# Patient Record
Sex: FEMALE | Race: WHITE
[De-identification: names, ages, dates, MRNs, and addresses within clinical notes are randomized per-mention and may not be internally consistent; named-entity substitution may affect disease eponyms.]

---

## 2017-02-28 ENCOUNTER — HOSPITAL ENCOUNTER (INPATIENT)
Dept: HOSPITAL 74 - JER | Age: 77
LOS: 7 days | Discharge: HOME | DRG: 811 | End: 2017-03-07
Attending: INTERNAL MEDICINE | Admitting: INTERNAL MEDICINE
Payer: COMMERCIAL

## 2017-02-28 VITALS — BODY MASS INDEX: 21.9 KG/M2

## 2017-02-28 DIAGNOSIS — K92.2: ICD-10-CM

## 2017-02-28 DIAGNOSIS — J96.21: ICD-10-CM

## 2017-02-28 DIAGNOSIS — B48.8: ICD-10-CM

## 2017-02-28 DIAGNOSIS — J44.1: ICD-10-CM

## 2017-02-28 DIAGNOSIS — K57.90: ICD-10-CM

## 2017-02-28 DIAGNOSIS — E87.6: ICD-10-CM

## 2017-02-28 DIAGNOSIS — D50.0: Primary | ICD-10-CM

## 2017-02-28 DIAGNOSIS — N17.9: ICD-10-CM

## 2017-02-28 DIAGNOSIS — E78.5: ICD-10-CM

## 2017-02-28 DIAGNOSIS — Z86.718: ICD-10-CM

## 2017-02-28 DIAGNOSIS — Z99.81: ICD-10-CM

## 2017-02-28 DIAGNOSIS — Z87.891: ICD-10-CM

## 2017-02-28 DIAGNOSIS — I50.31: ICD-10-CM

## 2017-02-28 DIAGNOSIS — I11.0: ICD-10-CM

## 2017-02-28 LAB
ALBUMIN SERPL-MCNC: 3.7 G/DL (ref 3.4–5)
ALP SERPL-CCNC: 89 U/L (ref 45–117)
ALT SERPL-CCNC: 27 U/L (ref 12–78)
ANION GAP SERPL CALC-SCNC: 10 MMOL/L (ref 8–16)
ANISOCYTOSIS BLD QL SMEAR: (no result)
APTT BLD: 28.5 SECONDS (ref 26.9–34.4)
AST SERPL-CCNC: 25 U/L (ref 15–37)
BASOPHILS # BLD: 0.7 % (ref 0–2)
BILIRUB SERPL-MCNC: 0.3 MG/DL (ref 0.2–1)
CALCIUM SERPL-MCNC: 9.4 MG/DL (ref 8.5–10.1)
CO2 SERPL-SCNC: 38 MMOL/L (ref 21–32)
CREAT SERPL-MCNC: 0.8 MG/DL (ref 0.55–1.02)
DEPRECATED RDW RBC AUTO: 21.8 % (ref 11.6–15.6)
EOSINOPHIL # BLD: 0.2 % (ref 0–4.5)
GLUCOSE SERPL-MCNC: 86 MG/DL (ref 74–106)
HYPOCHROMIA BLD QL SMEAR: (no result)
INR BLD: 0.96 (ref 0.82–1.09)
MCH RBC QN AUTO: 16.9 PG (ref 25.7–33.7)
MCHC RBC AUTO-ENTMCNC: 28.4 G/DL (ref 32–36)
MCV RBC: 59.7 FL (ref 80–96)
MICROCYTES BLD QL SMEAR: (no result)
NEUTROPHILS # BLD: 85 % (ref 42.8–82.8)
PLATELET # BLD AUTO: 120 K/MM3 (ref 134–434)
PMV BLD: 8.5 FL (ref 7.5–11.1)
PROT SERPL-MCNC: 6.9 G/DL (ref 6.4–8.2)
PT PNL PPP: 10.6 SEC (ref 9.98–11.88)
TARGETS BLD QL SMEAR: (no result)
TROPONIN I SERPL-MCNC: < 0.02 NG/ML (ref 0–0.05)
VIT B12 SERPL-MCNC: 1642 PG/ML (ref 180–914)
WBC # BLD AUTO: 9.4 K/MM3 (ref 4–10)

## 2017-02-28 PROCEDURE — P9038 RBC IRRADIATED: HCPCS

## 2017-02-28 PROCEDURE — 30233N1 TRANSFUSION OF NONAUTOLOGOUS RED BLOOD CELLS INTO PERIPHERAL VEIN, PERCUTANEOUS APPROACH: ICD-10-PCS

## 2017-02-28 PROCEDURE — P9058 RBC, L/R, CMV-NEG, IRRAD: HCPCS

## 2017-02-28 RX ADMIN — SALINE NASAL SPRAY SCH SPRAY: 1.5 SOLUTION NASAL at 22:23

## 2017-02-28 RX ADMIN — ACLIDINIUM BROMIDE SCH PUFF: 400 POWDER, METERED RESPIRATORY (INHALATION) at 22:22

## 2017-02-28 RX ADMIN — ATORVASTATIN CALCIUM SCH MG: 10 TABLET, FILM COATED ORAL at 22:22

## 2017-02-28 RX ADMIN — FUROSEMIDE SCH MG: 10 INJECTION, SOLUTION INTRAVENOUS at 18:55

## 2017-02-28 RX ADMIN — LATANOPROST SCH DROP: 50 SOLUTION OPHTHALMIC at 22:23

## 2017-02-28 RX ADMIN — ALBUTEROL SULFATE PRN AMP: 2.5 SOLUTION RESPIRATORY (INHALATION) at 22:47

## 2017-02-28 RX ADMIN — BUDESONIDE AND FORMOTEROL FUMARATE DIHYDRATE SCH INH: 80; 4.5 AEROSOL RESPIRATORY (INHALATION) at 22:22

## 2017-02-28 RX ADMIN — MONTELUKAST SODIUM SCH MG: 10 TABLET, COATED ORAL at 22:22

## 2017-02-28 NOTE — HP
Admitting History and Physical





- Primary Care Physician


PCP: Alonso Gonzalez





- Admission


Chief Complaint: I couldn't breathe


History of Present Illness: 


Ms Peter is a very pleasant 76 year old female who comes in with difficulty 

breathing. She says it began about a week ago. At first it was minimal and 

noticed only on significant exertion, however it progressed to the point where 

she was feeling it constantly and at rest. She wears chronic oxygen and notes 

it was not helping the shortness of breath. She says she is unable to lie flat 

all the time and needs two pillows, this did not change. She noticed worsening 

swelling in her legs but that was minimal as well. She says that she was trying 

to treat this at home but the shortness of breath became so severe she needed 

to come in for further evaluation. She denies lightheadedness, dizziness, 

passing out, chest pain, coughing, nausea, vomiting, diarrhea, constipation, 

dark black stool, difficulty or pain on urination, change in color of her urine

, or redness in her legs. She is still feeling short of breath now.


History Source: Patient


Limitations to Obtaining History: No Limitations





- Past Medical History


Cardiovascular: Yes: HTN


Pulmonary: Yes: COPD


Heme/Onc: Yes: Anemia


Musculoskeletal: Yes: Osteoarthritis





- Past Surgical History


Past Surgical History: Yes: None





- Smoking History


Smoking history: Former smoker


Have you smoked in the past 12 months: No


Aproximately how many cigarettes per day: 0


If you are a former smoker, when did you quit?: Over 15 years ago





- Alcohol/Substance Use


Hx Alcohol Use: No


History of Substance Use: reports: None





- Social History


Usual Living Arrangement: Yes: Alone


ADL: Independent


Occupation: Retired dental , , 2 children


History of Recent Travel: No





Home Medications





- Allergies


Allergies/Adverse Reactions: 


 Allergies











Allergy/AdvReac Type Severity Reaction Status Date / Time


 


No Known Allergies Allergy   Verified 02/28/17 13:14














- Home Medications


Home Medications: 


Ambulatory Orders





Albuterol Sulfate [Proair Hfa -] 1 - 2 inh PO PRN PRN 06/14/12 


Fluticasone/Salmeterol [Advair 250-50 Diskus] 1 each IH BID 06/14/12 


Tiotropium Bromide [Spiriva] 18 mcg IH DAILY 06/14/12 


Montelukast Na [Singulair -] 10 mg PO HS #0 tablet 06/19/12 


Multivitamins [Multivit ([facility] Formul[facility])] 1 udtab PO DAILY #0 tab 06/19/12 


Sodium Chloride Nasal Spray [Ocean Spray Nasal Spray -] 2 spray NS TID #0 

spraybtl 06/19/12 


Ferrous Sulfate [Feosol] 325 mg PO DAILY 09/17/12 


Calcium Carbonate/Vitamin D3 [Calcium 600-Vit D3 200 Tablet] 1 each PO DAILY 07/ 06/13 


Docosahexanoic Acid/Epa [Fish Oil Softgel] 1 each PO DAILY 07/06/13 


Potassium Chloride [K-Dur] 20 meq PO DAILY #0 tab.er.prt 07/06/13 


Alendronate Sodium [Fosamax] 35 mg PO COSTA 05/01/16 


Atorvastatin Ca [Lipitor] 10 mg PO HS 05/01/16 


Latanoprost 0.005% Eye Drops [Xalatan 0.005% Eye Drops -] 1 drop OU HS 05/01/16 


Torsemide 40 mg PO BID #120 tablet 05/02/16 


Diltiazem Cd [Cardizem Cd -] 180 mg PO BID 02/28/17 


Diphenhydramine HCl [Benadryl -] 25 mg PO Q6H PRN 02/28/17 


Pantoprazole Sodium 40 mg PO DAILY 02/28/17 











Family Disease History





- Family Disease History


Family Disease History: Heart Disease: Father, Other: Mother (Alzheimers 

Dementia)





Review of Systems


Findings/Remarks: 


Full review of systems obtained, as per HPI and otherwise negative





Physical Examination


Vital Signs: 


 Vital Signs











Temperature  99.1 F   02/28/17 13:15


 


Pulse Rate  101 H  02/28/17 13:26


 


Respiratory Rate  20   02/28/17 13:15


 


Blood Pressure  123/78   02/28/17 13:15


 


O2 Sat by Pulse Oximetry (%)  94 L  02/28/17 13:26











Constitutional: Yes: Well Nourished, No Distress, Calm, Pallor


Eyes: Yes: Conjunctiva Clear, EOM Intact, PERRL


HENT: Yes: Atraumatic, Normocephalic


Cardiovascular: Yes: Regular Rate and Rhythm.  No: Gallop, Murmur, Rub


Respiratory: Yes: Regular, CTA Bilaterally, On Nasal O2.  No: Rales, Rhonchi, 

Wheezes


Gastrointestinal: Yes: Normal Bowel Sounds, Soft.  No: Distention, Tenderness


Extremities: Yes: WNL


Edema: Yes


Edema: LLE: 1+, RLE: 1+


Labs: 


 CBC, BMP





 02/28/17 13:45 





 02/28/17 13:45 











Imaging





- Results


Chest X-ray: Report Reviewed, Image Reviewed


EKG: Image Reviewed





Problem List





- Problems


(1) Anemia


Assessment/Plan: 


-symptomatic anemia with shortness of breath


-has history of anemia, on iron 


-will check anemia labs


-stool for occult blood


-hematology consult


-transfusion ordered by ER physician





Code(s): D64.9 - ANEMIA, UNSPECIFIED   





(2) SOB (shortness of breath)


Assessment/Plan: 


-most likely secondary to anemia


-however will admit to telemetry and rule out for ACS


-cardiac enzymes x3, first set negative


Code(s): R06.02 - SHORTNESS OF BREATH





(3) CHF (congestive heart failure)


Assessment/Plan: 


-patient with slightly increased swelling in her lower extremities per history


-chest x-ray clear, SOB not from CHF exacerbation


-continue torsemide


-cardiology consult


Code(s): I50.9 - HEART FAILURE, UNSPECIFIED   





(4) Hypertension


Assessment/Plan: 


-continue diltiazem and torsemide


-monitor


Code(s): I10 - ESSENTIAL (PRIMARY) HYPERTENSION   





(5) COPD (chronic obstructive pulmonary disease)


Assessment/Plan: 


-baseline


-continue home regimen


-consult Dr Soto, outpatient pulmonologist


Code(s): J44.9 - CHRONIC OBSTRUCTIVE PULMONARY DISEASE, UNSPECIFIED

## 2017-02-28 NOTE — CON.CARD
Consult


Consult Specialty:: cardio


Referred by:: janki


Reason for Consultation:: sob





- History of Present Illness


Chief Complaint: sob


History of Present Illness: 


77 yo female here with progressive sob.





sx's have worsened from mostly on exertion to now also at rest.


her usual home O2 is not helping relieve her sx's


started noticing the sob about 1 week ago but signif worse the past few days 

and today was much worse.


wheezing the past couple of days as well.


legs much more swollen than usual (baseline is feet only).


says her usual wt runs 120-122 lbs, hasn't checked recently.


thinks abdomen is slightly swollen/distended as well.





scattered mild cough, dry.


sore throat x 3d;


no fever/chills;


felt very tired today;


nausea/dry heaves yest





no cp/pressure/heaviness at all





pt here 5/16 with sob.


treated for both a.e. copd and acute diast chf at that time.


lasix 80 iv bid given (had been on lasix 40 po bid as outpt)--labs were stable 

with this tx for 2-3d, then d/c'd after brief stay (? sent out on torsemide 40 

bid per d/c summary).





PMH:


COPD


HFpEF


HTN


anemia





- Past Medical History


Cardio/Vascular: Yes: HTN


Pulmonary: Yes: COPD


Musculoskeletal: Yes: Osteoarthritis





- Past Surgical History


Past Surgical History: Yes: None





- Alcohol/Substance Use


Hx Alcohol Use: No


History of Substance Use: reports: None





- Smoking History


Smoking history: Former smoker


Have you smoked in the past 12 months: No


Aproximately how many cigarettes per day: 0


If you are a former smoker, when did you quit?: Over 15 years ago





- Social History


ADL: Independent


Occupation: Retired dental , , 2 children


History of Recent Travel: No





Home Medications





- Allergies


Allergies/Adverse Reactions: 


 Allergies











Allergy/AdvReac Type Severity Reaction Status Date / Time


 


No Known Allergies Allergy   Verified 02/28/17 13:14














- Home Medications


Home Medications: 


Ambulatory Orders





Albuterol Sulfate [Proair Hfa -] 1 - 2 inh PO PRN PRN 06/14/12 


Fluticasone/Salmeterol [Advair 250-50 Diskus] 1 each IH BID 06/14/12 


Tiotropium Bromide [Spiriva] 18 mcg IH DAILY 06/14/12 


Montelukast Na [Singulair -] 10 mg PO HS #0 tablet 06/19/12 


Multivitamins [Multivit ([facility] Formul[facility])] 1 udtab PO DAILY #0 tab 06/19/12 


Sodium Chloride Nasal Spray [Ocean Spray Nasal Spray -] 2 spray NS TID #0 

spraybtl 06/19/12 


Ferrous Sulfate [Feosol] 325 mg PO DAILY 09/17/12 


Calcium Carbonate/Vitamin D3 [Calcium 600-Vit D3 200 Tablet] 1 each PO DAILY 07/ 06/13 


Docosahexanoic Acid/Epa [Fish Oil Softgel] 1 each PO DAILY 07/06/13 


Potassium Chloride [K-Dur] 20 meq PO DAILY #0 tab.er.prt 07/06/13 


Alendronate Sodium [Fosamax] 35 mg PO COSTA 05/01/16 


Atorvastatin Ca [Lipitor] 10 mg PO HS 05/01/16 


Latanoprost 0.005% Eye Drops [Xalatan 0.005% Eye Drops -] 1 drop OU HS 05/01/16 


Torsemide 40 mg PO BID #120 tablet 05/02/16 


Diltiazem Cd [Cardizem Cd -] 180 mg PO BID 02/28/17 


Diphenhydramine HCl [Benadryl -] 25 mg PO Q6H PRN 02/28/17 


Pantoprazole Sodium 40 mg PO DAILY 02/28/17 











Family Disease History





- Family Disease History


Family Disease History: Heart Disease: Father, Other: Mother (Alzheimers 

Dementia)





Review of Systems





- Review of Systems


Constitutional: denies: Chills, Fever


Eyes: denies: Eye Pain


HENT: denies: Nasal Congestion


Neck: denies: Stiffness


Cardiovascular: denies: Palpitations


Respiratory: denies: Orthopnea, PND


Gastrointestinal: denies: Diarrhea, Rectal Bleeding


Genitourinary: denies: Burning, Hematuria


Musculoskeletal: denies: Muscle Pain


Integumentary: denies: Rash


Neurological: denies: Numbness, Seizure, Syncope


Endocrine: denies: Excessive Sweating


Hematology/Lymphatic: denies: Excessive Bleeding


Vital Signs: 


 Vital Signs











Temperature  99.1 F   02/28/17 13:15


 


Pulse Rate  94 H  02/28/17 16:35


 


Respiratory Rate  17   02/28/17 16:35


 


Blood Pressure  119/63   02/28/17 16:35


 


O2 Sat by Pulse Oximetry (%)  100   02/28/17 16:35











Constitutional: Yes: Well Nourished, No Distress


Eyes: No: Sclera Icterus


HENT: No: Nasal Congestion


Neck: No: Decreased ROM


Respiratory: Yes: CTA Bilaterally.  No: Accessory Muscle Use, Rales (-), Wheezes


Gastrointestinal: Yes: Normal Bowel Sounds.  No: Distention, Hepatomegaly, 

Palpable Mass, Tenderness


Cardiovascular: Yes: Regular Rate and Rhythm


JVD: No


Carotid Bruit: No


PMI: Non-Displaced


Heart Sounds: Yes: S1, S2.  No: Gallop


Murmur: No: Systolic Murmur, Diastolic Murmur


Musculoskeletal: Yes: Other (No kyphosis)


Extremities: No: Cold, Cyanosis


Edema: Yes (2+ pretibs bilat)


Peripheral Pulses: 2+ Left Carotid, 2+ Right Carotid, 2+ Left Doralis Pedis, 2+ 

Right Dorsalis Pedis


Integumentary: No: Jaundice


Neurological: Yes: Alert, Oriented (x3)


Psychiatric: No: Agitated





- Other Data


Labs, Other Data: 


 INR, PTT











INR  0.96  (0.82-1.09)   02/28/17  15:00    








 Laboratory Tests











  04/29/16 05/02/16 02/28/17





  21:30 06:00 13:45


 


WBC    9.4


 


Hgb   8.9 L  6.2 L* D


 


Plt Count    120 L D


 


Sodium   


 


Potassium   


 


Carbon Dioxide   


 


BUN   


 


Creatinine   


 


AST   


 


ALT   


 


Creatine Kinase   


 


Troponin I   


 


B-Natriuretic Peptide  611.07 H  














  02/28/17





  13:45


 


WBC 


 


Hgb 


 


Plt Count 


 


Sodium  140


 


Potassium  3.5


 


Carbon Dioxide  38 H


 


BUN  18  D


 


Creatinine  0.8


 


AST  25


 


ALT  27


 


Creatine Kinase  88


 


Troponin I  < 0.02


 


B-Natriuretic Peptide  645.36 H











ekg 2/28 (ER): NSR, normal asix/interv; WNL





Imaging





- Results


Chest X-ray: Report Reviewed, Image Reviewed





Assessment/Plan


Echo 4/16: nl LV/EF; RV tds; nl LA; valves WNL; RVSP 40-50





acute SOB, acute diast chf:


-BNP 600s, similar to when was here with probable chf 4/16


-? sx's due to worsening anemia with hgb 6


-CXR with no vasc redistribution pattern, no effusions--diffuse incr'd markings 

? chronic (copd)--incr'd vs prior 4/16 on my review but ? technique-related (

i.e. differences in penetratino); no signif cardiomegaly


-leg swelling is signif incr'd lately (? abdomen as well per pt), raising 

suspicion for chf though i cannot appreciate JVD on exam


-has been complying with torsemide 40 bid at home she says


-will start lasix 80 iv bid (diuresed well with this last time here)


-close f/u of daily wts and BMP--low threshold to hold diuretics if bun/creat 

bumps (in which case her sx's are likely related to anemia)


-no suspicion of ACS, enzymes neg x1 and ecg WNL





mild pulm HTN:


-estimated RVSP 40-50 on recent echo, RV not well seen on that report


-? sec to LV diast chf vs ? hypoxic chronic lung dz (on home O2 for copd)--

these 2 dx's are extremely likely to represent the etiology of her pulm htn and 

tx directed at both (i.e. diuretics and supplemental O2/airways tx) is all that 

is indicated here





COPD, ? with a.e.:


-+ wheezing and mild URI sx's


-defer to dr shearer +/- pulm as indicated, regarding airways-specific tx's


-check ABG





HTN:


-well controlled


-cont home meds





anemia, chronic:


-baseline hgb runs 8s-9s


-currently 6's, likely symptomatic (sob)


-PRBCs +/- further w/u of etiology per dr shearer





NO NEED FOR TELEMETRY MONITORING

## 2017-02-28 NOTE — CONSULT
Consult


Consult Specialty:: Oncology-hematology


Referred by:: Dr. Tucker


Reason for Consultation:: Hypochromic, Microcytic anemia





- History Source


History Provided By: Patient


Limitations to Obtaining History: No Limitations





- Past Medical History


Cardio/Vascular: Yes: HTN


Pulmonary: Yes: COPD, Other (oxygen at home)


Gastrointestinal: Yes: Other (History of  ulcer disease diagnosed 3 years ago 

when patient presented with anemia)


...Pregnant: No


Heme/Onc: Yes: Anemia


Musculoskeletal: Yes: Osteoarthritis





- Past Surgical History


Past Surgical History: Yes: None





- Alcohol/Substance Use


Hx Alcohol Use: No


History of Substance Use: reports: None





- Smoking History


Smoking history: Former smoker


Have you smoked in the past 12 months: No


Aproximately how many cigarettes per day: 0


If you are a former smoker, when did you quit?: Over 15 years ago





- Social History


ADL: Independent


Occupation: Retired dental , , 2 children


History of Recent Travel: No





Home Medications





- Allergies


Allergies/Adverse Reactions: 


 Allergies











Allergy/AdvReac Type Severity Reaction Status Date / Time


 


No Known Allergies Allergy   Verified 02/28/17 13:14














- Home Medications


Home Medications: 


Ambulatory Orders





Albuterol Sulfate [Proair Hfa -] 1 - 2 inh PO PRN PRN 06/14/12 


Fluticasone/Salmeterol [Advair 250-50 Diskus] 1 each IH BID 06/14/12 


Tiotropium Bromide [Spiriva] 18 mcg IH DAILY 06/14/12 


Montelukast Na [Singulair -] 10 mg PO HS #0 tablet 06/19/12 


Multivitamins [Multivit ([facility] Formul[facility])] 1 udtab PO DAILY #0 tab 06/19/12 


Sodium Chloride Nasal Spray [Ocean Spray Nasal Spray -] 2 spray NS TID #0 

spraybtl 06/19/12 


Ferrous Sulfate [Feosol] 325 mg PO DAILY 09/17/12 


Calcium Carbonate/Vitamin D3 [Calcium 600-Vit D3 200 Tablet] 1 each PO DAILY 07/ 06/13 


Docosahexanoic Acid/Epa [Fish Oil Softgel] 1 each PO DAILY 07/06/13 


Potassium Chloride [K-Dur] 20 meq PO DAILY #0 tab.er.prt 07/06/13 


Alendronate Sodium [Fosamax] 35 mg PO COSTA 05/01/16 


Atorvastatin Ca [Lipitor] 10 mg PO HS 05/01/16 


Latanoprost 0.005% Eye Drops [Xalatan 0.005% Eye Drops -] 1 drop OU HS 05/01/16 


Torsemide 40 mg PO BID #120 tablet 05/02/16 


Diltiazem Cd [Cardizem Cd -] 180 mg PO BID 02/28/17 


Diphenhydramine HCl [Benadryl -] 25 mg PO Q6H PRN 02/28/17 


Pantoprazole Sodium 40 mg PO DAILY 02/28/17 











Family Disease History





- Family Disease History


Family Disease History: Heart Disease: Father, Other: Mother (Alzheimers 

Dementia)


Other Family History: No family history of ca





Review of Systems





- Review of Systems


Constitutional: reports: Weakness.  denies: Fever, Loss of Appetite, Night 

Sweats


Eyes: denies: Double Vision, Recent Change in Vision


HENT: reports: Difficult Swallowing.  denies: Epistaxis, Throat Pain


Neck: denies: Pain on Movement, Stiffness, Tenderness


Cardiovascular: reports: Shortness of Breath.  denies: Chest Pain


Respiratory: reports: Exercise Intolerance, SOB, SOB on Exertion.  denies: Cough

, Hemoptysis


Gastrointestinal: denies: Abdominal Pain, Bloating, Constipation, Diarrhea, 

Melena, Nausea, Rectal Bleeding, Vomiting


Genitourinary: denies: Burning, Dysuria, Flank Pain, Frequency, Incontinence


Musculoskeletal: denies: Back Pain, Extremity Pain, Muscle Pain


Integumentary: denies: Blister, Bruising, Eczema, Erythema


Neurological: reports: No Symptoms


Endocrine: reports: No Symptoms


Hematology/Lymphatic: denies: Easily Bruised, Excessive Bleeding, Swollen Glands


Psychiatric: reports: No Symptoms





Physical Exam


Vital Signs: 


 Vital Signs











Temperature  98.8 F   02/28/17 19:44


 


Pulse Rate  102 H  02/28/17 19:44


 


Respiratory Rate  20   02/28/17 19:44


 


Blood Pressure  113/62   02/28/17 19:44


 


O2 Sat by Pulse Oximetry (%)  95   02/28/17 19:44











Constitutional: Yes: Mild Distress


Eyes: Yes: PERRL.  No: Diplopia, Ptosis, Sclera Icterus


HENT: Yes: Atraumatic, Normocephalic.  No: Hoarseness, Tonsillar Exudate


Neck: Yes: Supple, Trachea Midline.  No: Lymphadenopathy, Thyromegaly


Cardiovascular: Yes: Regular Rate and Rhythm


Respiratory: Yes: Rales, Wheezes


Gastrointestinal: Yes: Normal Bowel Sounds, Soft.  No: Hepatomegaly, Palpable 

Mass, Splenomegaly


Renal/: No: Anuria, CVA Tenderness - Left


Breast(s): Yes: WNL, Left, Right


Musculoskeletal: No: Back Pain, Joint Swelling, Muscle Pain


Extremities: No: Cold, Cool, Cyanosis


Edema: LLE: 3+, RLE: 3+


Integumentary: No: Bruising, Erythema


Neurological: Yes: WNL


...Motor Strength: WNL


Psychiatric: Yes: WNL





Problem List





- Problems


(1) Anemia


Assessment/Plan: 


History of ulcer disease diagnosed 3 years ago when patient underwent last EGD 

and colonoscopy. 


Treated medically with PPI's. 


Last saw  PCP, Dr. Gonzalez several months ago and was not told of anemia.


Presents now with hypochromic, microcytic anemia. Denies GI complaints of nausea

, diarrhea, constipation, melena, hematochezia, abdominal pains.


Likely diagnosis is blood loss anemia causing symptomatic SOB and dyspnea. 


Fe++ studies pending. 


To check  for celiac disease and for HbE. 


Will obtain stool guaics and evaluate for hemolysis .


Will need GI assessment. 


 








Code(s): D64.9 - ANEMIA, UNSPECIFIED

## 2017-02-28 NOTE — PDOC
History of Present Illness





- General


History Source: Patient


Exam Limitations: No Limitations





- History of Present Illness


Initial Comments: 





02/28/17 14:03


The patient is a 76 year old female brought via EMS, with a significant past 

medical history of COPD on home O2 (2 liters), anemia, HTN, HLD and lower 

extremity DVT, who presents to the emergency department with shortness of 

breath for the last couple of days. She states that she has been having a cough 

as well for an undisclosed amount of time, that is dry in nature. She notes 

that she has been compliant with all her medication and has not been eating any 

salty foods. She also notes that she has had lower extremity swelling in the 

past. 





The patient denies chest pain, headache and dizziness. Denies fever, chills, 

nausea, vomit, diarrhea and constipation. Denies dysuria, frequency, urgency 

and hematuria. 





Allergies: None 


Past surgical history: None reported 


Social history: Former smoker


PMD - Dr. Gonzalez 


Cardiology - Dr. Cuba


Pulmonology - Dr. Soto  





<Caleb Ku - Last Filed: 02/28/17 14:22>





- General


History Source: Patient, Old Records


Exam Limitations: No Limitations





<Lila Husain - Last Filed: 02/28/17 15:18>





- General


Chief Complaint: Shortness of Breath


Stated Complaint: DIFFICULTY BREATHING


Time Seen by Provider: 02/28/17 13:25





Past History





<Caleb Ku - Last Filed: 02/28/17 14:22>





- Past Medical History


Anemia: Yes (BLOOD TRANSFUSION)


Asthma: No


Cancer: No


Cardiac Disorders: No


CVA: No


COPD: Yes (USES O2 AT HOME 2 LITERS)


CHF: No


Dementia: No


Diabetes: No


GI Disorders: Yes


 Disorders: No


HTN: Yes


Hypercholesterolemia: Yes


Liver Disease: No


Suicide Attempt (Hx): No


Seizures: No


Thyroid Disease: No





- Surgical History


Abdominal Surgery: No


Appendectomy: No


Cardiac Surgery: No


Cholecystectomy: No


Lung Surgery: No


Neurologic Surgery: No


Orthopedic Surgery: No





- Psycho/Social/Smoking Cessation Hx


Anxiety: No


Suicidal Ideation: No


Smoking Status: Yes


Smoking History: Former smoker


Have you smoked in the past 12 months: No


Number of Cigarettes Smoked Daily: 0


If you are a former smoker, when did you quit?: Over 15 years ago


Information on smoking cessation initiated: No


Hx Alcohol Use: No


Drug/Substance Use Hx: No


Substance Use Type: None


Hx Substance Use Treatment: No





<LishaLila - Last Filed: 02/28/17 15:18>





- Past Medical History


Allergies/Adverse Reactions: 


 Allergies











Allergy/AdvReac Type Severity Reaction Status Date / Time


 


No Known Allergies Allergy   Verified 02/28/17 13:14











Home Medications: 


Ambulatory Orders





Albuterol Sulfate [Proair Hfa -] 1 - 2 inh PO PRN PRN 06/14/12 


Fluticasone/Salmeterol [Advair 250-50 Diskus] 1 each IH BID 06/14/12 


Tiotropium Bromide [Spiriva] 18 mcg IH DAILY 06/14/12 


Albuterol Sulfate Inhaler - [Ventolin HFA Inhaler -] 2 inh IH Q4H PRN #0 inh 06/ 19/12 


Montelukast Na [Singulair -] 10 mg PO HS #0 tablet 06/19/12 


Multivitamins [Multivit ([facility] Formul[facility])] 1 udtab PO DAILY #0 tab 06/19/12 


Sodium Chloride Nasal Spray [Ocean Spray Nasal Spray -] 2 spray NS TID #0 

spraybtl 06/19/12 


Ferrous Sulfate [Feosol] 325 mg PO DAILY 09/17/12 


Calcium Carbonate/Vitamin D3 [Calcium 600-Vit D3 200 Tablet] 1 each PO DAILY 07/ 06/13 


Docosahexanoic Acid/Epa [Fish Oil Softgel] 1 each PO DAILY 07/06/13 


Potassium Chloride [K-Dur] 20 meq PO DAILY #0 tab.er.prt 07/06/13 


Alendronate Sodium [Fosamax] 35 mg PO WEEKLY 05/01/16 


Atorvastatin Ca [Lipitor] 10 mg PO HS 05/01/16 


Latanoprost 0.005% Eye Drops [Xalatan 0.005% Eye Drops -] 1 drop OU HS 05/01/16 


Diltiazem Cd [Cardizem Cd -] 180 mg PO BID #60 cap.cd.24h 05/02/16 


Torsemide 40 mg PO BID #120 tablet 05/02/16 


Azithromycin 250 mg PO DAILY 02/28/17 


Diltiazem Cd [Cardizem Cd -] 180 mg PO BID 02/28/17 


Potassium Chloride [Klor-Con M20] 20 meq PO AM 02/28/17 











**Review of Systems





- Review of Systems


Able to Perform ROS?: Yes


Comments:: 





02/28/17 14:04


GENERAL/CONSTITUTIONAL: No fever or chills. No weakness.


HEAD, EYES, EARS, NOSE AND THROAT: No change in vision. No ear pain or 

discharge. No sore throat.


CARDIOVASCULAR: +Shortness of breath. No chest pain


RESPIRATORY: +Cough. No wheezing, or hemoptysis.


GASTROINTESTINAL: No nausea, vomiting, diarrhea or constipation.


GENITOURINARY: No dysuria, frequency, or change in urination.


MUSCULOSKELETAL: No joint or muscle swelling or pain. No neck or back pain.


SKIN: No rash


NEUROLOGIC: No headache, vertigo, loss of consciousness, or change in strength/

sensation.


ENDOCRINE: No increased thirst. No abnormal weight change


HEMATOLOGIC/LYMPHATIC: No anemia, easy bleeding, or history of blood clots.


ALLERGIC/IMMUNOLOGIC: No hives or skin allergy.





<Caleb Ku - Last Filed: 02/28/17 14:22>





*Physical Exam





- Vital Signs


 Last Vital Signs











Temp Pulse Resp BP Pulse Ox


 


 99.1 F   101 H  20   123/78   94 L


 


 02/28/17 13:15  02/28/17 13:26  02/28/17 13:15  02/28/17 13:15  02/28/17 13:26














- Physical Exam


Comments: 





02/28/17 14:04


GENERAL: Awake, alert, and fully oriented, in no acute distress


HEAD: No signs of trauma, normocephalic, atraumatic 


EYES: PERRLA, EOMI, sclera anicteric, conjunctiva clear


ENT: Auricles normal inspection, hearing grossly normal, nares patent, 

oropharynx clear without


exudates. Moist mucosa


NECK: Normal ROM, supple, no lymphadenopathy, JVD, or masses


LUNGS: +Distant breath sounds but equal bilaterally. No distress, speaks full 

sentences, clear to auscultation bilaterally 


HEART: Regular rate and rhythm, normal S1 and S2, no murmurs, rubs or gallops, 

peripheral pulses normal and equal bilaterally. 


ABDOMEN: Soft, nontender, normoactive bowel sounds.  No guarding, no rebound.  

No masses


EXTREMITIES: +2+ pitting edema bilaterally. Erythema of the distal fibular 

region left greater than right. Normal range of motion.  No clubbing or 

cyanosis. 


NEUROLOGICAL: Cranial nerves II through XII grossly intact.  Normal speech, 

normal gait, no focal sensorimotor deficits 


SKIN: Warm, Dry, normal turgor, no rashes or lesions noted





<Caleb Ku - Last Filed: 02/28/17 14:22>





- Vital Signs


 Last Vital Signs











Temp Pulse Resp BP Pulse Ox


 


 99.1 F   101 H  20   123/78   88 L


 


 02/28/17 13:15  02/28/17 13:15  02/28/17 13:15  02/28/17 13:15  02/28/17 13:15














<Lila Husain - Last Filed: 02/28/17 15:18>





ED Treatment Course





- RADIOLOGY


Radiograph Interpretation: 





02/28/17 14:22


Chest X-Ray


Reviewed by: Dr. Tony Macdonald


Impression: No significant interval change or acute lung disease is present. 





<Caleb Ku - Last Filed: 02/28/17 14:22>





- LABORATORY


CBC & Chemistry Diagram: 


 02/28/17 13:45





 02/28/17 13:45





<Lila Husain - Last Filed: 02/28/17 15:18>





Medical Decision Making





- Medical Decision Making


02/28/17 14:27


76-year-old female with history of hypertension, anemia, COPD on home O2, DVT 

last year and CHF exacerbation who presents to the emergency department with 

three-day history of progressive shortness of breath and lower extremity 

swelling. Differential diagnosis includes but is not limited to: Pneumonia, 

influenza, ACS, CHF, COPD exacerbation, anemia, electrolyte abnormality, toxic/

metabolic derangement.


Plan:


1. EKG


2. Chest x-ray


3. Labs


4. Urine analysis


5. Influenza PCR


6. DuoNeb treatment


7. Observe and reevaluate





02/28/17 15:16


Addendum:  The labs were reviewed and are noted in the EMR.  The hemoglobin is 

6.2 which is significantly lower than her baseline.  Rectal exam showed brown 

stool (hemoccult results are pending).  CXR is negative for acute infiltrate.  

Will admit to telemetry for serial cardiac markers, anemia work-up and 

monitoring of SOB.





<Lila Husain - Last Filed: 02/28/17 15:18>





*DC/Admit/Observation/Transfer





- Attestations


Scribe Attestion: 





02/28/17 14:05


Documentation prepared by Caleb Ku, acting as medical scribe for 

Lila Husain MD





<Caleb Ku - Last Filed: 02/28/17 14:22>





- Discharge Dispostion


Admit: Yes





- Attestations


Physician Attestion: 





02/28/17 14:29


I, Dr. Lila Husain, attest that the scribes documentation that appears above 

has been prepared under my direction and personally reviewed by me in its 

entirety.





I confirmed that the note above accurately reflects all work, treatment, 

procedures, and medical decision-making performed by me.





<Lila Husain - Last Filed: 02/28/17 15:18>


Diagnosis at time of Disposition: 


 SOB (shortness of breath), Anemia, COPD exacerbation





- Discharge Dispostion


Condition at time of disposition: Stable





- Referrals


Referrals: 


Alonso Gonzalez MD [Primary Care Provider] -

## 2017-03-01 LAB
ANION GAP SERPL CALC-SCNC: 8 MMOL/L (ref 8–16)
APPEARANCE UR: CLEAR
BASOPHILS # BLD: 0.4 % (ref 0–2)
BILIRUB DIRECT SERPL-MCNC: 193 U/L (ref 84–246)
BILIRUB UR STRIP.AUTO-MCNC: NEGATIVE MG/DL
CALCIUM SERPL-MCNC: 7.8 MG/DL (ref 8.5–10.1)
CO2 SERPL-SCNC: 37 MMOL/L (ref 21–32)
COLOR UR: (no result)
CREAT SERPL-MCNC: 0.8 MG/DL (ref 0.55–1.02)
DEPRECATED RDW RBC AUTO: 29.8 % (ref 11.6–15.6)
EOSINOPHIL # BLD: 0 % (ref 0–4.5)
GLUCOSE SERPL-MCNC: 99 MG/DL (ref 74–106)
KETONES UR QL STRIP: NEGATIVE
LEUKOCYTE ESTERASE UR QL STRIP.AUTO: NEGATIVE
MAGNESIUM SERPL-MCNC: 2.6 MG/DL (ref 1.8–2.4)
MCH RBC QN AUTO: 20.8 PG (ref 25.7–33.7)
MCHC RBC AUTO-ENTMCNC: 31 G/DL (ref 32–36)
MCV RBC: 67.1 FL (ref 80–96)
NEUTROPHILS # BLD: 79.8 % (ref 42.8–82.8)
NITRITE UR QL STRIP: NEGATIVE
PH UR: 7 [PH] (ref 5–8)
PHOSPHATE SERPL-MCNC: 4.1 MG/DL (ref 2.5–4.9)
PLATELET # BLD AUTO: 102 K/MM3 (ref 134–434)
PMV BLD: 8.5 FL (ref 7.5–11.1)
PROT UR QL STRIP: NEGATIVE
PROT UR QL STRIP: NEGATIVE
RBC # UR STRIP: NEGATIVE /UL
SP GR UR: 1.01 (ref 1–1.03)
TROPONIN I SERPL-MCNC: < 0.02 NG/ML (ref 0–0.05)
TROPONIN I SERPL-MCNC: < 0.02 NG/ML (ref 0–0.05)
UROBILINOGEN UR STRIP-MCNC: NEGATIVE E.U./DL (ref 0.2–1)
WBC # BLD AUTO: 8.5 K/MM3 (ref 4–10)

## 2017-03-01 RX ADMIN — SALINE NASAL SPRAY SCH SPRAY: 1.5 SOLUTION NASAL at 14:04

## 2017-03-01 RX ADMIN — LATANOPROST SCH DROP: 50 SOLUTION OPHTHALMIC at 22:01

## 2017-03-01 RX ADMIN — ACLIDINIUM BROMIDE SCH PUFF: 400 POWDER, METERED RESPIRATORY (INHALATION) at 10:37

## 2017-03-01 RX ADMIN — FUROSEMIDE SCH MG: 10 INJECTION, SOLUTION INTRAVENOUS at 06:49

## 2017-03-01 RX ADMIN — SALINE NASAL SPRAY SCH SPRAY: 1.5 SOLUTION NASAL at 22:01

## 2017-03-01 RX ADMIN — BUDESONIDE AND FORMOTEROL FUMARATE DIHYDRATE SCH INH: 80; 4.5 AEROSOL RESPIRATORY (INHALATION) at 22:02

## 2017-03-01 RX ADMIN — MULTIVITAMIN TABLET SCH TAB: TABLET at 10:36

## 2017-03-01 RX ADMIN — SALINE NASAL SPRAY SCH SPRAY: 1.5 SOLUTION NASAL at 06:50

## 2017-03-01 RX ADMIN — Medication SCH TAB: at 10:37

## 2017-03-01 RX ADMIN — ATORVASTATIN CALCIUM SCH MG: 10 TABLET, FILM COATED ORAL at 22:01

## 2017-03-01 RX ADMIN — FERROUS SULFATE TAB EC 324 MG (65 MG FE EQUIVALENT) SCH MG: 324 (65 FE) TABLET DELAYED RESPONSE at 10:37

## 2017-03-01 RX ADMIN — POTASSIUM CHLORIDE SCH MEQ: 1500 TABLET, EXTENDED RELEASE ORAL at 10:37

## 2017-03-01 RX ADMIN — ALBUTEROL SULFATE PRN AMP: 2.5 SOLUTION RESPIRATORY (INHALATION) at 18:10

## 2017-03-01 RX ADMIN — MONTELUKAST SODIUM SCH MG: 10 TABLET, COATED ORAL at 22:01

## 2017-03-01 RX ADMIN — ALBUTEROL SULFATE PRN AMP: 2.5 SOLUTION RESPIRATORY (INHALATION) at 12:24

## 2017-03-01 RX ADMIN — BUDESONIDE AND FORMOTEROL FUMARATE DIHYDRATE SCH INH: 80; 4.5 AEROSOL RESPIRATORY (INHALATION) at 10:37

## 2017-03-01 RX ADMIN — FUROSEMIDE SCH MG: 10 INJECTION, SOLUTION INTRAVENOUS at 14:03

## 2017-03-01 NOTE — PN
Progress Note (short form)





- Note


Progress Note: 


Called to see patient.  I had performed a colonoscopy 6/12 that revealed 

diverticulosis.  She then followed up with Dr. Rodríguez as an outpatient.  he 

performed EGD 9/12.  She said that she could not follow back up with him due to 

insurance purposes but would not mind seeing him again now that her insurance 

has changed to [facility].  I let Dr. Tucker know who will change the 

consult to Dr. Rodríguez.

## 2017-03-01 NOTE — CONSULT
Consultation: 


REQUESTING PROVIDER:





CONSULT REQUEST: We have been asked to medically evaluate this patient for sob.





HISTORY OF PRESENT ILLNESS:


This is a 77 yo F former smoker with PMH of COPD, anemia, HTN, and OA, who 

presents due to worsening sob x 2w. At baseline patient is O2 dependent 2 L and 

had dyspnea at rest. 2 weeks ago she started havign rhinorrhea and sore throat 

as well as worsening sob. She also noticed increased LE edema and orthopnea. 

She has had similar symptoms years prior. She states that in 2000 she had her 

last EGD and colonoscopy, at which time a bleeding gastric ulcer was 

discovered. She denies melena, hematochesia or hematemesis. Shje denies f/c, 

abd pain, chest pain, n/v, diarrhea, dysuria or hematuria. She is on daily Fe 

supplements. She received 2 L pRBC. She now feels better and reports reduced LE 

edema. Ambulating to bathroom on 2L NC 








REVIEW OF SYSTEMS:


CONSTITUTIONAL: 


Absent:  fever, chills, loss of appetite, weight change


HEENT: 


Absent:  difficulty swallowing, visual changes


CARDIOVASCULAR: 


Absent: chest pain, syncope, palpitations


RESPIRATORY: 


Absent: stridor, hemoptysis


GASTROINTESTINAL:


Absent: abdominal pain, abdominal distension, nausea, vomiting, diarrhea, 

constipation, melena, hematochezia


GENITOURINARY: 


Absent: dysuria, hematuria


MUSCULOSKELETAL: 


Absent: back pain, neck pain


SKIN: 


Absent: rash, itching, pallor


HEMATOLOGIC/IMMUNOLOGIC: 


Absent: easy bleeding, easy bruising


ENDOCRINE:


Absent: heat intolerance, cold intolerance


NEUROLOGIC: 


Absent: headache, focal weakness or paresthesias


PSYCHIATRIC: 


Absent: anxiety, depression





PHYSICAL EXAMINATION





 Vital Signs - 24 hr











  02/28/17 02/28/17 02/28/17





  16:20 16:35 19:44


 


Temperature   98.8 F


 


Pulse Rate 93 H  


 


Pulse Rate [  94 H 102 H





Left]   


 


Respiratory 19 17 20





Rate   


 


Blood Pressure 118/69  


 


Blood Pressure  119/63 113/62





[Left Arm]   


 


O2 Sat by Pulse 100 100 95





Oximetry (%)   














  02/28/17 02/28/17 03/01/17





  21:00 22:00 05:55


 


Temperature   98.6 F


 


Pulse Rate   110 H


 


Pulse Rate [   





Left]   


 


Respiratory 20  20





Rate   


 


Blood Pressure   121/67


 


Blood Pressure   





[Left Arm]   


 


O2 Sat by Pulse 96 96 





Oximetry (%)   














  03/01/17 03/01/17





  09:00 14:32


 


Temperature 98.0 F 98.1 F


 


Pulse Rate 93 H 96 H


 


Pulse Rate [  





Left]  


 


Respiratory 18 





Rate  


 


Blood Pressure 127/68 100/57


 


Blood Pressure  





[Left Arm]  


 


O2 Sat by Pulse  





Oximetry (%)  














GENERAL: Awake, alert, and fully oriented, in no acute distress.


HEAD: Normal with no signs of trauma.


EYES: Pupils equal, round and reactive to light, extraocular movements intact, 

sclera anicteric, conjunctiva clear. 


EARS, NOSE, THROAT: Moist mucous membranes.


NECK: supple without JVD


LUNGS: diffuse wheezes 


HEART: Regular rate and rhythm, normal S1 and S2


ABDOMEN: Soft, nontender, not distended, normoactive bowel sounds, no masses. 


MUSCULOSKELETAL: No CVA tenderness.


UPPER EXTREMITIES: 2+ pulses, No peripheral edema.


LOWER EXTREMITIES: 2+ pulses, warm, well-perfused. 1+ peripheral edema. 


NEUROLOGICAL:  Cranial nerves II-XII grossly intact. Normal speech. 


PSYCHIATRIC: Cooperative. Good eye contact. A


SKIN: Warm, dry








 Laboratory Results - last 24 hr











  02/28/17 03/01/17 03/01/17





  22:00 06:50 06:50


 


WBC   


 


RBC   


 


Hgb   


 


Hct   


 


MCV   


 


MCHC   


 


RDW   


 


Plt Count   


 


MPV   


 


Neutrophils %   


 


Lymphocytes %   


 


Monocytes %   


 


Eosinophils %   


 


Basophils %   


 


Retic Count    3.10 H D


 


Sodium   142 


 


Potassium   3.5 


 


Chloride   97 L 


 


Carbon Dioxide   37 H 


 


Anion Gap   8 


 


BUN   19 H 


 


Creatinine   0.8 


 


Random Glucose   99 


 


Calcium   7.8 L 


 


Phosphorus   4.1 


 


Magnesium   2.6 H D 


 


LD Total   193 


 


Creatine Kinase  74  70 


 


Troponin I  < 0.02  < 0.02 


 


Urine Color   


 


Urine Appearance   


 


Urine pH   


 


Ur Specific Gravity   


 


Urine Protein   


 


Urine Glucose (UA)   


 


Urine Ketones   


 


Urine Blood   


 


Urine Nitrite   


 


Urine Bilirubin   


 


Urine Urobilinogen   


 


Ur Leukocyte Esterase   


 


Stool Occult Blood   














  03/01/17 03/01/17 03/01/17





  06:50 07:30 10:20


 


WBC  8.5  


 


RBC  3.74  


 


Hgb  7.8 L D  


 


Hct  25.1 L D  


 


MCV  67.1 L  


 


MCHC  31.0 L  


 


RDW  29.8 H  


 


Plt Count  102 L  


 


MPV  8.5  


 


Neutrophils %  79.8  


 


Lymphocytes %  8.6  D  


 


Monocytes %  11.2 H  


 


Eosinophils %  0.0  D  


 


Basophils %  0.4  


 


Retic Count   


 


Sodium   


 


Potassium   


 


Chloride   


 


Carbon Dioxide   


 


Anion Gap   


 


BUN   


 


Creatinine   


 


Random Glucose   


 


Calcium   


 


Phosphorus   


 


Magnesium   


 


LD Total   


 


Creatine Kinase   


 


Troponin I   


 


Urine Color   Straw 


 


Urine Appearance   Clear 


 


Urine pH   7.0 


 


Ur Specific Gravity   1.008 


 


Urine Protein   Negative 


 


Urine Glucose (UA)   Negative 


 


Urine Ketones   Negative 


 


Urine Blood   Negative 


 


Urine Nitrite   Negative 


 


Urine Bilirubin   Negative 


 


Urine Urobilinogen   Negative 


 


Ur Leukocyte Esterase   Negative 


 


Stool Occult Blood    Positive








Active Medications











Generic Name Dose Route Start Last Admin





  Trade Name Freq  PRN Reason Stop Dose Admin


 


Albuterol Sulfate  1 amp 02/28/17 16:35 03/01/17 12:24





  Ventolin 0.5% -  NEB   1 amp





  Q6H PRN   Administration





  SHORT OF BREATH/WHEEZING   


 


Atorvastatin Calcium  10 mg 02/28/17 22:00 02/28/17 22:22





  Lipitor -  PO   10 mg





  HS ELI   Administration


 


Budesonide/Formoterol Fumarate  2 puff 02/28/17 22:00 03/01/17 10:37





  Symbicort 80/4.5mcg -  IH   2 inh





  BID ELI   Administration


 


Calcium Carbonate/Cholecalciferol  1 tab 03/01/17 10:00 03/01/17 10:37





  Os-Saleem 500+D -  PO   1 tab





  DAILY ELI   Administration


 


Diltiazem HCl  180 mg 02/28/17 22:00 03/01/17 10:37





  Cardizem Cd -  PO   180 mg





  BID ELI   Administration


 


Ferrous Sulfate  325 mg 03/01/17 10:00 03/01/17 10:37





  Feosol -  PO   325 mg





  DAILY ELI   Administration


 


Furosemide  80 mg 02/28/17 18:15 03/01/17 14:03





  Lasix Injection -  IVPUSH   80 mg





  BID@0600,1400 ELI   Administration


 


Latanoprost  1 drop 02/28/17 22:00 02/28/17 22:23





  Xalatan 0.005% Eye Drops -  OU   1 drop





  HS ELI   Administration


 


Montelukast Sodium  10 mg 02/28/17 22:00 02/28/17 22:22





  Singulair -  PO   10 mg





  HS ELI   Administration


 


Multivitamins/Minerals/Vitamin C  1 tab 03/01/17 10:00 03/01/17 10:36





  Tab-A-Vit -  PO   1 tab





  DAILY ELI   Administration


 


Pantoprazole Sodium  40 mg 03/01/17 10:00 03/01/17 10:37





  Protonix -  PO   40 mg





  DAILY ELI   Administration


 


Potassium Chloride  20 meq 03/01/17 10:00 03/01/17 10:37





  K-Dur -  PO   20 meq





  DAILY ELI   Administration


 


Sodium Chloride  2 spray 02/28/17 22:00 03/01/17 14:04





  Ocean Spray Nasal Spray -  NS   2 spray





  TID ELI   Administration


 


Tiotropium Bromide  1 puff 03/02/17 10:00  





  Spiriva -  IH   





  DAILY ELI   











ASSESSMENT/PLAN:





Acute on chronic hypoxic respiratory failure 


-likely due to exacerbated severe anemia


-symptmatically improved, ambulating to bathroom, below baseline


-supplemental O2, now on 2L sat 96% at rest 


-symbicort, singulair, albuterol, spiriva





COPD


-O2 dependant 


-mild pulmonary HTN


-CXR no acute provess





Severe symptomatic anemia 


-acute on chronic


-Hx upper GIB (bleeding ulcer 2000) 


-recommend GI consult for EGD, colonoscopy


-hemo consult appreciated: f/u anemia workup 


-s/p 2 U pRBC


-monitor h/h 





CHF


-cardiology on case


-possible exacerbation 


      


HTN


-lasix





Dispo: We will continue to follow the patient. Thank you for this consultative 

opportunity.











Problem List





- Problems


(1) Acute on chronic respiratory failure with hypoxemia


Code(s): J96.21 - ACUTE AND CHRONIC RESPIRATORY FAILURE WITH HYPOXIA





(2) Anemia


Code(s): D64.9 - ANEMIA, UNSPECIFIED   





(3) COPD (chronic obstructive pulmonary disease)


Code(s): J44.9 - CHRONIC OBSTRUCTIVE PULMONARY DISEASE, UNSPECIFIED   





(4) COPD exacerbation


Code(s): J44.1 - CHRONIC OBSTRUCTIVE PULMONARY DISEASE W (ACUTE) EXACERBATION





(5) SOB (shortness of breath)


Code(s): R06.02 - SHORTNESS OF BREATH





(6) CHF (congestive heart failure)


Code(s): I50.9 - HEART FAILURE, UNSPECIFIED   





(7) Hypertension


Code(s): I10 - ESSENTIAL (PRIMARY) HYPERTENSION   





(8) GIB (gastrointestinal bleeding)


Code(s): K92.2 - GASTROINTESTINAL HEMORRHAGE, UNSPECIFIED   








Visit type





- Emergency Visit


Emergency Visit: Yes


ED Registration Date: 02/28/17


Care time: The patient presented to the Emergency Department on the above date 

and was hospitalized for further evaluation of their emergent condition.





- New Patient


This patient is new to me today: Yes


Date on this admission: 03/01/17





- Critical Care


Critical Care patient: No

## 2017-03-01 NOTE — PN
Teaching Attending Note


Name of Resident: Isabelle Grider


ATTENDING PHYSICIAN STATEMENT





I saw and evaluated the patient.


I reviewed the resident's note and discussed the case with the resident.


I agree with the resident's findings and plan as documented.





PULMONARY CONSULTATION





IMP ACUTE ON CHRONIC HYPOXEMIC RESPIRATORY FAILURE


      ADVANCED COPD ON O2


      SEVERE ANEMIA


      ?CHF


      GUIAC + STOOLS


      HTN





PLAN INHALED BRONCHODILATORS


        O2


        TRANSFUSE


        LASIX


        GI EVALUATION


        MONITOR H+H





Problem List





- Problems


(1) Anemia


Code(s): D64.9 - ANEMIA, UNSPECIFIED   





(2) COPD (chronic obstructive pulmonary disease)


Code(s): J44.9 - CHRONIC OBSTRUCTIVE PULMONARY DISEASE, UNSPECIFIED   





(3) SOB (shortness of breath)


Code(s): R06.02 - SHORTNESS OF BREATH





(4) CHF (congestive heart failure)


Code(s): I50.9 - HEART FAILURE, UNSPECIFIED   





(5) Hypertension


Code(s): I10 - ESSENTIAL (PRIMARY) HYPERTENSION   





(6) Acute on chronic respiratory failure with hypoxemia


Code(s): J96.21 - ACUTE AND CHRONIC RESPIRATORY FAILURE WITH HYPOXIA

## 2017-03-01 NOTE — PN
Progress Note (short form)





- Note


Progress Note: 


s:  no cp sob palps dizzy





o: 


 Vital Signs











 Period  Temp  Pulse  Resp  BP Sys/Gonzalez  Pulse Ox


 


 Last 24 Hr  98.0 F-99.1 F    17-20  113-127/62-78  








Constitutional: Yes: Well Nourished, No Distress


Eyes: No: Sclera Icterus


Respiratory: Yes: CTA Bilaterally.  No: Accessory Muscle Use


Gastrointestinal: Yes: Normal Bowel Sounds.  No: Distention, Hepatomegaly, 

Palpable Mass, Tenderness


Cardiovascular: Yes: Regular Rate and Rhythm


JVD: No


Heart Sounds: Yes: S1, S2.  No: Gallop


Murmur: No: Systolic Murmur, Diastolic Murmur


Extremities: No: Cold, Cyanosis


Edema: Yes (1-2+ pretibs bilat)


Integumentary: No: Jaundice


Neurological: Yes: Alert, Oriented (x3)


Psychiatric: No: Agitated








 Current Medications











Generic Name Dose Route Start Last Admin





  Trade Name Freq  PRN Reason Stop Dose Admin


 


Aclidinium Bromide  1 puff 02/28/17 22:00 03/01/17 10:37





  Tudorza -  IH   1 puff





  BID ELI   Administration


 


Albuterol Sulfate  1 amp 02/28/17 16:35 02/28/17 22:47





  Ventolin 0.5% -  NEB   1 amp





  Q6H PRN   Administration





  SHORT OF BREATH/WHEEZING   


 


Atorvastatin Calcium  10 mg 02/28/17 22:00 02/28/17 22:22





  Lipitor -  PO   10 mg





  HS ELI   Administration


 


Budesonide/Formoterol Fumarate  2 puff 02/28/17 22:00 03/01/17 10:37





  Symbicort 80/4.5mcg -  IH   2 inh





  BID ELI   Administration


 


Calcium Carbonate/Cholecalciferol  1 tab 03/01/17 10:00 03/01/17 10:37





  Os-Saleem 500+D -  PO   1 tab





  DAILY ELI   Administration


 


Diltiazem HCl  180 mg 02/28/17 22:00 03/01/17 10:37





  Cardizem Cd -  PO   180 mg





  BID ELI   Administration


 


Ferrous Sulfate  325 mg 03/01/17 10:00 03/01/17 10:37





  Feosol -  PO   325 mg





  DAILY ELI   Administration


 


Furosemide  80 mg 02/28/17 18:15 03/01/17 06:49





  Lasix Injection -  IVPUSH   80 mg





  BID@0600,1400 ELI   Administration


 


Latanoprost  1 drop 02/28/17 22:00 02/28/17 22:23





  Xalatan 0.005% Eye Drops -  OU   1 drop





  HS ELI   Administration


 


Montelukast Sodium  10 mg 02/28/17 22:00 02/28/17 22:22





  Singulair -  PO   10 mg





  HS ELI   Administration


 


Multivitamins/Minerals/Vitamin C  1 tab 03/01/17 10:00 03/01/17 10:36





  Tab-A-Vit -  PO   1 tab





  DAILY ELI   Administration


 


Non-Formulary Medication  1 each 03/01/17 10:00  





  Docosahexanoic Acid/Epa [Fish Oil Softgel]  PO   





  DAILY ELI   


 


Pantoprazole Sodium  40 mg 03/01/17 10:00 03/01/17 10:37





  Protonix -  PO   40 mg





  DAILY ELI   Administration


 


Potassium Chloride  20 meq 03/01/17 10:00 03/01/17 10:37





  K-Dur -  PO   20 meq





  DAILY ELI   Administration


 


Sodium Chloride  2 spray 02/28/17 22:00 03/01/17 06:50





  Ocean Spray Nasal Spray -  NS   2 spray





  TID ELI   Administration








 CBC, BMP





 03/01/17 06:50 





 03/01/17 06:50 








ekg 2/28 (ER): NSR, normal asix/interv; WNL








Echo 4/16: nl LV/EF; RV tds; nl LA; valves WNL; RVSP 40-50











Assessment/Plan





acute SOB, acute diast chf:


-BNP 600s, similar to when was here with probable chf 4/16


-? sx's due to worsening anemia with hgb 6


-CXR with no sig chf


-leg swelling is signif incr'd lately (? abdomen as well per pt), raising 

suspicion for chf though cannot appreciate JVD on exam


-has been complying with torsemide 40 bid at home she says


-will start lasix 80 iv bid (diuresed well with this last time here)


-close f/u of daily wts and BMP--low threshold to hold diuretics if bun/creat 

bumps (in which case her sx's are likely more related to anemia)


-no suspicion of ACS, enzymes neg x3 and ecg unremarkable





mild pulm HTN:


-estimated RVSP 40-50 on recent echo, RV not well seen on that report


-? sec to LV diast chf vs ? hypoxic chronic lung dz (on home O2 for copd)--

these 2 dx's are extremely likely to represent the etiology of her pulm htn and 

tx directed at both (i.e. diuretics and supplemental O2/airways tx) is all that 

is indicated here





COPD, ? with a.e.:


-+ wheezing and mild URI sx's


-defer to dr shearer +/- pulm as indicated, regarding airways-specific tx's





HTN:


-well controlled


-cont home meds





anemia, chronic:


-baseline hgb runs 8s-9s


-currently 6's on admit, likely symptomatic (sob)


-today hgb in 7's after prbcs


-PRBCs +/- further w/u of etiology per dr shearer, +/-GI


-Pt has no cardiac contraindications (intermediate risk) to EGD/FOC if needed 

for anemia workup

## 2017-03-01 NOTE — PN
Progress Note, Physician


Chief Complaint: 


Ms Peter says she is feeling better but still with dyspnea on exertion. No cp 

or n/v.





- Current Medication List


Current Medications: 


Active Medications





Aclidinium Bromide (Tudorza -)  1 puff IH BID [facility]


   Last Admin: 03/01/17 10:37 Dose:  1 puff


Albuterol Sulfate (Ventolin 0.5% -)  1 amp NEB Q6H PRN


   PRN Reason: SHORT OF BREATH/WHEEZING


   Last Admin: 03/01/17 12:24 Dose:  1 amp


Atorvastatin Calcium (Lipitor -)  10 mg PO HS [facility]


   Last Admin: 02/28/17 22:22 Dose:  10 mg


Budesonide/Formoterol Fumarate (Symbicort 80/4.5mcg -)  2 puff IH BID [facility]


   Last Admin: 03/01/17 10:37 Dose:  2 inh


Calcium Carbonate/Cholecalciferol (Os-Saleem 500+D -)  1 tab PO DAILY [facility]


   Last Admin: 03/01/17 10:37 Dose:  1 tab


Diltiazem HCl (Cardizem Cd -)  180 mg PO BID [facility]


   Last Admin: 03/01/17 10:37 Dose:  180 mg


Ferrous Sulfate (Feosol -)  325 mg PO DAILY [facility]


   Last Admin: 03/01/17 10:37 Dose:  325 mg


Furosemide (Lasix Injection -)  80 mg IVPUSH BID@0600,1400 [facility]


   Last Admin: 03/01/17 06:49 Dose:  80 mg


Latanoprost (Xalatan 0.005% Eye Drops -)  1 drop OU [facility]


   Last Admin: 02/28/17 22:23 Dose:  1 drop


Montelukast Sodium (Singulair -)  10 mg PO HS [facility]


   Last Admin: 02/28/17 22:22 Dose:  10 mg


Multivitamins/Minerals/Vitamin C (Tab-A-Vit -)  1 tab PO DAILY [facility]


   Last Admin: 03/01/17 10:36 Dose:  1 tab


Pantoprazole Sodium (Protonix -)  40 mg PO DAILY [facility]


   Last Admin: 03/01/17 10:37 Dose:  40 mg


Potassium Chloride (K-Dur -)  20 meq PO DAILY [facility]


   Last Admin: 03/01/17 10:37 Dose:  20 meq


Sodium Chloride (Ocean Spray Nasal Spray -)  2 spray NS TID [facility]


   Last Admin: 03/01/17 06:50 Dose:  2 spray











- Objective


Vital Signs: 


 Vital Signs











Temperature  98.0 F   03/01/17 09:00


 


Pulse Rate  93 H  03/01/17 09:00


 


Respiratory Rate  18   03/01/17 09:00


 


Blood Pressure  127/68   03/01/17 09:00


 


O2 Sat by Pulse Oximetry (%)  96   02/28/17 22:00











Constitutional: Yes: Well Nourished, No Distress, Calm


Cardiovascular: Yes: Regular Rate and Rhythm.  No: Gallop, Murmur, Rub


Respiratory: Yes: Regular, CTA Bilaterally, On Nasal O2.  No: Rales, Rhonchi, 

Wheezes


Gastrointestinal: Yes: Normal Bowel Sounds, Soft.  No: Distention, Tenderness


Extremities: Yes: WNL


Edema: Yes


Edema: LLE: 1+, RLE: 1+


Labs: 


 CBC, BMP





 03/01/17 06:50 





 03/01/17 06:50 





 INR, PTT











INR  0.96  (0.82-1.09)   02/28/17  15:00    














Problem List





- Problems


(1) Anemia


Code(s): D64.9 - ANEMIA, UNSPECIFIED   





(2) SOB (shortness of breath)


Code(s): R06.02 - SHORTNESS OF BREATH





(3) CHF (congestive heart failure)


Code(s): I50.9 - HEART FAILURE, UNSPECIFIED   





(4) Hypertension


Code(s): I10 - ESSENTIAL (PRIMARY) HYPERTENSION   





(5) COPD (chronic obstructive pulmonary disease)


Code(s): J44.9 - CHRONIC OBSTRUCTIVE PULMONARY DISEASE, UNSPECIFIED   








Assessment/Plan


(1) Anemia


Assessment/Plan: 


-s/p 2 units but still symptomatic


-will transfuse 3rd unit today


-appreciate hematology assistance


-await labs and occult blood in stool


-consult GI


Code(s): D64.9 - ANEMIA, UNSPECIFIED   





(2) SOB (shortness of breath)


Assessment/Plan: 


-improved but still present


-transfuse as above


-ACS ruled out


Code(s): R06.02 - SHORTNESS OF BREATH





(3) CHF (congestive heart failure)


Assessment/Plan: 


-cardiology consult and note reviewed


-on IV lasix 80mg bid


Code(s): I50.9 - HEART FAILURE, UNSPECIFIED   





(4) Hypertension


Assessment/Plan: 


-continue diltiazem and lasix


-monitor


Code(s): I10 - ESSENTIAL (PRIMARY) HYPERTENSION   





(5) COPD (chronic obstructive pulmonary disease)


Assessment/Plan: 


-pulmonary consulted


Code(s): J44.9 - CHRONIC OBSTRUCTIVE PULMONARY DISEASE, UNSPECIFIED

## 2017-03-02 LAB
ANION GAP SERPL CALC-SCNC: 6 MMOL/L (ref 8–16)
ANISOCYTOSIS BLD QL SMEAR: (no result)
BASOPHILS # BLD: 1.1 % (ref 0–2)
CALCIUM SERPL-MCNC: 7.7 MG/DL (ref 8.5–10.1)
CO2 SERPL-SCNC: 38 MMOL/L (ref 21–32)
CREAT SERPL-MCNC: 0.7 MG/DL (ref 0.55–1.02)
DEPRECATED RDW RBC AUTO: 28.7 % (ref 11.6–15.6)
EOSINOPHIL # BLD: 1 % (ref 0–4.5)
GLUCOSE SERPL-MCNC: 78 MG/DL (ref 74–106)
HAPTOGLOB SERPL-MCNC: 173 MG/DL (ref 34–200)
HYPOCHROMIA BLD QL SMEAR: (no result)
IRON SERPL-MCNC: 14 UG/DL (ref 27–139)
MAGNESIUM SERPL-MCNC: 2.5 MG/DL (ref 1.8–2.4)
MCH RBC QN AUTO: 21.5 PG (ref 25.7–33.7)
MCHC RBC AUTO-ENTMCNC: 30.6 G/DL (ref 32–36)
MCV RBC: 70.3 FL (ref 80–96)
MICROCYTES BLD QL SMEAR: (no result)
NEUTROPHILS # BLD: 71.5 % (ref 42.8–82.8)
PHOSPHATE SERPL-MCNC: 2.7 MG/DL (ref 2.5–4.9)
PLATELET # BLD AUTO: 128 K/MM3 (ref 134–434)
PMV BLD: 9.3 FL (ref 7.5–11.1)
TARGETS BLD QL SMEAR: (no result)
TIBC SERPL-MCNC: 428 UG/DL (ref 250–450)
TROPONIN I SERPL-MCNC: < 0.02 NG/ML (ref 0–0.05)
UIBC SERPL-MCNC: 414 UG/DL (ref 118–369)
WBC # BLD AUTO: 11.5 K/MM3 (ref 4–10)

## 2017-03-02 RX ADMIN — ALBUTEROL SULFATE PRN AMP: 2.5 SOLUTION RESPIRATORY (INHALATION) at 12:45

## 2017-03-02 RX ADMIN — Medication SCH TAB: at 09:29

## 2017-03-02 RX ADMIN — FUROSEMIDE SCH MG: 10 INJECTION, SOLUTION INTRAVENOUS at 06:35

## 2017-03-02 RX ADMIN — PANTOPRAZOLE SODIUM SCH MG: 40 TABLET, DELAYED RELEASE ORAL at 22:42

## 2017-03-02 RX ADMIN — BUDESONIDE AND FORMOTEROL FUMARATE DIHYDRATE SCH INH: 80; 4.5 AEROSOL RESPIRATORY (INHALATION) at 11:24

## 2017-03-02 RX ADMIN — LATANOPROST SCH DROP: 50 SOLUTION OPHTHALMIC at 22:42

## 2017-03-02 RX ADMIN — SALINE NASAL SPRAY SCH SPRAY: 1.5 SOLUTION NASAL at 14:11

## 2017-03-02 RX ADMIN — FUROSEMIDE SCH MG: 10 INJECTION, SOLUTION INTRAVENOUS at 14:11

## 2017-03-02 RX ADMIN — MULTIVITAMIN TABLET SCH TAB: TABLET at 09:29

## 2017-03-02 RX ADMIN — SALINE NASAL SPRAY SCH SPRAY: 1.5 SOLUTION NASAL at 06:36

## 2017-03-02 RX ADMIN — TIOTROPIUM BROMIDE SCH PUFF: 18 CAPSULE ORAL; RESPIRATORY (INHALATION) at 13:15

## 2017-03-02 RX ADMIN — ALBUTEROL SULFATE PRN AMP: 2.5 SOLUTION RESPIRATORY (INHALATION) at 19:29

## 2017-03-02 RX ADMIN — PANTOPRAZOLE SODIUM SCH MG: 40 TABLET, DELAYED RELEASE ORAL at 09:29

## 2017-03-02 RX ADMIN — ACETAMINOPHEN PRN MG: 325 TABLET ORAL at 11:34

## 2017-03-02 RX ADMIN — SALINE NASAL SPRAY SCH SPRAY: 1.5 SOLUTION NASAL at 22:43

## 2017-03-02 RX ADMIN — MONTELUKAST SODIUM SCH MG: 10 TABLET, COATED ORAL at 22:42

## 2017-03-02 RX ADMIN — FERROUS SULFATE TAB EC 324 MG (65 MG FE EQUIVALENT) SCH MG: 324 (65 FE) TABLET DELAYED RESPONSE at 09:28

## 2017-03-02 RX ADMIN — ATORVASTATIN CALCIUM SCH MG: 10 TABLET, FILM COATED ORAL at 22:42

## 2017-03-02 RX ADMIN — BUDESONIDE AND FORMOTEROL FUMARATE DIHYDRATE SCH INH: 80; 4.5 AEROSOL RESPIRATORY (INHALATION) at 22:41

## 2017-03-02 RX ADMIN — POTASSIUM CHLORIDE SCH MEQ: 1500 TABLET, EXTENDED RELEASE ORAL at 09:28

## 2017-03-02 NOTE — PN
Teaching Attending Note


Name of Resident: Isabelle Grider


ATTENDING PHYSICIAN STATEMENT





I saw and evaluated the patient.


I reviewed the resident's note and discussed the case with the resident.


I agree with the resident's findings and plan as documented.








SUBJECTIVE:SOB ON EXERTION








OBJECTIVE:DISTANT BREATH SOUNDS





ANEMIA/COPD/HTN





CONTINUE CURRENT TREATMENT


O2/BRONCHODILATORS/SINGULAIR/DIURETICS/KEEP HGB 8 GMS OR ABOVE





ADRIANO BOWENS MD

## 2017-03-02 NOTE — PN
Progress Note, Physician


Chief Complaint: 


Ms Peter says she is feeling better, continues to have dyspnea on exertion. 

Says having some abdominal discomfort that started this morning. No cp or n/v. 

RN states she just developed LUE discomfort.





- Current Medication List


Current Medications: 


Active Medications





Acetaminophen (Tylenol -)  650 mg PO Q6H PRN


   PRN Reason: FEVER OR PAIN


   Last Admin: 03/02/17 11:34 Dose:  650 mg


Albuterol Sulfate (Ventolin 0.5% -)  1 amp NEB Q6H PRN


   PRN Reason: SHORT OF BREATH/WHEEZING


   Last Admin: 03/01/17 18:10 Dose:  1 amp


Atorvastatin Calcium (Lipitor -)  10 mg PO [facility]


   Last Admin: 03/01/17 22:01 Dose:  10 mg


Budesonide/Formoterol Fumarate (Symbicort 80/4.5mcg -)  2 puff IH BID [facility]


   Last Admin: 03/02/17 11:24 Dose:  2 inh


Calcium Carbonate/Cholecalciferol (Os-Saleem 500+D -)  1 tab PO DAILY [facility]


   Last Admin: 03/02/17 09:29 Dose:  1 tab


Diltiazem HCl (Cardizem Cd -)  180 mg PO BID [facility]


   Last Admin: 03/02/17 09:28 Dose:  180 mg


Ferrous Sulfate (Feosol -)  325 mg PO DAILY [facility]


   Last Admin: 03/02/17 09:28 Dose:  325 mg


Furosemide (Lasix Injection -)  80 mg IVPUSH BID@0600,1400 [facility]


   Last Admin: 03/02/17 06:35 Dose:  80 mg


Latanoprost (Xalatan 0.005% Eye Drops -)  1 drop OU [facility]


   Last Admin: 03/01/17 22:01 Dose:  1 drop


Montelukast Sodium (Singulair -)  10 mg PO HS [facility]


   Last Admin: 03/01/17 22:01 Dose:  10 mg


Multivitamins/Minerals/Vitamin C (Tab-A-Vit -)  1 tab PO DAILY [facility]


   Last Admin: 03/02/17 09:29 Dose:  1 tab


Pantoprazole Sodium (Protonix -)  40 mg PO BID [facility]


   Last Admin: 03/02/17 09:29 Dose:  40 mg


Potassium Chloride (K-Dur -)  20 meq PO DAILY [facility]


   Last Admin: 03/02/17 09:28 Dose:  20 meq


Sodium Chloride (Ocean Spray Nasal Spray -)  2 spray NS TID [facility]


   Last Admin: 03/02/17 06:36 Dose:  2 spray


Tiotropium Bromide (Spiriva -)  1 puff IH DAILY [facility]


   Last Admin: 03/02/17 13:15 Dose:  1 puff











- Objective


Vital Signs: 


 Vital Signs











Temperature  97.8 F   03/02/17 13:48


 


Pulse Rate  95 H  03/02/17 13:48


 


Respiratory Rate  20   03/02/17 10:00


 


Blood Pressure  100/60   03/02/17 13:48


 


O2 Sat by Pulse Oximetry (%)  99   03/02/17 09:05











Constitutional: Yes: Well Nourished, No Distress, Calm


Cardiovascular: Yes: Regular Rate and Rhythm.  No: Gallop, Murmur, Rub


Respiratory: Yes: Regular, CTA Bilaterally, On Nasal O2.  No: Rales, Rhonchi, 

Wheezes


Gastrointestinal: Yes: Normal Bowel Sounds, Soft.  No: Distention, Tenderness


Extremities: Yes: WNL


Edema: No


Labs: 


 CBC, BMP





 03/02/17 06:30 





 03/02/17 06:30 





 INR, PTT











INR  0.96  (0.82-1.09)   02/28/17  15:00    














Problem List





- Problems


(1) Anemia


Code(s): D64.9 - ANEMIA, UNSPECIFIED   





(2) SOB (shortness of breath)


Code(s): R06.02 - SHORTNESS OF BREATH





(3) CHF (congestive heart failure)


Code(s): I50.9 - HEART FAILURE, UNSPECIFIED   





(4) Hypertension


Code(s): I10 - ESSENTIAL (PRIMARY) HYPERTENSION   





(5) COPD (chronic obstructive pulmonary disease)


Code(s): J44.9 - CHRONIC OBSTRUCTIVE PULMONARY DISEASE, UNSPECIFIED   








Assessment/Plan


(1) Anemia


Assessment/Plan: 


-s/p 3 units with good response


-GI consulted, hemoccult positive


-increase protonix to bid


-await recommendations from GI


Code(s): D64.9 - ANEMIA, UNSPECIFIED   





(2) SOB (shortness of breath)


Assessment/Plan: 


-improved but still present


-transfuse as above


-ACS ruled out


Code(s): R06.02 - SHORTNESS OF BREATH





(3) CHF (congestive heart failure)


Assessment/Plan: 


-cardiology consult and note reviewed


-on IV lasix 80mg bid


Code(s): I50.9 - HEART FAILURE, UNSPECIFIED   





(4) Hypertension


Assessment/Plan: 


-continue diltiazem and lasix


-monitor


Code(s): I10 - ESSENTIAL (PRIMARY) HYPERTENSION   





(5) COPD (chronic obstructive pulmonary disease)


Assessment/Plan: 


-pulmonary consulted


Code(s): J44.9 - CHRONIC OBSTRUCTIVE PULMONARY DISEASE, UNSPECIFIED   





(6) Pain


-low suspicion for ACS


-EKG normal


-cardiac enzyme negative


-case d/w cardiology, no further work up needed

## 2017-03-02 NOTE — PN
Progress Note (short form)





- Note


Progress Note: 


s:  no cp palps dizzy; sob with mild exertion to bathroom





o: 


 


 Vital Signs











 Period  Temp  Pulse  Resp  BP Sys/Gonzalez  Pulse Ox


 


 Last 24 Hr  97.3 F-98.3 F  83-96  19-20  100-116/57-66  99











Constitutional: Yes: Well Nourished, No Distress


Eyes: No: Sclera Icterus


Respiratory: Yes: mild bl wheeze


Gastrointestinal: Yes: Normal Bowel Sounds.  No: Distention, Hepatomegaly, 

Palpable Mass, Tenderness


Cardiovascular: Yes: Regular Rate and Rhythm


JVD: No


Heart Sounds: Yes: S1, S2.  No: Gallop


Murmur: No: Systolic Murmur, Diastolic Murmur


Extremities: No: Cold, Cyanosis


Edema: Yes (1+ pretibs bilat)


Integumentary: No: Jaundice


Neurological: Yes: Alert, Oriented (x3)


Psychiatric: No: Agitated








 


 Current Medications











Generic Name Dose Route Start Last Admin





  Trade Name Freq  PRN Reason Stop Dose Admin


 


Acetaminophen  650 mg 03/02/17 11:24  





  Tylenol -  PO   





  Q6H PRN   





  FEVER OR PAIN   


 


Albuterol Sulfate  1 amp 02/28/17 16:35 03/01/17 18:10





  Ventolin 0.5% -  NEB   1 amp





  Q6H PRN   Administration





  SHORT OF BREATH/WHEEZING   


 


Atorvastatin Calcium  10 mg 02/28/17 22:00 03/01/17 22:01





  Lipitor -  PO   10 mg





  HS [facility]   Administration


 


Budesonide/Formoterol Fumarate  2 puff 02/28/17 22:00 03/01/17 22:02





  Symbicort 80/4.5mcg -  IH   2 inh





  BID [facility]   Administration


 


Calcium Carbonate/Cholecalciferol  1 tab 03/01/17 10:00 03/02/17 09:29





  Os-Saleem 500+D -  PO   1 tab





  DAILY [facility]   Administration


 


Diltiazem HCl  180 mg 02/28/17 22:00 03/02/17 09:28





  Cardizem Cd -  PO   180 mg





  BID [facility]   Administration


 


Ferrous Sulfate  325 mg 03/01/17 10:00 03/02/17 09:28





  Feosol -  PO   325 mg





  DAILY [facility]   Administration


 


Furosemide  80 mg 02/28/17 18:15 03/02/17 06:35





  Lasix Injection -  IVPUSH   80 mg





  BID@0600,1400 [facility]   Administration


 


Latanoprost  1 drop 02/28/17 22:00 03/01/17 22:01





  Xalatan 0.005% Eye Drops -  OU   1 drop





  HS [facility]   Administration


 


Montelukast Sodium  10 mg 02/28/17 22:00 03/01/17 22:01





  Singulair -  PO   10 mg





  HS [facility]   Administration


 


Multivitamins/Minerals/Vitamin C  1 tab 03/01/17 10:00 03/02/17 09:29





  Tab-A-Vit -  PO   1 tab





  DAILY [facility]   Administration


 


Pantoprazole Sodium  40 mg 03/02/17 10:00 03/02/17 09:29





  Protonix -  PO   40 mg





  BID [facility]   Administration


 


Potassium Chloride  20 meq 03/01/17 10:00 03/02/17 09:28





  K-Dur -  PO   20 meq





  DAILY [facility]   Administration


 


Sodium Chloride  2 spray 02/28/17 22:00 03/02/17 06:36





  Ocean Spray Nasal Spray -  NS   2 spray





  TID [facility]   Administration


 


Tiotropium Bromide  1 puff 03/02/17 10:00  





  Spiriva -  IH   





  DAILY [facility]   











 CBC, BMP





 03/02/17 06:30 





 03/02/17 06:30 











ekg 2/28 (ER): NSR, normal asix/interv; WNL








Echo 4/16: nl LV/EF; RV tds; nl LA; valves WNL; RVSP 40-50











Assessment/Plan





acute SOB, acute diast chf:


-BNP 600s, similar to when was here with probable chf 4/16


-? sx's due to worsening anemia with hgb 6


-CXR with no sig chf


-leg swelling is signif incr'd lately (? abdomen as well per pt), raising 

suspicion for chf though cannot appreciate JVD on exam


-has been complying with torsemide 40 bid at home she says


-will cont lasix 80 iv bid (diuresed well with this last time here), cr stable 

thus far


-f/u of daily wts and BMP--low threshold to hold diuretics if bun/creat bumps


-no suspicion of ACS, enzymes neg x3 and ecg unremarkable





mild pulm HTN:


-estimated RVSP 40-50 on recent echo, RV not well seen on that report


-? sec to LV diast chf vs ? hypoxic chronic lung dz (on home O2 for copd)--

these 2 dx's are extremely likely to represent the etiology of her pulm htn and 

tx directed at both (i.e. diuretics and supplemental O2/airways tx) is all that 

is indicated here





COPD, ? with a.e.:


-+ wheezing and mild URI sx's


-defer to dr shearer pulamisha as indicated, regarding airways-specific tx's





HTN:


-controlled


-cont home meds





anemia, chronic:


-baseline hgb runs 8s-9s


-currently 6's on admit, likely symptomatic (sob)


-today hgb in 9s after prbcs


-PRBCs +/- further w/u of etiology per dr shearer, GI


-Pt has no cardiac contraindications (intermediate risk) to EGD/FOC if needed 

for anemia workup

## 2017-03-02 NOTE — EKG
Test Reason : 

Blood Pressure : ***/*** mmHG

Vent. Rate : 086 BPM     Atrial Rate : 086 BPM

   P-R Int : 118 ms          QRS Dur : 074 ms

    QT Int : 350 ms       P-R-T Axes : 057 002 -07 degrees

   QTc Int : 418 ms

 

NORMAL SINUS RHYTHM

NORMAL ECG

WHEN COMPARED WITH ECG OF 28-FEB-2017 13:21,

NO SIGNIFICANT CHANGE WAS FOUND

Confirmed by JOSE G FIGUEROA MD ([id number]) on 3/2/2017 2:14:01 PM

 

Referred By: [facility] PHILIPPE           Confirmed By:JOSE G FIGUEROA MD

## 2017-03-02 NOTE — CON.GI
Consult


Consult Specialty:: anemia





- History of Present Illness


History of Present Illness: 


77 y/o female with PMH of COPD, CHF was asked to be seen because of severe 

anemia. She developed progressive SOB , easy fatigability and severe bilateral 

leg swelling. On admission the Hgb was 6. Today Hgb is 9 but still is SOB. The 

bilateral leg swelling has improved. In 6/2012, Dr Leon did a colonoscopy 

which revealed severe diverticulosis. 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 

duodenal AVMS, no gastric ulcer. She was lost in follow-up





- Past Medical History


Cardio/Vascular: Yes: HTN


Pulmonary: Yes: COPD, Other (oxygen at home)


Gastrointestinal: Yes: Other (History of  ulcer disease diagnosed 3 years ago 

when patient presented with anemia)


...Pregnant: No


Musculoskeletal: Yes: Osteoarthritis





- Past Surgical History


Past Surgical History: Yes: None





- Alcohol/Substance Use


Hx Alcohol Use: No


History of Substance Use: reports: None





- Smoking History


Smoking history: Former smoker


Have you smoked in the past 12 months: No


Aproximately how many cigarettes per day: 0


If you are a former smoker, when did you quit?: Over 15 years ago





- Social History


ADL: Independent


Occupation: Retired dental , , 2 children


History of Recent Travel: No





Home Medications





- Allergies


Allergies/Adverse Reactions: 


 Allergies











Allergy/AdvReac Type Severity Reaction Status Date / Time


 


No Known Allergies Allergy   Verified 02/28/17 13:14














- Home Medications


Home Medications: 


Ambulatory Orders





Albuterol Sulfate [Proair Hfa -] 1 - 2 inh PO PRN PRN 06/14/12 


Fluticasone/Salmeterol [Advair 250-50 Diskus] 1 each IH BID 06/14/12 


Tiotropium Bromide [Spiriva] 18 mcg IH DAILY 06/14/12 


Montelukast Na [Singulair -] 10 mg PO HS #0 tablet 06/19/12 


Multivitamins [Multivit ([facility] Form[facility])] 1 udtab PO DAILY #0 tab 06/19/12 


Sodium Chloride Nasal Spray [Ocean Spray Nasal Spray -] 2 spray NS TID #0 

spraybtl 06/19/12 


Ferrous Sulfate [Feosol] 325 mg PO DAILY 09/17/12 


Calcium Carbonate/Vitamin D3 [Calcium 600-Vit D3 200 Tablet] 1 each PO DAILY 07/ 06/13 


Docosahexanoic Acid/Epa [Fish Oil Softgel] 1 each PO DAILY 07/06/13 


Potassium Chloride [K-Dur] 20 meq PO DAILY #0 tab.er.prt 07/06/13 


Alendronate Sodium [Fosamax] 35 mg PO [place] 05/01/16 


Atorvastatin Ca [Lipitor] 10 mg PO HS 05/01/16 


Latanoprost 0.005% Eye Drops [Xalatan 0.005% Eye Drops -] 1 drop OU HS 05/01/16 


Torsemide 40 mg PO BID #120 tablet 05/02/16 


Diltiazem Cd [Cardizem Cd -] 180 mg PO BID 02/28/17 


Diphenhydramine HCl [Benadryl -] 25 mg PO Q6H PRN 02/28/17 


Pantoprazole Sodium 40 mg PO DAILY 02/28/17 











Family Disease History





- Family Disease History


Family Disease History: Heart Disease: Father, Other: Mother (Alzheimers 

Dementia)


Other Family History: No family history of ca





Review of Systems





- Review of Systems


Constitutional: denies: Fever


Eyes: denies: Blind Spots


HENT: denies: Difficult Swallowing


Neck: denies: Decreased ROM


Cardiovascular: denies: Chest Pain


Respiratory: denies: Cough


Gastrointestinal: denies: Abdominal Pain, Constipation, Melena, Rectal Bleeding

, Vomiting, Vomiting Blood





Physical Exam-GI


Vital Signs: 


 Vital Signs











Temperature  98.2 F   03/02/17 17:00


 


Pulse Rate  87   03/02/17 17:00


 


Respiratory Rate  18   03/02/17 17:00


 


Blood Pressure  109/62   03/02/17 17:00


 


O2 Sat by Pulse Oximetry (%)  99   03/02/17 09:05











Constitutional: Yes: No Distress


Eyes: Yes: Conjunctiva Clear


HENT: Yes: Atraumatic


Neck: Yes: Supple


Respiratory: Yes: Poor Air Entry, Wheezes (--mild)


Edema: LLE: Trace, RLE: Trace


Labs: 


 CBC, BMP





 03/02/17 06:30 





 03/02/17 06:30 





 INR, PTT











INR  0.96  (0.82-1.09)   02/28/17  15:00    








 Hepatic Panel











Total Bilirubin  0.3 mg/dL (0.2-1.0)  D 02/28/17  13:45    


 


AST  25 U/L (15-37)   02/28/17  13:45    


 


ALT  27 U/L (12-78)   02/28/17  13:45    


 


Alkaline Phosphatase  89 U/L ()   02/28/17  13:45    


 


Albumin  3.7 g/dl (3.4-5.0)   02/28/17  13:45    














Problem List





- Problems


(1) GIB (gastrointestinal bleeding)


Assessment/Plan: 


--occlult gi bleeding possibly secondary to AVMS


R> patient still has respiratory insufficiency and is poor candidate for any GI 

procedures, patient was made aware to follow-up and schedule gi w/u as an 

outpatient once medically improved, will need pulmonary clearance


Code(s): K92.2 - GASTROINTESTINAL HEMORRHAGE, UNSPECIFIED

## 2017-03-02 NOTE — PN
Physical Exam: 


SUBJECTIVE: Patient seen and examined


patient resting in bed comfortably NAD. No acute events overnight. Afebrile and 

hemodynamically stable. s/p 1/2 unit (IV infiltrated) yesterday in addition to 

previous 2. States she feels less SOB and has reduced LE edema. ambulated wiuth 

walker to bathroom with some exertional SOB, still below baseline. Denies f/c, 

chest pain, dizziness, cough, abd pain, diarrhea, dysuria. 





OBJECTIVE:





 Vital Signs











 Period  Temp  Pulse  Resp  BP Sys/Gonzalez  Pulse Ox


 


 Last 24 Hr  97.9 F-98.3 F  83-96  19-20  100-112/57-65  99











GENERAL: Awake, alert, and fully oriented, in no acute distress.


HEAD: Normal with no signs of trauma.


EYES: Pupils equal, round and reactive to light, extraocular movements intact, 

sclera anicteric, conjunctiva clear. 


EARS, NOSE, THROAT: Moist mucous membranes.


NECK: supple without JVD


LUNGS: reduced wheezes, better air movement  


HEART: Regular rate and rhythm, normal S1 and S2


ABDOMEN: Soft, nontender, not distended, normoactive bowel sounds, no masses. 


MUSCULOSKELETAL: No CVA tenderness.


UPPER EXTREMITIES: 2+ pulses, No peripheral edema.


LOWER EXTREMITIES: 2+ pulses, warm, well-perfused. 1+ peripheral edema. 


NEUROLOGICAL:  Cranial nerves II-XII grossly intact. Normal speech. 


PSYCHIATRIC: Cooperative. Good eye contact. A


SKIN: Warm, dry











 Laboratory Results - last 24 hr











  02/28/17 02/28/17 03/01/17





  18:50 18:55 06:50


 


WBC   


 


RBC   


 


Hgb   


 


Hct   


 


MCV   


 


MCHC   


 


RDW   


 


Plt Count   


 


MPV   


 


Neutrophils %   


 


Lymphocytes %   


 


Monocytes %   


 


Eosinophils %   


 


Basophils %   


 


Haptoglobin    173


 


Sodium   


 


Potassium   


 


Chloride   


 


Carbon Dioxide   


 


Anion Gap   


 


BUN   


 


Creatinine   


 


Random Glucose   


 


Calcium   


 


Phosphorus   


 


Magnesium   


 


Iron   14 L 


 


TIBC   428 


 


Iron Saturation   3 L 


 


Transferrin  376 H  


 


Stool Occult Blood   














  03/01/17 03/02/17 03/02/17





  10:20 06:30 06:30


 


WBC   11.5 H D 


 


RBC   4.51  D 


 


Hgb   9.7 L D 


 


Hct   31.7 L D 


 


MCV   70.3 L 


 


MCHC   30.6 L 


 


RDW   28.7 H 


 


Plt Count   128 L D 


 


MPV   9.3 


 


Neutrophils %   71.5 


 


Lymphocytes %   16.3  D 


 


Monocytes %   10.1 


 


Eosinophils %   1.0  D 


 


Basophils %   1.1 


 


Haptoglobin   


 


Sodium    141


 


Potassium    3.7


 


Chloride    97 L


 


Carbon Dioxide    38 H


 


Anion Gap    6 L


 


BUN    15  D


 


Creatinine    0.7


 


Random Glucose    78  D


 


Calcium    7.7 L


 


Phosphorus    2.7  D


 


Magnesium    2.5 H


 


Iron   


 


TIBC   


 


Iron Saturation   


 


Transferrin   


 


Stool Occult Blood  Positive  








Active Medications











Generic Name Dose Route Start Last Admin





  Trade Name Freq  PRN Reason Stop Dose Admin


 


Albuterol Sulfate  1 amp 02/28/17 16:35 03/01/17 18:10





  Ventolin 0.5% -  NEB   1 amp





  Q6H PRN   Administration





  SHORT OF BREATH/WHEEZING   


 


Atorvastatin Calcium  10 mg 02/28/17 22:00 03/01/17 22:01





  Lipitor -  PO   10 mg





  HS ELI   Administration


 


Budesonide/Formoterol Fumarate  2 puff 02/28/17 22:00 03/01/17 22:02





  Symbicort 80/4.5mcg -  IH   2 inh





  BID ELI   Administration


 


Calcium Carbonate/Cholecalciferol  1 tab 03/01/17 10:00 03/02/17 09:29





  Os-Saleem 500+D -  PO   1 tab





  DAILY ELI   Administration


 


Diltiazem HCl  180 mg 02/28/17 22:00 03/02/17 09:28





  Cardizem Cd -  PO   180 mg





  BID ELI   Administration


 


Ferrous Sulfate  325 mg 03/01/17 10:00 03/02/17 09:28





  Feosol -  PO   325 mg





  DAILY ELI   Administration


 


Furosemide  80 mg 02/28/17 18:15 03/02/17 06:35





  Lasix Injection -  IVPUSH   80 mg





  BID@0600,1400 ELI   Administration


 


Latanoprost  1 drop 02/28/17 22:00 03/01/17 22:01





  Xalatan 0.005% Eye Drops -  OU   1 drop





  HS ELI   Administration


 


Montelukast Sodium  10 mg 02/28/17 22:00 03/01/17 22:01





  Singulair -  PO   10 mg





  HS ELI   Administration


 


Multivitamins/Minerals/Vitamin C  1 tab 03/01/17 10:00 03/02/17 09:29





  Tab-A-Vit -  PO   1 tab





  DAILY ELI   Administration


 


Pantoprazole Sodium  40 mg 03/02/17 10:00 03/02/17 09:29





  Protonix -  PO   40 mg





  BID ELI   Administration


 


Potassium Chloride  20 meq 03/01/17 10:00 03/02/17 09:28





  K-Dur -  PO   20 meq





  DAILY ELI   Administration


 


Sodium Chloride  2 spray 02/28/17 22:00 03/02/17 06:36





  Ocean Spray Nasal Spray -  NS   2 spray





  TID ELI   Administration


 


Tiotropium Bromide  1 puff 03/02/17 10:00  





  Spiriva -  IH   





  DAILY ELI   











ASSESSMENT/PLAN:


Acute on chronic hypoxic respiratory failure 


-likely due to exacerbated severe anemia


-symptmatically improved, ambulating to bathroom, below baseline


-supplemental O2, now on 2L sat 99% at rest 


-symbicort, singulair, albuterol, spiriva


-PT





COPD


-O2 dependant 


-mild pulmonary HTN


-CXR no acute process





Severe symptomatic anemia 


-acute on chronic


-Hx upper GIB (bleeding ulcer 2000) 


-GI consult for EGD, colonoscopy to follow 


-hemo consult appreciated: f/u anemia workup 


-s/p 2.2 U pRBC


-monitor h/h (hgb 11.5 today) 





CHF


-cardiology on case


-possible exacerbation 


      


HTN


-lasix





Dispo: We will continue to follow the patient. Thank you for this consultative 

opportunity.








Problem List





- Problems


(1) Acute on chronic respiratory failure with hypoxemia


Code(s): J96.21 - ACUTE AND CHRONIC RESPIRATORY FAILURE WITH HYPOXIA





(2) Anemia


Code(s): D64.9 - ANEMIA, UNSPECIFIED   





(3) COPD (chronic obstructive pulmonary disease)


Code(s): J44.9 - CHRONIC OBSTRUCTIVE PULMONARY DISEASE, UNSPECIFIED   





(4) COPD exacerbation


Code(s): J44.1 - CHRONIC OBSTRUCTIVE PULMONARY DISEASE W (ACUTE) EXACERBATION





(5) SOB (shortness of breath)


Code(s): R06.02 - SHORTNESS OF BREATH





(6) CHF (congestive heart failure)


Code(s): I50.9 - HEART FAILURE, UNSPECIFIED   





(7) Hypertension


Code(s): I10 - ESSENTIAL (PRIMARY) HYPERTENSION   





(8) GIB (gastrointestinal bleeding)


Code(s): K92.2 - GASTROINTESTINAL HEMORRHAGE, UNSPECIFIED   








Visit type





- Emergency Visit


Emergency Visit: Yes


ED Registration Date: 02/28/17


Care time: The patient presented to the Emergency Department on the above date 

and was hospitalized for further evaluation of their emergent condition.





- New Patient


This patient is new to me today: No





- Critical Care


Critical Care patient: No





- Discharge Referral


Referred to [facility] No

## 2017-03-02 NOTE — PN
Progress Note (short form)





- Note


Progress Note: 


Paient seen and examined





feels ok








 Last Vital Signs











Temp Pulse Resp BP Pulse Ox


 


 98.2 F   87   18   109/62   99 


 


 03/02/17 17:00  03/02/17 17:00  03/02/17 17:00  03/02/17 17:00  03/02/17 09:05











HEENT: MARCELLO, EOM Intact


Oropharynx: No thrush, 


Cor: RSR, No murmurs, No gallops


Lungs:decreased at bases


Abd: Soft, Normal bowel sounds, No organomegaly


Ext:No significant edema








 Abnormal Lab Results











  02/28/17 02/28/17 03/02/17





  18:50 18:55 06:30


 


WBC    11.5 H D


 


Hgb    9.7 L D


 


Hct    31.7 L D


 


MCV    70.3 L


 


MCHC    30.6 L


 


RDW    28.7 H


 


Plt Count    128 L D


 


Chloride   


 


Carbon Dioxide   


 


Anion Gap   


 


Calcium   


 


Magnesium   


 


Iron   14 L 


 


Iron Saturation   3 L 


 


Transferrin  376 H  














  03/02/17





  06:30


 


WBC 


 


Hgb 


 


Hct 


 


MCV 


 


MCHC 


 


RDW 


 


Plt Count 


 


Chloride  97 L


 


Carbon Dioxide  38 H


 


Anion Gap  6 L


 


Calcium  7.7 L


 


Magnesium  2.5 H


 


Iron 


 


Iron Saturation 


 


Transferrin 








 Current Medications





Acetaminophen (Tylenol -)  650 mg PO Q6H PRN


   PRN Reason: FEVER OR PAIN


   Last Admin: 03/02/17 11:34 Dose:  650 mg


Albuterol Sulfate (Ventolin 0.5% -)  1 amp NEB Q6H PRN


   PRN Reason: SHORT OF BREATH/WHEEZING


   Last Admin: 03/02/17 19:29 Dose:  1 amp


Atorvastatin Calcium (Lipitor -)  10 mg PO HS [facility]


   Last Admin: 03/01/17 22:01 Dose:  10 mg


Budesonide/Formoterol Fumarate (Symbicort 80/4.5mcg -)  2 puff IH BID [facility]


   Last Admin: 03/02/17 11:24 Dose:  2 inh


Calcium Carbonate/Cholecalciferol (Os-Saleem 500+D -)  1 tab PO DAILY [facility]


   Last Admin: 03/02/17 09:29 Dose:  1 tab


Diltiazem HCl (Cardizem Cd -)  180 mg PO BID [facility]


   Last Admin: 03/02/17 09:28 Dose:  180 mg


Ferrous Sulfate (Feosol -)  325 mg PO DAILY [facility]


   Last Admin: 03/02/17 09:28 Dose:  325 mg


Furosemide (Lasix Injection -)  80 mg IVPUSH BID@0600,1400 [facility]


   Last Admin: 03/02/17 14:11 Dose:  80 mg


Latanoprost (Xalatan 0.005% Eye Drops -)  1 drop OU HS [facility]


   Last Admin: 03/01/17 22:01 Dose:  1 drop


Montelukast Sodium (Singulair -)  10 mg PO HS [facility]


   Last Admin: 03/01/17 22:01 Dose:  10 mg


Multivitamins/Minerals/Vitamin C (Tab-A-Vit -)  1 tab PO DAILY [facility]


   Last Admin: 03/02/17 09:29 Dose:  1 tab


Pantoprazole Sodium (Protonix -)  40 mg PO BID [facility]


   Last Admin: 03/02/17 09:29 Dose:  40 mg


Potassium Chloride (K-Dur -)  20 meq PO DAILY [facility]


   Last Admin: 03/02/17 09:28 Dose:  20 meq


Sodium Chloride (Ocean Spray Nasal Spray -)  2 spray NS TID [facility]


   Last Admin: 03/02/17 14:11 Dose:  2 spray


Tiotropium Bromide (Spiriva -)  1 puff IH DAILY [facility]


   Last Admin: 03/02/17 13:15 Dose:  1 puff





A/P





77 y/o patient with History of ulcer disease diagnosed 3 years ago when patient 

underwent last EGD and colonoscopy. 


Treated medically with PPI's. 


Presents now with hypochromic, microcytic anemia.


Fe++ studies s/o iron deficiency


gi teams inputnoted





On FeSO4 325 mg daily---change to niferex 150mg daily


continue vit. c


add protonix

## 2017-03-03 LAB
ANION GAP SERPL CALC-SCNC: 6 MMOL/L (ref 8–16)
BASOPHILS # BLD: 0.7 % (ref 0–2)
CALCIUM SERPL-MCNC: 7.9 MG/DL (ref 8.5–10.1)
CO2 SERPL-SCNC: 38 MMOL/L (ref 21–32)
CREAT SERPL-MCNC: 0.7 MG/DL (ref 0.55–1.02)
DEPRECATED RDW RBC AUTO: 29.8 % (ref 11.6–15.6)
EOSINOPHIL # BLD: 1.8 % (ref 0–4.5)
GLUCOSE SERPL-MCNC: 76 MG/DL (ref 74–106)
MAGNESIUM SERPL-MCNC: 2.5 MG/DL (ref 1.8–2.4)
MCH RBC QN AUTO: 21.7 PG (ref 25.7–33.7)
MCHC RBC AUTO-ENTMCNC: 30.8 G/DL (ref 32–36)
MCV RBC: 70.5 FL (ref 80–96)
NEUTROPHILS # BLD: 78 % (ref 42.8–82.8)
PHOSPHATE SERPL-MCNC: 2.9 MG/DL (ref 2.5–4.9)
PLATELET # BLD AUTO: 119 K/MM3 (ref 134–434)
PMV BLD: 8.5 FL (ref 7.5–11.1)
WBC # BLD AUTO: 11.1 K/MM3 (ref 4–10)

## 2017-03-03 RX ADMIN — POTASSIUM CHLORIDE SCH MEQ: 1500 TABLET, EXTENDED RELEASE ORAL at 09:57

## 2017-03-03 RX ADMIN — FUROSEMIDE SCH MG: 10 INJECTION, SOLUTION INTRAVENOUS at 06:13

## 2017-03-03 RX ADMIN — ALBUTEROL SULFATE PRN AMP: 2.5 SOLUTION RESPIRATORY (INHALATION) at 14:56

## 2017-03-03 RX ADMIN — ATORVASTATIN CALCIUM SCH MG: 10 TABLET, FILM COATED ORAL at 21:40

## 2017-03-03 RX ADMIN — TIOTROPIUM BROMIDE SCH PUFF: 18 CAPSULE ORAL; RESPIRATORY (INHALATION) at 12:48

## 2017-03-03 RX ADMIN — BUDESONIDE AND FORMOTEROL FUMARATE DIHYDRATE SCH INH: 80; 4.5 AEROSOL RESPIRATORY (INHALATION) at 09:57

## 2017-03-03 RX ADMIN — ALBUTEROL SULFATE PRN AMP: 2.5 SOLUTION RESPIRATORY (INHALATION) at 21:11

## 2017-03-03 RX ADMIN — Medication SCH MG: at 09:57

## 2017-03-03 RX ADMIN — PANTOPRAZOLE SODIUM SCH MG: 40 TABLET, DELAYED RELEASE ORAL at 09:57

## 2017-03-03 RX ADMIN — Medication SCH TAB: at 09:57

## 2017-03-03 RX ADMIN — FUROSEMIDE SCH MG: 10 INJECTION, SOLUTION INTRAVENOUS at 15:00

## 2017-03-03 RX ADMIN — MONTELUKAST SODIUM SCH MG: 10 TABLET, COATED ORAL at 21:40

## 2017-03-03 RX ADMIN — SALINE NASAL SPRAY SCH SPRAY: 1.5 SOLUTION NASAL at 06:16

## 2017-03-03 RX ADMIN — SALINE NASAL SPRAY SCH SPRAY: 1.5 SOLUTION NASAL at 15:33

## 2017-03-03 RX ADMIN — PANTOPRAZOLE SODIUM SCH MG: 40 TABLET, DELAYED RELEASE ORAL at 21:40

## 2017-03-03 RX ADMIN — BUDESONIDE AND FORMOTEROL FUMARATE DIHYDRATE SCH INH: 80; 4.5 AEROSOL RESPIRATORY (INHALATION) at 21:41

## 2017-03-03 RX ADMIN — SALINE NASAL SPRAY SCH SPRAY: 1.5 SOLUTION NASAL at 21:41

## 2017-03-03 RX ADMIN — LATANOPROST SCH DROP: 50 SOLUTION OPHTHALMIC at 21:41

## 2017-03-03 RX ADMIN — MULTIVITAMIN TABLET SCH TAB: TABLET at 09:57

## 2017-03-03 NOTE — PN
Progress Note, Physician


Chief Complaint: 


Ms Peter says she is improving, still with dyspnea on exertion but better. No 

cp or n/v.





- Current Medication List


Current Medications: 


Active Medications





Acetaminophen (Tylenol -)  650 mg PO Q6H PRN


   PRN Reason: FEVER OR PAIN


   Last Admin: 03/02/17 11:34 Dose:  650 mg


Albuterol Sulfate (Ventolin 0.5% -)  1 amp NEB Q6H PRN


   PRN Reason: SHORT OF BREATH/WHEEZING


   Last Admin: 03/03/17 14:56 Dose:  1 amp


Atorvastatin Calcium (Lipitor -)  10 mg PO HS [facility]


   Last Admin: 03/02/17 22:42 Dose:  10 mg


Budesonide/Formoterol Fumarate (Symbicort 80/4.5mcg -)  2 puff IH BID [facility]


   Last Admin: 03/03/17 09:57 Dose:  1 inh


Calcium Carbonate/Cholecalciferol (Os-Saleem 500+D -)  1 tab PO DAILY [facility]


   Last Admin: 03/03/17 09:57 Dose:  1 tab


Diltiazem HCl (Cardizem Cd -)  180 mg PO DAILY [facility]


Furosemide (Lasix Injection -)  80 mg IVPUSH BID@0600,1400 [facility]


   Last Admin: 03/03/17 06:13 Dose:  80 mg


Latanoprost (Xalatan 0.005% Eye Drops -)  1 drop OU [facility]


   Last Admin: 03/02/17 22:42 Dose:  1 drop


Montelukast Sodium (Singulair -)  10 mg PO HS [facility]


   Last Admin: 03/02/17 22:42 Dose:  10 mg


Multivitamins/Minerals/Vitamin C (Tab-A-Vit -)  1 tab PO DAILY [facility]


   Last Admin: 03/03/17 09:57 Dose:  1 tab


Pantoprazole Sodium (Protonix -)  40 mg PO BID [facility]


   Last Admin: 03/03/17 09:57 Dose:  40 mg


Polysaccharide Iron Complex (Niferex-150 -)  150 mg PO DAILY [facility]


   Last Admin: 03/03/17 09:57 Dose:  150 mg


Potassium Chloride (K-Dur -)  20 meq PO DAILY [facility]


   Last Admin: 03/03/17 09:57 Dose:  20 meq


Sodium Chloride (Ocean Spray Nasal Spray -)  2 spray NS TID [facility]


   Last Admin: 03/03/17 06:16 Dose:  2 spray


Tiotropium Bromide (Spiriva -)  1 puff IH DAILY [facility]


   Last Admin: 03/03/17 12:48 Dose:  1 puff











- Objective


Vital Signs: 


 Vital Signs











Temperature  98.4 F   03/03/17 14:00


 


Pulse Rate  90   03/03/17 14:00


 


Respiratory Rate  20   03/03/17 14:00


 


Blood Pressure  102/64   03/03/17 14:00


 


O2 Sat by Pulse Oximetry (%)  97   03/03/17 09:00











Constitutional: Yes: Well Nourished, No Distress, Calm


Cardiovascular: Yes: Regular Rate and Rhythm.  No: Gallop, Murmur, Rub


Respiratory: Yes: Regular, CTA Bilaterally, On Nasal O2.  No: Rales, Rhonchi, 

Wheezes


Gastrointestinal: Yes: Normal Bowel Sounds, Soft.  No: Distention, Tenderness


Extremities: Yes: WNL


Edema: Yes


Edema: LLE: Trace, RLE: Trace


Labs: 


 CBC, BMP





 03/03/17 06:45 





 03/03/17 06:45 





 INR, PTT











INR  0.96  (0.82-1.09)   02/28/17  15:00    














Problem List





- Problems


(1) Anemia


Code(s): D64.9 - ANEMIA, UNSPECIFIED   





(2) SOB (shortness of breath)


Code(s): R06.02 - SHORTNESS OF BREATH





(3) CHF (congestive heart failure)


Code(s): I50.9 - HEART FAILURE, UNSPECIFIED   





(4) Hypertension


Code(s): I10 - ESSENTIAL (PRIMARY) HYPERTENSION   





(5) COPD (chronic obstructive pulmonary disease)


Code(s): J44.9 - CHRONIC OBSTRUCTIVE PULMONARY DISEASE, UNSPECIFIED   








Assessment/Plan


(1) Anemia


Assessment/Plan: 


-s/p 3 units with good response


-however dropping today


-GI recommendations are to follow up as outpatient


-hematology started niferex


-will monitor over the weekend as cannot discharge while actively bleeding


Code(s): D64.9 - ANEMIA, UNSPECIFIED   





(2) SOB (shortness of breath)


Assessment/Plan: 


-improved but still present


-transfused as above


-ACS ruled out


Code(s): R06.02 - SHORTNESS OF BREATH





(3) CHF (congestive heart failure)


Assessment/Plan: 


-cardiology consult and note reviewed


-on IV lasix 80mg bid


Code(s): I50.9 - HEART FAILURE, UNSPECIFIED   





(4) Hypertension


Assessment/Plan: 


-continue diltiazem and lasix


-monitor


Code(s): I10 - ESSENTIAL (PRIMARY) HYPERTENSION   





(5) COPD (chronic obstructive pulmonary disease)


Assessment/Plan: 


-pulmonary consulted


Code(s): J44.9 - CHRONIC OBSTRUCTIVE PULMONARY DISEASE, UNSPECIFIED   





(6) Pain


-resolved

## 2017-03-03 NOTE — PN
Progress Note (short form)





- Note


Progress Note: 


s:  no cp palps dizzy; manrique improving





o: 


 


 Vital Signs











 Period  Temp  Pulse  Resp  BP Sys/Gonzalez  Pulse Ox


 


 Last 24 Hr  97.7 F-98.5 F  87-95  18-20  100-115/52-62  99








Constitutional: Yes: Well Nourished, No Distress


Eyes: No: Sclera Icterus


Respiratory: Yes: mild bl wheeze


Gastrointestinal: Yes: Normal Bowel Sounds.  No: Distention, Hepatomegaly, 

Palpable Mass, Tenderness


Cardiovascular: Yes: Regular Rate and Rhythm


JVD: No


Heart Sounds: Yes: S1, S2.  No: Gallop


Murmur: No: Systolic Murmur, Diastolic Murmur


Extremities: No: Cold, Cyanosis


Edema: Yes (trace-1+ pretibs bilat)


Integumentary: No: Jaundice


Neurological: Yes: Alert, Oriented (x3)


Psychiatric: No: Agitated








 


 Current Medications











Generic Name Dose Route Start Last Admin





  Trade Name Freq  PRN Reason Stop Dose Admin


 


Acetaminophen  650 mg 03/02/17 11:24 03/02/17 11:34





  Tylenol -  PO   650 mg





  Q6H PRN   Administration





  FEVER OR PAIN   


 


Albuterol Sulfate  1 amp 02/28/17 16:35 03/02/17 19:29





  Ventolin 0.5% -  NEB   1 amp





  Q6H PRN   Administration





  SHORT OF BREATH/WHEEZING   


 


Atorvastatin Calcium  10 mg 02/28/17 22:00 03/02/17 22:42





  Lipitor -  PO   10 mg





  HS ELI   Administration


 


Budesonide/Formoterol Fumarate  2 puff 02/28/17 22:00 03/03/17 09:57





  Symbicort 80/4.5mcg -  IH   1 inh





  BID ELI   Administration


 


Calcium Carbonate/Cholecalciferol  1 tab 03/01/17 10:00 03/03/17 09:57





  Os-Saleem 500+D -  PO   1 tab





  DAILY ELI   Administration


 


Diltiazem HCl  180 mg 03/04/17 10:00  





  Cardizem Cd -  PO   





  DAILY ELI   


 


Furosemide  80 mg 02/28/17 18:15 03/03/17 06:13





  Lasix Injection -  IVPUSH   80 mg





  BID@0600,1400 [facility]I   Administration


 


Latanoprost  1 drop 02/28/17 22:00 03/02/17 22:42





  Xalatan 0.005% Eye Drops -  OU   1 drop





  HS ELI   Administration


 


Montelukast Sodium  10 mg 02/28/17 22:00 03/02/17 22:42





  Singulair -  PO   10 mg





  HS ELI   Administration


 


Multivitamins/Minerals/Vitamin C  1 tab 03/01/17 10:00 03/03/17 09:57





  Tab-A-Vit -  PO   1 tab





  DAILY ELI   Administration


 


Pantoprazole Sodium  40 mg 03/02/17 10:00 03/03/17 09:57





  Protonix -  PO   40 mg





  BID ELI   Administration


 


Polysaccharide Iron Complex  150 mg 03/03/17 10:00 03/03/17 09:57





  Niferex-150 -  PO   150 mg





  DAILY ELI   Administration


 


Potassium Chloride  20 meq 03/01/17 10:00 03/03/17 09:57





  K-Dur -  PO   20 meq





  DAILY ELI   Administration


 


Sodium Chloride  2 spray 02/28/17 22:00 03/03/17 06:16





  Ocean Spray Nasal Spray -  NS   2 spray





  TID ELI   Administration


 


Tiotropium Bromide  1 puff 03/02/17 10:00 03/02/17 13:15





  Spiriva -  IH   1 puff





  DAILY ELI   Administration











 CBC, BMP





 03/03/17 06:45 





 03/03/17 06:45 














ekg 2/28 (ER): NSR, normal asix/interv; WNL








Echo 4/16: nl LV/EF; RV tds; nl LA; valves WNL; RVSP 40-50











Assessment/Plan





acute SOB, acute diast chf:


-BNP 600s, similar to when was here with probable chf 4/16


-? sx's due to worsening anemia with hgb 6


-CXR with no sig chf


-leg swelling is signif incr'd lately (? abdomen as well per pt), raising 

suspicion for chf though cannot appreciate JVD on exam


-has been complying with torsemide 40 bid at home she says


-has been getting lasix 80 iv bid and le edema improved and cr stable thus far.

  would change back to po torsemide 40 bid when otherwise ready for dc.


-no suspicion of ACS, enzymes neg x3 and ecg unremarkable





mild pulm HTN:


-estimated RVSP 40-50 on recent echo, RV not well seen on that report


-? sec to LV diast chf vs ? hypoxic chronic lung dz (on home O2 for copd)--

these 2 dx's are extremely likely to represent the etiology of her pulm htn and 

tx directed at both (i.e. diuretics and supplemental O2/airways tx) is all that 

is indicated here





COPD, ? with a.e.:


-+ wheezing and mild URI sx's


-defer to dr shearer, pulm regarding airways-specific tx's





HTN:


-controlled


-will decrease dilt from 180 bid to 180 qd as her bp is on low side and ccb may 

be worsening le edema





anemia, chronic:


-baseline hgb runs 8s-9s


-currently 6's on admit, likely symptomatic (sob)


-hgb back to baseline after prbcs


-further w/u of etiology per GI/heme


-Pt has no cardiac contraindications (intermediate risk) to EGD/FOC if needed 

for anemia workup








cardiac wise remains stable

## 2017-03-03 NOTE — PN
Progress Note (short form)





- Note


Progress Note: 


Patient seen and examined 


No chespt pain , some SOB and dyspnea. 


No GI complaints


No dysuria, hematuria


Swelling LE improved


 Last Vital Signs











Temp Pulse Resp BP Pulse Ox


 


 98.4 F   90   20   102/64   97 


 


 03/03/17 10:00  03/03/17 10:00  03/03/17 10:00  03/03/17 10:00  03/03/17 09:00











HEENT: MARCELLO, EOM Intact


Oropharynx: No thrush, No mucositis, upper denture


Cor: RSR, No murmurs, No gallops


Lungs: kyphosis, diminished breath sounds, wheezes,


Abd: Soft, Normal bowel sounds, No organomegaly


Ext:No significant edema


Skin: No rashes, Integument intact


 CBC, BMP





 03/03/17 06:45 





 03/03/17 06:45 





 Current Medications











Generic Name Dose Route Start Last Admin





  Trade Name Freq  PRN Reason Stop Dose Admin


 


Acetaminophen  650 mg 03/02/17 11:24 03/02/17 11:34





  Tylenol -  PO   650 mg





  Q6H PRN   Administration





  FEVER OR PAIN   


 


Albuterol Sulfate  1 amp 02/28/17 16:35 03/02/17 19:29





  Ventolin 0.5% -  NEB   1 amp





  Q6H PRN   Administration





  SHORT OF BREATH/WHEEZING   


 


Atorvastatin Calcium  10 mg 02/28/17 22:00 03/02/17 22:42





  Lipitor -  PO   10 mg





  HS ELI   Administration


 


Budesonide/Formoterol Fumarate  2 puff 02/28/17 22:00 03/03/17 09:57





  Symbicort 80/4.5mcg -  IH   1 inh





  BID [facility]   Administration


 


Calcium Carbonate/Cholecalciferol  1 tab 03/01/17 10:00 03/03/17 09:57





  Os-Saleem 500+D -  PO   1 tab





  DAILY ELI   Administration


 


Diltiazem HCl  180 mg 03/04/17 10:00  





  Cardizem Cd -  PO   





  DAILY ELI   


 


Furosemide  80 mg 02/28/17 18:15 03/03/17 06:13





  Lasix Injection -  IVPUSH   80 mg





  BID@0600,1400 [facility]   Administration


 


Latanoprost  1 drop 02/28/17 22:00 03/02/17 22:42





  Xalatan 0.005% Eye Drops -  OU   1 drop





  HS ELI   Administration


 


Montelukast Sodium  10 mg 02/28/17 22:00 03/02/17 22:42





  Singulair -  PO   10 mg





  HS ELI   Administration


 


Multivitamins/Minerals/Vitamin C  1 tab 03/01/17 10:00 03/03/17 09:57





  Tab-A-Vit -  PO   1 tab





  DAILY ELI   Administration


 


Pantoprazole Sodium  40 mg 03/02/17 10:00 03/03/17 09:57





  Protonix -  PO   40 mg





  BID [facility]   Administration


 


Polysaccharide Iron Complex  150 mg 03/03/17 10:00 03/03/17 09:57





  Niferex-150 -  PO   150 mg





  DAILY ELI   Administration


 


Potassium Chloride  20 meq 03/01/17 10:00 03/03/17 09:57





  K-Dur -  PO   20 meq





  DAILY ELI   Administration


 


Sodium Chloride  2 spray 02/28/17 22:00 03/03/17 06:16





  Ocean Spray Nasal Spray -  NS   2 spray





  TID [facility]   Administration


 


Tiotropium Bromide  1 puff 03/02/17 10:00 03/03/17 12:48





  Spiriva -  IH   1 puff





  DAILY ELI   Administration








Impression:


Anemia--?? secondary to bleeding  AVMs- will need outpatient work up


CHF - on IV lasix  


Thrombocytopenia--etiology unclear.  elevated,B-12 and folate, normal alb/glob 

ratio





Patient has received 3 units of packed cells to date. To check CBC-- current Hct

-28%. 


Thrombocytopenia- present on admission. 





Plan Sono of liver and spleen. ck TSH.











Problem List





- Problems


(1) Anemia


Code(s): D64.9 - ANEMIA, UNSPECIFIED

## 2017-03-03 NOTE — PN
Physical Exam: 


SUBJECTIVE: Patient seen and examined





patient resting in bed comfortably NAD. No acute events overnight. Afebrile and 

hemodynamically stable. States she feels less SOB and has reduced LE edema. 

ambulated with walker to bathroom with less exertional SOB, still below 

baseline. Denies f/c, chest pain, dizziness, cough, abd pain, diarrhea, 

dysuria. 





OBJECTIVE:





 Vital Signs











 Period  Temp  Pulse  Resp  BP Sys/Gonzalez  Pulse Ox


 


 Last 24 Hr  97.7 F-98.5 F  87-91  18-20  102-116/52-69  97-99











GENERAL: Awake, alert, and fully oriented, in no acute distress.


HEAD: Normal with no signs of trauma.


EYES: Pupils equal, round and reactive to light, extraocular movements intact, 

sclera anicteric, conjunctiva clear. 


EARS, NOSE, THROAT: Moist mucous membranes.


NECK: supple without JVD


LUNGS: reduced wheezes, better air movement  


HEART: Regular rate and rhythm, normal S1 and S2


ABDOMEN: Soft, nontender, not distended, normoactive bowel sounds, no masses. 


MUSCULOSKELETAL: No CVA tenderness.


UPPER EXTREMITIES: 2+ pulses, No peripheral edema.


LOWER EXTREMITIES: 2+ pulses, warm, well-perfused. 1+ peripheral edema. 


NEUROLOGICAL:  Cranial nerves II-XII grossly intact. Normal speech. 


PSYCHIATRIC: Cooperative. Good eye contact. A


SKIN: Warm, dry

















 Laboratory Results - last 24 hr











  03/03/17 03/03/17





  06:45 06:45


 


WBC  11.1 H 


 


RBC  3.98 


 


Hgb  8.6 L D 


 


Hct  28.0 L 


 


MCV  70.5 L 


 


MCHC  30.8 L 


 


RDW  29.8 H 


 


Plt Count  119 L 


 


MPV  8.5 


 


Neutrophils %  78.0 


 


Lymphocytes %  10.0  D 


 


Monocytes %  9.5 


 


Eosinophils %  1.8 


 


Basophils %  0.7 


 


Sodium   142


 


Potassium   3.5


 


Chloride   98


 


Carbon Dioxide   38 H


 


Anion Gap   6 L


 


BUN   14


 


Creatinine   0.7


 


Random Glucose   76


 


Calcium   7.9 L


 


Phosphorus   2.9


 


Magnesium   2.5 H








Active Medications











Generic Name Dose Route Start Last Admin





  Trade Name Freq  PRN Reason Stop Dose Admin


 


Acetaminophen  650 mg 03/02/17 11:24 03/02/17 11:34





  Tylenol -  PO   650 mg





  Q6H PRN   Administration





  FEVER OR PAIN   


 


Albuterol Sulfate  1 amp 02/28/17 16:35 03/03/17 14:56





  Ventolin 0.5% -  NEB   1 amp





  Q6H PRN   Administration





  SHORT OF BREATH/WHEEZING   


 


Atorvastatin Calcium  10 mg 02/28/17 22:00 03/02/17 22:42





  Lipitor -  PO   10 mg





  HS ELI   Administration


 


Budesonide/Formoterol Fumarate  2 puff 02/28/17 22:00 03/03/17 09:57





  Symbicort 80/4.5mcg -  IH   1 inh





  BID ELI   Administration


 


Calcium Carbonate/Cholecalciferol  1 tab 03/01/17 10:00 03/03/17 09:57





  Os-Saleem 500+D -  PO   1 tab





  DAILY ELI   Administration


 


Diltiazem HCl  180 mg 03/04/17 10:00  





  Cardizem Cd -  PO   





  DAILY ELI   


 


Furosemide  80 mg 02/28/17 18:15 03/03/17 06:13





  Lasix Injection -  IVPUSH   80 mg





  BID@0600,1400 ELI   Administration


 


Latanoprost  1 drop 02/28/17 22:00 03/02/17 22:42





  Xalatan 0.005% Eye Drops -  OU   1 drop





  HS ELI   Administration


 


Montelukast Sodium  10 mg 02/28/17 22:00 03/02/17 22:42





  Singulair -  PO   10 mg





  HS ELI   Administration


 


Multivitamins/Minerals/Vitamin C  1 tab 03/01/17 10:00 03/03/17 09:57





  Tab-A-Vit -  PO   1 tab





  DAILY ELI   Administration


 


Pantoprazole Sodium  40 mg 03/02/17 10:00 03/03/17 09:57





  Protonix -  PO   40 mg





  BID ELI   Administration


 


Polysaccharide Iron Complex  150 mg 03/03/17 10:00 03/03/17 09:57





  Niferex-150 -  PO   150 mg





  DAILY ELI   Administration


 


Potassium Chloride  20 meq 03/01/17 10:00 03/03/17 09:57





  K-Dur -  PO   20 meq





  DAILY ELI   Administration


 


Sodium Chloride  2 spray 02/28/17 22:00 03/03/17 06:16





  Ocean Spray Nasal Spray -  NS   2 spray





  TID ELI   Administration


 


Tiotropium Bromide  1 puff 03/02/17 10:00 03/03/17 12:48





  Spiriva -  IH   1 puff





  DAILY ELI   Administration











ASSESSMENT/PLAN:


Acute on chronic hypoxic respiratory failure 


-likely due to exacerbated severe anemia


-symptmatically improved, ambulating to bathroom, below baseline


-supplemental O2, now on 2L sat 97% at rest 


-symbicort, singulair, albuterol, spiriva


-PT





COPD


-O2 dependant 


-mild pulmonary HTN


-CXR no acute process





Severe symptomatic anemia 


-acute on chronic


-Hx upper GIB (bleeding ulcer 2000) 


-GI consult: cecilio lemon, will scope when respiratory status more stable


-hemo consult appreciated: f/u anemia workup 


-s/p 2.5 U pRBC


-monitor h/h (hgb 8.6 today) 





CHF (HFPEF)


-cardiology on case


-possible exacerbation 


      


HTN


-lasix





Dispo: We will continue to follow the patient. Thank you for this consultative 

opportunity.





Problem List





- Problems


(1) Acute on chronic respiratory failure with hypoxemia


Code(s): J96.21 - ACUTE AND CHRONIC RESPIRATORY FAILURE WITH HYPOXIA





(2) Anemia


Code(s): D64.9 - ANEMIA, UNSPECIFIED   





(3) COPD (chronic obstructive pulmonary disease)


Code(s): J44.9 - CHRONIC OBSTRUCTIVE PULMONARY DISEASE, UNSPECIFIED   





(4) COPD exacerbation


Code(s): J44.1 - CHRONIC OBSTRUCTIVE PULMONARY DISEASE W (ACUTE) EXACERBATION





(5) SOB (shortness of breath)


Code(s): R06.02 - SHORTNESS OF BREATH





(6) CHF (congestive heart failure)


Code(s): I50.9 - HEART FAILURE, UNSPECIFIED   





(7) Hypertension


Code(s): I10 - ESSENTIAL (PRIMARY) HYPERTENSION   





(8) GIB (gastrointestinal bleeding)


Code(s): K92.2 - GASTROINTESTINAL HEMORRHAGE, UNSPECIFIED   








Visit type





- Emergency Visit


Emergency Visit: Yes


ED Registration Date: 02/28/17


Care time: The patient presented to the Emergency Department on the above date 

and was hospitalized for further evaluation of their emergent condition.





- New Patient


This patient is new to me today: No





- Critical Care


Critical Care patient: No





- Discharge Referral


Referred to [facility] No

## 2017-03-03 NOTE — PN
Progress Note, Physician


History of Present Illness: 


pulmonary





alert,feeling better,less dyspneic





- Current Medication List


Current Medications: 


Active Medications





Acetaminophen (Tylenol -)  650 mg PO Q6H PRN


   PRN Reason: FEVER OR PAIN


   Last Admin: 03/02/17 11:34 Dose:  650 mg


Albuterol Sulfate (Ventolin 0.5% -)  1 amp NEB Q6H PRN


   PRN Reason: SHORT OF BREATH/WHEEZING


   Last Admin: 03/02/17 19:29 Dose:  1 amp


Atorvastatin Calcium (Lipitor -)  10 mg PO HS [facility]


   Last Admin: 03/02/17 22:42 Dose:  10 mg


Budesonide/Formoterol Fumarate (Symbicort 80/4.5mcg -)  2 puff IH BID [facility]


   Last Admin: 03/03/17 09:57 Dose:  1 inh


Calcium Carbonate/Cholecalciferol (Os-Saleem 500+D -)  1 tab PO DAILY [facility]


   Last Admin: 03/03/17 09:57 Dose:  1 tab


Diltiazem HCl (Cardizem Cd -)  180 mg PO DAILY [facility]


Furosemide (Lasix Injection -)  80 mg IVPUSH BID@0600,1400 [facility]


   Last Admin: 03/03/17 06:13 Dose:  80 mg


Latanoprost (Xalatan 0.005% Eye Drops -)  1 drop OU [facility]


   Last Admin: 03/02/17 22:42 Dose:  1 drop


Montelukast Sodium (Singulair -)  10 mg PO HS [facility]


   Last Admin: 03/02/17 22:42 Dose:  10 mg


Multivitamins/Minerals/Vitamin C (Tab-A-Vit -)  1 tab PO DAILY [facility]


   Last Admin: 03/03/17 09:57 Dose:  1 tab


Pantoprazole Sodium (Protonix -)  40 mg PO BID [facility]


   Last Admin: 03/03/17 09:57 Dose:  40 mg


Polysaccharide Iron Complex (Niferex-150 -)  150 mg PO DAILY [facility]


   Last Admin: 03/03/17 09:57 Dose:  150 mg


Potassium Chloride (K-Dur -)  20 meq PO DAILY [facility]


   Last Admin: 03/03/17 09:57 Dose:  20 meq


Sodium Chloride (Ocean Spray Nasal Spray -)  2 spray NS TID [facility]


   Last Admin: 03/03/17 06:16 Dose:  2 spray


Tiotropium Bromide (Spiriva -)  1 puff IH DAILY [facility]


   Last Admin: 03/03/17 12:48 Dose:  1 puff











- Objective


Vital Signs: 


 Vital Signs











Temperature  98.4 F   03/03/17 10:00


 


Pulse Rate  90   03/03/17 10:00


 


Respiratory Rate  20   03/03/17 10:00


 


Blood Pressure  102/64   03/03/17 10:00


 


O2 Sat by Pulse Oximetry (%)  97   03/03/17 09:00











Constitutional: Yes: Well Nourished, Calm


Eyes: Yes: WNL


HENT: Yes: WNL


Neck: Yes: WNL


Cardiovascular: Yes: Regular Rate and Rhythm, S1, S2


Respiratory: Yes: Diminished, Wheezes (few wheezes)


Gastrointestinal: Yes: WNL


Extremities: Yes: WNL


Edema: Yes


Labs: 


 CBC, BMP





 03/03/17 06:45 





 03/03/17 06:45 





 INR, PTT











INR  0.96  (0.82-1.09)   02/28/17  15:00    














Problem List





- Problems


(1) Anemia


Code(s): D64.9 - ANEMIA, UNSPECIFIED   





(2) COPD (chronic obstructive pulmonary disease)


Code(s): J44.9 - CHRONIC OBSTRUCTIVE PULMONARY DISEASE, UNSPECIFIED   





(3) SOB (shortness of breath)


Code(s): R06.02 - SHORTNESS OF BREATH





(4) CHF (congestive heart failure)


Code(s): I50.9 - HEART FAILURE, UNSPECIFIED   





(5) Hypertension


Code(s): I10 - ESSENTIAL (PRIMARY) HYPERTENSION   





(6) Acute on chronic respiratory failure with hypoxemia


Code(s): J96.21 - ACUTE AND CHRONIC RESPIRATORY FAILURE WITH HYPOXIA








Assessment/Plan


IMP ACUTE ON CHRONIC HYPOXEMIC RESPIRATORY FAILURE


      ADVANCED COPD ON O2


      SEVERE ANEMIA


      CHF


      HTN


      GI BLEED





PLAN INHALED BRONCHODILATORS


        O2


        TRANSFUSE PRN


        LASIX


        MONITOR H+H





 Problem List 





- Problems


(1) Anemia


Code(s): D64.9 - ANEMIA, UNSPECIFIED   





(2) COPD (chronic obstructive pulmonary disease)


Code(s): J44.9 - CHRONIC OBSTRUCTIVE PULMONARY DISEASE, UNSPECIFIED   





(3) SOB (shortness of breath)


Code(s): R06.02 - SHORTNESS OF BREATH





(4) CHF (congestive heart failure)


Code(s): I50.9 - HEART FAILURE, UNSPECIFIED   





(5) Hypertension


Code(s): I10 - ESSENTIAL (PRIMARY) HYPERTENSION   





(6) Acute on chronic respiratory failure with hypoxemia


Code(s): J96.21 - ACUTE AND CHRONIC RESPIRATORY FAILURE WITH HYPOXIA

## 2017-03-04 LAB
ANION GAP SERPL CALC-SCNC: 5 MMOL/L (ref 8–16)
ANISOCYTOSIS BLD QL SMEAR: (no result)
BASO STIPL BLD QL SMEAR: (no result)
BASOPHILS # BLD: 0.8 % (ref 0–2)
BILIRUB DIRECT SERPL-MCNC: 235 U/L (ref 84–246)
CALCIUM SERPL-MCNC: 8 MG/DL (ref 8.5–10.1)
CO2 SERPL-SCNC: 43 MMOL/L (ref 21–32)
CREAT SERPL-MCNC: 0.7 MG/DL (ref 0.55–1.02)
DEPRECATED RDW RBC AUTO: 30.9 % (ref 11.6–15.6)
EOSINOPHIL # BLD: 2.1 % (ref 0–4.5)
GLUCOSE SERPL-MCNC: 78 MG/DL (ref 74–106)
HGB VARIANT-: (no result)
HYPOCHROMIA BLD QL SMEAR: (no result)
MAGNESIUM SERPL-MCNC: 2.5 MG/DL (ref 1.8–2.4)
MCH RBC QN AUTO: 21.7 PG (ref 25.7–33.7)
MCHC RBC AUTO-ENTMCNC: 30.4 G/DL (ref 32–36)
MCV RBC: 71.3 FL (ref 80–96)
MICROCYTES BLD QL SMEAR: (no result)
NEUTROPHILS # BLD: 82.1 % (ref 42.8–82.8)
OVALOCYTES BLD QL SMEAR: (no result)
PHOSPHATE SERPL-MCNC: 2.8 MG/DL (ref 2.5–4.9)
PLATELET # BLD AUTO: 146 K/MM3 (ref 134–434)
PLATELET # BLD EST: (no result) 10*3/UL
PLATELET COMMENT2: (no result)
PMV BLD: 8.7 FL (ref 7.5–11.1)
POIKILOCYTOSIS BLD QL SMEAR: (no result)
POLYCHROMASIA BLD QL SMEAR: (no result)
SCHISTOCYTES BLD QL SMEAR: (no result)
SPHEROCYTES BLD QL SMEAR: (no result)
STOMATOCYTES BLD QL SMEAR: (no result)
TSH SERPL-ACNC: 1.15 UIU/ML (ref 0.36–3.74)
WBC # BLD AUTO: 9.8 K/MM3 (ref 4–10)

## 2017-03-04 RX ADMIN — MONTELUKAST SODIUM SCH MG: 10 TABLET, COATED ORAL at 21:26

## 2017-03-04 RX ADMIN — BUDESONIDE AND FORMOTEROL FUMARATE DIHYDRATE SCH INH: 80; 4.5 AEROSOL RESPIRATORY (INHALATION) at 09:27

## 2017-03-04 RX ADMIN — ALBUTEROL SULFATE PRN AMP: 2.5 SOLUTION RESPIRATORY (INHALATION) at 11:10

## 2017-03-04 RX ADMIN — SALINE NASAL SPRAY SCH SPRAY: 1.5 SOLUTION NASAL at 21:27

## 2017-03-04 RX ADMIN — FUROSEMIDE SCH MG: 10 INJECTION, SOLUTION INTRAVENOUS at 05:24

## 2017-03-04 RX ADMIN — ATORVASTATIN CALCIUM SCH MG: 10 TABLET, FILM COATED ORAL at 21:26

## 2017-03-04 RX ADMIN — Medication SCH MG: at 09:26

## 2017-03-04 RX ADMIN — SALINE NASAL SPRAY SCH SPRAY: 1.5 SOLUTION NASAL at 05:25

## 2017-03-04 RX ADMIN — BUDESONIDE AND FORMOTEROL FUMARATE DIHYDRATE SCH INH: 80; 4.5 AEROSOL RESPIRATORY (INHALATION) at 21:22

## 2017-03-04 RX ADMIN — SALINE NASAL SPRAY SCH SPRAY: 1.5 SOLUTION NASAL at 14:18

## 2017-03-04 RX ADMIN — TIOTROPIUM BROMIDE SCH PUFF: 18 CAPSULE ORAL; RESPIRATORY (INHALATION) at 09:26

## 2017-03-04 RX ADMIN — PANTOPRAZOLE SODIUM SCH MG: 40 TABLET, DELAYED RELEASE ORAL at 09:26

## 2017-03-04 RX ADMIN — MULTIVITAMIN TABLET SCH TAB: TABLET at 09:26

## 2017-03-04 RX ADMIN — POTASSIUM CHLORIDE SCH MEQ: 1500 TABLET, EXTENDED RELEASE ORAL at 09:26

## 2017-03-04 RX ADMIN — Medication SCH TAB: at 09:26

## 2017-03-04 RX ADMIN — FUROSEMIDE SCH MG: 10 INJECTION, SOLUTION INTRAVENOUS at 14:28

## 2017-03-04 RX ADMIN — PANTOPRAZOLE SODIUM SCH MG: 40 TABLET, DELAYED RELEASE ORAL at 21:24

## 2017-03-04 RX ADMIN — LATANOPROST SCH DROP: 50 SOLUTION OPHTHALMIC at 21:20

## 2017-03-04 RX ADMIN — ACETAMINOPHEN PRN MG: 325 TABLET ORAL at 09:47

## 2017-03-04 NOTE — PN
Progress Note (short form)





- Note


Progress Note: 


Continues to have SOB


No chest pain or palpitations





O/E





Heart regular


Lungs clear


Abd soft


Ext no b/l +2 edema





 Vital Signs











 Period  Temp  Pulse  Resp  BP Sys/Gonzalez  Pulse Ox


 


 Last 24 Hr  97.9 F-98.4 F  85-91  20-20  101-120/54-69  97-98








 Current Medications





Acetaminophen (Tylenol -)  650 mg PO Q6H PRN


   PRN Reason: FEVER OR PAIN


   Last Admin: 03/04/17 09:47 Dose:  650 mg


Albuterol Sulfate (Ventolin 0.5% -)  1 amp NEB Q6H PRN


   PRN Reason: SHORT OF BREATH/WHEEZING


   Last Admin: 03/04/17 11:10 Dose:  1 amp


Atorvastatin Calcium (Lipitor -)  10 mg PO HS [facility]


   Last Admin: 03/03/17 21:40 Dose:  10 mg


Budesonide/Formoterol Fumarate (Symbicort 80/4.5mcg -)  2 puff IH BID [facility]


   Last Admin: 03/04/17 09:27 Dose:  1 inh


Calcium Carbonate/Cholecalciferol (Os-Saleem 500+D -)  1 tab PO DAILY [facility]


   Last Admin: 03/04/17 09:26 Dose:  1 tab


Diltiazem HCl (Cardizem Cd -)  180 mg PO DAILY [facility]


   Last Admin: 03/04/17 09:26 Dose:  180 mg


Furosemide (Lasix Injection -)  80 mg IVPUSH BID@0600,1400 [facility]


   Last Admin: 03/04/17 05:24 Dose:  80 mg


Latanoprost (Xalatan 0.005% Eye Drops -)  1 drop OU [facility]


   Last Admin: 03/03/17 21:41 Dose:  1 drop


Metolazone (Zaroxolyn -)  5 mg PO ONCE ONE


   Stop: 03/04/17 13:08


Montelukast Sodium (Singulair -)  10 mg PO HS [facility]


   Last Admin: 03/03/17 21:40 Dose:  10 mg


Multivitamins/Minerals/Vitamin C (Tab-A-Vit -)  1 tab PO DAILY [facility]


   Last Admin: 03/04/17 09:26 Dose:  1 tab


Pantoprazole Sodium (Protonix -)  40 mg PO BID [facility]


   Last Admin: 03/04/17 09:26 Dose:  40 mg


Polysaccharide Iron Complex (Niferex-150 -)  150 mg PO DAILY [facility]


   Last Admin: 03/04/17 09:26 Dose:  150 mg


Potassium Chloride (K-Dur -)  20 meq PO DAILY [facility]


   Last Admin: 03/04/17 09:26 Dose:  20 meq


Sodium Chloride (Ocean Spray Nasal Spray -)  2 spray NS TID [facility]


   Last Admin: 03/04/17 05:25 Dose:  2 spray


Tiotropium Bromide (Spiriva -)  1 puff IH DAILY [facility]


   Last Admin: 03/04/17 09:26 Dose:  1 puff





 Laboratory Last Values











WBC  9.8 K/mm3 (4.0-10.0)   03/04/17  07:50    


 


RBC  4.38 M/mm3 (3.60-5.2)   03/04/17  07:50    


 


Hgb  9.5 GM/dL (10.7-15.3)  L D 03/04/17  07:50    


 


Hct  31.3 % (32.4-45.2)  L  03/04/17  07:50    


 


MCV  71.3 fl (80-96)  L  03/04/17  07:50    


 


MCHC  30.4 g/dl (32.0-36.0)  L  03/04/17  07:50    


 


RDW  30.9 % (11.6-15.6)  H  03/04/17  07:50    


 


Plt Count  146 K/MM3 (134-434)  D 03/04/17  07:50    


 


MPV  8.7 fl (7.5-11.1)   03/04/17  07:50    


 


Neutrophils %  82.1 % (42.8-82.8)   03/04/17  07:50    


 


Lymphocytes %  6.1 % (8-40)  L D 03/04/17  07:50    


 


Monocytes %  8.9 % (3.8-10.2)   03/04/17  07:50    


 


Eosinophils %  2.1 % (0-4.5)   03/04/17  07:50    


 


Basophils %  0.8 % (0-2.0)   03/04/17  07:50    


 


Differential Comment  Slide scanned   03/04/17  07:50    


 


Platelet Estimate  Slt decreased  (NORMAL)   03/04/17  07:50    


 


Platelet Comment  No clumping noted   03/04/17  07:50    


 


Platelet Comment  No clotting detected   03/04/17  07:50    


 


Polychromasia  2+   03/04/17  07:50    


 


Hypochromic-Microcytic  2+   03/04/17  07:50    


 


Poikilocytosis  2+   03/04/17  07:50    


 


Basophilic Stippling  1+   03/04/17  07:50    


 


Anisocytosis  2+   03/04/17  07:50    


 


Microcytosis  2+   03/04/17  07:50    


 


Spherocytes  1+   03/04/17  07:50    


 


Target Cells  Few   03/02/17  06:30    


 


Ovalocytes  1+   03/04/17  07:50    


 


Stomatocytes  1+   03/04/17  07:50    


 


Fragmented RBCs  1+   03/04/17  07:50    


 


Retic Count  3.10 % (0.5-1.5)  H D 03/01/17  06:50    


 


Hemoglobin A  98.4 % (94.0-98.0)  H  03/01/17  06:50    


 


Hemoglobin A2  1.6 % (0.7-3.1)   03/01/17  06:50    


 


Hemoglobin C  0 % (0.0)   03/01/17  06:50    


 


Hemoglobin S  0 % (0.0)   03/01/17  06:50    


 


Variant Hemoglobin  TNP   03/01/17  06:50    


 


Hemoglobin Interpret    (.)   03/01/17  06:50    


 


Maternal Rh  0 % (0.0-2.0)   03/01/17  06:50    


 


Hemoglobin Solubility  Negative  (Negative)   03/01/17  06:50    


 


Haptoglobin  173 mg/dL ()   03/01/17  06:50    


 


INR  0.96  (0.82-1.09)   02/28/17  15:00    


 


PTT (Actin FS)  28.5 SECONDS (26.9-34.4)   02/28/17  15:00    


 


Sodium  143 mmol/L (136-145)   03/04/17  07:50    


 


Potassium  4.0 mmol/L (3.5-5.1)   03/04/17  07:50    


 


Chloride  95 mmol/L ()  L  03/04/17  07:50    


 


Carbon Dioxide  43 mmol/L (21-32)  H  03/04/17  07:50    


 


Anion Gap  5  (8-16)  L  03/04/17  07:50    


 


BUN  12 mg/dL (7-18)   03/04/17  07:50    


 


Creatinine  0.7 mg/dL (0.55-1.02)   03/04/17  07:50    


 


Creat Clearance w eGFR  > 60  (>60)   02/28/17  13:45    


 


Random Glucose  78 mg/dL ()   03/04/17  07:50    


 


Calcium  8.0 mg/dL (8.5-10.1)  L  03/04/17  07:50    


 


Phosphorus  2.8 mg/dL (2.5-4.9)   03/04/17  07:50    


 


Magnesium  2.5 mg/dL (1.8-2.4)  H  03/04/17  07:50    


 


Iron  14 ug/dL ()  L  02/28/17  18:55    


 


TIBC  428 ug/dL (250-450)   02/28/17  18:55    


 


Iron Saturation  3 % (15-55)  L  02/28/17  18:55    


 


Transferrin  376 mg/dL (200-370)  H  02/28/17  18:50    


 


Ferritin  9.880 ng/ml (6.9-282.5)   02/28/17  13:45    


 


Total Bilirubin  0.3 mg/dL (0.2-1.0)  D 02/28/17  13:45    


 


AST  25 U/L (15-37)   02/28/17  13:45    


 


ALT  27 U/L (12-78)   02/28/17  13:45    


 


Alkaline Phosphatase  89 U/L ()   02/28/17  13:45    


 


LD Total  235 U/L ()  D 03/04/17  07:50    


 


Creatine Kinase  70 IU/L ()   03/02/17  14:10    


 


Troponin I  < 0.02 ng/ml (0.00-0.05)   03/02/17  14:10    


 


B-Natriuretic Peptide  645.36 pg/ml (5-450)  H  02/28/17  13:45    


 


Total Protein  6.9 g/dl (6.4-8.2)   02/28/17  13:45    


 


Albumin  3.7 g/dl (3.4-5.0)   02/28/17  13:45    


 


Vitamin B12  1642 pg/ml (180-914)  H  02/28/17  13:45    


 


Serum Folate  88 ng/ml (3.1-17.5)  H  02/28/17  13:45    


 


TSH  1.15 uIU/ml (0.358-3.74)   03/04/17  07:50    


 


Urine Color  Straw   03/01/17  07:30    


 


Urine Appearance  Clear   03/01/17  07:30    


 


Urine pH  7.0  (5.0-8.0)   03/01/17  07:30    


 


Ur Specific Gravity  1.008  (1.001-1.035)   03/01/17  07:30    


 


Urine Protein  Negative  (NEGATIVE)   03/01/17  07:30    


 


Urine Glucose (UA)  Negative  (NEGATIVE)   03/01/17  07:30    


 


Urine Ketones  Negative  (NEGATIVE)   03/01/17  07:30    


 


Urine Blood  Negative  (NEGATIVE)   03/01/17  07:30    


 


Urine Nitrite  Negative  (NEGATIVE)   03/01/17  07:30    


 


Urine Bilirubin  Negative  (NEGATIVE)   03/01/17  07:30    


 


Urine Urobilinogen  Negative E.U./dl (0.2-1.0)   03/01/17  07:30    


 


Ur Leukocyte Esterase  Negative  (NEGATIVE)   03/01/17  07:30    


 


Stool Occult Blood  Positive  (NEGATIVE)   03/01/17  10:20    


 


Blood Type  O POSITIVE   02/28/17  14:40    


 


Antibody Screen  Negative   02/28/17  14:40    


 


Crossmatch  See Detail   02/28/17  14:40    








Assessment/Plan


(1) Anemia


Assessment/Plan: 


appears stable


Code(s): D64.9 - ANEMIA, UNSPECIFIED   





(2) SOB (shortness of breath)


Assessment/Plan: 


-improved but still present


-add Zarox


Code(s): R06.02 - SHORTNESS OF BREATH





(3) CHF (congestive heart failure)


Assessment/Plan: 


as above


Code(s): I50.9 - HEART FAILURE, UNSPECIFIED   





(4) Hypertension


Assessment/Plan: 


controlled


Code(s): I10 - ESSENTIAL (PRIMARY) HYPERTENSION   





(5) COPD (chronic obstructive pulmonary disease)


Assessment/Plan: 


-pulmonary consulted


Code(s): J44.9 - CHRONIC OBSTRUCTIVE PULMONARY DISEASE, UNSPECIFIED

## 2017-03-04 NOTE — PN
Progress Note, Physician


History of Present Illness: 


PULMONARY





ALERT,OOB-CHAIR,FEELING BETTER,LESS DYSPNEIC





- Current Medication List


Current Medications: 


Active Medications





Acetaminophen (Tylenol -)  650 mg PO Q6H PRN


   PRN Reason: FEVER OR PAIN


   Last Admin: 03/04/17 09:47 Dose:  650 mg


Albuterol Sulfate (Ventolin 0.5% -)  1 amp NEB Q6H PRN


   PRN Reason: SHORT OF BREATH/WHEEZING


   Last Admin: 03/04/17 11:10 Dose:  1 amp


Atorvastatin Calcium (Lipitor -)  10 mg PO HS [facility]


   Last Admin: 03/03/17 21:40 Dose:  10 mg


Budesonide/Formoterol Fumarate (Symbicort 80/4.5mcg -)  2 puff IH BID [facility]


   Last Admin: 03/04/17 09:27 Dose:  1 inh


Calcium Carbonate/Cholecalciferol (Os-Saleem 500+D -)  1 tab PO DAILY [facility]


   Last Admin: 03/04/17 09:26 Dose:  1 tab


Diltiazem HCl (Cardizem Cd -)  180 mg PO DAILY [facility]


   Last Admin: 03/04/17 09:26 Dose:  180 mg


Furosemide (Lasix Injection -)  80 mg IVPUSH BID@0600,1400 [facility]


   Last Admin: 03/04/17 05:24 Dose:  80 mg


Latanoprost (Xalatan 0.005% Eye Drops -)  1 drop OU [facility]


   Last Admin: 03/03/17 21:41 Dose:  1 drop


Metolazone (Zaroxolyn -)  5 mg PO ONCE ONE


   Stop: 03/04/17 13:31


Montelukast Sodium (Singulair -)  10 mg PO [facility]


   Last Admin: 03/03/17 21:40 Dose:  10 mg


Multivitamins/Minerals/Vitamin C (Tab-A-Vit -)  1 tab PO DAILY [facility]


   Last Admin: 03/04/17 09:26 Dose:  1 tab


Pantoprazole Sodium (Protonix -)  40 mg PO BID [facility]


   Last Admin: 03/04/17 09:26 Dose:  40 mg


Polysaccharide Iron Complex (Niferex-150 -)  150 mg PO DAILY [facility]


   Last Admin: 03/04/17 09:26 Dose:  150 mg


Potassium Chloride (K-Dur -)  20 meq PO DAILY [facility]


   Last Admin: 03/04/17 09:26 Dose:  20 meq


Sodium Chloride (Ocean Spray Nasal Spray -)  2 spray NS TID [facility]


   Last Admin: 03/04/17 05:25 Dose:  2 spray


Tiotropium Bromide (Spiriva -)  1 puff IH DAILY [facility]


   Last Admin: 03/04/17 09:26 Dose:  1 puff











- Objective


Vital Signs: 


 Vital Signs











Temperature  98.4 F   03/04/17 10:00


 


Pulse Rate  86   03/04/17 10:00


 


Respiratory Rate  20   03/04/17 10:00


 


Blood Pressure  118/66   03/04/17 10:00


 


O2 Sat by Pulse Oximetry (%)  98   03/04/17 09:00











Constitutional: Yes: Well Nourished, Calm


Eyes: Yes: WNL


HENT: Yes: WNL


Neck: Yes: Supple


Cardiovascular: Yes: Regular Rate and Rhythm, S1, S2


Respiratory: Yes: Wheezes (FEW WHEEZES)


Gastrointestinal: Yes: Normal Bowel Sounds, Soft


Extremities: Yes: WNL


Edema: Yes


Labs: 


 CBC, BMP





 03/04/17 07:50 





 03/04/17 07:50 





 INR, PTT











INR  0.96  (0.82-1.09)   02/28/17  15:00    














Problem List





- Problems


(1) Anemia


Code(s): D64.9 - ANEMIA, UNSPECIFIED   





(2) COPD (chronic obstructive pulmonary disease)


Code(s): J44.9 - CHRONIC OBSTRUCTIVE PULMONARY DISEASE, UNSPECIFIED   





(3) SOB (shortness of breath)


Code(s): R06.02 - SHORTNESS OF BREATH





(4) CHF (congestive heart failure)


Code(s): I50.9 - HEART FAILURE, UNSPECIFIED   





(5) Hypertension


Code(s): I10 - ESSENTIAL (PRIMARY) HYPERTENSION   





(6) Acute on chronic respiratory failure with hypoxemia


Code(s): J96.21 - ACUTE AND CHRONIC RESPIRATORY FAILURE WITH HYPOXIA








Assessment/Plan


IMP ACUTE ON CHRONIC HYPOXEMIC RESPIRATORY FAILURE IMPROVING


      ADVANCED COPD ON O2


      SEVERE ANEMIA


      CHF


      HTN


      GI BLEED





PLAN INHALED BRONCHODILATORS


        O2


        TRANSFUSE PRN


        LASIX


        MONITOR H+H





 Problem List 





- Problems


(1) Anemia


Code(s): D64.9 - ANEMIA, UNSPECIFIED   





(2) COPD (chronic obstructive pulmonary disease)


Code(s): J44.9 - CHRONIC OBSTRUCTIVE PULMONARY DISEASE, UNSPECIFIED   





(3) SOB (shortness of breath)


Code(s): R06.02 - SHORTNESS OF BREATH





(4) CHF (congestive heart failure)


Code(s): I50.9 - HEART FAILURE, UNSPECIFIED   





(5) Hypertension


Code(s): I10 - ESSENTIAL (PRIMARY) HYPERTENSION   





(6) Acute on chronic respiratory failure with hypoxemia


Code(s): J96.21 - ACUTE AND CHRONIC RESPIRATORY FAILURE WITH HYPOXIA

## 2017-03-05 RX ADMIN — PANTOPRAZOLE SODIUM SCH MG: 40 TABLET, DELAYED RELEASE ORAL at 08:59

## 2017-03-05 RX ADMIN — Medication SCH MG: at 10:04

## 2017-03-05 RX ADMIN — ATORVASTATIN CALCIUM SCH MG: 10 TABLET, FILM COATED ORAL at 21:26

## 2017-03-05 RX ADMIN — TIOTROPIUM BROMIDE SCH PUFF: 18 CAPSULE ORAL; RESPIRATORY (INHALATION) at 08:59

## 2017-03-05 RX ADMIN — Medication SCH TAB: at 08:59

## 2017-03-05 RX ADMIN — FUROSEMIDE SCH MG: 10 INJECTION, SOLUTION INTRAVENOUS at 14:45

## 2017-03-05 RX ADMIN — BUDESONIDE AND FORMOTEROL FUMARATE DIHYDRATE SCH INH: 80; 4.5 AEROSOL RESPIRATORY (INHALATION) at 21:25

## 2017-03-05 RX ADMIN — SALINE NASAL SPRAY SCH SPRAY: 1.5 SOLUTION NASAL at 21:25

## 2017-03-05 RX ADMIN — ALBUTEROL SULFATE PRN AMP: 2.5 SOLUTION RESPIRATORY (INHALATION) at 13:30

## 2017-03-05 RX ADMIN — BUDESONIDE AND FORMOTEROL FUMARATE DIHYDRATE SCH INH: 80; 4.5 AEROSOL RESPIRATORY (INHALATION) at 08:59

## 2017-03-05 RX ADMIN — MULTIVITAMIN TABLET SCH TAB: TABLET at 08:59

## 2017-03-05 RX ADMIN — FUROSEMIDE SCH MG: 10 INJECTION, SOLUTION INTRAVENOUS at 06:06

## 2017-03-05 RX ADMIN — SALINE NASAL SPRAY SCH SPRAY: 1.5 SOLUTION NASAL at 06:13

## 2017-03-05 RX ADMIN — ACETAMINOPHEN PRN MG: 325 TABLET ORAL at 09:13

## 2017-03-05 RX ADMIN — LATANOPROST SCH DROP: 50 SOLUTION OPHTHALMIC at 21:24

## 2017-03-05 RX ADMIN — PANTOPRAZOLE SODIUM SCH MG: 40 TABLET, DELAYED RELEASE ORAL at 21:26

## 2017-03-05 RX ADMIN — SALINE NASAL SPRAY SCH SPRAY: 1.5 SOLUTION NASAL at 14:16

## 2017-03-05 RX ADMIN — MONTELUKAST SODIUM SCH MG: 10 TABLET, COATED ORAL at 21:26

## 2017-03-05 RX ADMIN — POTASSIUM CHLORIDE SCH MEQ: 1500 TABLET, EXTENDED RELEASE ORAL at 08:59

## 2017-03-05 NOTE — PN
Progress Note, Physician


History of Present Illness: 


pulmonary








alert,feeling better,oob-chair





- Current Medication List


Current Medications: 


Active Medications





Acetaminophen (Tylenol -)  650 mg PO Q6H PRN


   PRN Reason: FEVER OR PAIN


   Last Admin: 03/05/17 09:13 Dose:  650 mg


Albuterol Sulfate (Ventolin 0.5% -)  1 amp NEB Q6H PRN


   PRN Reason: SHORT OF BREATH/WHEEZING


   Last Admin: 03/04/17 11:10 Dose:  1 amp


Atorvastatin Calcium (Lipitor -)  10 mg PO HS Counts include 234 beds at the [facility]


   Last Admin: 03/04/17 21:26 Dose:  10 mg


Budesonide/Formoterol Fumarate (Symbicort 80/4.5mcg -)  2 puff IH BID Counts include 234 beds at the [facility]


   Last Admin: 03/05/17 08:59 Dose:  2 inh


Calcium Carbonate/Cholecalciferol (Os-Saleem 500+D -)  1 tab PO DAILY Counts include 234 beds at the [facility]


   Last Admin: 03/05/17 08:59 Dose:  1 tab


Diltiazem HCl (Cardizem Cd -)  180 mg PO DAILY Counts include 234 beds at the [facility]


   Last Admin: 03/05/17 08:59 Dose:  180 mg


Furosemide (Lasix Injection -)  80 mg IVPUSH BID@0600,1400 Counts include 234 beds at the [facility]


   Last Admin: 03/05/17 06:06 Dose:  80 mg


Latanoprost (Xalatan 0.005% Eye Drops -)  1 drop OU HS Counts include 234 beds at the [facility]


   Last Admin: 03/04/17 21:20 Dose:  1 drop


Metolazone (Zaroxolyn -)  2.5 mg PO DAILY@1330 [facility]


Montelukast Sodium (Singulair -)  10 mg PO HS Counts include 234 beds at the [facility]


   Last Admin: 03/04/17 21:26 Dose:  10 mg


Multivitamins/Minerals/Vitamin C (Tab-A-Vit -)  1 tab PO DAILY Counts include 234 beds at the [facility]


   Last Admin: 03/05/17 08:59 Dose:  1 tab


Pantoprazole Sodium (Protonix -)  40 mg PO BID Counts include 234 beds at the [facility]


   Last Admin: 03/05/17 08:59 Dose:  40 mg


Polysaccharide Iron Complex (Niferex-150 -)  150 mg PO DAILY Counts include 234 beds at the [facility]


   Last Admin: 03/05/17 10:04 Dose:  150 mg


Potassium Chloride (K-Dur -)  20 meq PO DAILY Counts include 234 beds at the [facility]


   Last Admin: 03/05/17 08:59 Dose:  20 meq


Sodium Chloride (Ocean Spray Nasal Spray -)  2 spray NS TID Counts include 234 beds at the [facility]


   Last Admin: 03/05/17 06:13 Dose:  2 spray


Tiotropium Bromide (Spiriva -)  1 puff IH DAILY Counts include 234 beds at the [facility]


   Last Admin: 03/05/17 08:59 Dose:  1 puff











- Objective


Vital Signs: 


 Vital Signs











Temperature  98.1 F   03/05/17 10:00


 


Pulse Rate  92 H  03/05/17 10:00


 


Respiratory Rate  18   03/05/17 10:00


 


Blood Pressure  127/60   03/05/17 10:00


 


O2 Sat by Pulse Oximetry (%)  99   03/05/17 09:00











Constitutional: Yes: Well Nourished, Calm


Eyes: Yes: WNL


HENT: Yes: WNL


Neck: Yes: Supple


Cardiovascular: Yes: Regular Rate and Rhythm, S1, S2


Respiratory: Yes: Diminished, Wheezes (few wheezes brunilda)


Gastrointestinal: Yes: Normal Bowel Sounds, Soft


Extremities: Yes: WNL


Edema: Yes


Labs: 


 CBC, BMP





 03/04/17 07:50 





 03/04/17 07:50 





 INR, PTT











INR  0.96  (0.82-1.09)   02/28/17  15:00    














Problem List





- Problems


(1) Anemia


Code(s): D64.9 - ANEMIA, UNSPECIFIED   





(2) COPD (chronic obstructive pulmonary disease)


Code(s): J44.9 - CHRONIC OBSTRUCTIVE PULMONARY DISEASE, UNSPECIFIED   





(3) SOB (shortness of breath)


Code(s): R06.02 - SHORTNESS OF BREATH





(4) CHF (congestive heart failure)


Code(s): I50.9 - HEART FAILURE, UNSPECIFIED   





(5) Hypertension


Code(s): I10 - ESSENTIAL (PRIMARY) HYPERTENSION   





(6) Acute on chronic respiratory failure with hypoxemia


Code(s): J96.21 - ACUTE AND CHRONIC RESPIRATORY FAILURE WITH HYPOXIA








Assessment/Plan


IMP ACUTE ON CHRONIC HYPOXEMIC RESPIRATORY FAILURE IMPROVING


      ADVANCED COPD ON O2


      SEVERE ANEMIA


      CHF


      HTN


      GI BLEED





PLAN INHALED BRONCHODILATORS


        O2


        TRANSFUSE PRN


        LASIX


        MONITOR H+H





 Problem List 





- Problems


(1) Anemia


Code(s): D64.9 - ANEMIA, UNSPECIFIED   





(2) COPD (chronic obstructive pulmonary disease)


Code(s): J44.9 - CHRONIC OBSTRUCTIVE PULMONARY DISEASE, UNSPECIFIED   





(3) SOB (shortness of breath)


Code(s): R06.02 - SHORTNESS OF BREATH





(4) CHF (congestive heart failure)


Code(s): I50.9 - HEART FAILURE, UNSPECIFIED   





(5) Hypertension


Code(s): I10 - ESSENTIAL (PRIMARY) HYPERTENSION   





(6) Acute on chronic respiratory failure with hypoxemia


Code(s): J96.21 - ACUTE AND CHRONIC RESPIRATORY FAILURE WITH HYPOXIA

## 2017-03-05 NOTE — PN
Progress Note (short form)





- Note


Progress Note: 


CC: sob





s:  no cp palps dizzy; manrique improving not yet back to baseline.  LE edema and 

poor appetite resolved. 





o: 


  





 Current Medications





Acetaminophen (Tylenol -)  650 mg PO Q6H PRN


   PRN Reason: FEVER OR PAIN


   Last Admin: 03/05/17 09:13 Dose:  650 mg


Albuterol Sulfate (Ventolin 0.5% -)  1 amp NEB Q6H PRN


   PRN Reason: SHORT OF BREATH/WHEEZING


   Last Admin: 03/05/17 13:30 Dose:  1 amp


Atorvastatin Calcium (Lipitor -)  10 mg PO HS [facility]


   Last Admin: 03/04/17 21:26 Dose:  10 mg


Budesonide/Formoterol Fumarate (Symbicort 80/4.5mcg -)  2 puff IH BID [facility]


   Last Admin: 03/05/17 08:59 Dose:  2 inh


Calcium Carbonate/Cholecalciferol (Os-Saleem 500+D -)  1 tab PO DAILY [facility]


   Last Admin: 03/05/17 08:59 Dose:  1 tab


Diltiazem HCl (Cardizem Cd -)  180 mg PO DAILY [facility]


   Last Admin: 03/05/17 08:59 Dose:  180 mg


Furosemide (Lasix Injection -)  80 mg IVPUSH BID@0600,1400 [facility]


   Last Admin: 03/05/17 14:45 Dose:  80 mg


Latanoprost (Xalatan 0.005% Eye Drops -)  1 drop OU [facility]


   Last Admin: 03/04/17 21:20 Dose:  1 drop


Metolazone (Zaroxolyn -)  2.5 mg PO DAILY@1330 [facility]


   Last Admin: 03/05/17 14:16 Dose:  2.5 mg


Montelukast Sodium (Singulair -)  10 mg PO HS [facility]


   Last Admin: 03/04/17 21:26 Dose:  10 mg


Multivitamins/Minerals/Vitamin C (Tab-A-Vit -)  1 tab PO DAILY [facility]


   Last Admin: 03/05/17 08:59 Dose:  1 tab


Pantoprazole Sodium (Protonix -)  40 mg PO BID [facility]


   Last Admin: 03/05/17 08:59 Dose:  40 mg


Polysaccharide Iron Complex (Niferex-150 -)  150 mg PO DAILY [facility]


   Last Admin: 03/05/17 10:04 Dose:  150 mg


Potassium Chloride (K-Dur -)  20 meq PO DAILY [facility]


   Last Admin: 03/05/17 08:59 Dose:  20 meq


Sodium Chloride (Ocean Spray Nasal Spray -)  2 spray NS TID [facility]


   Last Admin: 03/05/17 14:16 Dose:  2 spray


Tiotropium Bromide (Spiriva -)  1 puff IH DAILY [facility]


   Last Admin: 03/05/17 08:59 Dose:  1 puff





 Vital Signs - 24 hr











  03/04/17 03/04/17 03/05/17





  20:48 21:00 06:00


 


Temperature 97.4 F L  97.5 F L


 


Pulse Rate 98 H  98 H


 


Respiratory 20 20 18





Rate   


 


Blood Pressure 133/75  126/65


 


O2 Sat by Pulse  98 





Oximetry (%)   














  03/05/17 03/05/17 03/05/17





  09:00 10:00 14:07


 


Temperature  98.1 F 97.4 F L


 


Pulse Rate  92 H 108 H


 


Respiratory  18 





Rate   


 


Blood Pressure  127/60 127/71


 


O2 Sat by Pulse 99  





Oximetry (%)   








 Intake & Output











 03/03/17 03/04/17 03/05/17 03/06/17





 07:59 07:59 07:59 07:59


 


Intake Total 8043 300 6286 1250


 


Balance 8660 732 5094 1250


 


Weight 121 lb 12.8 oz 122 lb 7 oz 117 lb 14.4 oz 











Constitutional: Yes: Well Nourished, No Distress


Eyes: No: Sclera Icterus


Respiratory: Yes: mild bl wheeze diminshed air movement


Gastrointestinal: Yes: Normal Bowel Sounds.  No: Distention, Hepatomegaly, 

Palpable Mass, Tenderness


Cardiovascular: Yes: Regular Rate and Rhythm


JVD: No


Heart Sounds: Yes: S1, S2.  No: Gallop


Murmur: 2/6 at lsb


Extremities: No: Cold, Cyanosis


Edema: no


Integumentary: No: Jaundice


Neurological: Yes: Alert, Oriented (x3)


Psychiatric: No: Agitated








 


  


 no CBC, BMP today





 03/04/17 07:50 





 03/04/17 07:50 








 Laboratory Tests











  03/04/17





  07:50


 


TSH  1.15




















ekg 2/28 (ER): NSR, normal asix/interv; WNL








Echo 4/16: nl LV/EF; RV tds; nl LA; valves WNL; RVSP 40-50











Assessment/Plan





acute SOB, acute diast chf:


-BNP 600s, similar to when was here with probable chf 4/16


-? sx's due to worsening anemia with hgb 6


-CXR with no sig chf


-leg swelling is signif incr'd lately (? abdomen as well per pt), raising 

suspicion for chf though cannot appreciate JVD on exam


-has been complying with torsemide 40 bid at home she says


-has been getting lasix 80 iv bid and le edema improved and cr stable thus far.

  


-no suspicion of ACS, enzymes neg x3 and ecg unremarkable


- appears euvolemic today.  Would change back to po torsemide 40 bid and stop 

metolazone.  Repeat bmp.  





mild pulm HTN:


-estimated RVSP 40-50 on recent echo, RV not well seen on that report


-? sec to LV diast chf vs ? hypoxic chronic lung dz (on home O2 for copd)--

these 2 dx's are extremely likely to represent the etiology of her pulm htn and 

tx directed at both (i.e. diuretics and supplemental O2/airways tx) is all that 

is indicated here





COPD, ? with a.e.:


-+ wheezing and mild URI sx's


-defer to dr shearer, pulm regarding airways-specific tx's





HTN:


-controlled


-decreased dilt from 180 bid to 180 qd as her bp is on low side and ccb may be 

worsening le edema





anemia, chronic:


-baseline hgb runs 8s-9s


- 6's on admit, likely symptomatic (sob)


-hgb back to baseline after prbcs


-further w/u of etiology per GI/heme


-Pt has no cardiac contraindications (intermediate risk) to EGD/FOC if needed 

for anemia workup








cardiac wise remains stable

## 2017-03-05 NOTE — PN
Progress Note (short form)





- Note


Progress Note: 


SOB is a bit beter today


Voided a lot of urine and the leg swelling is better


 Current Medications





Acetaminophen (Tylenol -)  650 mg PO Q6H PRN


   PRN Reason: FEVER OR PAIN


   Last Admin: 03/05/17 09:13 Dose:  650 mg


Albuterol Sulfate (Ventolin 0.5% -)  1 amp NEB Q6H PRN


   PRN Reason: SHORT OF BREATH/WHEEZING


   Last Admin: 03/04/17 11:10 Dose:  1 amp


Atorvastatin Calcium (Lipitor -)  [facility] HS [facility]


   Last Admin: 03/04/17 21:26 Dose:  10 mg


Budesonide/Formoterol Fumarate (Symbicort 80/4.5mcg -)  2 puff IH BID [facility]


   Last Admin: 03/05/17 08:59 Dose:  2 inh


Calcium Carbonate/Cholecalciferol (Os-Saleem 500+D -)  1 tab PO DAILY [facility]


   Last Admin: 03/05/17 08:59 Dose:  1 tab


Diltiazem HCl (Cardizem Cd -)  180 mg PO DAILY [facility]


   Last Admin: 03/05/17 08:59 Dose:  180 mg


Furosemide (Lasix Injection -)  80 mg IVPUSH BID@0600,1400 [facility]


   Last Admin: 03/05/17 06:06 Dose:  80 mg


Latanoprost (Xalatan 0.005% Eye Drops -)  1 drop OU [facility]


   Last Admin: 03/04/17 21:20 Dose:  1 drop


Montelukast Sodium (Singulair -)  10 mg PO HS [facility]


   Last Admin: 03/04/17 21:26 Dose:  10 mg


Multivitamins/Minerals/Vitamin C (Tab-A-Vit -)  1 tab PO DAILY [facility]


   Last Admin: 03/05/17 08:59 Dose:  1 tab


Pantoprazole Sodium (Protonix -)  40 mg PO BID [facility]


   Last Admin: 03/05/17 08:59 Dose:  40 mg


Polysaccharide Iron Complex (Niferex-150 -)  150 mg PO DAILY [facility]


   Last Admin: 03/05/17 10:04 Dose:  150 mg


Potassium Chloride (K-Dur -)  20 meq PO DAILY [facility]


   Last Admin: 03/05/17 08:59 Dose:  20 meq


Sodium Chloride (Ocean Spray Nasal Spray -)  2 spray NS TID [facility]


   Last Admin: 03/05/17 06:13 Dose:  2 spray


Tiotropium Bromide (Spiriva -)  1 puff IH DAILY [facility]


   Last Admin: 03/05/17 08:59 Dose:  1 puff








O/E





 Laboratory Results - last 24 hr











  02/28/17 03/04/17





  14:40 07:50


 


Differential Comment   Slide scanned


 


Platelet Estimate   Slt decreased


 


Platelet Comment   No clotting detected


 


Polychromasia   2+


 


Hypochromic-Microcytic   2+


 


Poikilocytosis   2+


 


Basophilic Stippling   1+


 


Anisocytosis   2+


 


Microcytosis   2+


 


Spherocytes   1+


 


Ovalocytes   1+


 


Stomatocytes   1+


 


Fragmented RBCs   1+


 


Blood Type  O POSITIVE 


 


Antibody Screen  Negative 


 


Crossmatch  See Detail 








 


 Vital Signs











 Period  Temp  Pulse  Resp  BP Sys/Gonzalez  Pulse Ox


 


 Last 24 Hr  97.4 F-98.1 F    18-20  122-133/60-75  98-99








Assessment/Plan


(1) Anemia


Assessment/Plan: 


appears stable


Code(s): D64.9 - ANEMIA, UNSPECIFIED   





(2) SOB (shortness of breath)


Assessment/Plan: 


-improved but still present


-Cont Zarox


Code(s): R06.02 - SHORTNESS OF BREATH





(3) CHF (congestive heart failure)


Assessment/Plan: 


as above


Code(s): I50.9 - HEART FAILURE, UNSPECIFIED   





(4) Hypertension


Assessment/Plan: 


controlled


Code(s): I10 - ESSENTIAL (PRIMARY) HYPERTENSION   





(5) COPD (chronic obstructive pulmonary disease)


Assessment/Plan: 


-pulmonary consulted


Code(s): J44.9 - CHRONIC OBSTRUCTIVE PULMONARY DISEASE, UNSPECIFIED

## 2017-03-06 LAB
ANION GAP SERPL CALC-SCNC: 9 MMOL/L (ref 8–16)
CALCIUM SERPL-MCNC: 9.1 MG/DL (ref 8.5–10.1)
CO2 SERPL-SCNC: 48 MMOL/L (ref 21–32)
CREAT SERPL-MCNC: 0.8 MG/DL (ref 0.55–1.02)
DEPRECATED RDW RBC AUTO: 32.1 % (ref 11.6–15.6)
GLUCOSE SERPL-MCNC: 78 MG/DL (ref 74–106)
MCH RBC QN AUTO: 22.2 PG (ref 25.7–33.7)
MCHC RBC AUTO-ENTMCNC: 31.2 G/DL (ref 32–36)
MCV RBC: 70.9 FL (ref 80–96)
PLATELET # BLD AUTO: 226 K/MM3 (ref 134–434)
PMV BLD: 8.5 FL (ref 7.5–11.1)
WBC # BLD AUTO: 10.8 K/MM3 (ref 4–10)

## 2017-03-06 PROCEDURE — 0HBRXZZ EXCISION OF TOE NAIL, EXTERNAL APPROACH: ICD-10-PCS | Performed by: PODIATRIST

## 2017-03-06 RX ADMIN — SALINE NASAL SPRAY SCH SPRAY: 1.5 SOLUTION NASAL at 13:42

## 2017-03-06 RX ADMIN — BUDESONIDE AND FORMOTEROL FUMARATE DIHYDRATE SCH INH: 80; 4.5 AEROSOL RESPIRATORY (INHALATION) at 09:19

## 2017-03-06 RX ADMIN — SALINE NASAL SPRAY SCH SPRAY: 1.5 SOLUTION NASAL at 21:30

## 2017-03-06 RX ADMIN — POTASSIUM CHLORIDE SCH MEQ: 1500 TABLET, EXTENDED RELEASE ORAL at 09:18

## 2017-03-06 RX ADMIN — PANTOPRAZOLE SODIUM SCH MG: 40 TABLET, DELAYED RELEASE ORAL at 21:29

## 2017-03-06 RX ADMIN — POTASSIUM CHLORIDE SCH MLS/HR: 7.46 INJECTION, SOLUTION INTRAVENOUS at 10:27

## 2017-03-06 RX ADMIN — BUDESONIDE AND FORMOTEROL FUMARATE DIHYDRATE SCH INH: 80; 4.5 AEROSOL RESPIRATORY (INHALATION) at 21:30

## 2017-03-06 RX ADMIN — POTASSIUM CHLORIDE SCH MLS/HR: 7.46 INJECTION, SOLUTION INTRAVENOUS at 13:40

## 2017-03-06 RX ADMIN — Medication SCH TAB: at 09:18

## 2017-03-06 RX ADMIN — ACETAMINOPHEN PRN MG: 325 TABLET ORAL at 09:24

## 2017-03-06 RX ADMIN — TIOTROPIUM BROMIDE SCH PUFF: 18 CAPSULE ORAL; RESPIRATORY (INHALATION) at 09:19

## 2017-03-06 RX ADMIN — LATANOPROST SCH DROP: 50 SOLUTION OPHTHALMIC at 21:30

## 2017-03-06 RX ADMIN — MULTIVITAMIN TABLET SCH TAB: TABLET at 09:19

## 2017-03-06 RX ADMIN — ALBUTEROL SULFATE PRN AMP: 2.5 SOLUTION RESPIRATORY (INHALATION) at 11:44

## 2017-03-06 RX ADMIN — ATORVASTATIN CALCIUM SCH MG: 10 TABLET, FILM COATED ORAL at 21:30

## 2017-03-06 RX ADMIN — MONTELUKAST SODIUM SCH MG: 10 TABLET, COATED ORAL at 21:30

## 2017-03-06 RX ADMIN — POTASSIUM CHLORIDE SCH MLS/HR: 7.46 INJECTION, SOLUTION INTRAVENOUS at 11:41

## 2017-03-06 RX ADMIN — PANTOPRAZOLE SODIUM SCH MG: 40 TABLET, DELAYED RELEASE ORAL at 09:19

## 2017-03-06 RX ADMIN — Medication SCH MG: at 09:18

## 2017-03-06 RX ADMIN — SALINE NASAL SPRAY SCH SPRAY: 1.5 SOLUTION NASAL at 06:11

## 2017-03-06 NOTE — PN
Physical Exam: 


SUBJECTIVE: Patient seen and examined





patient resting in bed comfortably NAD. No acute events overnight. Afebrile and 

hemodynamically stable. States she feels less SOB and LE edema had resolved. 

respiratory status is still below baseline. She ambulated with walker to 

bathroom with less exertional SOB. Denies f/c, chest pain, dizziness, cough, 

abd pain, diarrhea, dysuria. 


OBJECTIVE:





 Vital Signs











 Period  Temp  Pulse  Resp  BP Sys/Gonzalez  Pulse Ox


 


 Last 24 Hr  97.9 F-98.4 F  80-99  18-20  120-132/71-88  99-99














GENERAL: Awake, alert, and fully oriented, in no acute distress.


HEAD: Normal with no signs of trauma.


EYES: Pupils equal, round and reactive to light, extraocular movements intact, 

sclera anicteric, conjunctiva clear. 


EARS, NOSE, THROAT: Moist mucous membranes.


NECK: supple without JVD


LUNGS: no wheezes, better air movement  


HEART: Regular rate and rhythm, normal S1 and S2


ABDOMEN: Soft, nontender, not distended, normoactive bowel sounds, no masses. 


MUSCULOSKELETAL: No CVA tenderness.


UPPER EXTREMITIES: 2+ pulses, No peripheral edema.


LOWER EXTREMITIES: 2+ pulses, warm, well-perfused. 1+ peripheral edema. 


NEUROLOGICAL:  Cranial nerves II-XII grossly intact. Normal speech. 


PSYCHIATRIC: Cooperative. Good eye contact. A


SKIN: Warm, dry











 Laboratory Results - last 24 hr











  03/06/17 03/06/17





  06:05 06:05


 


WBC   10.8 H


 


RBC   4.64


 


Hgb   10.3 L


 


Hct   32.9


 


MCV   70.9 L


 


MCHC   31.2 L


 


RDW   32.1 H


 


Plt Count   226  D


 


MPV   8.5


 


Sodium  136 


 


Potassium  2.7 L* D 


 


Chloride  79 L D 


 


Carbon Dioxide  48 H 


 


Anion Gap  9 


 


BUN  26 H D 


 


Creatinine  0.8 


 


Random Glucose  78 


 


Calcium  9.1 








Active Medications











Generic Name Dose Route Start Last Admin





  Trade Name Freq  PRN Reason Stop Dose Admin


 


Acetaminophen  650 mg 03/02/17 11:24 03/06/17 09:24





  Tylenol -  PO   650 mg





  Q6H PRN   Administration





  FEVER OR PAIN   


 


Albuterol Sulfate  1 amp 03/05/17 20:11 03/06/17 11:44





  Ventolin 0.083% Nebulizer Soln -  NEB   1 amp





  Q6H PRN   Administration





  SHORT OF BREATH/WHEEZING   


 


Atorvastatin Calcium  10 mg 02/28/17 22:00 03/05/17 21:26





  Lipitor -  PO   10 mg





  HS [facility]   Administration


 


Budesonide/Formoterol Fumarate  2 puff 02/28/17 22:00 03/06/17 09:19





  Symbicort 80/4.5mcg -  IH   2 inh





  BID [facility]   Administration


 


Calcium Carbonate/Cholecalciferol  1 tab 03/01/17 10:00 03/06/17 09:18





  Os-Saleem 500+D -  PO   1 tab





  DAILY [facility]   Administration


 


Diltiazem HCl  180 mg 03/04/17 10:00 03/06/17 09:18





  Cardizem Cd -  PO   180 mg





  DAILY [facility]   Administration


 


Latanoprost  1 drop 02/28/17 22:00 03/05/17 21:24





  Xalatan 0.005% Eye Drops -  OU   1 drop





  HS [facility]I   Administration


 


Montelukast Sodium  10 mg 02/28/17 22:00 03/05/17 21:26





  Singulair -  PO   10 mg





  HS [facility]   Administration


 


Multivitamins/Minerals/Vitamin C  1 tab 03/01/17 10:00 03/06/17 09:19





  Tab-A-Vit -  PO   1 tab





  DAILY [facility]   Administration


 


Pantoprazole Sodium  40 mg 03/02/17 10:00 03/06/17 09:19





  Protonix -  PO   40 mg





  BID [facility]   Administration


 


Polysaccharide Iron Complex  150 mg 03/03/17 10:00 03/06/17 09:18





  Niferex-150 -  PO   150 mg





  DAILY [facility]   Administration


 


Potassium Chloride  20 meq 03/01/17 10:00 03/06/17 09:18





  K-Dur -  PO   20 meq





  DAILY [facility]   Administration


 


Sodium Chloride  2 spray 02/28/17 22:00 03/06/17 13:42





  Ocean Spray Nasal Spray -  NS   2 spray





  TID [facility]   Administration


 


Tiotropium Bromide  1 puff 03/02/17 10:00 03/06/17 09:19





  Spiriva -  IH   1 puff





  DAILY [facility]   Administration











ASSESSMENT/PLAN:


Acute on chronic hypoxic respiratory failure 


-likely due to exacerbated severe anemia


-symptmatically improved, ambulating to bathroom, below baseline


-supplemental O2, now on 2L sat 99% at rest 


-symbicort, singulair, albuterol


-PT





COPD


-O2 dependant 


-mild pulmonary HTN


-CXR no acute process





Severe symptomatic anemia 


-acute on chronic


-Hx upper GIB (bleeding ulcer 2000) 


-GI consult: likely avm, will scope when respiratory status more stable


-hemo consult appreciated: f/u anemia workup 


-s/p 2.5 U pRBC


-monitor h/h (stable hgb 10.3 today) 





CHF (HFPEF)


-cardiology on case


-possible exacerbation 


-now euvolemic


      


HTN


-lasix





Dispo: We will continue to follow the patient. Thank you for this consultative 

opportunity.








Problem List





- Problems


(1) Acute on chronic respiratory failure with hypoxemia


Code(s): J96.21 - ACUTE AND CHRONIC RESPIRATORY FAILURE WITH HYPOXIA





(2) Anemia


Code(s): D64.9 - ANEMIA, UNSPECIFIED   





(3) COPD (chronic obstructive pulmonary disease)


Code(s): J44.9 - CHRONIC OBSTRUCTIVE PULMONARY DISEASE, UNSPECIFIED   





(4) COPD exacerbation


Code(s): J44.1 - CHRONIC OBSTRUCTIVE PULMONARY DISEASE W (ACUTE) EXACERBATION





(5) SOB (shortness of breath)


Code(s): R06.02 - SHORTNESS OF BREATH





(6) CHF (congestive heart failure)


Code(s): I50.9 - HEART FAILURE, UNSPECIFIED   





(7) Hypertension


Code(s): I10 - ESSENTIAL (PRIMARY) HYPERTENSION   





(8) GIB (gastrointestinal bleeding)


Code(s): K92.2 - GASTROINTESTINAL HEMORRHAGE, UNSPECIFIED   








Visit type





- Emergency Visit


Emergency Visit: Yes


ED Registration Date: 02/28/17


Care time: The patient presented to the Emergency Department on the above date 

and was hospitalized for further evaluation of their emergent condition.





- New Patient


This patient is new to me today: No





- Critical Care


Critical Care patient: No





- Discharge Referral


Referred to [facility] No

## 2017-03-06 NOTE — PN
Progress Note, Physician


Chief Complaint: 


Ms Peter says she is feeling better. SOB much improved. No cp or n/v.





- Current Medication List


Current Medications: 


Active Medications





Acetaminophen (Tylenol -)  650 mg PO Q6H PRN


   PRN Reason: FEVER OR PAIN


   Last Admin: 03/06/17 09:24 Dose:  650 mg


Albuterol Sulfate (Ventolin 0.083% Nebulizer Soln -)  1 amp NEB Q6H PRN


   PRN Reason: SHORT OF BREATH/WHEEZING


   Last Admin: 03/06/17 11:44 Dose:  1 amp


Atorvastatin Calcium (Lipitor -)  10 mg PO HS [facility]


   Last Admin: 03/05/17 21:26 Dose:  10 mg


Budesonide/Formoterol Fumarate (Symbicort 80/4.5mcg -)  2 puff IH BID [facility]


   Last Admin: 03/06/17 09:19 Dose:  2 inh


Calcium Carbonate/Cholecalciferol (Os-Saleem 500+D -)  1 tab PO DAILY [facility]


   Last Admin: 03/06/17 09:18 Dose:  1 tab


Diltiazem HCl (Cardizem Cd -)  180 mg PO DAILY [facility]


   Last Admin: 03/06/17 09:18 Dose:  180 mg


Latanoprost (Xalatan 0.005% Eye Drops -)  1 drop OU HS [facility]


   Last Admin: 03/05/17 21:24 Dose:  1 drop


Montelukast Sodium (Singulair -)  10 mg PO HS [facility]


   Last Admin: 03/05/17 21:26 Dose:  10 mg


Multivitamins/Minerals/Vitamin C (Tab-A-Vit -)  1 tab PO DAILY [facility]


   Last Admin: 03/06/17 09:19 Dose:  1 tab


Pantoprazole Sodium (Protonix -)  40 mg PO BID [facility]


   Last Admin: 03/06/17 09:19 Dose:  40 mg


Polysaccharide Iron Complex (Niferex-150 -)  150 mg PO DAILY [facility]


   Last Admin: 03/06/17 09:18 Dose:  150 mg


Potassium Chloride (K-Dur -)  20 meq PO DAILY [facility]


   Last Admin: 03/06/17 09:18 Dose:  20 meq


Sodium Chloride (Ocean Spray Nasal Spray -)  2 spray NS TID [facility]


   Last Admin: 03/06/17 13:42 Dose:  2 spray


Tiotropium Bromide (Spiriva -)  1 puff IH DAILY [facility]


   Last Admin: 03/06/17 09:19 Dose:  1 puff











- Objective


Vital Signs: 


 Vital Signs











Temperature  98.4 F   03/06/17 10:00


 


Pulse Rate  98 H  03/06/17 11:49


 


Respiratory Rate  18   03/06/17 10:00


 


Blood Pressure  132/71   03/06/17 10:00


 


O2 Sat by Pulse Oximetry (%)  99   03/06/17 11:49











Constitutional: Yes: Well Nourished, No Distress, Calm


Cardiovascular: Yes: Regular Rate and Rhythm.  No: Gallop, Murmur, Rub


Respiratory: Yes: Regular, CTA Bilaterally, On Nasal O2.  No: Rales, Rhonchi, 

Wheezes


Gastrointestinal: Yes: Normal Bowel Sounds, Soft.  No: Distention, Tenderness


Extremities: Yes: WNL


Edema: Yes


Edema: LLE: Trace, RLE: Trace


Labs: 


 CBC, BMP





 03/06/17 06:05 





 03/06/17 06:05 





 INR, PTT











INR  0.96  (0.82-1.09)   02/28/17  15:00    














Problem List





- Problems


(1) Anemia


Code(s): D64.9 - ANEMIA, UNSPECIFIED   





(2) SOB (shortness of breath)


Code(s): R06.02 - SHORTNESS OF BREATH





(3) CHF (congestive heart failure)


Code(s): I50.9 - HEART FAILURE, UNSPECIFIED   





(4) Hypertension


Code(s): I10 - ESSENTIAL (PRIMARY) HYPERTENSION   





(5) COPD (chronic obstructive pulmonary disease)


Code(s): J44.9 - CHRONIC OBSTRUCTIVE PULMONARY DISEASE, UNSPECIFIED   








Assessment/Plan


(1) Anemia


Assessment/Plan: 


-s/p 3 units with good response


-much improved 


-recheck in am


Code(s): D64.9 - ANEMIA, UNSPECIFIED   





(2) SOB (shortness of breath)


Assessment/Plan: 


-patient says she is feeling baseline


-suspect at baseline


Code(s): R06.02 - SHORTNESS OF BREATH





(3) CHF (congestive heart failure)


Assessment/Plan: 


-cardiology consult and note reviewed


-well diuresed


-holding torsemide secondary to elevated BUN and bicarb


Code(s): I50.9 - HEART FAILURE, UNSPECIFIED   





(4) Hypertension


Assessment/Plan: 


-continue diltiazem 


-monitor


Code(s): I10 - ESSENTIAL (PRIMARY) HYPERTENSION   





(5) COPD (chronic obstructive pulmonary disease)


Assessment/Plan: 


-pulmonary following


Code(s): J44.9 - CHRONIC OBSTRUCTIVE PULMONARY DISEASE, UNSPECIFIED   





(6)  Hypokalemia


-secondary to torsemide


-replete





Dispo


-possible discharge tomorrow

## 2017-03-06 NOTE — PN
Teaching Attending Note


Name of Resident: Isabelle Grider


ATTENDING PHYSICIAN STATEMENT





I saw and evaluated the patient.


I reviewed the resident's note and discussed the case with the resident.


I agree with the resident's findings and plan as documented.








SUBJECTIVE:


Breathing feels a little better today, but not back to baseline. 








OBJECTIVE:


 Intake & Output











 03/03/17 03/04/17 03/05/17 03/06/17





 23:59 23:59 23:59 23:59


 


Intake Total 750 1400 1450 300


 


Balance 750 1400 1450 300


 


Weight 121 lb 12.8 oz 122 lb 7 oz 117 lb 14.4 oz 115 lb 2 oz








 Last Vital Signs











Temp Pulse Resp BP Pulse Ox


 


 97.9 F   99 H  20   132/71   99 


 


 03/06/17 14:00  03/06/17 14:00  03/06/17 14:00  03/06/17 10:00  03/06/17 11:49








Active Medications





Acetaminophen (Tylenol -)  650 mg PO Q6H PRN


   PRN Reason: FEVER OR PAIN


   Last Admin: 03/06/17 09:24 Dose:  650 mg


Albuterol Sulfate (Ventolin 0.083% Nebulizer Soln -)  1 amp NEB Q6H PRN


   PRN Reason: SHORT OF BREATH/WHEEZING


   Last Admin: 03/06/17 11:44 Dose:  1 amp


Atorvastatin Calcium (Lipitor -)  10 mg PO HS [facility]


   Last Admin: 03/05/17 21:26 Dose:  10 mg


Budesonide/Formoterol Fumarate (Symbicort 80/4.5mcg -)  2 puff IH BID [facility]


   Last Admin: 03/06/17 09:19 Dose:  2 inh


Calcium Carbonate/Cholecalciferol (Os-Saleem 500+D -)  1 tab PO DAILY [facility]


   Last Admin: 03/06/17 09:18 Dose:  1 tab


Diltiazem HCl (Cardizem Cd -)  180 mg PO DAILY [facility]


   Last Admin: 03/06/17 09:18 Dose:  180 mg


Latanoprost (Xalatan 0.005% Eye Drops -)  1 drop OU [facility]


   Last Admin: 03/05/17 21:24 Dose:  1 drop


Montelukast Sodium (Singulair -)  10 mg PO HS [facility]


   Last Admin: 03/05/17 21:26 Dose:  10 mg


Multivitamins/Minerals/Vitamin C (Tab-A-Vit -)  1 tab PO DAILY [facility]


   Last Admin: 03/06/17 09:19 Dose:  1 tab


Pantoprazole Sodium (Protonix -)  40 mg PO BID [facility]


   Last Admin: 03/06/17 09:19 Dose:  40 mg


Polysaccharide Iron Complex (Niferex-150 -)  150 mg PO DAILY [facility]


   Last Admin: 03/06/17 09:18 Dose:  150 mg


Potassium Chloride (K-Dur -)  20 meq PO DAILY [facility]


   Last Admin: 03/06/17 09:18 Dose:  20 meq


Sodium Chloride (Ocean Spray Nasal Spray -)  2 spray NS TID [facility]


   Last Admin: 03/06/17 13:42 Dose:  2 spray


Tiotropium Bromide (Spiriva -)  1 puff IH DAILY [facility]


   Last Admin: 03/06/17 09:19 Dose:  1 puff











GENERAL: Awake, alert, NAD 


HEAD: Normal with no signs of trauma.


EYES: sclera anicteric, conjunctiva clear. 


EARS, NOSE, THROAT: Moist mucous membranes.


NECK: supple without JVD


LUNGS: no wheezes, few scattered rhonchi   


HEART: Regular rate and rhythm, normal S1 and S2


ABDOMEN: Soft, nontender, not distended, normoactive bowel sounds, no masses. 


MUSCULOSKELETAL: No CVA tenderness.


UPPER EXTREMITIES: 2+ pulses, No peripheral edema.


LOWER EXTREMITIES: 2+ pulses, warm, well-perfused. 1+ peripheral edema. 


NEUROLOGICAL:  non-focal  


PSYCHIATRIC: Cooperative. 


SKIN: Warm, dry











 


 Laboratory Results - last 24 hr











  03/06/17 03/06/17





  06:05 06:05


 


WBC   10.8 H


 


RBC   4.64


 


Hgb   10.3 L


 


Hct   32.9


 


MCV   70.9 L


 


MCHC   31.2 L


 


RDW   32.1 H


 


Plt Count   226  D


 


MPV   8.5


 


Sodium  136 


 


Potassium  2.7 L* D 


 


Chloride  79 L D 


 


Carbon Dioxide  48 H 


 


Anion Gap  9 


 


BUN  26 H D 


 


Creatinine  0.8 


 


Random Glucose  78 


 


Calcium  9.1 

















Problem List





- Problems


(1) Acute on chronic respiratory failure with hypoxemia


Code(s): J96.21 - ACUTE AND CHRONIC RESPIRATORY FAILURE WITH HYPOXIA





(2) Anemia


Code(s): D64.9 - ANEMIA, UNSPECIFIED   





(3) COPD (chronic obstructive pulmonary disease)


Code(s): J44.9 - CHRONIC OBSTRUCTIVE PULMONARY DISEASE, UNSPECIFIED   





(4) COPD exacerbation


Code(s): J44.1 - CHRONIC OBSTRUCTIVE PULMONARY DISEASE W (ACUTE) EXACERBATION





(5) SOB (shortness of breath)


Code(s): R06.02 - SHORTNESS OF BREATH





(6) CHF (congestive heart failure)


Code(s): I50.9 - HEART FAILURE, UNSPECIFIED   





(7) Hypertension


Code(s): I10 - ESSENTIAL (PRIMARY) HYPERTENSION   





(8) GIB (gastrointestinal bleeding)


Code(s): K92.2 - GASTROINTESTINAL HEMORRHAGE, UNSPECIFIED   





ASSESSMENT/PLAN:


Acute on chronic hypoxic respiratory failure 


Severe symptomatic anemia 





Symbicort 


Spiriva 


O2 as needed


Lasix 


Nasal spray 


PT 





Dr Delgado

## 2017-03-06 NOTE — PN
Progress Note, Physician


Chief Complaint: 


sob, chf


History of Present Illness: 


swelling resolved;


sob persists but slightly better;


longstanding chronic dry cough persists


no cp, palpit





- Current Medication List


Current Medications: 


Active Medications





Acetaminophen (Tylenol -)  650 mg PO Q6H PRN


   PRN Reason: FEVER OR PAIN


   Last Admin: 03/06/17 09:24 Dose:  650 mg


Albuterol Sulfate (Ventolin 0.083% Nebulizer Soln -)  1 amp NEB Q6H PRN


   PRN Reason: SHORT OF BREATH/WHEEZING


Atorvastatin Calcium (Lipitor -)  10 mg PO HS [facility]


   Last Admin: 03/05/17 21:26 Dose:  10 mg


Budesonide/Formoterol Fumarate (Symbicort 80/4.5mcg -)  2 puff IH BID [facility]


   Last Admin: 03/06/17 09:19 Dose:  2 inh


Calcium Carbonate/Cholecalciferol (Os-Saleem 500+D -)  1 tab PO DAILY [facility]


   Last Admin: 03/06/17 09:18 Dose:  1 tab


Diltiazem HCl (Cardizem Cd -)  180 mg PO DAILY [facility]


   Last Admin: 03/06/17 09:18 Dose:  180 mg


Potassium Chloride (Potassium Chloride 10 Meq Premix Ivpb -)  100 mls @ 100 mls/

hr IVPB Q60M [facility]


   Stop: 03/06/17 12:59


   Last Admin: 03/06/17 11:41 Dose:  100 mls/hr


Latanoprost (Xalatan 0.005% Eye Drops -)  1 drop OU [facility]


   Last Admin: 03/05/17 21:24 Dose:  1 drop


Montelukast Sodium (Singulair -)  10 mg PO [facility]


   Last Admin: 03/05/17 21:26 Dose:  10 mg


Multivitamins/Minerals/Vitamin C (Tab-A-Vit -)  1 tab PO DAILY [facility]


   Last Admin: 03/06/17 09:19 Dose:  1 tab


Pantoprazole Sodium (Protonix -)  40 mg PO BID [facility]


   Last Admin: 03/06/17 09:19 Dose:  40 mg


Polysaccharide Iron Complex (Niferex-150 -)  150 mg PO DAILY [facility]


   Last Admin: 03/06/17 09:18 Dose:  150 mg


Potassium Chloride (K-Dur -)  20 meq PO DAILY [facility]


   Last Admin: 03/06/17 09:18 Dose:  20 meq


Sodium Chloride (Ocean Spray Nasal Spray -)  2 spray NS TID [facility]


   Last Admin: 03/06/17 06:11 Dose:  2 spray


Tiotropium Bromide (Spiriva -)  1 puff IH DAILY [facility]


   Last Admin: 03/06/17 09:19 Dose:  1 puff


Torsemide (Demadex -)  40 mg PO BID@0600,1400 [facility]


   Last Admin: 03/06/17 06:11 Dose:  40 mg











- Objective


Vital Signs: 


 Vital Signs











Temperature  98.4 F   03/06/17 10:00


 


Pulse Rate  92 H  03/06/17 10:00


 


Respiratory Rate  18   03/06/17 10:00


 


Blood Pressure  132/71   03/06/17 10:00


 


O2 Sat by Pulse Oximetry (%)  99   03/06/17 08:58











Constitutional: Yes: Well Nourished, No Distress, Calm


Cardiovascular: Yes: Regular Rate and Rhythm, S1, S2.  No: Gallop, Murmur


Respiratory: Yes: Regular, CTA Bilaterally.  No: Accessory Muscle Use, Rales, 

Wheezes


Extremities: No: Cold


Edema: No


Neurological: Yes: Alert, Oriented


Psychiatric: No: Agitated


Labs: 


 CBC, BMP





 03/06/17 06:05 





 03/06/17 06:05 





 INR, PTT











INR  0.96  (0.82-1.09)   02/28/17  15:00    














Assessment/Plan


Echo 4/16: nl LV/EF; RV tds; nl LA; valves WNL; RVSP 40-50











Assessment/Plan





acute diast chf, a.e. copd vs viral bronchitis:


-BNP 600s, similar to when was here with probable chf 4/16


-? sx's due to worsening anemia with hgb 6


-CXR with no sig chf


-leg swelling is signif incr'd lately (? abdomen as well per pt), raising 

suspicion for chf though cannot appreciate JVD on exam


-has been complying with torsemide 40 bid at home she says


-has been getting lasix 80 iv bid and le edema improved and cr stable thus far.

  


-no suspicion of ACS, enzymes neg x3 and ecg unremarkable


-3/5: appears euvolemic, change back to po torsemide 40 bid and stop 

metolazone.  


-3/6: wt down from 124 to 115 lbs here, bicarb and bun up today, K very low (2.7

) which is likely metolazone effect--will hold torsemide tonight dose, reassess 

labs in am


-ongoing sob is likely copd sx vs bronchitis--per pulm





mild pulm HTN:


-estimated RVSP 40-50 on recent echo, RV not well seen on that report


-? sec to LV diast chf vs ? hypoxic chronic lung dz (on home O2 for copd)--

these 2 dx's are extremely likely to represent the etiology of her pulm htn and 

tx directed at both (i.e. diuretics and supplemental O2/airways tx) is all that 

is indicated here





COPD, ? with a.e.:


-+ wheezing and mild URI sx's


-defer to dr shearer, pulm regarding airways-specific tx's





HTN:


-controlled


-decreased dilt from 180 bid to 180 qd as her bp is on low side and ccb may be 

worsening le edema





Fe-def anemia, acute on chronic:


-baseline hgb runs 8s-9s


- 6's on admit, likely symptomatic (sob)


-hgb back to baseline after prbcs


-seen by GI--recommending outpt scopes


-Pt has no cardiac contraindications (intermediate risk) to EGD/FOC, currently 

well-compensated chf status

## 2017-03-06 NOTE — EKG
Test Reason : 

Blood Pressure : ***/*** mmHG

Vent. Rate : 097 BPM     Atrial Rate : 097 BPM

   P-R Int : 126 ms          QRS Dur : 096 ms

    QT Int : 348 ms       P-R-T Axes : 069 -01 033 degrees

   QTc Int : 441 ms

 

NORMAL SINUS RHYTHM

NORMAL ECG

WHEN COMPARED WITH ECG OF 29-APR-2016 21:24,

NO SIGNIFICANT CHANGE WAS FOUND

Confirmed by LEANNE HOLLINGSWORTH MD ([id number]) on 3/6/2017 11:25:44 AM

 

Referred By:             Confirmed By:LEANNE HOLLINGSWORTH MD

## 2017-03-06 NOTE — CONSULT
Consult - text type





- Consultation


Consultation Note: 


Podiatry Consultation:





76 year old pleasant F, admitted for SOB.  Podiatry consultation requested for 

treatment of elongated, discolored, thickened toe nails.  She denies F/V/N/C, 

afebrile.





PMHx: HTN, COPD, OA, anemia


Meds: noted in chart


NKMA





GILDA:





Pedal pulses palpable, TG wnl, CFT brisk to all toes.  Nails are elongated, 

discolored, thickened, tender x 10 with subungual debris.  There are no open 

wounds, no nail bed lesions, no signs of active infection.





Imp: 76 year old F with onychomycosis x 10


1. Manual debridement of mycotic nails x 10 with nail nipper.  Pt tolerated the 

procedure well without complications.


2. Appropriate foot hygiene discussed.


3. Patient may f/u in 3 months for routine care either in [facility] ([phone]) or my private [place] office ([phone]).


4. Thank you for the courtesy of this consultation.





KEL Lloyd DPM

## 2017-03-06 NOTE — PN
Progress Note (short form)





- Note


Progress Note: 


Paient seen and examined





feels ok








afvss 


Oropharynx: No thrush, 


Cor: RSR, No murmurs, No gallops


Lungs:decreased at bases


Abd: Soft, Normal bowel sounds, No organomegaly


Ext:No significant edema





LAbs/meds reviewed





A/P





77 y/o patient with History of ulcer disease diagnosed 3 years ago when patient 

underwent last EGD and colonoscopy. 


Treated medically with PPI's. 


Presents now with hypochromic, microcytic anemia.


Fe++ studies s/o iron deficiency


gi teams inputnoted





in niferex 150mg daily


continue vit. c

## 2017-03-07 VITALS — HEART RATE: 83 BPM | SYSTOLIC BLOOD PRESSURE: 117 MMHG | TEMPERATURE: 97.3 F | DIASTOLIC BLOOD PRESSURE: 64 MMHG

## 2017-03-07 LAB
ANION GAP SERPL CALC-SCNC: 9 MMOL/L (ref 8–16)
ANISOCYTOSIS BLD QL SMEAR: (no result)
BASO STIPL BLD QL SMEAR: (no result)
CALCIUM SERPL-MCNC: 8.4 MG/DL (ref 8.5–10.1)
CO2 SERPL-SCNC: 29 MMOL/L (ref 21–32)
CREAT SERPL-MCNC: 0.6 MG/DL (ref 0.55–1.02)
DACRYOCYTES BLD QL SMEAR: (no result)
DEPRECATED RDW RBC AUTO: 31.7 % (ref 11.6–15.6)
EOSINOPHIL # BLD: 7 % (ref 0–4.5)
GLUCOSE SERPL-MCNC: 104 MG/DL (ref 74–106)
HYPOCHROMIA BLD QL SMEAR: (no result)
MAGNESIUM SERPL-MCNC: 1.9 MG/DL (ref 1.8–2.4)
MCH RBC QN AUTO: 22.3 PG (ref 25.7–33.7)
MCHC RBC AUTO-ENTMCNC: 31.7 G/DL (ref 32–36)
MCV RBC: 70.5 FL (ref 80–96)
MICROCYTES BLD QL SMEAR: (no result)
NEUTROPHILS # BLD: 72 % (ref 42.8–82.8)
PHOSPHATE SERPL-MCNC: 3.3 MG/DL (ref 2.5–4.9)
PLATELET # BLD AUTO: 251 K/MM3 (ref 134–434)
PMV BLD: 8.5 FL (ref 7.5–11.1)
WBC # BLD AUTO: 9.9 K/MM3 (ref 4–10)

## 2017-03-07 RX ADMIN — POTASSIUM CHLORIDE SCH MEQ: 1500 TABLET, EXTENDED RELEASE ORAL at 10:17

## 2017-03-07 RX ADMIN — BUDESONIDE AND FORMOTEROL FUMARATE DIHYDRATE SCH INH: 80; 4.5 AEROSOL RESPIRATORY (INHALATION) at 10:19

## 2017-03-07 RX ADMIN — MULTIVITAMIN TABLET SCH TAB: TABLET at 10:19

## 2017-03-07 RX ADMIN — SALINE NASAL SPRAY SCH SPRAY: 1.5 SOLUTION NASAL at 06:04

## 2017-03-07 RX ADMIN — TIOTROPIUM BROMIDE SCH PUFF: 18 CAPSULE ORAL; RESPIRATORY (INHALATION) at 10:19

## 2017-03-07 RX ADMIN — Medication SCH TAB: at 10:17

## 2017-03-07 RX ADMIN — SALINE NASAL SPRAY SCH SPRAY: 1.5 SOLUTION NASAL at 16:12

## 2017-03-07 RX ADMIN — PANTOPRAZOLE SODIUM SCH MG: 40 TABLET, DELAYED RELEASE ORAL at 10:17

## 2017-03-07 RX ADMIN — ALBUTEROL SULFATE PRN AMP: 2.5 SOLUTION RESPIRATORY (INHALATION) at 10:50

## 2017-03-07 RX ADMIN — Medication SCH MG: at 10:17

## 2017-03-07 NOTE — DS
Physical Examination


Vital Signs: 


 Vital Signs











Temperature  98.3 F   03/07/17 14:00


 


Pulse Rate  70   03/07/17 14:00


 


Respiratory Rate  18   03/07/17 14:00


 


Blood Pressure  120/76   03/06/17 22:00


 


O2 Sat by Pulse Oximetry (%)  95   03/07/17 11:18











Constitutional: Yes: Well Nourished, No Distress, Calm


Cardiovascular: Yes: Regular Rate and Rhythm.  No: Gallop, Murmur, Rub


Respiratory: Yes: Regular, On Nasal O2, Wheezes.  No: Rales, Rhonchi


Gastrointestinal: Yes: Normal Bowel Sounds, Soft.  No: Distention, Tenderness


Extremities: Yes: WNL


Edema: No


Labs: 


 CBC, BMP





 03/07/17 06:35 





 03/07/17 06:35 











Discharge Summary


Reason For Visit: SOB; ANEMIA; OBSTRUCTIVE CHRONIC BRONCHITIS


Current Active Problems





Acute on chronic respiratory failure with hypoxemia (Acute) 


Anemia (Acute) 


COPD (chronic obstructive pulmonary disease) (Acute) 


COPD exacerbation (Acute) 


GIB (gastrointestinal bleeding) (Acute) 


SOB (shortness of breath) (Acute) 








Hospital Course: 


(1) Anemia


Code(s): D64.9 - ANEMIA, UNSPECIFIED   





(2) SOB (shortness of breath)


Code(s): R06.02 - SHORTNESS OF BREATH





(3) CHF (congestive heart failure)


Code(s): I50.9 - HEART FAILURE, UNSPECIFIED   





(4) Hypertension


Code(s): I10 - ESSENTIAL (PRIMARY) HYPERTENSION   





(5) COPD (chronic obstructive pulmonary disease)


Code(s): J44.9 - CHRONIC OBSTRUCTIVE PULMONARY DISEASE, UNSPECIFIED   





(6) MO





Ms Peter is a very pleasant 76 year old female who comes in with anemia and 

found to have a GI bleed. She was admitted to the hospital and transfused. She 

was seen by GI and deferred scopes to the outpatient setting secondary to 

optimization of CHF and COPD. She was seen by cardiology and diuresed. She was 

seen by pulmonary and followed. Because she was not being scoped in the hospital

, monitored over the weekend to make sure bleeding has stopped. Her H/H is 

stable. She was seen by hematology and started on niferex. She had MO 

secondary to overdiuresis, lasix was held and she normalized. She is safe to 

start back on her home torsemide. She was instructed to have a bmp check on 

Thursday or Friday of this week and have follow up next week with pulmonary and 

cardiology for clearance for scopes.





35 minutes spent in preparation of this discharge


Condition: Good





- Instructions


Diet, Activity, Other Instructions: 


Resume previous diet and activity. Have BMP drawn on Thursday or Friday of this 

week to be followed up by Dr Cuba. Follow up with Dr Cuba and Dr Soto 

next week for approval for EGD/colonoscopy.


Referrals: 


Homero Soto MD [Staff Physician] - 


Alonso Gonzalez MD [Primary Care Provider] - 


Fletcher Cuba MD [Staff Physician] - 


Jose Rodríguez MD [Staff Physician] - 


Disposition: HOME





- Home Medications


Comprehensive Discharge Medication List: 


Ambulatory Orders





Albuterol Sulfate [Proair Hfa -] 1 - 2 inh PO PRN PRN 06/14/12 


Fluticasone/Salmeterol [Advair 250-50 Diskus] 1 each IH BID 06/14/12 


Tiotropium Bromide [Spiriva] 18 mcg IH DAILY 06/14/12 


Montelukast Na [Singulair -] 10 mg PO HS #0 tablet 06/19/12 


Multivitamins [Multivit ([facility] Formulary)] 1 udtab PO DAILY #0 tab 06/19/12 


Sodium Chloride Nasal Spray [Ocean Spray Nasal Spray -] 2 spray NS TID #0 

spraybtl 06/19/12 


Ferrous Sulfate [Feosol] 325 mg PO DAILY 09/17/12 


Calcium Carbonate/Vitamin D3 [Calcium 600-Vit D3 200 Tablet] 1 each PO DAILY 07/ 06/13 


Docosahexanoic Acid/Epa [Fish Oil Softgel] 1 each PO DAILY 07/06/13 


Potassium Chloride [K-Dur] 20 meq PO DAILY #0 tab.er.prt 07/06/13 


Alendronate Sodium [Fosamax] 35 mg PO [place] 05/01/16 


Atorvastatin Ca [Lipitor] 10 mg PO HS 05/01/16 


Latanoprost 0.005% Eye Drops [Xalatan 0.005% Eye Drops -] 1 drop OU HS 05/01/16 


Torsemide 40 mg PO BID #120 tablet 05/02/16 


Diltiazem Cd [Cardizem Cd -] 180 mg PO BID 02/28/17 


Diphenhydramine HCl [Benadryl Capsule -] 25 mg PO Q6H PRN 02/28/17 


Pantoprazole Sodium 40 mg PO DAILY 02/28/17 


Iron Polysaccharides [Niferex-150 -] 150 mg PO DAILY #30 capsule 03/07/17

## 2017-03-07 NOTE — PN
Progress Note (short form)





- Note


Progress Note: 


Chief Complaint: 


sob, chf


History of Present Illness: 


swelling resolved;


sob persists but slightly better;


longstanding chronic dry cough persists


no cp, palpit





 


 Current Medications





Acetaminophen (Tylenol -)  650 mg PO Q6H PRN


   PRN Reason: FEVER OR PAIN


   Last Admin: 03/06/17 09:24 Dose:  650 mg


Albuterol Sulfate (Ventolin 0.083% Nebulizer Soln -)  1 amp NEB Q6H PRN


   PRN Reason: SHORT OF BREATH/WHEEZING


   Last Admin: 03/07/17 10:50 Dose:  1 amp


Atorvastatin Calcium (Lipitor -)  10 mg PO HS [facility]


   Last Admin: 03/06/17 21:30 Dose:  10 mg


Budesonide/Formoterol Fumarate (Symbicort 80/4.5mcg -)  2 puff IH BID [facility]


   Last Admin: 03/07/17 10:19 Dose:  2 inh


Calcium Carbonate/Cholecalciferol (Os-Saleem 500+D -)  1 tab PO DAILY [facility]


   Last Admin: 03/07/17 10:17 Dose:  1 tab


Diltiazem HCl (Cardizem Cd -)  180 mg PO DAILY [facility]


   Last Admin: 03/07/17 10:17 Dose:  180 mg


Latanoprost (Xalatan 0.005% Eye Drops -)  1 drop OU HS [facility]


   Last Admin: 03/06/17 21:30 Dose:  1 drop


Montelukast Sodium (Singulair -)  10 mg PO HS [facility]


   Last Admin: 03/06/17 21:30 Dose:  10 mg


Multivitamins/Minerals/Vitamin C (Tab-A-Vit -)  1 tab PO DAILY [facility]


   Last Admin: 03/07/17 10:19 Dose:  1 tab


Pantoprazole Sodium (Protonix -)  40 mg PO BID [facility]


   Last Admin: 03/07/17 10:17 Dose:  40 mg


Polysaccharide Iron Complex (Niferex-150 -)  150 mg PO DAILY [facility]


   Last Admin: 03/07/17 10:17 Dose:  150 mg


Potassium Chloride (K-Dur -)  20 meq PO DAILY [facility]


   Last Admin: 03/07/17 10:17 Dose:  20 meq


Sodium Chloride (Ocean Spray Nasal Spray -)  2 spray NS TID [facility]


   Last Admin: 03/07/17 06:04 Dose:  2 spray


Tiotropium Bromide (Spiriva -)  1 puff IH DAILY [facility]


   Last Admin: 03/07/17 10:19 Dose:  1 puff








 Vital Signs - 24 hr











  03/06/17 03/06/17 03/06/17





  14:00 18:00 21:00


 


Temperature 97.9 F 98.1 F 


 


Pulse Rate 99 H 98 H 


 


Respiratory 20 20 





Rate   


 


Blood Pressure  118/82 


 


O2 Sat by Pulse   97





Oximetry (%)   














  03/06/17 03/07/17





  22:00 11:18


 


Temperature 98.7 F 


 


Pulse Rate 90 92 H


 


Respiratory 18 





Rate  


 


Blood Pressure 120/76 


 


O2 Sat by Pulse  95





Oximetry (%)  











 Intake & Output











 03/05/17 03/06/17 03/07/17 03/08/17





 07:59 07:59 07:59 07:59


 


Intake Total 1300 1450 1200 


 


Balance 1300 1450 1200 


 


Weight 117 lb 14.4 oz 115 lb 2 oz 116 lb 1 oz 














Constitutional: Yes: Well Nourished, No Distress, Calm


Cardiovascular: Yes: Regular Rate and Rhythm, S1, S2.   No: Gallop, Murmur


Respiratory: Yes: Regular, trace wheezes.  No: Accessory Muscle Use, Rales, 

Wheezes


Extremities: No: Cold


Edema: No


Neurological: Yes: Alert, Oriented


Psychiatric: No: Agitated


Labs: 


  


 CBC, BMP





 03/07/17 06:35 





 03/07/17 06:35 











Assessment/Plan


Echo 4/16: nl LV/EF; RV tds; nl LA; valves WNL; RVSP 40-50











Assessment/Plan





acute diast chf, a.e. copd vs viral bronchitis:


-BNP 600s, similar to when was here with probable chf 4/16


-? sx's due to worsening anemia with hgb 6


-CXR with no sig chf


-leg swelling is signif incr'd lately (? abdomen as well per pt), raising 

suspicion for chf though cannot appreciate JVD on exam


-has been complying with torsemide 40 bid at home she says


-has been getting lasix 80 iv bid and le edema improved and cr stable thus far.

  


-no suspicion of ACS, enzymes neg x3 and ecg unremarkable


-3/5: appears euvolemic, change back to po torsemide 40 bid and stop 

metolazone.  


-3/6: wt down from 124 to 115 lbs here, bicarb and bun up today, K very low (2.7

) which is likely metolazone effect--will hold torsemide tonight dose, reassess 

labs in am


- 3/7: labs improved, will resume outpatient torsemide 40 mg bid.  Needs daily 

bmp


-ongoing sob is likely copd sx vs bronchitis--per pulm





mild pulm HTN:


-estimated RVSP 40-50 on recent echo, RV not well seen on that report


-? sec to LV diast chf vs ? hypoxic chronic lung dz (on home O2 for copd)--

these 2 dx's are extremely likely to represent the etiology of her pulm htn and 

tx directed at both (i.e. diuretics and supplemental O2/airways tx) is all that 

is indicated here





COPD, ? with a.e.:


-+ wheezing and mild URI sx's


-defer to dr shearer, pul regarding airways-specific tx's





HTN:


-controlled


-decreased dilt from 180 bid to 180 qd as her bp is on low side and ccb may be 

worsening le edema





Fe-def anemia, acute on chronic:


-baseline hgb runs 8s-9s


- 6's on admit, likely symptomatic (sob)


-hgb back to baseline after prbcs


-seen by GI--recommending outpt scopes


-Pt has no cardiac contraindications (intermediate risk) to EGD/FOC, currently 

well-compensated chf status

## 2017-03-07 NOTE — PN
Progress Note (short form)





- Note


Progress Note: 


OOB to chair.  Overall breathing feels better. 


No CP.  


Mild, dry cough. 





 Intake & Output











 03/04/17 03/05/17 03/06/17 03/07/17





 23:59 23:59 23:59 23:59


 


Intake Total 1400 1450 1100 100


 


Balance 1400 1450 1100 100


 


Weight 122 lb 7 oz 117 lb 14.4 oz 115 lb 2 oz 116 lb 1 oz








 Last Vital Signs











Temp Pulse Resp BP Pulse Ox


 


 98.7 F   92 H  18   120/76   95 


 


 03/06/17 22:00  03/07/17 11:18  03/06/17 22:00  03/06/17 22:00  03/07/17 11:18








Active Medications





Acetaminophen (Tylenol -)  650 mg PO Q6H PRN


   PRN Reason: FEVER OR PAIN


   Last Admin: 03/06/17 09:24 Dose:  650 mg


Albuterol Sulfate (Ventolin 0.083% Nebulizer Soln -)  1 amp NEB Q6H PRN


   PRN Reason: SHORT OF BREATH/WHEEZING


   Last Admin: 03/07/17 10:50 Dose:  1 amp


Atorvastatin Calcium (Lipitor -)  10 mg PO HS [facility]


   Last Admin: 03/06/17 21:30 Dose:  10 mg


Budesonide/Formoterol Fumarate (Symbicort 80/4.5mcg -)  2 puff IH BID [facility]


   Last Admin: 03/07/17 10:19 Dose:  2 inh


Calcium Carbonate/Cholecalciferol (Os-Saleem 500+D -)  1 tab PO DAILY [facility]


   Last Admin: 03/07/17 10:17 Dose:  1 tab


Diltiazem HCl (Cardizem Cd -)  180 mg PO DAILY [facility]


   Last Admin: 03/07/17 10:17 Dose:  180 mg


Latanoprost (Xalatan 0.005% Eye Drops -)  1 drop OU HS [facility]


   Last Admin: 03/06/17 21:30 Dose:  1 drop


Montelukast Sodium (Singulair -)  10 mg PO HS [facility]


   Last Admin: 03/06/17 21:30 Dose:  10 mg


Multivitamins/Minerals/Vitamin C (Tab-A-Vit -)  1 tab PO DAILY [facility]


   Last Admin: 03/07/17 10:19 Dose:  1 tab


Pantoprazole Sodium (Protonix -)  40 mg PO BID [facility]


   Last Admin: 03/07/17 10:17 Dose:  40 mg


Polysaccharide Iron Complex (Niferex-150 -)  150 mg PO DAILY [facility]


   Last Admin: 03/07/17 10:17 Dose:  150 mg


Potassium Chloride (K-Dur -)  20 meq PO DAILY [facility]


   Last Admin: 03/07/17 10:17 Dose:  20 meq


Sodium Chloride (Ocean Spray Nasal Spray -)  2 spray NS TID [facility]


   Last Admin: 03/07/17 06:04 Dose:  2 spray


Tiotropium Bromide (Spiriva -)  1 puff IH DAILY [facility]


   Last Admin: 03/07/17 10:19 Dose:  1 puff

















GENERAL: Awake, alert, NAD 


HEAD: Normal with no signs of trauma.


EYES: sclera anicteric, conjunctiva clear. 


EARS, NOSE, THROAT: Moist mucous membranes.


NECK: supple without JVD


LUNGS: minimal expiratory wheeze, few scattered rhonchi   


HEART: Regular rate and rhythm, normal S1 and S2


ABDOMEN: Soft, nontender, not distended, normoactive bowel sounds, no masses. 


MUSCULOSKELETAL: No CVA tenderness.


UPPER EXTREMITIES: 2+ pulses, No peripheral edema.


LOWER EXTREMITIES: 2+ pulses, warm, well-perfused. 1+ peripheral edema. 


NEUROLOGICAL:  non-focal  


PSYCHIATRIC: Cooperative. 


SKIN: Warm, dry











 


 Laboratory Results - last 24 hr











  03/07/17 03/07/17





  06:35 06:35


 


WBC   9.9


 


RBC   4.54


 


Hgb   10.2 L


 


Hct   32.1 L


 


MCV   70.5 L


 


MCHC   31.7 L


 


RDW   31.7 H


 


Plt Count   251


 


MPV   8.5


 


Neutrophils %   72.0


 


Lymphocytes %   13.0  D


 


Monocytes %   8.0


 


Eosinophils %   7.0 H D


 


Basophils %   Np


 


Differential Comment   Manual diff done


 


Hypochromic-Microcytic   3+


 


Basophilic Stippling   Rare


 


Anisocytosis   1+


 


Microcytosis   1+


 


Tear Drop Cells   Rare


 


Morphology Comment   Slide scanned


 


Sodium  142 


 


Potassium  4.0  D 


 


Chloride  104  D 


 


Carbon Dioxide  29  D 


 


Anion Gap  9 


 


BUN  12  D 


 


Creatinine  0.6  D 


 


Random Glucose  104  D 


 


Calcium  8.4 L 


 


Phosphorus  3.3 


 


Magnesium  1.9  D 














Problem List





- Problems


(1) Acute on chronic respiratory failure with hypoxemia


Code(s): J96.21 - ACUTE AND CHRONIC RESPIRATORY FAILURE WITH HYPOXIA





(2) Anemia


Code(s): D64.9 - ANEMIA, UNSPECIFIED   





(3) COPD (chronic obstructive pulmonary disease)


Code(s): J44.9 - CHRONIC OBSTRUCTIVE PULMONARY DISEASE, UNSPECIFIED   





(4) COPD exacerbation


Code(s): J44.1 - CHRONIC OBSTRUCTIVE PULMONARY DISEASE W (ACUTE) EXACERBATION





(5) SOB (shortness of breath)


Code(s): R06.02 - SHORTNESS OF BREATH





(6) CHF (congestive heart failure)


Code(s): I50.9 - HEART FAILURE, UNSPECIFIED   





(7) Hypertension


Code(s): I10 - ESSENTIAL (PRIMARY) HYPERTENSION   





(8) GIB (gastrointestinal bleeding)


Code(s): K92.2 - GASTROINTESTINAL HEMORRHAGE, UNSPECIFIED   





ASSESSMENT/PLAN:


Acute on chronic hypoxic respiratory failure 


Severe symptomatic anemia 





Symbicort 


Spiriva 


O2 as needed (patient uses 2 L NC at home) 


Lasix 


Nasal spray 


PT 


D/C planning 








Dr Delgado

## 2017-12-25 ENCOUNTER — HOSPITAL ENCOUNTER (INPATIENT)
Dept: HOSPITAL 74 - JER | Age: 77
LOS: 11 days | Discharge: NURSING, REHAB OR LONG-TERM CARE | DRG: 480 | End: 2018-01-05
Attending: INTERNAL MEDICINE | Admitting: INTERNAL MEDICINE
Payer: COMMERCIAL

## 2017-12-25 VITALS — BODY MASS INDEX: 24.7 KG/M2

## 2017-12-25 DIAGNOSIS — J95.822: ICD-10-CM

## 2017-12-25 DIAGNOSIS — I11.0: ICD-10-CM
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
## 2017-12-25 DIAGNOSIS — I50.32: ICD-10-CM

## 2017-12-25 DIAGNOSIS — I27.20: ICD-10-CM

## 2017-12-25 DIAGNOSIS — K59.00: ICD-10-CM

## 2017-12-25 DIAGNOSIS — Z87.891: ICD-10-CM

## 2017-12-25 DIAGNOSIS — N13.30: ICD-10-CM

## 2017-12-25 DIAGNOSIS — D62: ICD-10-CM

## 2017-12-25 DIAGNOSIS — J44.1: ICD-10-CM

## 2017-12-25 DIAGNOSIS — S72.142A: Primary | ICD-10-CM
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
## 2017-12-25 LAB
ALBUMIN SERPL-MCNC: 3.5 G/DL (ref 3.4–5)
ALP SERPL-CCNC: 105 U/L (ref 45–117)
ALT SERPL-CCNC: 39 U/L (ref 12–78)
ANION GAP SERPL CALC-SCNC: 5 MMOL/L (ref 8–16)
AST SERPL-CCNC: 24 U/L (ref 15–37)
BASOPHILS # BLD: 0.1 % (ref 0–2)
BILIRUB SERPL-MCNC: 0.2 MG/DL (ref 0.2–1)
BNP SERPL-MCNC: 347.37 PG/ML (ref 5–450)
BUN SERPL-MCNC: 21 MG/DL (ref 7–18)
CALCIUM SERPL-MCNC: 8.5 MG/DL (ref 8.5–10.1)
CHLORIDE SERPL-SCNC: 94 MMOL/L (ref 98–107)
CO2 SERPL-SCNC: 40 MMOL/L (ref 21–32)
CREAT SERPL-MCNC: 0.8 MG/DL (ref 0.55–1.02)
DEPRECATED RDW RBC AUTO: 14.4 % (ref 11.6–15.6)
EOSINOPHIL # BLD: 0.1 % (ref 0–4.5)
GLUCOSE SERPL-MCNC: 128 MG/DL (ref 74–106)
HCT VFR BLD CALC: 32.5 % (ref 32.4–45.2)
HGB BLD-MCNC: 10 GM/DL (ref 10.7–15.3)
INR BLD: 0.94 (ref 0.82–1.09)
LYMPHOCYTES # BLD: 2.6 % (ref 8–40)
MCH RBC QN AUTO: 24.2 PG (ref 25.7–33.7)
MCHC RBC AUTO-ENTMCNC: 30.9 G/DL (ref 32–36)
MCV RBC: 78.5 FL (ref 80–96)
MONOCYTES # BLD AUTO: 7.3 % (ref 3.8–10.2)
NEUTROPHILS # BLD: 89.9 % (ref 42.8–82.8)
PLATELET # BLD AUTO: 228 K/MM3 (ref 134–434)
PMV BLD: 8.7 FL (ref 7.5–11.1)
POTASSIUM SERPLBLD-SCNC: 4.6 MMOL/L (ref 3.5–5.1)
PROT SERPL-MCNC: 6.6 G/DL (ref 6.4–8.2)
PT PNL PPP: 10.6 SEC (ref 9.98–11.88)
RBC # BLD AUTO: 4.14 M/MM3 (ref 3.6–5.2)
SODIUM SERPL-SCNC: 139 MMOL/L (ref 136–145)
WBC # BLD AUTO: 15.1 K/MM3 (ref 4–10)

## 2017-12-25 PROCEDURE — A9562 TC99M MERTIATIDE: HCPCS

## 2017-12-25 PROCEDURE — P9058 RBC, L/R, CMV-NEG, IRRAD: HCPCS

## 2017-12-25 PROCEDURE — P9038 RBC IRRADIATED: HCPCS

## 2017-12-25 NOTE — PDOC
*Physical Exam





- Vital Signs


 Last Vital Signs











Temp Pulse Resp BP Pulse Ox


 


 97.6 F   108 H  18   111/70   96 


 


 12/25/17 17:51  12/25/17 17:51  12/25/17 17:51  12/25/17 17:51  12/25/17 17:51














ED Treatment Course





- LABORATORY


CBC & Chemistry Diagram: 


 12/25/17 19:20





 12/25/17 19:20





- Medications


Given in the ED: 


ED Medications














Discontinued Medications














Generic Name Dose Route Start Last Admin





  Trade Name Oseas  PRN Reason Stop Dose Admin


 


Albuterol/Ipratropium  1 amp  12/25/17 18:24  12/25/17 18:37





  Duoneb -  NEB  12/25/17 18:25  1 amp





  ONCE ONE   Administration


 


Morphine Sulfate  4 mg  12/25/17 18:25  12/25/17 18:38





  Morphine Injection -  IVPUSH  12/25/17 18:26  4 mg





  ONCE ONE   Administration


 


Ondansetron HCl  4 mg  12/25/17 18:38  12/25/17 18:38





  Zofran Injection  IVPUSH  12/25/17 18:39  4 mg





  NOW ONE   Administration














Medical Decision Making





- Medical Decision Making





12/25/17 19:28


Patient endorsed to me to follow labs and consult PMD and ortho.  





12/25/17 


no acute findings on labs


xray hip (+) Fx left intertrochanteric 





Dr. Mireles in the ED to see the patient 





Case d/w with Dr. Monroy who recommend to admit the patient to Dr. Gonzalez's 

service








Patient c/o more pain given Morphine 2 mg IV





EKG ST rate 106, NAD, occ PVC,  (-) acute ST-T wave changes








*DC/Admit/Observation/Transfer


Diagnosis at time of Disposition: 


Fracture, hip


Qualifiers:


 Encounter type: initial encounter Fracture type: closed Laterality: left 

Qualified Code(s): S72.002A - Fracture of unspecified part of neck of left femur

, initial encounter for closed fracture








- Discharge Dispostion


Condition at time of disposition: Stable


Admit: Yes





- Referrals


Referrals: 


Alonso Gonzalez MD [Primary Care Provider] - 





- Patient Instructions





- Post Discharge Activity

## 2017-12-25 NOTE — PDOC
History of Present Illness





- General


Chief Complaint: Respiratory


Stated Complaint: DIFF BREATHING/fall


Time Seen by Provider: 12/25/17 18:04


History Source: Patient


Exam Limitations: No Limitations





- History of Present Illness


Initial Comments: 





12/25/17 18:26


Patient was at home, stumbled and fell landing on her left side, has been 

immobile since that time. "I think I broke my left hip". Patient suffers from 

severe COPD, has recently completed course of steroids, is getting Fosamax for 

osteopenia and has had multiple bone issues. Patient denies numbness or 

tingling to foot, denies head injury, denies any other area of discomfort. 

However patient suffers from severe shortness of breath and uses multiple 

medications for respiratory control but states has recently suffered from a 

cold which is exacerbated her COPD.


Occurred: reports: just prior to arrival, this afternoon


Severity: reports: severe


Pain Location: reports: lower extremity (left hip), pelvis


Method of Injury: Yes: fall


Loss of Consciousness: no loss of consciousness


Associated Symptoms (Fall): abdominal pain





Past History





- Travel


Traveled outside of the country in the last 30 days: No


Close contact w/someone who was outside of country & ill: No





- Past Medical History


Allergies/Adverse Reactions: 


 Allergies











Allergy/AdvReac Type Severity Reaction Status Date / Time


 


No Known Allergies Allergy   Verified 12/25/17 19:39











Home Medications: 


Ambulatory Orders





Albuterol Sulfate [Proair Hfa -] 1 - 2 inh PO PRN PRN 06/14/12 


Fluticasone/Salmeterol [Advair 250-50 Diskus] 1 each IH BID 06/14/12 


Tiotropium Bromide [Spiriva] 18 mcg IH DAILY 06/14/12 


Montelukast Na [Singulair -] 10 mg PO HS #0 tablet 06/19/12 


Multivitamins [Multivit ([facility] Formulary)] 1 udtab PO DAILY #0 tab 06/19/12 


Ferrous Sulfate [Feosol] 325 mg PO DAILY 09/17/12 


Calcium Carbonate/Vitamin D3 [Calcium 600-Vit D3 200 Tablet] 1 each PO DAILY 07/ 06/13 


Docosahexanoic Acid/Epa [Fish Oil Softgel] 1 each PO DAILY 07/06/13 


Potassium Chloride [K-Dur] 20 meq PO DAILY #0 tab.er.prt 07/06/13 


Alendronate Sodium [Fosamax] 35 mg PO COSTA 05/01/16 


Atorvastatin Ca [Lipitor] 10 mg PO HS 05/01/16 


Latanoprost 0.005% Eye Drops [Xalatan 0.005% Eye Drops -] 1 drop OU HS 05/01/16 


Torsemide 40 mg PO BID #120 tablet 05/02/16 


Diltiazem Cd [Cardizem Cd -] 180 mg PO BID 02/28/17 


Diphenhydramine HCl [Benadryl Capsule -] 25 mg PO Q6H PRN 02/28/17 


Pantoprazole Sodium 40 mg PO DAILY 02/28/17 


Azithromycin 250 mg PO DAILY 12/25/17 








Anemia: Yes (BLOOD TRANSFUSION)


Asthma: No


Cancer: No


Cardiac Disorders: No


CVA: No


COPD: Yes (USES O2 AT HOME 2 LITERS)


CHF: No


Dementia: No


Diabetes: No


GI Disorders: Yes


 Disorders: No


HTN: Yes


Hypercholesterolemia: Yes


Liver Disease: No


Seizures: No


Thyroid Disease: No





- Surgical History


Abdominal Surgery: No


Appendectomy: No


Cardiac Surgery: No


Cholecystectomy: No


Lung Surgery: No


Neurologic Surgery: No


Orthopedic Surgery: No





- Suicide/Smoking/Psychosocial Hx


Smoking Status: Yes


Smoking History: Former smoker


Have you smoked in the past 12 months: No


Number of Cigarettes Smoked Daily: 0


If you are a former smoker, when did you quit?: Over 15 years ago


Hx Alcohol Use: No


Drug/Substance Use Hx: No


Substance Use Type: None


Hx Substance Use Treatment: No





Trauma Specific PMHX





- Complaint Specific PMHX


Back Injury: No


Neck Injury: No





**Review of Systems





- Review of Systems


Able to Perform ROS?: Yes


Is the patient limited English proficient: Yes


Constitutional: Yes: Symptoms Reported, See HPI, Loss of Appetite, Malaise.  No

: Fever


HEENTM: Yes: Symptoms Reported, See HPI, Nose Congestion.  No: Throat Swelling


Respiratory: Yes: Symptoms reported, See HPI, Cough, Orthopnea, Shortness of 

Breath, Wheezing


Cardiac (ROS): No: Symptoms Reported


ABD/GI: No: Symptoms Reported


: No: Symptoms Reported


Musculoskeletal: Yes: Symptoms Reported, See HPI, Back Pain, Joint Pain (left 

hip, unable to stand after fall)


Integumentary: Yes: Symptoms Reported


All Other Systems: Reviewed and Negative





*Physical Exam





- Physical Exam


General Appearance: Yes: Appropriately Dressed, Apparent Distress, Moderate 

Distress, Severe Distress


HEENT: positive: GWEN, Normal ENT Inspection, TMs Normal, Pharynx Normal


Neck: positive: Supple.  negative: Tender


Respiratory/Chest: positive: Labored Respiration, Decreased Breath Sounds (

bilateral, with expiratory wheezing), Wheezing.  negative: Lungs Clear


Cardiovascular: positive: Regular Rhythm


Gastrointestinal/Abdominal: positive: Soft.  negative: Tender


Extremity: positive: Normal Capillary Refill.  negative: Normal Inspection, 

Normal Range of Motion (unable to move left leg and left foot is shortened and 

externally rotated. Pulses are palpable and able to wiggle toes.)


Integumentary: positive: Dry, Warm, Pale


Neurologic: positive: CNs II-XII NML intact, Fully Oriented, Alert, Normal Mood/

Affect, Normal Response, Motor Strength 5/5





ED Treatment Course





- LABORATORY


CBC & Chemistry Diagram: 


 12/25/17 19:20





 12/25/17 19:20





- RADIOLOGY


Radiology Studies Ordered: 














 Category Date Time Status


 


 CHEST X-RAY PORTABLE* [RAD] [facility]  12/25/17 18:24 Ordered


 


 HIP & PELVIS-LEFT [RAD] [facility]  12/25/17 18:25 Ordered














Progress Note





- Progress Note


Progress Note: 





Status post fall with probable left hip fracture. We will provide morphine for 

pain relief immediately, and obtain x-rays and probable pre-op laboratory work. 

Dr. Monroy and parul, covering for Dr. Gonzalez family and patient updated., 





Medical Decision Making





- Medical Decision Making





12/25/17 19:01


Shift change,COREY Remy given shift turnover , patient updated plan





*DC/Admit/Observation/Transfer


Diagnosis at time of Disposition: 


Fracture, hip


Qualifiers:


 Encounter type: initial encounter Fracture type: closed Laterality: left 

Qualified Code(s): S72.002A - Fracture of unspecified part of neck of left femur

, initial encounter for closed fracture








- Discharge Dispostion


Condition at time of disposition: Stable


Admit: Yes





- Referrals





- Patient Instructions





- Post Discharge Activity

## 2017-12-25 NOTE — PDOC
History of Present Illness





- General


Chief Complaint: Respiratory


Stated Complaint: DIFF BREATHING


Time Seen by Provider: 12/25/17 18:04





Past History





- Past Medical History


Allergies/Adverse Reactions: 


 Allergies











Allergy/AdvReac Type Severity Reaction Status Date / Time


 


No Known Allergies Allergy   Verified 02/28/17 13:14











Home Medications: 


Ambulatory Orders





Albuterol Sulfate [Proair Hfa -] 1 - 2 inh PO PRN PRN 06/14/12 


Fluticasone/Salmeterol [Advair 250-50 Diskus] 1 each IH BID 06/14/12 


Tiotropium Bromide [Spiriva] 18 mcg IH DAILY 06/14/12 


Montelukast Na [Singulair -] 10 mg PO HS #0 tablet 06/19/12 


Multivitamins [Multivit ([facility] Formulary)] 1 udtab PO DAILY #0 tab 06/19/12 


Sodium Chloride Nasal Spray [Ocean Spray Nasal Spray -] 2 spray NS TID #0 

spraybtl 06/19/12 


Ferrous Sulfate [Feosol] 325 mg PO DAILY 09/17/12 


Calcium Carbonate/Vitamin D3 [Calcium 600-Vit D3 200 Tablet] 1 each PO DAILY 07/ 06/13 


Docosahexanoic Acid/Epa [Fish Oil Softgel] 1 each PO DAILY 07/06/13 


Potassium Chloride [K-Dur] 20 meq PO DAILY #0 tab.er.prt 07/06/13 


Alendronate Sodium [Fosamax] 35 mg PO COSTA 05/01/16 


Atorvastatin Ca [Lipitor] 10 mg PO HS 05/01/16 


Latanoprost 0.005% Eye Drops [Xalatan 0.005% Eye Drops -] 1 drop OU HS 05/01/16 


Torsemide 40 mg PO BID #120 tablet 05/02/16 


Diltiazem Cd [Cardizem Cd -] 180 mg PO BID 02/28/17 


Diphenhydramine HCl [Benadryl Capsule -] 25 mg PO Q6H PRN 02/28/17 


Pantoprazole Sodium 40 mg PO DAILY 02/28/17 


Iron Polysaccharides [Niferex-150 -] 150 mg PO DAILY #30 capsule 03/07/17 








Anemia: Yes (BLOOD TRANSFUSION)


Asthma: No


Cancer: No


Cardiac Disorders: No


CVA: No


COPD: Yes (USES O2 AT HOME 2 LITERS)


CHF: No


Dementia: No


Diabetes: No


GI Disorders: Yes


 Disorders: No


HTN: Yes


Hypercholesterolemia: Yes


Liver Disease: No


Seizures: No


Thyroid Disease: No





- Surgical History


Abdominal Surgery: No


Appendectomy: No


Cardiac Surgery: No


Cholecystectomy: No


Lung Surgery: No


Neurologic Surgery: No


Orthopedic Surgery: No





- Suicide/Smoking/Psychosocial Hx


Smoking Status: Yes


Smoking History: Former smoker


Have you smoked in the past 12 months: No


Number of Cigarettes Smoked Daily: 0


If you are a former smoker, when did you quit?: Over 15 years ago


Hx Alcohol Use: No


Drug/Substance Use Hx: No


Substance Use Type: None


Hx Substance Use Treatment: No





*DC/Admit/Observation/Transfer





- Referrals





- Patient Instructions





- Post Discharge Activity





- Attestations


Physician Attestion: 





12/25/17 18:04








I, Dr. Regis Carty, attest that this document has been prepared under my 

direction and personally reviewed by me in its entirety.   I further attest, 

that it accurately reflects all work, treatment, procedures and medical decision

-making performed by me.

## 2017-12-26 LAB
APPEARANCE UR: CLEAR
ARTERIAL BLOOD GAS PCO2: 84.4 MMHG (ref 35–45)
ARTERIAL BLOOD GAS PCO2: 85.4 MMHG (ref 35–45)
ARTERIAL BLOOD GAS PCO2: 89.6 MMHG (ref 35–45)
ARTERIAL PATENCY WRIST A: POSITIVE
BASE EXCESS BLDA CALC-SCNC: 8.9 MEQ/L (ref -2–2)
BASE EXCESS BLDA CALC-SCNC: 9.3 MEQ/L (ref -2–2)
BASO STIPL BLD QL SMEAR: (no result)
BILIRUB UR STRIP.AUTO-MCNC: NEGATIVE MG/DL
COLOR UR: (no result)
DEPRECATED RDW RBC AUTO: 14.6 % (ref 11.6–15.6)
HCT VFR BLD CALC: 29.8 % (ref 32.4–45.2)
HGB BLD-MCNC: 9.1 GM/DL (ref 10.7–15.3)
KETONES UR QL STRIP: NEGATIVE
LEUKOCYTE ESTERASE UR QL STRIP.AUTO: NEGATIVE
MCH RBC QN AUTO: 24.4 PG (ref 25.7–33.7)
MCHC RBC AUTO-ENTMCNC: 30.6 G/DL (ref 32–36)
MCV RBC: 79.7 FL (ref 80–96)
NITRITE UR QL STRIP: NEGATIVE
OVALOCYTES BLD QL SMEAR: (no result)
PH UR: 5 [PH] (ref 5–8)
PLATELET # BLD AUTO: 202 K/MM3 (ref 134–434)
PLATELET BLD QL SMEAR: ADEQUATE
PMV BLD: 9.3 FL (ref 7.5–11.1)
PO2 BLDA: 109 MMHG (ref 70–100)
PO2 BLDA: 142 MMHG (ref 70–100)
PO2 BLDA: 73.9 MMHG (ref 70–100)
PROT UR QL STRIP: NEGATIVE
PROT UR QL STRIP: NEGATIVE
RBC # BLD AUTO: 3.74 M/MM3 (ref 3.6–5.2)
RBC # UR STRIP: NEGATIVE /UL
SAO2 % BLDA: 92.5 % (ref 90–98.9)
SAO2 % BLDA: 97.6 % (ref 90–98.9)
SAO2 % BLDA: 98.7 % (ref 90–98.9)
SP GR UR: 1.01 (ref 1–1.03)
TARGETS BLD QL SMEAR: (no result)
UROBILINOGEN UR STRIP-MCNC: NEGATIVE MG/DL (ref 0.2–1)
WBC # BLD AUTO: 16.8 K/MM3 (ref 4–10)

## 2017-12-26 RX ADMIN — IPRATROPIUM BROMIDE AND ALBUTEROL SULFATE SCH AMP: .5; 3 SOLUTION RESPIRATORY (INHALATION) at 12:57

## 2017-12-26 RX ADMIN — ALBUTEROL SULFATE PRN AMP: 2.5 SOLUTION RESPIRATORY (INHALATION) at 22:45

## 2017-12-26 RX ADMIN — TORSEMIDE SCH: 20 TABLET ORAL at 06:38

## 2017-12-26 RX ADMIN — IPRATROPIUM BROMIDE AND ALBUTEROL SULFATE SCH AMP: .5; 3 SOLUTION RESPIRATORY (INHALATION) at 03:50

## 2017-12-26 RX ADMIN — BUDESONIDE AND FORMOTEROL FUMARATE DIHYDRATE SCH PUFF: 80; 4.5 AEROSOL RESPIRATORY (INHALATION) at 22:32

## 2017-12-26 RX ADMIN — METHYLPREDNISOLONE SODIUM SUCCINATE SCH MG: 125 INJECTION, POWDER, FOR SOLUTION INTRAMUSCULAR; INTRAVENOUS at 14:03

## 2017-12-26 RX ADMIN — Medication SCH: at 10:05

## 2017-12-26 RX ADMIN — TORSEMIDE SCH: 20 TABLET ORAL at 14:03

## 2017-12-26 RX ADMIN — METHYLPREDNISOLONE SODIUM SUCCINATE SCH MG: 125 INJECTION, POWDER, FOR SOLUTION INTRAMUSCULAR; INTRAVENOUS at 22:09

## 2017-12-26 RX ADMIN — PANTOPRAZOLE SODIUM SCH: 40 TABLET, DELAYED RELEASE ORAL at 10:05

## 2017-12-26 RX ADMIN — MULTIVITAMIN TABLET SCH: TABLET at 10:05

## 2017-12-26 RX ADMIN — ALBUTEROL SULFATE PRN AMP: 2.5 SOLUTION RESPIRATORY (INHALATION) at 17:20

## 2017-12-26 RX ADMIN — POTASSIUM CHLORIDE SCH: 1500 TABLET, EXTENDED RELEASE ORAL at 10:05

## 2017-12-26 RX ADMIN — FERROUS SULFATE TAB EC 324 MG (65 MG FE EQUIVALENT) SCH: 324 (65 FE) TABLET DELAYED RESPONSE at 10:05

## 2017-12-26 RX ADMIN — TIOTROPIUM BROMIDE SCH PUFF: 18 CAPSULE ORAL; RESPIRATORY (INHALATION) at 10:07

## 2017-12-26 RX ADMIN — SODIUM CHLORIDE, POTASSIUM CHLORIDE, SODIUM LACTATE AND CALCIUM CHLORIDE SCH MLS/HR: 600; 310; 30; 20 INJECTION, SOLUTION INTRAVENOUS at 16:10

## 2017-12-26 RX ADMIN — IPRATROPIUM BROMIDE AND ALBUTEROL SULFATE SCH AMP: .5; 3 SOLUTION RESPIRATORY (INHALATION) at 06:43

## 2017-12-26 RX ADMIN — BUDESONIDE AND FORMOTEROL FUMARATE DIHYDRATE SCH PUFF: 80; 4.5 AEROSOL RESPIRATORY (INHALATION) at 10:07

## 2017-12-26 NOTE — EKG
Test Reason : 

Blood Pressure : ***/*** mmHG

Vent. Rate : 106 BPM     Atrial Rate : 106 BPM

   P-R Int : 118 ms          QRS Dur : 078 ms

    QT Int : 338 ms       P-R-T Axes : [phone] degrees

   QTc Int : 448 ms

 

SINUS TACHYCARDIA WITH OCCASIONAL PREMATURE VENTRICULAR COMPLEXES

POSSIBLE LEFT ATRIAL ENLARGEMENT

NONSPECIFIC ST ABNORMALITY

ABNORMAL ECG

WHEN COMPARED WITH ECG OF 25-DEC-2017 18:18,

PREMATURE VENTRICULAR COMPLEXES ARE NOW PRESENT

ST NOW DEPRESSED IN INFERIOR LEADS

T WAVE INVERSION NO LONGER EVIDENT IN INFERIOR LEADS

Confirmed by MD Lavon, Isma ([id number]) on 12/26/2017 1:09:26 PM

 

Referred By:             Confirmed By:Isma Doll MD

## 2017-12-26 NOTE — PN
Progress Note (short form)





- Note


Progress Note: 


PULMONARY





CONSULTATION DICTATED 12/26/17





IMP COPD WITH  CHRONIC HYPOXEMIC RESPIRATORY FAILURE ON HOME O2


      ACUTE ON CHRONIC  HYPERCAPNEIC RESPIRATORY FAILURE LIKELY SECONDARY TO 

MORPHINE


      LEFT FEMORAL FX S/P MECHANICAL FALL


      HTN





PLAN IV STEROIDS


        O2


        INHALED BRONCHODILATORS


        BIPAP PRN


        ABG


        ALTHOUGH PT IS A HIGH RISK FOR INTRA AND POST-OP PULMONARY COMPLICATION 

THERE ARE NO PULMONARY CONTRAINDICATION TO ANTICIPATED SURGERY AT THIS TIME.


       





DR BENEDICT


        














Problem List





- Problems


(1) Acute and chronic respiratory failure with hypercapnia


Code(s): J96.22 - ACUTE AND CHRONIC RESPIRATORY FAILURE WITH HYPERCAPNIA   





(2) Chronic respiratory failure


Code(s): J96.10 - CHRONIC RESPIRATORY FAILURE, UNSP W HYPOXIA OR HYPERCAPNIA   





(3) Fracture, hip


Code(s): S72.009A - FRACTURE OF UNSP PART OF NECK OF UNSP FEMUR, INIT   


Qualifiers: 


   Encounter type: initial encounter   Fracture type: closed   Laterality: left

   Qualified Code(s): S72.002A - Fracture of unspecified part of neck of left 

femur, initial encounter for closed fracture   





(4) COPD (chronic obstructive pulmonary disease)


Code(s): J44.9 - CHRONIC OBSTRUCTIVE PULMONARY DISEASE, UNSPECIFIED   





(5) COPD exacerbation


Code(s): J44.1 - CHRONIC OBSTRUCTIVE PULMONARY DISEASE W (ACUTE) EXACERBATION   





(6) Hypertension


Code(s): I10 - ESSENTIAL (PRIMARY) HYPERTENSION

## 2017-12-26 NOTE — PN
Progress Note (short form)





- Note


Progress Note: 


Pt lying in bed, pain controlled w meds





AF VSS


LLE short and ER


calves soft NT


intact ehl fhl ta g s 


sens int to LT


1+ DP





ap L IT fx


-pt was initially scheduled for OR tonight, tray is not ready, will go first 

case tomorrow


-npo p midnight


-d/w pt, family


-bedrest for now





Problem List





- Problems


(1) Fracture, hip


Code(s): S72.009A - FRACTURE OF UNSP PART OF NECK OF UNSP FEMUR, INIT   


Qualifiers: 


   Encounter type: initial encounter   Fracture type: closed   Laterality: left

   Qualified Code(s): S72.002A - Fracture of unspecified part of neck of left 

femur, initial encounter for closed fracture

## 2017-12-26 NOTE — CONS
DATE OF CONSULTATION:  12/26/2017

 

REFERRING PHYSICIAN:  Brandyn Tucker MD 

 

HISTORY:  The patient is a 77-year-old white male known to me from previous office

visits with a past medical history of end-stage COPD on home oxygen therapy, history

of peptic ulcer disease diagnosed 3 years ago, anemia, osteoarthritis, longstanding

history of tobacco use quit years ago admitted to [facility]

status post fall sustaining left hip fracture.  The patient states when she was

walking and standing for a second there was loss of balance and fell on her left

side.  She denied any loss of consciousness, chest pains, or palpitations prior to

this episode.  She started complaining of pain in the left lower extremity.  She came

to the emergency room.  EMS was called.  The patient presented to the emergency room.

 She was noted to have a left femoral fracture.  The patient denies any hemoptysis. 

Denies any fevers or chills.  On admission, she was noted to be hypercapnic.  She was

placed on BiPAP.  No further history available at this time.  

 

PAST MEDICAL HISTORY:  Again, includes advanced COPD on home oxygen therapy,

osteoarthritis, anemia, hypertension, peptic ulcer disease.

 

REVIEW OF SYSTEMS:  No orthopnea, no PND, no chest pain, no palpitations.  No

shortness of breath, no abdominal pain.  Positive left lower extremity pain.

 

CURRENT MEDICATIONS:  Include Symbicort, Solu-Medrol, Tylenol, Spiriva, DuoNeb,

Cardizem, Benadryl, Lipitor, Singulair, Demadex, Roxicodone, Xalatan, Protonix,

Os-Saleem, and K-Dur.

 

PHYSICAL EXAMINATION: 

General:  The patient is an elderly white female well developed, awake, alert in no

acute distress.  

Vital Signs:  She is currently afebrile.  Blood pressure 104/48, respiratory rate 18,

O2 saturation 94% on 3 L.

HEENT:  Normocephalic and atraumatic.

Neck:  Supple.

Heart:  Regular S1, S2.

Chest:  A few scattered bilateral wheezes.

Abdomen:  Soft.  Bowel sounds positive.

Extremities:  There is external rotation left lower extremity.  

 

LABORATORIES:  WBC is 15.1, hemoglobin 10, hematocrit 32.5 with a platelet count of

228,000, INR 0.94.  Blood gas:  PH of 7.27, PCO2 of 84, PO2 of 109, bicarbonate 37,

saturation 96 on BiPAP on 40% and rate of 18.  Chemistries:  BUN 21, creatinine 0.8. 

Chest x-ray reveals scoliosis, degenerative changes.  No acute infiltrate

appreciated.  There is acute left femoral intertrochanteric fracture.

 

IMPRESSION: 

1.  Acute hypercapnic respiratory failure secondary to sedation, morphine.

2.  Advanced chronic obstructive pulmonary disease with chronic hypoxemic respiratory

failure.

3.  Left hip fracture status post mechanical fall.

4.  Hypertension.

 

PLAN:  Supplemental O2.  Check arterial blood gas.  IV steroids and inhaled

bronchodilators.  At this time, there are no pulmonary contraindications to

anticipated surgery.  We will follow postoperative with you.

 

 

 

LUDA BENEDICT M.D.

 

[id number]

DD: 12/26/2017 13:53

DT: 12/26/2017 14:37

Job #:  [id number]

## 2017-12-26 NOTE — HOSP
Subjective





- Review of Symptoms


Events since last encounter: 





called by RN, pt in respiratory distress, sat 88% on 50% venti mask


Subjective: 





pt denies pain, reports SOB. 





Physical Examination


Vital Signs: 


 Vital Signs











Temperature  97.6 F   12/25/17 17:51


 


Pulse Rate  96 H  12/26/17 02:23


 


Respiratory Rate  18   12/26/17 02:23


 


Blood Pressure  118/70   12/26/17 02:23


 


O2 Sat by Pulse Oximetry (%)  98   12/26/17 02:23





 ox sat 99% on 50% venti mask upon arrival, RR 22





Constitutional: Yes: Mild Distress


Cardiovascular: Yes: Regular Rate and Rhythm


Respiratory: Yes: CTA Bilaterally, Diminished (bases)


Gastrointestinal: Yes: WNL


Labs: 


 CBC, BMP





 12/25/17 19:20 





 12/25/17 19:20 











Hospitalist Encounter


Assessment: 





COPD


   - duoneb now and QID


   - will check ABG to r/o retained CO2


   - pt states she feels "loopy"-likely from morphine, will try to limit use





hip fracture


   - will place FC to minimize pain during urination; remove ASAP postop to 

prevent infection.





Addendum


4AM: ABG with elevated CO2--89.6. will start pt on bipap. repeat ABG 1-2 hours

## 2017-12-26 NOTE — HP
Admitting History and Physical





- Primary Care Physician


PCP: Alonso Gonzalez





- Admission


Chief Complaint: I fell


History of Present Illness: 


Ms Peter is a very pleasant 77 year old female who comes after falling. She 

says she was standing and lost her balance and fell on her side. She denies 

head trauma, dizziness, passing out, or loss of consciousness. After falling 

she had pain in her lower extremity and came in and was found to have a femur 

fracture. She has chronic shortness of breath and that is unchanged. She denies 

chest pain, abdominal pain, nausea, vomiting, diarrhea, constipation, or leg 

swelling. She was seen by ortho and recommended for surgery.


History Source: Patient


Limitations to Obtaining History: No Limitations





- Past Medical History


Cardiovascular: Yes: HTN


Pulmonary: Yes: COPD, Other (oxygen at home)


Gastrointestinal: Yes: Other (History of  ulcer disease diagnosed 3 years ago 

when patient presented with anemia)


Heme/Onc: Yes: Anemia


Musculoskeletal: Yes: Osteoarthritis





- Past Surgical History


Past Surgical History: Yes: None





- Smoking History


Smoking history: Former smoker


Have you smoked in the past 12 months: No


Aproximately how many cigarettes per day: 0


If you are a former smoker, when did you quit?: "years ago"





- Alcohol/Substance Use


Hx Alcohol Use: No


History of Substance Use: reports: None





- Social History


ADL: Independent


Occupation: Retired dental , , 2 children


History of Recent Travel: No





Home Medications





- Allergies


Allergies/Adverse Reactions: 


 Allergies











Allergy/AdvReac Type Severity Reaction Status Date / Time


 


No Known Allergies Allergy   Verified 12/25/17 19:39














- Home Medications


Home Medications: 


Ambulatory Orders





Albuterol Sulfate [Proair Hfa -] 1 - 2 inh PO PRN PRN 06/14/12 


Fluticasone/Salmeterol [Advair 250-50 Diskus] 1 each IH BID 06/14/12 


Tiotropium Bromide [Spiriva] 18 mcg IH DAILY 06/14/12 


Montelukast Na [Singulair -] 10 mg PO HS #0 tablet 06/19/12 


Multivitamins [Multivit ([facility] Formulary)] 1 udtab PO DAILY #0 tab 06/19/12 


Ferrous Sulfate [Feosol] 325 mg PO DAILY 09/17/12 


Calcium Carbonate/Vitamin D3 [Calcium 600-Vit D3 200 Tablet] 1 each PO DAILY 07/ 06/13 


Docosahexanoic Acid/Epa [Fish Oil Softgel] 1 each PO DAILY 07/06/13 


Potassium Chloride [K-Dur] 20 meq PO DAILY #0 tab.er.prt 07/06/13 


Alendronate Sodium [Fosamax] 35 mg PO COSTA 05/01/16 


Atorvastatin Ca [Lipitor] 10 mg PO HS 05/01/16 


Latanoprost 0.005% Eye Drops [Xalatan 0.005% Eye Drops -] 1 drop OU HS 05/01/16 


Torsemide 40 mg PO BID #120 tablet 05/02/16 


Diltiazem Cd [Cardizem Cd -] 180 mg PO BID 02/28/17 


Diphenhydramine HCl [Benadryl Capsule -] 25 mg PO Q6H PRN 02/28/17 


Pantoprazole Sodium 40 mg PO DAILY 02/28/17 


Azithromycin 250 mg PO DAILY 12/25/17 











Family Disease History





- Family Disease History


Family Disease History: Heart Disease: Father, Other: Mother (Alzheimers 

Dementia)





Review of Systems


Findings/Remarks: 


Full review of systems obtained, as per HPI and otherwise negative





Physical Examination


Vital Signs: 


 Vital Signs











Temperature  36.8 C   12/26/17 09:11


 


Pulse Rate  112 H  12/26/17 09:11


 


Respiratory Rate  18   12/26/17 09:11


 


Blood Pressure  104/48   12/26/17 09:11


 


O2 Sat by Pulse Oximetry (%)  98   12/26/17 03:44











Constitutional: Yes: Well Nourished, No Distress, Calm


Cardiovascular: Yes: Tachycardia.  No: Pulse Irregular, Gallop, Murmur, Rub


Respiratory: Yes: Regular, On Nasal O2, Rhonchi, Wheezes.  No: CTA Bilaterally, 

Rales


Gastrointestinal: Yes: Normal Bowel Sounds, Soft.  No: Distention, Tenderness


Extremities: Yes: WNL


Edema: No


Labs: 


 CBC, BMP





 12/25/17 19:20 





 12/25/17 19:20 











Imaging





- Results


Chest X-ray: Report Reviewed, Image Reviewed


X-ray: Report Reviewed





Problem List





- Problems


(1) Fracture, hip


Assessment/Plan: 


-patient with mechanical fall and hip fracture


-admitted to the hospital


-seen by ortho and recommending surgery


-patient with history of CHF and COPD


-also tachycardic on exam


-given diltiazem


-cardiology and pulmonary consult for both clearance and optimization


Code(s): S72.009A - FRACTURE OF UNSP PART OF NECK OF UNSP FEMUR, INIT   


Qualifiers: 


   Encounter type: initial encounter   Fracture type: closed   Laterality: left

   Qualified Code(s): S72.002A - Fracture of unspecified part of neck of left 

femur, initial encounter for closed fracture   





(2) COPD (chronic obstructive pulmonary disease)


Assessment/Plan: 


-not in exacerbation


-continue oxygen support


-continue bronchodilators and singulair


-pulmonary consult


Code(s): J44.9 - CHRONIC OBSTRUCTIVE PULMONARY DISEASE, UNSPECIFIED   





(3) CHF (congestive heart failure)


Assessment/Plan: 


-continue torsemide with hold parameters


-cardiology consulted


Code(s): I50.9 - HEART FAILURE, UNSPECIFIED   


Qualifiers: 


   Congestive heart failure type: diastolic   Congestive heart failure 

chronicity: chronic   Qualified Code(s): I50.32 - Chronic diastolic (congestive

) heart failure   





(4) Hypertension


Assessment/Plan: 


-controlled, with low normal this morning


-torsemide held


-continue diltiazem


Code(s): I10 - ESSENTIAL (PRIMARY) HYPERTENSION   





(5) Chronic respiratory failure


Assessment/Plan: 


-continue oxygen 


Code(s): J96.10 - CHRONIC RESPIRATORY FAILURE, UNSP W HYPOXIA OR HYPERCAPNIA

## 2017-12-26 NOTE — CON.ORTH
Consult


Consult Specialty:: orthopedics





- History of Present Illness


Chief Complaint: left hip pain


History of Present Illness: 


This is a 77-year-old female who suffered a trip and fall at home.  She felt 

like she broke her hip.  She was brought in by ambulance to the emergency 

department where evaluation confirmed hip fracture.  She complains only of pain 

in the left hip.  She denies any pain elsewhere.  She denies any radiating pain

, numbness or tingling.  Her history is significant for severe COPD and she has 

been treated with steroids.  This has led to osteoporosis and she is being 

treated with Fosamax.








- History Source


History Provided By: Patient


Limitations to Obtaining History: No Limitations





- Past Medical History


Cardio/Vascular: Yes: HTN


Pulmonary: Yes: COPD, Other (oxygen at home)


Gastrointestinal: Yes: Other (History of  ulcer disease diagnosed 3 years ago 

when patient presented with anemia)


Musculoskeletal: Yes: Osteoarthritis





- Past Surgical History


Past Surgical History: Yes: None





- Alcohol/Substance Use


Hx Alcohol Use: No


History of Substance Use: reports: None





- Smoking History


Smoking history: Former smoker


Have you smoked in the past 12 months: No


Aproximately how many cigarettes per day: 0


If you are a former smoker, when did you quit?: "years ago"





- Social History


ADL: Independent


Occupation: Retired dental , , 2 children


History of Recent Travel: No





Home Medications





- Allergies


Allergies/Adverse Reactions: 


 Allergies











Allergy/AdvReac Type Severity Reaction Status Date / Time


 


No Known Allergies Allergy   Verified 12/25/17 19:39














- Home Medications


Home Medications: 


Ambulatory Orders





Albuterol Sulfate [Proair Hfa -] 1 - 2 inh PO PRN PRN 06/14/12 


Fluticasone/Salmeterol [Advair 250-50 Diskus] 1 each IH BID 06/14/12 


Tiotropium Bromide [Spiriva] 18 mcg IH DAILY 06/14/12 


Montelukast Na [Singulair -] 10 mg PO HS #0 tablet 06/19/12 


Multivitamins [Multivit ([facility] Formulary)] 1 udtab PO DAILY #0 tab 06/19/12 


Ferrous Sulfate [Feosol] 325 mg PO DAILY 09/17/12 


Calcium Carbonate/Vitamin D3 [Calcium 600-Vit D3 200 Tablet] 1 each PO DAILY 07/ 06/13 


Docosahexanoic Acid/Epa [Fish Oil Softgel] 1 each PO DAILY 07/06/13 


Potassium Chloride [K-Dur] 20 meq PO DAILY #0 tab.er.prt 07/06/13 


Alendronate Sodium [Fosamax] 35 mg PO COSTA 05/01/16 


Atorvastatin Ca [Lipitor] 10 mg PO HS 05/01/16 


Latanoprost 0.005% Eye Drops [Xalatan 0.005% Eye Drops -] 1 drop OU HS 05/01/16 


Torsemide 40 mg PO BID #120 tablet 05/02/16 


Diltiazem Cd [Cardizem Cd -] 180 mg PO BID 02/28/17 


Diphenhydramine HCl [Benadryl Capsule -] 25 mg PO Q6H PRN 02/28/17 


Pantoprazole Sodium 40 mg PO DAILY 02/28/17 


Azithromycin 250 mg PO DAILY 12/25/17 











Family Disease History





- Family Disease History


Family Disease History: Heart Disease: Father, Other: Mother (Alzheimers 

Dementia)





Physical Exam for Ortho


Vital Signs: 


 Vital Signs











Temperature  98.3 F   12/26/17 09:11


 


Pulse Rate  112 H  12/26/17 09:11


 


Respiratory Rate  18   12/26/17 09:11


 


Blood Pressure  104/48   12/26/17 09:11


 


O2 Sat by Pulse Oximetry (%)  98   12/26/17 03:44











Constitutional: Yes: No Distress, Calm


Respiratory: Yes: Other (baseline shortness of breath)


Extremities: Yes: Other (The left lower extremity demonstrates no skin lesions.

  No swelling.  There is external rotation and shortening.  The knee is 

nontender.  The ankle is nontender.  Distally, sensation is intact light touch.

  1+ DP pulse.  EHL, FHL, TA, G, S intact.)


Labs: 


 CBC, BMP





 12/25/17 19:20 





 12/25/17 19:20 





 INR, PTT











INR  0.94  (0.82-1.09)   12/25/17  19:20    














Imaging





- Results


X-ray: Report Reviewed, Image Reviewed (There is a displaced intertrochanteric 

hip fracture on the left.  There is significant hip osteoarthritis.)





Problem List





- Problems


(1) Fracture, hip


Code(s): S72.009A - FRACTURE OF UNSP PART OF NECK OF UNSP FEMUR, INIT   


Qualifiers: 


   Encounter type: initial encounter   Fracture type: closed   Laterality: left

   Qualified Code(s): S72.002A - Fracture of unspecified part of neck of left 

femur, initial encounter for closed fracture   





Assessment/Plan


I reviewed today's findings with Lindsay.  We discussed that there is a 

displaced fracture of the femur.  This is typically treated operatively to 

provide for mobilization, pain relief and return to ambulatory function.  I 

recommend operative care.  We discussed the option of nonoperative management 

with prolonged bedrest and risk for debilitation, pneumonia, DVT, ulcers.





I reviewed the surgery in detail.  This involves utilizing a metal arian help 

repair the fracture.  This will allow the bone to heal in the correct 

alignment. Depending on the degree of injury and fracture alignment, partial or 

full weight bearing may be allowed.  We reviewed surgical risks in detail 

including bleeding, infection, neurovascular injury, need for further surgery, 

postoperative pain and stiffness, nonunion, malunion, hardware cutout or failure

, posttraumatic arthrosis.  We discussed medical risks such as heart attack, 

stroke, DVT, PE and death.  We discussed the use of perioperative DVT and 

antibiotic prophylaxis.  I discussed the post operative protocol.





Of particular note and Lindsay's case are a few factors.  One, her pulmonary 

disease puts her at high risk for anesthesia.  That being said, I believe she 

will be worse off if we do not fix the fracture as this will limit her mobility 

and this can significantly impact pulmonary function.  We discussed that the 

surgery can generally be done under spinal anesthesia however, sometimes 

general is necessary.  This could necessitate use of a breathing tube which 

sometimes cannot be removed after the procedure.  In addition, her being on 

Fosamax may have an effect on fracture healing.  This medication should be 

stopped at this time.  She may consider a course of Forteo or Prolia.





I addressed all of Lindsay's questions.  She would like to proceed surgically.  

We will proceed with surgery as soon as possible.  She will be seen by 

pulmonary today.  Surgery can be performed tonight if she is clear.

## 2017-12-27 LAB
ANION GAP SERPL CALC-SCNC: 7 MMOL/L (ref 8–16)
ARTERIAL BLOOD GAS PCO2: 84.1 MMHG (ref 35–45)
ARTERIAL PATENCY WRIST A: POSITIVE
BASE EXCESS BLDA CALC-SCNC: 11.3 MEQ/L (ref -2–2)
BASOPHILS # BLD: 0.2 % (ref 0–2)
BUN SERPL-MCNC: 33 MG/DL (ref 7–18)
CALCIUM SERPL-MCNC: 8.1 MG/DL (ref 8.5–10.1)
CHLORIDE SERPL-SCNC: 96 MMOL/L (ref 98–107)
CO2 SERPL-SCNC: 37 MMOL/L (ref 21–32)
CREAT SERPL-MCNC: 1 MG/DL (ref 0.55–1.02)
DEPRECATED RDW RBC AUTO: 14.6 % (ref 11.6–15.6)
DEPRECATED RDW RBC AUTO: 14.8 % (ref 11.6–15.6)
EOSINOPHIL # BLD: 0 % (ref 0–4.5)
GLUCOSE SERPL-MCNC: 134 MG/DL (ref 74–106)
HCT VFR BLD CALC: 27.3 % (ref 32.4–45.2)
HCT VFR BLD CALC: 28.3 % (ref 32.4–45.2)
HGB BLD-MCNC: 8.1 GM/DL (ref 10.7–15.3)
HGB BLD-MCNC: 8.6 GM/DL (ref 10.7–15.3)
LYMPHOCYTES # BLD: 1.6 % (ref 8–40)
MAGNESIUM SERPL-MCNC: 3 MG/DL (ref 1.8–2.4)
MCH RBC QN AUTO: 24 PG (ref 25.7–33.7)
MCH RBC QN AUTO: 24 PG (ref 25.7–33.7)
MCHC RBC AUTO-ENTMCNC: 29.8 G/DL (ref 32–36)
MCHC RBC AUTO-ENTMCNC: 30.4 G/DL (ref 32–36)
MCV RBC: 79 FL (ref 80–96)
MCV RBC: 80.6 FL (ref 80–96)
MONOCYTES # BLD AUTO: 2 % (ref 3.8–10.2)
NEUTROPHILS # BLD: 96.2 % (ref 42.8–82.8)
PHOSPHATE SERPL-MCNC: 4.1 MG/DL (ref 2.5–4.9)
PLATELET # BLD AUTO: 182 K/MM3 (ref 134–434)
PLATELET # BLD AUTO: 201 K/MM3 (ref 134–434)
PMV BLD: 9.4 FL (ref 7.5–11.1)
PMV BLD: 9.7 FL (ref 7.5–11.1)
PO2 BLDA: 91.4 MMHG (ref 70–100)
POTASSIUM SERPLBLD-SCNC: 4.7 MMOL/L (ref 3.5–5.1)
RBC # BLD AUTO: 3.38 M/MM3 (ref 3.6–5.2)
RBC # BLD AUTO: 3.59 M/MM3 (ref 3.6–5.2)
SAO2 % BLDA: 96.5 % (ref 90–98.9)
SODIUM SERPL-SCNC: 140 MMOL/L (ref 136–145)
WBC # BLD AUTO: 14.4 K/MM3 (ref 4–10)
WBC # BLD AUTO: 16.6 K/MM3 (ref 4–10)

## 2017-12-27 PROCEDURE — 0QH706Z INSERTION OF INTRAMEDULLARY INTERNAL FIXATION DEVICE INTO LEFT UPPER FEMUR, OPEN APPROACH: ICD-10-PCS | Performed by: ORTHOPAEDIC SURGERY

## 2017-12-27 RX ADMIN — ALBUTEROL SULFATE PRN AMP: 2.5 SOLUTION RESPIRATORY (INHALATION) at 14:00

## 2017-12-27 RX ADMIN — SODIUM CHLORIDE, POTASSIUM CHLORIDE, SODIUM LACTATE AND CALCIUM CHLORIDE SCH MLS/HR: 600; 310; 30; 20 INJECTION, SOLUTION INTRAVENOUS at 02:00

## 2017-12-27 RX ADMIN — METHYLPREDNISOLONE SODIUM SUCCINATE SCH MG: 125 INJECTION, POWDER, FOR SOLUTION INTRAMUSCULAR; INTRAVENOUS at 21:02

## 2017-12-27 RX ADMIN — TIOTROPIUM BROMIDE SCH: 18 CAPSULE ORAL; RESPIRATORY (INHALATION) at 19:40

## 2017-12-27 RX ADMIN — FERROUS SULFATE TAB EC 324 MG (65 MG FE EQUIVALENT) SCH: 324 (65 FE) TABLET DELAYED RESPONSE at 19:41

## 2017-12-27 RX ADMIN — LATANOPROST SCH DROP: 50 SOLUTION OPHTHALMIC at 21:03

## 2017-12-27 RX ADMIN — METHYLPREDNISOLONE SODIUM SUCCINATE SCH MG: 125 INJECTION, POWDER, FOR SOLUTION INTRAMUSCULAR; INTRAVENOUS at 16:06

## 2017-12-27 RX ADMIN — Medication SCH: at 19:41

## 2017-12-27 RX ADMIN — TORSEMIDE SCH: 20 TABLET ORAL at 05:51

## 2017-12-27 RX ADMIN — METHYLPREDNISOLONE SODIUM SUCCINATE SCH: 125 INJECTION, POWDER, FOR SOLUTION INTRAMUSCULAR; INTRAVENOUS at 19:41

## 2017-12-27 RX ADMIN — BUDESONIDE AND FORMOTEROL FUMARATE DIHYDRATE SCH PUFF: 80; 4.5 AEROSOL RESPIRATORY (INHALATION) at 21:03

## 2017-12-27 RX ADMIN — METHYLPREDNISOLONE SODIUM SUCCINATE SCH MG: 125 INJECTION, POWDER, FOR SOLUTION INTRAMUSCULAR; INTRAVENOUS at 02:00

## 2017-12-27 RX ADMIN — ALBUTEROL SULFATE PRN AMP: 2.5 SOLUTION RESPIRATORY (INHALATION) at 22:44

## 2017-12-27 RX ADMIN — PANTOPRAZOLE SODIUM SCH: 40 TABLET, DELAYED RELEASE ORAL at 19:40

## 2017-12-27 RX ADMIN — TORSEMIDE SCH MG: 20 TABLET ORAL at 16:05

## 2017-12-27 RX ADMIN — ACETAMINOPHEN PRN MG: 325 TABLET ORAL at 16:06

## 2017-12-27 RX ADMIN — BUDESONIDE AND FORMOTEROL FUMARATE DIHYDRATE SCH: 80; 4.5 AEROSOL RESPIRATORY (INHALATION) at 19:40

## 2017-12-27 RX ADMIN — ATORVASTATIN CALCIUM SCH MG: 10 TABLET, FILM COATED ORAL at 21:03

## 2017-12-27 RX ADMIN — MONTELUKAST SODIUM SCH MG: 10 TABLET, COATED ORAL at 21:02

## 2017-12-27 RX ADMIN — MULTIVITAMIN TABLET SCH: TABLET at 19:40

## 2017-12-27 RX ADMIN — POTASSIUM CHLORIDE SCH: 1500 TABLET, EXTENDED RELEASE ORAL at 19:41

## 2017-12-27 RX ADMIN — CEFAZOLIN SCH MLS/HR: 1 INJECTION, POWDER, FOR SOLUTION INTRAVENOUS at 21:02

## 2017-12-27 NOTE — OP
Operative Note





- Note:


Operative Date: 12/27/17


Pre-Operative Diagnosis: left hip intertrochanteric fracture


Operation: left hip intramedullary nail


Implants: [employer] Soft gamma 3 [id number].  10x90 proximal lag screw.  5x40 distal 

locking screw


Post-Operative Diagnosis: Same as Pre-op


Surgeon: Aj Mireles


Assistant: Bill Merida


Anesthesiologist/CRNA: Suzan Mcqueen


Anesthesia: Spinal


Operative Report Dictated: Yes

## 2017-12-27 NOTE — PN
Progress Note (short form)





- Note


Progress Note: 





CC: pre-op clearance





 


S: torsemide held yesterday and this morning for low bp's.  s/p surgery today. 

required bipap post surgery.  currently breathing improved.  pain controlled.  

no cp, palps, dizziness. 





Sees Dr. [place]





 


 Current Medications





Acetaminophen (Tylenol -)  325 mg PO Q4H PRN


   PRN Reason: PAIN


   Stop: 12/29/17 04:24


   Last Admin: 12/27/17 16:06 Dose:  325 mg


Albuterol Sulfate (Ventolin 0.083% Nebulizer Soln -)  1 amp NEB Q4H PRN


   PRN Reason: SHORT OF BREATH/WHEEZING


   Last Admin: 12/27/17 14:00 Dose:  1 amp


Atorvastatin Calcium (Lipitor -)  10 mg PO HS ELI


Budesonide/Formoterol Fumarate (Symbicort 80/4.5mcg -)  2 puff IH BID ELI


Calcium Carbonate/Cholecalciferol (Os-Saleem 500+D -)  1 tab PO DAILY [facility]


Diltiazem HCl (Cardizem Cd -)  180 mg PO BID ELI


Enoxaparin Sodium (Lovenox -)  40 mg SQ DAILY ELI


Ferrous Sulfate (Feosol -)  325 mg PO DAILY ELI


Cefazolin Sodium (Ancef -)  1 gm in 10 mls @ 120 mls/hr IVPUSH Q8H ELI


   Stop: 12/27/17 22:04


Lactated Ringer's (Lactated Ringers Solution)  1,000 ml in 1,000 mls @ 100 mls/

hr IV ASDIR ELI


   Last Admin: 12/27/17 16:07 Dose:  Not Given


Latanoprost (Xalatan 0.005% Eye Drops -)  1 drop OU HS [facility]


Methylprednisolone Sodium Succinate (Solu-Medrol -)  60 mg IVPUSH Q6H-IV ELI


   Last Admin: 12/27/17 16:06 Dose:  60 mg


Montelukast Sodium (Singulair -)  10 mg PO HS [facility]


Morphine Sulfate (Morphine Injection -)  1 mg IVPUSH Q10M PRN


   PRN Reason: PAIN


Multivitamins/Minerals/Vitamin C (Tab-A-Vit -)  1 tab PO DAILY ELI


Ondansetron HCl (Zofran Injection)  4 mg IVPUSH Q6H PRN


   PRN Reason: NAUSEA AND/OR VOMITING


   Last Admin: 12/27/17 16:03 Dose:  4 mg


Oxycodone HCl (Roxicodone -)  5 mg PO Q4H PRN


   PRN Reason: PAIN LEVEL 6-10


   Last Admin: 12/27/17 16:05 Dose:  5 mg


Pantoprazole Sodium (Protonix -)  40 mg PO DAILY [facility]


Potassium Chloride (K-Dur -)  20 meq PO DAILY [facility]


Tiotropium Bromide (Spiriva -)  1 puff IH DAILY [facility]


Torsemide (Demadex -)  40 mg PO BIDLASIX ELI


   Last Admin: 12/27/17 16:05 Dose:  40 mg








 Vital Signs - 24 hr











  12/26/17 12/26/17 12/26/17





  19:00 20:57 21:00


 


Temperature 99.6 F 97.9 F 


 


Pulse Rate 114 H 110 H 


 


Respiratory 20 19 





Rate   


 


Blood Pressure 111/65 120/54 


 


O2 Sat by Pulse   95





Oximetry (%)   














  12/27/17 12/27/17 12/27/17





  06:00 09:41 09:55


 


Temperature 98.5 F 97.9 F 


 


Pulse Rate 96 H 86 88


 


Respiratory 18 16 12





Rate   


 


Blood Pressure 105/63 103/60 106/60


 


O2 Sat by Pulse  100 93 L





Oximetry (%)   














  12/27/17 12/27/17 12/27/17





  10:10 10:25 10:40


 


Temperature   


 


Pulse Rate 90 96 H 90


 


Respiratory 12 12 12





Rate   


 


Blood Pressure 100/52 108/52 92/50


 


O2 Sat by Pulse 95 94 L 95





Oximetry (%)   














  12/27/17 12/27/17 12/27/17





  10:55 11:10 11:25


 


Temperature   


 


Pulse Rate 90 92 H 92 H


 


Respiratory 12 12 12





Rate   


 


Blood Pressure 90/50 90/50 92/52


 


O2 Sat by Pulse 95 95 95





Oximetry (%)   














  12/27/17 12/27/17 12/27/17





  11:40 11:55 12:10


 


Temperature   


 


Pulse Rate 88 88 90


 


Respiratory 16 16 16





Rate   


 


Blood Pressure 102/54 98/54 104/60


 


O2 Sat by Pulse 100 100 95





Oximetry (%)   














  12/27/17 12/27/17





  12:25 14:45


 


Temperature 98.0 F 98.3 F


 


Pulse Rate 92 H 98 H


 


Respiratory 16 20





Rate  


 


Blood Pressure 107/54 110/58


 


O2 Sat by Pulse  





Oximetry (%)  











 Intake & Output











 12/25/17 12/26/17 12/27/17 12/28/17





 07:59 07:59 07:59 07:59


 


Intake Total   1200 600


 


Output Total  1450 1700 400


 


Balance  -1450 -500 200


 


Weight  135 lb 115 lb 5 oz 











NAD, calm,  


JVD flat, nl effort


Diminished BS, wheezes, nl effort 


tachycardic, regular rhythm no m/r/g


+ bs soft nt nd


ext no edema, clubbing or cyanosis


aaox3


+ dp/pt


no jaundice, diaphoresis








  


 CBC, BMP





 12/27/17 06:00 





 12/27/17 06:00 





 Laboratory Tests











  12/25/17 12/26/17 12/27/17





  19:20 19:00 06:00


 


Hgb   9.1 L 


 


Potassium  4.6  


 


Carbon Dioxide  40 H D  


 


BUN  21 H D  


 


Magnesium    3.0 H D














ekg: stach, 106 bpm with pvc. short pr interval.    inferolateral st sagging. 





cxr: no acute pulm pathology





Echo 4/16: nl LV/EF; RV tds; nl LA; valves WNL; RVSP 40-50





76 yo with h/o htn, hfpef, O2 dep't copd, h/o GI ulcer c/b anemia, oa who 

presents s/p mechanical fall c/b  fracture with plan for surgery. 





Pre-op clearance


- Currently stable from CV perspective.  No further CV testing recommended 

prior to intervention.  Based on RCRI and functional status, estimated to have 

low-intermediate  risk for krishna-operative CV complications.  Patient counseled 

on risk.  Tachycardia likely 2/2 pain and respiratory status.  Pain control per 

surgery/pmd.  


- Pulm following.   


- now s/p  left hip intramedullary nail to repair left hip intertrochanteric 

fracture on 12/27


 


diast chf:


- currently euvolemic.  on torsemide 40 bid at home, but doses held yesterday 

and this morning for low bp's.  bed scale weight on lower end (dry weight may 

be closer to 120 lbs).   acidotic on blood gas. Net positive.  Resume torsemide 

bid tomorrow, con't to monitor volume status.   Daily weights (standing if and 

when possible).  I/O's.  daily bmp. 


 


COPD, ? with a.e.mild pulm HTN:


-estimated RVSP 40-50 on recent echo, RV not well seen on that report


-? sec to LV diast chf vs ? hypoxic chronic lung dz (on home O2 for copd)-


-  pulmonary following. 





HTN:


- running low on diltiazem 180 mg bid, con't for now.  Monitor for need to 

decrease dose to 120 bid.  


 


anemia, chronic:


- con't to monitor krishna-operatively

## 2017-12-27 NOTE — PN
Progress Note (short form)





- Note


Progress Note: 





PULMONARY





AWAKE/ALERT/POST-OP THIS AM


APPEARS STABLE


REQUIRED NIPPV POST-OP DUE TO CO2 RETENTION/


PRESENTLY ON N/C O2 FOR LUNCH





VSS/AFEBRILE


ANICTERIC


DIMINISHED BREATH SOUNDS B/L


S1S2


BS+


S/P LEFT GAMMA NAIL HIP





MEDS/LABS/NOTES/IMAGES REVIEWED








IMP COPD WITH  CHRONIC HYPOXEMIC RESPIRATORY FAILURE ON HOME O2


      ACUTE ON CHRONIC  HYPERCAPNEIC RESPIRATORY FAILURE 


      LEFT FEMORAL FX S/P MECHANICAL FALL


      S/P GAMMA NAIL LEFT HIP 


      HTN





PLAN  IV STEROIDS SHORT COURSE


        O2/NIPPV AS NEEDED


        INHALED BRONCHODILATORS


        FOLLOW ABG


        DVT PROPHYLAXSIS





WILL FOLLOW





ADRIANO BOWENS MD

## 2017-12-27 NOTE — PN
Progress Note, Physician


Chief Complaint: 


Ms Peter says she is feeling better. Says her pain is much better controlled 

after surgery. No cp, sob, n/v. On bipap after surgery.





- Current Medication List


Current Medications: 


Active Medications





Acetaminophen (Tylenol -)  325 mg PO Q4H PRN


   PRN Reason: PAIN


   Stop: 12/29/17 04:24


Albuterol Sulfate (Ventolin 0.083% Nebulizer Soln -)  1 amp NEB Q4H PRN


   PRN Reason: SHORT OF BREATH/WHEEZING


Atorvastatin Calcium (Lipitor -)  10 mg PO HS ELI


Budesonide/Formoterol Fumarate (Symbicort 80/4.5mcg -)  2 puff IH BID ELI


Calcium Carbonate/Cholecalciferol (Os-Saleem 500+D -)  1 tab PO DAILY ELI


Diltiazem HCl (Cardizem Cd -)  180 mg PO BID EIL


Enoxaparin Sodium (Lovenox -)  40 mg SQ DAILY ELI


Ferrous Sulfate (Feosol -)  325 mg PO DAILY ELI


Cefazolin Sodium (Ancef -)  1 gm in 10 mls @ 120 mls/hr IVPUSH Q8H ELI


   Stop: 12/27/17 22:04


Lactated Ringer's (Lactated Ringers Solution)  1,000 ml in 1,000 mls @ 100 mls/

hr IV ASDIR ELI


Latanoprost (Xalatan 0.005% Eye Drops -)  1 drop OU HS ELI


Methylprednisolone Sodium Succinate (Solu-Medrol -)  60 mg IVPUSH Q6H-IV ELI


Montelukast Sodium (Singulair -)  10 mg PO HS [facility]


Morphine Sulfate (Morphine Injection -)  1 mg IVPUSH Q10M PRN


   PRN Reason: PAIN


Multivitamins/Minerals/Vitamin C (Tab-A-Vit -)  1 tab PO DAILY ELI


Ondansetron HCl (Zofran Injection)  4 mg IVPUSH Q6H PRN


   PRN Reason: NAUSEA AND/OR VOMITING


Oxycodone HCl (Roxicodone -)  5 mg PO Q4H PRN


   PRN Reason: PAIN LEVEL 6-10


Pantoprazole Sodium (Protonix -)  40 mg PO DAILY [facility]


Potassium Chloride (K-Dur -)  20 meq PO DAILY ELI


Tiotropium Bromide (Spiriva -)  1 puff IH DAILY ELI


Torsemide (Demadex -)  40 mg PO BIDLASIX ELI











- Objective


Vital Signs: 


 Vital Signs











Temperature  36.7 C   12/27/17 12:25


 


Pulse Rate  92 H  12/27/17 12:25


 


Respiratory Rate  16   12/27/17 12:25


 


Blood Pressure  107/54   12/27/17 12:25


 


O2 Sat by Pulse Oximetry (%)  95   12/27/17 12:10











Constitutional: Yes: Well Nourished, No Distress, Calm


Cardiovascular: Yes: Regular Rate and Rhythm.  No: Gallop, Murmur, Rub


Respiratory: Yes: Regular, CTA Bilaterally.  No: Rales, Rhonchi, Wheezes


Gastrointestinal: Yes: Normal Bowel Sounds, Soft.  No: Distention, Tenderness


Extremities: Yes: WNL


Edema: No


Labs: 


 CBC, BMP





 12/27/17 06:00 





 12/27/17 06:00 





 INR, PTT











INR  0.94  (0.82-1.09)   12/25/17  19:20    














Problem List





- Problems


(1) Fracture, hip


Code(s): S72.009A - FRACTURE OF UNSP PART OF NECK OF UNSP FEMUR, INIT   


Qualifiers: 


   Encounter type: initial encounter   Fracture type: closed   Laterality: left

   Qualified Code(s): S72.002A - Fracture of unspecified part of neck of left 

femur, initial encounter for closed fracture   





(2) COPD (chronic obstructive pulmonary disease)


Code(s): J44.9 - CHRONIC OBSTRUCTIVE PULMONARY DISEASE, UNSPECIFIED   





(3) CHF (congestive heart failure)


Code(s): I50.9 - HEART FAILURE, UNSPECIFIED   


Qualifiers: 


   Congestive heart failure type: diastolic   Congestive heart failure 

chronicity: chronic   Qualified Code(s): I50.32 - Chronic diastolic (congestive

) heart failure   





(4) Hypertension


Code(s): I10 - ESSENTIAL (PRIMARY) HYPERTENSION   





(5) Chronic respiratory failure


Code(s): J96.10 - CHRONIC RESPIRATORY FAILURE, UNSP W HYPOXIA OR HYPERCAPNIA   





Assessment/Plan





(1) Fracture, hip


Assessment/Plan: 


-patient s/p repair


-much improved


-agree with DVT PPx and PT


Code(s): S72.009A - FRACTURE OF UNSP PART OF NECK OF UNSP FEMUR, INIT   


Qualifiers: 


   Encounter type: initial encounter   Fracture type: closed   Laterality: left

   Qualified Code(s): S72.002A - Fracture of unspecified part of neck of left 

femur, initial encounter for closed fracture   





(2) COPD (chronic obstructive pulmonary disease)


Assessment/Plan


-acidotic but mentally stable


-appreciate pulmonary assistance


-continue bipap per pulmonary recs


-now on solumedrol, expect leukocytosis


-continue bronchodilators


Code(s): J44.9 - CHRONIC OBSTRUCTIVE PULMONARY DISEASE, UNSPECIFIED   





(3) CHF (congestive heart failure)


Assessment/Plan: 


-continue torsemide with hold parameters


-cardiology following


Code(s): I50.9 - HEART FAILURE, UNSPECIFIED   


Qualifiers: 


   Congestive heart failure type: diastolic   Congestive heart failure 

chronicity: chronic   Qualified Code(s): I50.32 - Chronic diastolic (congestive

) heart failure   





(4) Hypertension


Assessment/Plan: 


-controlled, with low normal this morning


-torsemide held


-continue diltiazem


Code(s): I10 - ESSENTIAL (PRIMARY) HYPERTENSION   





(5) Chronic respiratory failure


Assessment/Plan: 


-continue oxygen 


Code(s): J96.10 - CHRONIC RESPIRATORY FAILURE, UNSP W HYPOXIA OR HYPERCAPNIA

## 2017-12-28 LAB
ANION GAP SERPL CALC-SCNC: 7 MMOL/L (ref 8–16)
BUN SERPL-MCNC: 35 MG/DL (ref 7–18)
CALCIUM SERPL-MCNC: 7.9 MG/DL (ref 8.5–10.1)
CHLORIDE SERPL-SCNC: 92 MMOL/L (ref 98–107)
CO2 SERPL-SCNC: 39 MMOL/L (ref 21–32)
CREAT SERPL-MCNC: 1 MG/DL (ref 0.55–1.02)
DEPRECATED RDW RBC AUTO: 14.6 % (ref 11.6–15.6)
GLUCOSE SERPL-MCNC: 148 MG/DL (ref 74–106)
HCT VFR BLD CALC: 27.5 % (ref 32.4–45.2)
HGB BLD-MCNC: 8.3 GM/DL (ref 10.7–15.3)
MCH RBC QN AUTO: 24 PG (ref 25.7–33.7)
MCHC RBC AUTO-ENTMCNC: 30.2 G/DL (ref 32–36)
MCV RBC: 79.6 FL (ref 80–96)
PLATELET # BLD AUTO: 189 K/MM3 (ref 134–434)
PMV BLD: 9.1 FL (ref 7.5–11.1)
POTASSIUM SERPLBLD-SCNC: 4.6 MMOL/L (ref 3.5–5.1)
RBC # BLD AUTO: 3.45 M/MM3 (ref 3.6–5.2)
SODIUM SERPL-SCNC: 138 MMOL/L (ref 136–145)
WBC # BLD AUTO: 16.8 K/MM3 (ref 4–10)

## 2017-12-28 RX ADMIN — ALBUTEROL SULFATE PRN AMP: 2.5 SOLUTION RESPIRATORY (INHALATION) at 06:55

## 2017-12-28 RX ADMIN — TIOTROPIUM BROMIDE SCH INH: 18 CAPSULE ORAL; RESPIRATORY (INHALATION) at 10:15

## 2017-12-28 RX ADMIN — ALBUTEROL SULFATE PRN AMP: 2.5 SOLUTION RESPIRATORY (INHALATION) at 17:54

## 2017-12-28 RX ADMIN — Medication SCH TAB: at 10:17

## 2017-12-28 RX ADMIN — METHYLPREDNISOLONE SODIUM SUCCINATE SCH MG: 125 INJECTION, POWDER, FOR SOLUTION INTRAMUSCULAR; INTRAVENOUS at 14:40

## 2017-12-28 RX ADMIN — Medication SCH APPLIC: at 17:30

## 2017-12-28 RX ADMIN — METHYLPREDNISOLONE SODIUM SUCCINATE SCH MG: 125 INJECTION, POWDER, FOR SOLUTION INTRAMUSCULAR; INTRAVENOUS at 21:01

## 2017-12-28 RX ADMIN — TORSEMIDE SCH MG: 20 TABLET ORAL at 05:59

## 2017-12-28 RX ADMIN — PANTOPRAZOLE SODIUM SCH MG: 40 TABLET, DELAYED RELEASE ORAL at 10:17

## 2017-12-28 RX ADMIN — METHYLPREDNISOLONE SODIUM SUCCINATE SCH MG: 125 INJECTION, POWDER, FOR SOLUTION INTRAMUSCULAR; INTRAVENOUS at 02:09

## 2017-12-28 RX ADMIN — MONTELUKAST SODIUM SCH MG: 10 TABLET, COATED ORAL at 21:01

## 2017-12-28 RX ADMIN — MULTIVITAMIN TABLET SCH TAB: TABLET at 10:16

## 2017-12-28 RX ADMIN — POTASSIUM CHLORIDE SCH MEQ: 1500 TABLET, EXTENDED RELEASE ORAL at 10:18

## 2017-12-28 RX ADMIN — ATORVASTATIN CALCIUM SCH MG: 10 TABLET, FILM COATED ORAL at 21:02

## 2017-12-28 RX ADMIN — ENOXAPARIN SODIUM SCH MG: 40 INJECTION SUBCUTANEOUS at 10:18

## 2017-12-28 RX ADMIN — LATANOPROST SCH DROP: 50 SOLUTION OPHTHALMIC at 21:02

## 2017-12-28 RX ADMIN — LORATADINE SCH MG: 10 TABLET ORAL at 14:40

## 2017-12-28 RX ADMIN — TORSEMIDE SCH MG: 20 TABLET ORAL at 14:40

## 2017-12-28 RX ADMIN — BUDESONIDE AND FORMOTEROL FUMARATE DIHYDRATE SCH PUFF: 80; 4.5 AEROSOL RESPIRATORY (INHALATION) at 21:02

## 2017-12-28 RX ADMIN — FERROUS SULFATE TAB EC 324 MG (65 MG FE EQUIVALENT) SCH MG: 324 (65 FE) TABLET DELAYED RESPONSE at 10:16

## 2017-12-28 RX ADMIN — BUDESONIDE AND FORMOTEROL FUMARATE DIHYDRATE SCH PUFF: 80; 4.5 AEROSOL RESPIRATORY (INHALATION) at 10:19

## 2017-12-28 RX ADMIN — CEFAZOLIN SCH: 1 INJECTION, POWDER, FOR SOLUTION INTRAVENOUS at 01:47

## 2017-12-28 RX ADMIN — ACETAMINOPHEN PRN MG: 325 TABLET ORAL at 14:42

## 2017-12-28 RX ADMIN — METHYLPREDNISOLONE SODIUM SUCCINATE SCH MG: 125 INJECTION, POWDER, FOR SOLUTION INTRAMUSCULAR; INTRAVENOUS at 10:16

## 2017-12-28 NOTE — PN
Progress Note (short form)





- Note


Progress Note: 





PULMONARY





AWAKE/ALERT/POST-OP DAY #2


APPEARS STABLE


REQUIRED NIPPV POST-OP DUE TO CO2 RETENTION/


PRESENTLY ON N/C O2 


COMPLAINING OF CHEST CONGESTION





VSS/AFEBRILE


ANICTERIC


DIMINISHED BREATH SOUNDS B/L


S1S2


BS+


S/P LEFT GAMMA NAIL HIP





MEDS/LABS/NOTES/IMAGES REVIEWED








IMP COPD WITH  CHRONIC HYPOXEMIC RESPIRATORY FAILURE ON HOME O2


      ACUTE ON CHRONIC  HYPERCAPNEIC RESPIRATORY FAILURE 


      LEFT FEMORAL FX S/P MECHANICAL FALL


      S/P GAMMA NAIL LEFT HIP 


      HTN





PLAN  IV STEROIDS SHORT COURSE


        O2/NIPPV AS NEEDED


        INHALED BRONCHODILATORS


        FOLLOW CXR


        DVT PROPHYLAXSIS





WILL FOLLOW





ADRIANO BOWENS MD

## 2017-12-28 NOTE — PN
Progress Note (short form)





- Note


Progress Note: 








CC:  pre-op clearance





S:  breathing improving today still intermittently requiring bipap.  no cp, 

palps, dizziness.  Pain controlled








 Current Medications





Acetaminophen (Tylenol -)  325 mg PO Q4H PRN


   PRN Reason: PAIN


   Stop: 12/29/17 04:24


   Last Admin: 12/28/17 14:42 Dose:  325 mg


Albuterol Sulfate (Ventolin 0.083% Nebulizer Soln -)  1 amp NEB Q4H PRN


   PRN Reason: SHORT OF BREATH/WHEEZING


   Last Admin: 12/28/17 17:54 Dose:  1 amp


Atorvastatin Calcium (Lipitor -)  10 mg PO HS [facility]


   Last Admin: 12/28/17 21:02 Dose:  10 mg


Budesonide/Formoterol Fumarate (Symbicort 80/4.5mcg -)  2 puff IH BID [facility]


   Last Admin: 12/28/17 21:02 Dose:  2 puff


Calcium Carbonate/Cholecalciferol (Os-Saleem 500+D -)  1 tab PO DAILY [facility]


   Last Admin: 12/28/17 10:17 Dose:  1 tab


Diltiazem HCl (Cardizem Cd -)  180 mg PO BID [facility]


   Last Admin: 12/28/17 21:02 Dose:  180 mg


Enoxaparin Sodium (Lovenox -)  40 mg SQ DAILY [facility]


   Last Admin: 12/28/17 10:18 Dose:  40 mg


Ferrous Sulfate (Feosol -)  325 mg PO DAILY [facility]


   Last Admin: 12/28/17 10:16 Dose:  325 mg


Latanoprost (Xalatan 0.005% Eye Drops -)  1 drop OU HS [facility]


   Last Admin: 12/28/17 21:02 Dose:  1 drop


Loratadine (Claritin -)  10 mg PO DAILY [facility]


   Last Admin: 12/28/17 14:40 Dose:  10 mg


Methylprednisolone Sodium Succinate (Solu-Medrol -)  60 mg IVPUSH Q6H-IV [facility]


   Last Admin: 12/28/17 21:01 Dose:  60 mg


Montelukast Sodium (Singulair -)  10 mg PO HS [facility]


   Last Admin: 12/28/17 21:01 Dose:  10 mg


Morphine Sulfate (Morphine Injection -)  1 mg IVPUSH Q10M PRN


   PRN Reason: PAIN


Multi-Ingredient Lotion (Eucerin (Small Jar) -)  1 applic TP DAILY [facility]


   Last Admin: 12/28/17 17:30 Dose:  1 applic


Multivitamins/Minerals/Vitamin C (Tab-A-Vit -)  1 tab PO DAILY [facility]


   Last Admin: 12/28/17 10:16 Dose:  1 tab


Ondansetron HCl (Zofran Injection)  4 mg IVPUSH Q6H PRN


   PRN Reason: NAUSEA AND/OR VOMITING


   Last Admin: 12/27/17 16:03 Dose:  4 mg


Oxycodone HCl (Roxicodone -)  5 mg PO Q4H PRN


   PRN Reason: PAIN LEVEL 6-10


   Last Admin: 12/28/17 14:42 Dose:  5 mg


Pantoprazole Sodium (Protonix -)  40 mg PO DAILY [facility]


   Last Admin: 12/28/17 10:17 Dose:  40 mg


Potassium Chloride (K-Dur -)  20 meq PO DAILY [facility]


   Last Admin: 12/28/17 10:18 Dose:  20 meq


Tiotropium Bromide (Spiriva -)  1 puff IH DAILY [facility]


   Last Admin: 12/28/17 10:15 Dose:  1 inh


Torsemide (Demadex -)  40 mg PO BIDLASIX [facility]


   Last Admin: 12/28/17 14:40 Dose:  40 mg





 Vital Signs - 24 hr











  12/28/17 12/28/17 12/28/17





  02:00 05:43 09:00


 


Temperature 99.0 F 98.0 F 


 


Pulse Rate 95 H 95 H 112 H


 


Respiratory 18 18 18





Rate   


 


Blood Pressure 109/64 118/70 103/61


 


O2 Sat by Pulse   95





Oximetry (%)   














  12/28/17 12/28/17 12/28/17





  11:14 14:31 18:30


 


Temperature  99.4 F 98.1 F


 


Pulse Rate 95 H 117 H 86


 


Respiratory   18





Rate   


 


Blood Pressure  126/72 124/61


 


O2 Sat by Pulse 96  





Oximetry (%)   














 Intake & Output











 12/26/17 12/27/17 12/28/17 12/29/17





 07:59 07:59 07:59 07:59


 


Intake Total  1200 920 


 


Output Total 1450 1700 400 300


 


Balance -1450 -500 520 -300


 


Weight 135 lb 115 lb 5 oz 115 lb 2 oz 





NAD, calm,  


JVD flat, nl effort


Diminished BS, wheezes, nl effort 


tachycardic, regular rhythm no m/r/g


+ bs soft nt nd


ext no edema, clubbing or cyanosis


aaox3


+ dp/pt


no jaundice, diaphoresis








 CBC, BMP





 12/28/17 08:05 





 12/28/17 08:05 








ekg: stach, 106 bpm with pvc. short pr interval.    inferolateral st sagging. 





cxr: no acute pulm pathology





Echo 4/16: nl LV/EF; RV tds; nl LA; valves WNL; RVSP 40-50





78 yo with h/o htn, hfpef, O2 dep't copd, h/o GI ulcer c/b anemia, oa who 

presents s/p mechanical fall c/b  fracture with plan for surgery. 





Pre-op clearance


- Currently stable from CV perspective.  No further CV testing recommended 

prior to intervention.  Based on RCRI and functional status, estimated to have 

low-intermediate  risk for krishna-operative CV complications.  Patient counseled 

on risk.  Tachycardia likely 2/2 pain and respiratory status.  Pain control per 

surgery/pmd.  


- Pulm following.   


- now s/p  left hip intramedullary nail to repair left hip intertrochanteric 

fracture on 12/27


- bp/hr overall reasonable control given clincial status. 


 


diast chf:


- currently euvolemic.  on torsemide 40 bid at home, continued here (only held 

12/26 and am 12/27).  bed scale weight on lower end (dry weight may be closer 

to 120 lbs).   


- 12/28 con't bid po torsemide, con't to monitor volume status.   Daily weights 

(standing if and when possible).  I/O's.  daily bmp. 


 


COPD, ? with a.e.mild pulm HTN:


-estimated RVSP 40-50 on recent echo, RV not well seen on that report


-? sec to LV diast chf vs ? hypoxic chronic lung dz (on home O2 for copd)-


-  pulmonary following. 





HTN:


- bp improving.  con't diltiazem 180 mg bid,





anemia, chronic:


- con't to monitor krishna-operatively

## 2017-12-28 NOTE — PN
Progress Note, Physician


Chief Complaint: 


Pt. pain controlled, no anesthesia complaints, eating and voiding.








- Current Medication List


Current Medications: 


Active Medications





Acetaminophen (Tylenol -)  325 mg PO Q4H PRN


   PRN Reason: PAIN


   Stop: 12/29/17 04:24


   Last Admin: 12/27/17 16:06 Dose:  325 mg


Albuterol Sulfate (Ventolin 0.083% Nebulizer Soln -)  1 amp NEB Q4H PRN


   PRN Reason: SHORT OF BREATH/WHEEZING


   Last Admin: 12/28/17 06:55 Dose:  1 amp


Atorvastatin Calcium (Lipitor -)  [facility]


   Last Admin: 12/27/17 21:03 Dose:  10 mg


Budesonide/Formoterol Fumarate (Symbicort 80/4.5mcg -)  2 puff IH BID [facility]


   Last Admin: 12/28/17 10:19 Dose:  2 puff


Calcium Carbonate/Cholecalciferol (Os-Saleem 500+D -)  1 tab PO DAILY [facility]


   Last Admin: 12/28/17 10:17 Dose:  1 tab


Diltiazem HCl (Cardizem Cd -)  180 mg PO BID [facility]


   Last Admin: 12/28/17 10:16 Dose:  180 mg


Enoxaparin Sodium (Lovenox -)  40 mg SQ DAILY [facility]


   Last Admin: 12/28/17 10:18 Dose:  40 mg


Ferrous Sulfate (Feosol -)  325 mg PO DAILY [facility]


   Last Admin: 12/28/17 10:16 Dose:  325 mg


Latanoprost (Xalatan 0.005% Eye Drops -)  1 drop OU HS [facility]


   Last Admin: 12/27/17 21:03 Dose:  1 drop


Loratadine (Claritin -)  10 mg PO DAILY [facility]


Methylprednisolone Sodium Succinate (Solu-Medrol -)  60 mg IVPUSH Q6H-IV [facility]


   Last Admin: 12/28/17 10:16 Dose:  60 mg


Montelukast Sodium (Singulair -)  10 mg PO HS [facility]


   Last Admin: 12/27/17 21:02 Dose:  10 mg


Morphine Sulfate (Morphine Injection -)  1 mg IVPUSH Q10M PRN


   PRN Reason: PAIN


Multi-Ingredient Lotion (Eucerin (Small Jar) -)  1 applic TP DAILY [facility]


Multivitamins/Minerals/Vitamin C (Tab-A-Vit -)  1 tab PO DAILY [facility]


   Last Admin: 12/28/17 10:16 Dose:  1 tab


Ondansetron HCl (Zofran Injection)  4 mg IVPUSH Q6H PRN


   PRN Reason: NAUSEA AND/OR VOMITING


   Last Admin: 12/27/17 16:03 Dose:  4 mg


Oxycodone HCl (Roxicodone -)  5 mg PO Q4H PRN


   PRN Reason: PAIN LEVEL 6-10


   Last Admin: 12/28/17 10:18 Dose:  5 mg


Pantoprazole Sodium (Protonix -)  40 mg PO DAILY [facility]


   Last Admin: 12/28/17 10:17 Dose:  40 mg


Potassium Chloride (K-Dur -)  20 meq PO DAILY [facility]


   Last Admin: 12/28/17 10:18 Dose:  20 meq


Tiotropium Bromide (Spiriva -)  1 puff IH DAILY [facility]


   Last Admin: 12/28/17 10:15 Dose:  1 inh


Torsemide (Demadex -)  40 mg PO BIDLASIX [facility]


   Last Admin: 12/28/17 05:59 Dose:  40 mg











- Objective


Vital Signs: 


 Vital Signs











Temperature  98.0 F   12/28/17 05:43


 


Pulse Rate  95 H  12/28/17 11:14


 


Respiratory Rate  18   12/28/17 09:00


 


Blood Pressure  103/61   12/28/17 09:00


 


O2 Sat by Pulse Oximetry (%)  96   12/28/17 11:14











Constitutional: Yes: Well Nourished, No Distress, Calm


Musculoskeletal: Yes: WNL


Neurological: Yes: WNL, Alert, Oriented


...Motor Strength: WNL


Labs: 


 CBC, BMP





 12/28/17 08:05 





 12/28/17 08:05 





 INR, PTT











INR  0.94  (0.82-1.09)   12/25/17  19:20    














Assessment/Plan


POD#1 s/p Left hip Gamma Nail under spinal. Doing well.


D/C from anesthesia care.

## 2017-12-28 NOTE — PN
Progress Note, Physician


Chief Complaint: 


Ms Peter says she is feeling better. Complains of nasal congestion. Also 

states she feels she cannot breathe as deeply as she would like. No cp or n/v.





- Current Medication List


Current Medications: 


Active Medications





Acetaminophen (Tylenol -)  325 mg PO Q4H PRN


   PRN Reason: PAIN


   Stop: 12/29/17 04:24


   Last Admin: 12/27/17 16:06 Dose:  325 mg


Albuterol Sulfate (Ventolin 0.083% Nebulizer Soln -)  1 amp NEB Q4H PRN


   PRN Reason: SHORT OF BREATH/WHEEZING


   Last Admin: 12/28/17 06:55 Dose:  1 amp


Atorvastatin Calcium (Lipitor -)  10 mg PO HS [facility]


   Last Admin: 12/27/17 21:03 Dose:  10 mg


Budesonide/Formoterol Fumarate (Symbicort 80/4.5mcg -)  2 puff IH BID [facility]


   Last Admin: 12/28/17 10:19 Dose:  2 puff


Calcium Carbonate/Cholecalciferol (Os-Saleem 500+D -)  1 tab PO DAILY [facility]


   Last Admin: 12/28/17 10:17 Dose:  1 tab


Diltiazem HCl (Cardizem Cd -)  180 mg PO BID [facility]


   Last Admin: 12/28/17 10:16 Dose:  180 mg


Enoxaparin Sodium (Lovenox -)  40 mg SQ DAILY [facility]


   Last Admin: 12/28/17 10:18 Dose:  40 mg


Ferrous Sulfate (Feosol -)  325 mg PO DAILY [facility]


   Last Admin: 12/28/17 10:16 Dose:  325 mg


Lactated Ringer's (Lactated Ringers Solution)  1,000 ml in 1,000 mls @ 100 mls/

hr IV ASDIR [facility]


   Last Admin: 12/27/17 16:07 Dose:  Not Given


Latanoprost (Xalatan 0.005% Eye Drops -)  1 drop OU HS [facility]


   Last Admin: 12/27/17 21:03 Dose:  1 drop


Methylprednisolone Sodium Succinate (Solu-Medrol -)  60 mg IVPUSH Q6H-IV [facility]


   Last Admin: 12/28/17 10:16 Dose:  60 mg


Montelukast Sodium (Singulair -)  10 mg PO HS [facility]


   Last Admin: 12/27/17 21:02 Dose:  10 mg


Morphine Sulfate (Morphine Injection -)  1 mg IVPUSH Q10M PRN


   PRN Reason: PAIN


Multivitamins/Minerals/Vitamin C (Tab-A-Vit -)  1 tab PO DAILY [facility]


   Last Admin: 12/28/17 10:16 Dose:  1 tab


Ondansetron HCl (Zofran Injection)  4 mg IVPUSH Q6H PRN


   PRN Reason: NAUSEA AND/OR VOMITING


   Last Admin: 12/27/17 16:03 Dose:  4 mg


Oxycodone HCl (Roxicodone -)  5 mg PO Q4H PRN


   PRN Reason: PAIN LEVEL 6-10


   Last Admin: 12/28/17 10:18 Dose:  5 mg


Pantoprazole Sodium (Protonix -)  40 mg PO DAILY [facility]


   Last Admin: 12/28/17 10:17 Dose:  40 mg


Potassium Chloride (K-Dur -)  20 meq PO DAILY [facility]


   Last Admin: 12/28/17 10:18 Dose:  20 meq


Tiotropium Bromide (Spiriva -)  1 puff IH DAILY [facility]


   Last Admin: 12/28/17 10:15 Dose:  1 inh


Torsemide (Demadex -)  40 mg PO BIDLASIX [facility]


   Last Admin: 12/28/17 05:59 Dose:  40 mg











- Objective


Vital Signs: 


 Vital Signs











Temperature  36.7 C   12/28/17 05:43


 


Pulse Rate  95 H  12/28/17 11:14


 


Respiratory Rate  18   12/28/17 05:43


 


Blood Pressure  118/70   12/28/17 05:43


 


O2 Sat by Pulse Oximetry (%)  96   12/28/17 11:14











Constitutional: Yes: Well Nourished, No Distress, Calm


Cardiovascular: Yes: Regular Rate and Rhythm.  No: Gallop, Murmur, Rub


Respiratory: Yes: Regular, CTA Bilaterally, On Nasal O2, Other (diminshed 

breath sounds in all fields)


Gastrointestinal: Yes: Normal Bowel Sounds, Soft.  No: Distention, Tenderness


Extremities: Yes: Other (dry)


Edema: No


Labs: 


 CBC, BMP





 12/28/17 08:05 





 12/28/17 08:05 





 INR, PTT











INR  0.94  (0.82-1.09)   12/25/17  19:20    














Problem List





- Problems


(1) Fracture, hip


Code(s): S72.009A - FRACTURE OF UNSP PART OF NECK OF UNSP FEMUR, INIT   


Qualifiers: 


   Encounter type: initial encounter   Fracture type: closed   Laterality: left

   Qualified Code(s): S72.002A - Fracture of unspecified part of neck of left 

femur, initial encounter for closed fracture   





(2) COPD (chronic obstructive pulmonary disease)


Code(s): J44.9 - CHRONIC OBSTRUCTIVE PULMONARY DISEASE, UNSPECIFIED   





(3) CHF (congestive heart failure)


Code(s): I50.9 - HEART FAILURE, UNSPECIFIED   


Qualifiers: 


   Congestive heart failure type: diastolic   Congestive heart failure 

chronicity: chronic   Qualified Code(s): I50.32 - Chronic diastolic (congestive

) heart failure   





(4) Hypertension


Code(s): I10 - ESSENTIAL (PRIMARY) HYPERTENSION   





(5) Chronic respiratory failure


Code(s): J96.10 - CHRONIC RESPIRATORY FAILURE, UNSP W HYPOXIA OR HYPERCAPNIA   





Assessment/Plan





(1) Fracture, hip


Assessment/Plan: 


-patient s/p repair


-much improved


-agree with DVT PPx and PT


Code(s): S72.009A - FRACTURE OF UNSP PART OF NECK OF UNSP FEMUR, INIT   


Qualifiers: 


   Encounter type: initial encounter   Fracture type: closed   Laterality: left

   Qualified Code(s): S72.002A - Fracture of unspecified part of neck of left 

femur, initial encounter for closed fracture   





(2) COPD (chronic obstructive pulmonary disease)


Assessment/Plan


-remains in exacerbation


-appreciate pulmonary assistance


-fair air movement but very tight on exam


-continue solumedrol


-continue bronchodilators


Code(s): J44.9 - CHRONIC OBSTRUCTIVE PULMONARY DISEASE, UNSPECIFIED   





(3) CHF (congestive heart failure)


Assessment/Plan: 


-continue torsemide with hold parameters


-cardiology following


Code(s): I50.9 - HEART FAILURE, UNSPECIFIED   


Qualifiers: 


   Congestive heart failure type: diastolic   Congestive heart failure 

chronicity: chronic   Qualified Code(s): I50.32 - Chronic diastolic (congestive

) heart failure   





(4) Hypertension


Assessment/Plan: 


-controlled, with low normal this morning


-continue torsemide and diltiazem


Code(s): I10 - ESSENTIAL (PRIMARY) HYPERTENSION   





(5) Chronic respiratory failure


Assessment/Plan: 


-continue oxygen 


Code(s): J96.10 - CHRONIC RESPIRATORY FAILURE, UNSP W HYPOXIA OR HYPERCAPNIA

## 2017-12-28 NOTE — PN
Progress Note (short form)





- Note


Progress Note: 


Pt lying in bed, pain slightly better than yesterday.


No chest pain but feels slightly short of breath.





 Last Vital Signs











Temp Pulse Resp BP Pulse Ox


 


 99.4 F   117 H  18   126/72   96 


 


 12/28/17 14:31  12/28/17 14:31  12/28/17 09:00  12/28/17 14:31  12/28/17 11:14














AF VSS


LLE dressings cdi


intact ehl fhl ta g s 


sens int to LT


2+ DP





ap POD 1 L femoral nail 


-pain control, minimize narcotics


-dvt proph


-oob/rehab


-f/u wbc, tachycardia, may be secondary to pain and inflammation





Problem List





- Problems


(1) Fracture, hip


Code(s): S72.009A - FRACTURE OF UNSP PART OF NECK OF UNSP FEMUR, INIT   


Qualifiers: 


   Encounter type: initial encounter   Fracture type: closed   Laterality: left

   Qualified Code(s): S72.002A - Fracture of unspecified part of neck of left 

femur, initial encounter for closed fracture

## 2017-12-29 LAB
ANION GAP SERPL CALC-SCNC: 7 MMOL/L (ref 8–16)
BUN SERPL-MCNC: 40 MG/DL (ref 7–18)
CALCIUM SERPL-MCNC: 8.4 MG/DL (ref 8.5–10.1)
CHLORIDE SERPL-SCNC: 92 MMOL/L (ref 98–107)
CO2 SERPL-SCNC: 39 MMOL/L (ref 21–32)
CREAT SERPL-MCNC: 0.9 MG/DL (ref 0.55–1.02)
DEPRECATED RDW RBC AUTO: 14.1 % (ref 11.6–15.6)
GLUCOSE SERPL-MCNC: 140 MG/DL (ref 74–106)
HCT VFR BLD CALC: 25.2 % (ref 32.4–45.2)
HGB BLD-MCNC: 7.7 GM/DL (ref 10.7–15.3)
MAGNESIUM SERPL-MCNC: 2.3 MG/DL (ref 1.8–2.4)
MCH RBC QN AUTO: 24.2 PG (ref 25.7–33.7)
MCHC RBC AUTO-ENTMCNC: 30.8 G/DL (ref 32–36)
MCV RBC: 78.9 FL (ref 80–96)
PHOSPHATE SERPL-MCNC: 3.4 MG/DL (ref 2.5–4.9)
PLATELET # BLD AUTO: 176 K/MM3 (ref 134–434)
PMV BLD: 9.2 FL (ref 7.5–11.1)
POTASSIUM SERPLBLD-SCNC: 5.1 MMOL/L (ref 3.5–5.1)
RBC # BLD AUTO: 3.19 M/MM3 (ref 3.6–5.2)
SODIUM SERPL-SCNC: 138 MMOL/L (ref 136–145)
WBC # BLD AUTO: 14.2 K/MM3 (ref 4–10)

## 2017-12-29 PROCEDURE — 30233N1 TRANSFUSION OF NONAUTOLOGOUS RED BLOOD CELLS INTO PERIPHERAL VEIN, PERCUTANEOUS APPROACH: ICD-10-PCS | Performed by: INTERNAL MEDICINE

## 2017-12-29 RX ADMIN — FERROUS SULFATE TAB EC 324 MG (65 MG FE EQUIVALENT) SCH MG: 324 (65 FE) TABLET DELAYED RESPONSE at 10:54

## 2017-12-29 RX ADMIN — LATANOPROST SCH DROP: 50 SOLUTION OPHTHALMIC at 21:54

## 2017-12-29 RX ADMIN — TIOTROPIUM BROMIDE SCH INH: 18 CAPSULE ORAL; RESPIRATORY (INHALATION) at 10:56

## 2017-12-29 RX ADMIN — TORSEMIDE SCH MG: 20 TABLET ORAL at 05:03

## 2017-12-29 RX ADMIN — ALBUTEROL SULFATE PRN AMP: 2.5 SOLUTION RESPIRATORY (INHALATION) at 04:46

## 2017-12-29 RX ADMIN — ENOXAPARIN SODIUM SCH MG: 40 INJECTION SUBCUTANEOUS at 10:54

## 2017-12-29 RX ADMIN — MONTELUKAST SODIUM SCH MG: 10 TABLET, COATED ORAL at 21:53

## 2017-12-29 RX ADMIN — BUDESONIDE AND FORMOTEROL FUMARATE DIHYDRATE SCH PUFF: 80; 4.5 AEROSOL RESPIRATORY (INHALATION) at 21:54

## 2017-12-29 RX ADMIN — LORATADINE SCH MG: 10 TABLET ORAL at 10:53

## 2017-12-29 RX ADMIN — PANTOPRAZOLE SODIUM SCH MG: 40 TABLET, DELAYED RELEASE ORAL at 10:54

## 2017-12-29 RX ADMIN — METHYLPREDNISOLONE SODIUM SUCCINATE SCH MG: 125 INJECTION, POWDER, FOR SOLUTION INTRAMUSCULAR; INTRAVENOUS at 02:09

## 2017-12-29 RX ADMIN — POTASSIUM CHLORIDE SCH MEQ: 1500 TABLET, EXTENDED RELEASE ORAL at 10:57

## 2017-12-29 RX ADMIN — Medication SCH TAB: at 10:53

## 2017-12-29 RX ADMIN — METHYLPREDNISOLONE SODIUM SUCCINATE SCH MG: 125 INJECTION, POWDER, FOR SOLUTION INTRAMUSCULAR; INTRAVENOUS at 10:52

## 2017-12-29 RX ADMIN — BUDESONIDE AND FORMOTEROL FUMARATE DIHYDRATE SCH PUFF: 80; 4.5 AEROSOL RESPIRATORY (INHALATION) at 10:56

## 2017-12-29 RX ADMIN — Medication SCH APPLIC: at 11:02

## 2017-12-29 RX ADMIN — TORSEMIDE SCH MG: 20 TABLET ORAL at 14:24

## 2017-12-29 RX ADMIN — ATORVASTATIN CALCIUM SCH MG: 10 TABLET, FILM COATED ORAL at 21:53

## 2017-12-29 RX ADMIN — MULTIVITAMIN TABLET SCH TAB: TABLET at 10:54

## 2017-12-29 NOTE — PN
Progress Note (short form)





- Note


Progress Note: 





PULMONARY





AWAKE/ALERT/POST-OP DAY #3


APPEARS STABLE/FEELS "MUSHY"


REQUIRED NIPPV POST-OP DUE TO CO2 RETENTION/


PRESENTLY ON N/C O2 


OOB TO CHAIR





VSS/AFEBRILE


ANICTERIC


DIMINISHED BREATH SOUNDS B/L


S1S2


BS+


S/P LEFT GAMMA NAIL HIP





MEDS/LABS/NOTES/IMAGES REVIEWED








IMP COPD WITH  CHRONIC HYPOXEMIC RESPIRATORY FAILURE ON HOME O2


      ACUTE ON CHRONIC  HYPERCAPNEIC RESPIRATORY FAILURE 


      LEFT FEMORAL FX S/P MECHANICAL FALL


      S/P GAMMA NAIL LEFT HIP 


      HTN





PLAN  IV STEROIDS CHANGED TO PREDNISONE


        O2/NIPPV AS NEEDED


        INHALED BRONCHODILATORS


        FOLLOW CXR


        DVT PROPHYLAXSIS





WILL FOLLOW





ADRIANO BOWENS MD

## 2017-12-29 NOTE — PN
Progress Note (short form)





- Note


Progress Note: 


Pt states mild improvement in pain today.





 Last Vital Signs











Temp Pulse Resp BP Pulse Ox


 


 98.7 F   108 H  18   116/72   98 


 


 12/29/17 05:13  12/29/17 14:00  12/29/17 14:00  12/29/17 14:00  12/29/17 12:02











AF VSS


LLE dressings cdi


intact ehl fhl ta g s 


sens int to LT


2+ DP





 Abnormal Lab Results











  12/29/17 12/29/17 12/29/17





  05:40 05:40 13:07


 


WBC  14.2 H  


 


RBC  3.19 L  


 


Hgb  7.7 L  


 


Hct  25.2 L  


 


MCV  78.9 L  


 


MCH  24.2 L  


 


MCHC  30.8 L  


 


Chloride   92 L 


 


Carbon Dioxide   39 H 


 


Anion Gap   7 L 


 


BUN   40 H 


 


Random Glucose   140 H 


 


Calcium   8.4 L 


 


Crossmatch    See Detail














ap POD 2 L femoral nail 


-pain control, minimize narcotics


-dvt proph


-oob/rehab


-no evidence for op site infection despite elevated wbc


-f.u tachycardia after transfusion





Problem List





- Problems


(1) Fracture, hip


Code(s): S72.009A - FRACTURE OF UNSP PART OF NECK OF UNSP FEMUR, INIT   


Qualifiers: 


   Encounter type: initial encounter   Fracture type: closed   Laterality: left

   Qualified Code(s): S72.002A - Fracture of unspecified part of neck of left 

femur, initial encounter for closed fracture

## 2017-12-29 NOTE — PN
Progress Note, Physician


Chief Complaint: 


Ms Peter continues to improve. Says her breathing is better today. Denies cp 

and n/v.





- Current Medication List


Current Medications: 


Active Medications





Albuterol Sulfate (Ventolin 0.083% Nebulizer Soln -)  1 amp NEB Q4H PRN


   PRN Reason: SHORT OF BREATH/WHEEZING


   Last Admin: 12/29/17 04:46 Dose:  1 amp


Atorvastatin Calcium (Lipitor -)  10 mg PO HS [facility]


   Last Admin: 12/28/17 21:02 Dose:  10 mg


Budesonide/Formoterol Fumarate (Symbicort 80/4.5mcg -)  2 puff IH BID [facility]


   Last Admin: 12/29/17 10:56 Dose:  2 puff


Calcium Carbonate/Cholecalciferol (Os-Saleem 500+D -)  1 tab PO DAILY [facility]


   Last Admin: 12/29/17 10:53 Dose:  1 tab


Diltiazem HCl (Cardizem Cd -)  180 mg PO BID [facility]


   Last Admin: 12/29/17 10:53 Dose:  180 mg


Enoxaparin Sodium (Lovenox -)  40 mg SQ DAILY [facility]


   Last Admin: 12/29/17 10:54 Dose:  40 mg


Ferrous Sulfate (Feosol -)  325 mg PO DAILY [facility]


   Last Admin: 12/29/17 10:54 Dose:  325 mg


Latanoprost (Xalatan 0.005% Eye Drops -)  1 drop OU HS [facility]


   Last Admin: 12/28/17 21:02 Dose:  1 drop


Loratadine (Claritin -)  10 mg PO DAILY [facility]


   Last Admin: 12/29/17 10:53 Dose:  10 mg


Montelukast Sodium (Singulair -)  10 mg PO HS [facility]


   Last Admin: 12/28/17 21:01 Dose:  10 mg


Morphine Sulfate (Morphine Injection -)  1 mg IVPUSH Q10M PRN


   PRN Reason: PAIN


Multi-Ingredient Lotion (Eucerin (Small Jar) -)  1 applic TP DAILY [facility]


   Last Admin: 12/29/17 11:02 Dose:  1 applic


Multivitamins/Minerals/Vitamin C (Tab-A-Vit -)  1 tab PO DAILY [facility]


   Last Admin: 12/29/17 10:54 Dose:  1 tab


Ondansetron HCl (Zofran Injection)  4 mg IVPUSH Q6H PRN


   PRN Reason: NAUSEA AND/OR VOMITING


   Last Admin: 12/27/17 16:03 Dose:  4 mg


Oxycodone HCl (Roxicodone -)  5 mg PO Q4H PRN


   PRN Reason: PAIN LEVEL 6-10


   Last Admin: 12/28/17 14:42 Dose:  5 mg


Pantoprazole Sodium (Protonix -)  40 mg PO DAILY [facility]


   Last Admin: 12/29/17 10:54 Dose:  40 mg


Potassium Chloride (K-Dur -)  20 meq PO DAILY [facility]


   Last Admin: 12/29/17 10:57 Dose:  20 meq


Prednisone (Deltasone -)  20 mg PO DAILY [facility]


Tiotropium Bromide (Spiriva -)  1 puff IH DAILY [facility]


   Last Admin: 12/29/17 10:56 Dose:  1 inh


Torsemide (Demadex -)  40 mg PO BIDLASIX [facility]


   Last Admin: 12/29/17 05:03 Dose:  40 mg











- Objective


Vital Signs: 


 Vital Signs











Temperature  37.1 C   12/29/17 05:13


 


Pulse Rate  92 H  12/29/17 12:02


 


Respiratory Rate  18   12/29/17 05:13


 


Blood Pressure  127/74   12/29/17 05:13


 


O2 Sat by Pulse Oximetry (%)  98   12/29/17 12:02











Constitutional: Yes: Well Nourished, No Distress, Calm


Cardiovascular: Yes: Regular Rate and Rhythm.  No: Gallop, Murmur, Rub


Respiratory: Yes: Regular, On Nasal O2, Wheezes (upper airway), Other (fair air 

movement, improved from yesterday).  No: CTA Bilaterally, Rales, Rhonchi


Gastrointestinal: Yes: Normal Bowel Sounds, Soft.  No: Distention, Tenderness


Extremities: Yes: WNL


Edema: No


Labs: 


 CBC, BMP





 12/29/17 05:40 





 12/29/17 05:40 





 INR, PTT











INR  0.94  (0.82-1.09)   12/25/17  19:20    














Problem List





- Problems


(1) Fracture, hip


Code(s): S72.009A - FRACTURE OF UNSP PART OF NECK OF UNSP FEMUR, INIT   


Qualifiers: 


   Encounter type: initial encounter   Fracture type: closed   Laterality: left

   Qualified Code(s): S72.002A - Fracture of unspecified part of neck of left 

femur, initial encounter for closed fracture   





(2) COPD (chronic obstructive pulmonary disease)


Code(s): J44.9 - CHRONIC OBSTRUCTIVE PULMONARY DISEASE, UNSPECIFIED   





(3) CHF (congestive heart failure)


Code(s): I50.9 - HEART FAILURE, UNSPECIFIED   


Qualifiers: 


   Congestive heart failure type: diastolic   Congestive heart failure 

chronicity: chronic   Qualified Code(s): I50.32 - Chronic diastolic (congestive

) heart failure   





(4) Hypertension


Code(s): I10 - ESSENTIAL (PRIMARY) HYPERTENSION   





(5) Chronic respiratory failure


Code(s): J96.10 - CHRONIC RESPIRATORY FAILURE, UNSP W HYPOXIA OR HYPERCAPNIA   





(6) Anemia


Code(s): D64.9 - ANEMIA, UNSPECIFIED   


Qualifiers: 


   Anemia type: other cause   Other causes of anemia: acute posthemorrhagic   

Qualified Code(s): D62 - Acute posthemorrhagic anemia   





Assessment/Plan





(1) Fracture, hip


Assessment/Plan: 


-patient s/p repair


-much improved


-agree with DVT PPx and PT


Code(s): S72.009A - FRACTURE OF UNSP PART OF NECK OF UNSP FEMUR, INIT   


Qualifiers: 


   Encounter type: initial encounter   Fracture type: closed   Laterality: left

   Qualified Code(s): S72.002A - Fracture of unspecified part of neck of left 

femur, initial encounter for closed fracture   





(2) COPD (chronic obstructive pulmonary disease)


Assessment/Plan


-much improved


-pulmonary note reviewed


-changed to prednisone


-possible discharge tomorrow


Code(s): J44.9 - CHRONIC OBSTRUCTIVE PULMONARY DISEASE, UNSPECIFIED   





(3) CHF (congestive heart failure)


Assessment/Plan: 


-continue torsemide with hold parameters


-cardiology following


Code(s): I50.9 - HEART FAILURE, UNSPECIFIED   


Qualifiers: 


   Congestive heart failure type: diastolic   Congestive heart failure 

chronicity: chronic   Qualified Code(s): I50.32 - Chronic diastolic (congestive

) heart failure   





(4) Hypertension


Assessment/Plan: 


-controlled, with low normal this morning


-continue torsemide and diltiazem


Code(s): I10 - ESSENTIAL (PRIMARY) HYPERTENSION   





(5) Chronic respiratory failure


Assessment/Plan: 


-continue oxygen 


Code(s): J96.10 - CHRONIC RESPIRATORY FAILURE, UNSP W HYPOXIA OR HYPERCAPNIA   





(6) ABLA


-also with long standing anemia


-transfuse 1 unit today


-recheck tomorrow





Dispo


-possible discharge in next 24-48 hours pending H/H and breathing status

## 2017-12-30 LAB
ANION GAP SERPL CALC-SCNC: 6 MMOL/L (ref 8–16)
BASOPHILS # BLD: 0 % (ref 0–2)
BUN SERPL-MCNC: 35 MG/DL (ref 7–18)
CALCIUM SERPL-MCNC: 7.8 MG/DL (ref 8.5–10.1)
CHLORIDE SERPL-SCNC: 94 MMOL/L (ref 98–107)
CO2 SERPL-SCNC: 43 MMOL/L (ref 21–32)
CREAT SERPL-MCNC: 0.8 MG/DL (ref 0.55–1.02)
DEPRECATED RDW RBC AUTO: 14.6 % (ref 11.6–15.6)
EOSINOPHIL # BLD: 0 % (ref 0–4.5)
GLUCOSE SERPL-MCNC: 99 MG/DL (ref 74–106)
HCT VFR BLD CALC: 28.5 % (ref 32.4–45.2)
HGB BLD-MCNC: 8.8 GM/DL (ref 10.7–15.3)
LYMPHOCYTES # BLD: 1.5 % (ref 8–40)
MAGNESIUM SERPL-MCNC: 2.2 MG/DL (ref 1.8–2.4)
MCH RBC QN AUTO: 24.4 PG (ref 25.7–33.7)
MCHC RBC AUTO-ENTMCNC: 30.8 G/DL (ref 32–36)
MCV RBC: 79.2 FL (ref 80–96)
MONOCYTES # BLD AUTO: 9.4 % (ref 3.8–10.2)
NEUTROPHILS # BLD: 89.1 % (ref 42.8–82.8)
PHOSPHATE SERPL-MCNC: 2.7 MG/DL (ref 2.5–4.9)
PLATELET # BLD AUTO: 163 K/MM3 (ref 134–434)
PMV BLD: 8.8 FL (ref 7.5–11.1)
POTASSIUM SERPLBLD-SCNC: 4.2 MMOL/L (ref 3.5–5.1)
RBC # BLD AUTO: 3.61 M/MM3 (ref 3.6–5.2)
SODIUM SERPL-SCNC: 143 MMOL/L (ref 136–145)
WBC # BLD AUTO: 14.4 K/MM3 (ref 4–10)

## 2017-12-30 RX ADMIN — LATANOPROST SCH DROP: 50 SOLUTION OPHTHALMIC at 21:43

## 2017-12-30 RX ADMIN — TORSEMIDE SCH MG: 20 TABLET ORAL at 15:12

## 2017-12-30 RX ADMIN — LORATADINE SCH MG: 10 TABLET ORAL at 10:22

## 2017-12-30 RX ADMIN — ENOXAPARIN SODIUM SCH MG: 40 INJECTION SUBCUTANEOUS at 10:22

## 2017-12-30 RX ADMIN — POTASSIUM CHLORIDE SCH MEQ: 1500 TABLET, EXTENDED RELEASE ORAL at 10:21

## 2017-12-30 RX ADMIN — BUDESONIDE AND FORMOTEROL FUMARATE DIHYDRATE SCH PUFF: 80; 4.5 AEROSOL RESPIRATORY (INHALATION) at 10:21

## 2017-12-30 RX ADMIN — POLYETHYLENE GLYCOL 3350 PRN GM: 17 POWDER, FOR SOLUTION ORAL at 10:42

## 2017-12-30 RX ADMIN — PANTOPRAZOLE SODIUM SCH MG: 40 TABLET, DELAYED RELEASE ORAL at 10:21

## 2017-12-30 RX ADMIN — Medication SCH APPLIC: at 11:02

## 2017-12-30 RX ADMIN — MULTIVITAMIN TABLET SCH TAB: TABLET at 10:21

## 2017-12-30 RX ADMIN — MONTELUKAST SODIUM SCH MG: 10 TABLET, COATED ORAL at 21:43

## 2017-12-30 RX ADMIN — ATORVASTATIN CALCIUM SCH MG: 10 TABLET, FILM COATED ORAL at 21:43

## 2017-12-30 RX ADMIN — TIOTROPIUM BROMIDE SCH INH: 18 CAPSULE ORAL; RESPIRATORY (INHALATION) at 10:21

## 2017-12-30 RX ADMIN — Medication SCH TAB: at 10:21

## 2017-12-30 RX ADMIN — BUDESONIDE AND FORMOTEROL FUMARATE DIHYDRATE SCH PUFF: 80; 4.5 AEROSOL RESPIRATORY (INHALATION) at 21:43

## 2017-12-30 RX ADMIN — ALBUTEROL SULFATE PRN AMP: 2.5 SOLUTION RESPIRATORY (INHALATION) at 22:14

## 2017-12-30 RX ADMIN — TORSEMIDE SCH MG: 20 TABLET ORAL at 06:46

## 2017-12-30 RX ADMIN — PREDNISONE SCH MG: 20 TABLET ORAL at 10:21

## 2017-12-30 RX ADMIN — FERROUS SULFATE TAB EC 324 MG (65 MG FE EQUIVALENT) SCH MG: 324 (65 FE) TABLET DELAYED RESPONSE at 10:21

## 2017-12-30 NOTE — PN
Progress Note (short form)





- Note


Progress Note: 





PULMONARY





AWAKE/ALERT/POST-OP DAY #4


MILD SUBJECTIVE IMPROVEMENT


REQUIRED NIPPV POST-OP DUE TO CO2 RETENTION/


PRESENTLY ON N/C O2 


OOB TO CHAIR





VSS/TMAX 99


ANICTERIC


DIMINISHED BREATH SOUNDS B/L


S1S2


BS+


S/P LEFT GAMMA NAIL HIP





MEDS/LABS/NOTES/IMAGES REVIEWED








IMP COPD WITH  CHRONIC HYPOXEMIC RESPIRATORY FAILURE ON HOME O2


      ACUTE ON CHRONIC  HYPERCAPNEIC RESPIRATORY FAILURE 


      LEFT FEMORAL FX S/P MECHANICAL FALL


      S/P GAMMA NAIL LEFT HIP 


      HTN





PLAN  IV STEROIDS CHANGED TO PREDNISONE


        O2/NIPPV AS NEEDED


        INHALED BRONCHODILATORS


        DISCHARGE PLANNING


        DVT PROPHYLAXSIS





WILL FOLLOW





ADRIANO BOWENS MD

## 2017-12-30 NOTE — PN
Progress Note, Physician


History of Present Illness: 





Feeling OK, breathing feeling better, hip feeling OK.  Has not been having 

bowel movements since she had been hospitalized (just took some Miralax).





- Current Medication List


Current Medications: 


Active Medications





Al Hydroxide/Mg Hydroxide (Mylanta Oral Suspension -)  30 ml PO Q6H PRN


   PRN Reason: DYSPEPSIA


Albuterol Sulfate (Ventolin 0.083% Nebulizer Soln -)  1 amp NEB Q4H PRN


   PRN Reason: SHORT OF BREATH/WHEEZING


   Last Admin: 12/29/17 04:46 Dose:  1 amp


Atorvastatin Calcium (Lipitor -)  10 mg PO HS [facility]


   Last Admin: 12/29/17 21:53 Dose:  10 mg


Budesonide/Formoterol Fumarate (Symbicort 80/4.5mcg -)  2 puff IH BID [facility]


   Last Admin: 12/30/17 10:21 Dose:  2 puff


Calcium Carbonate/Cholecalciferol (Os-Saleem 500+D -)  1 tab PO DAILY [facility]


   Last Admin: 12/30/17 10:21 Dose:  1 tab


Diltiazem HCl (Cardizem Cd -)  180 mg PO BID [facility]


   Last Admin: 12/30/17 10:21 Dose:  180 mg


Enoxaparin Sodium (Lovenox -)  40 mg SQ DAILY [facility]


   Last Admin: 12/30/17 10:22 Dose:  40 mg


Ferrous Sulfate (Feosol -)  325 mg PO DAILY [facility]


   Last Admin: 12/30/17 10:21 Dose:  325 mg


Latanoprost (Xalatan 0.005% Eye Drops -)  1 drop OU HS [facility]


   Last Admin: 12/29/17 21:54 Dose:  1 drop


Loratadine (Claritin -)  10 mg PO DAILY [facility]


   Last Admin: 12/30/17 10:22 Dose:  10 mg


Montelukast Sodium (Singulair -)  10 mg PO HS [facility]


   Last Admin: 12/29/17 21:53 Dose:  10 mg


Multi-Ingredient Lotion (Eucerin (Small Jar) -)  1 applic TP DAILY [facility]


   Last Admin: 12/30/17 11:02 Dose:  1 applic


Multivitamins/Minerals/Vitamin C (Tab-A-Vit -)  1 tab PO DAILY [facility]


   Last Admin: 12/30/17 10:21 Dose:  1 tab


Ondansetron HCl (Zofran Injection)  4 mg IVPUSH Q6H PRN


   PRN Reason: NAUSEA AND/OR VOMITING


   Last Admin: 12/27/17 16:03 Dose:  4 mg


Oxycodone HCl (Roxicodone -)  5 mg PO Q4H PRN


   PRN Reason: PAIN LEVEL 6-10


   Last Admin: 12/30/17 10:56 Dose:  5 mg


Pantoprazole Sodium (Protonix -)  40 mg PO DAILY [facility]


   Last Admin: 12/30/17 10:21 Dose:  40 mg


Polyethylene Glycol (Miralax (For Daily Use) -)  17 gm PO BID PRN


   PRN Reason: CONSTIPATION


   Last Admin: 12/30/17 10:42 Dose:  17 gm


Potassium Chloride (K-Dur -)  20 meq PO DAILY [facility]


   Last Admin: 12/30/17 10:21 Dose:  20 meq


Prednisone (Deltasone -)  20 mg PO DAILY [facility]


   Last Admin: 12/30/17 10:21 Dose:  20 mg


Tiotropium Bromide (Spiriva -)  1 puff IH DAILY [facility]


   Last Admin: 12/30/17 10:21 Dose:  1 inh


Torsemide (Demadex -)  40 mg PO BIDLASIX [facility]


   Last Admin: 12/30/17 06:46 Dose:  40 mg











- Objective


Vital Signs: 


 Vital Signs











Temperature  99.1 F   12/30/17 10:00


 


Pulse Rate  91 H  12/30/17 10:00


 


Respiratory Rate  20   12/30/17 10:00


 


Blood Pressure  100/65   12/30/17 10:00


 


O2 Sat by Pulse Oximetry (%)  96   12/30/17 11:29











Constitutional: Yes: No Distress, Calm


Eyes: Yes: Conjunctiva Clear, EOM Intact


HENT: Yes: Atraumatic, Normocephalic


Neck: Yes: Supple, Trachea Midline


Cardiovascular: Yes: Regular Rate and Rhythm, S1, S2.  No: Murmur


Respiratory: Yes: Regular, CTA Bilaterally.  No: Rales, Rhonchi, Wheezes


Gastrointestinal: Yes: Normal Bowel Sounds, Soft.  No: Distention, Tenderness


Edema: No


Neurological: Yes: Alert, Oriented


Labs: 


 CBC, BMP





 12/30/17 07:45 





 12/30/17 07:45 





 INR, PTT











INR  0.94  (0.82-1.09)   12/25/17  19:20    














Assessment/Plan





Current Active Problems





Acute and chronic respiratory failure with hypercapnia (Acute)


COPD


HTN


CHF


Anemia


Fracture, hip (Acute)





-changed to prednisone, cont PT, d/c planning for STR

## 2017-12-31 LAB
ALBUMIN SERPL-MCNC: 2.7 G/DL (ref 3.4–5)
ALP SERPL-CCNC: 83 U/L (ref 45–117)
ALT SERPL-CCNC: 593 U/L (ref 12–78)
ANION GAP SERPL CALC-SCNC: 8 MMOL/L (ref 8–16)
AST SERPL-CCNC: 158 U/L (ref 15–37)
BASOPHILS # BLD: 0 % (ref 0–2)
BILIRUB SERPL-MCNC: 0.5 MG/DL (ref 0.2–1)
BUN SERPL-MCNC: 33 MG/DL (ref 7–18)
CALCIUM SERPL-MCNC: 8 MG/DL (ref 8.5–10.1)
CHLORIDE SERPL-SCNC: 90 MMOL/L (ref 98–107)
CO2 SERPL-SCNC: 43 MMOL/L (ref 21–32)
CREAT SERPL-MCNC: 0.8 MG/DL (ref 0.55–1.02)
DEPRECATED RDW RBC AUTO: 14.9 % (ref 11.6–15.6)
EOSINOPHIL # BLD: 0.3 % (ref 0–4.5)
GLUCOSE SERPL-MCNC: 79 MG/DL (ref 74–106)
HCT VFR BLD CALC: 30 % (ref 32.4–45.2)
HGB BLD-MCNC: 9.1 GM/DL (ref 10.7–15.3)
LYMPHOCYTES # BLD: 5.7 % (ref 8–40)
MCH RBC QN AUTO: 24.3 PG (ref 25.7–33.7)
MCHC RBC AUTO-ENTMCNC: 30.3 G/DL (ref 32–36)
MCV RBC: 80.1 FL (ref 80–96)
MONOCYTES # BLD AUTO: 13.8 % (ref 3.8–10.2)
NEUTROPHILS # BLD: 80.2 % (ref 42.8–82.8)
PLATELET # BLD AUTO: 164 K/MM3 (ref 134–434)
PMV BLD: 8.8 FL (ref 7.5–11.1)
POTASSIUM SERPLBLD-SCNC: 4.4 MMOL/L (ref 3.5–5.1)
PROT SERPL-MCNC: 5.4 G/DL (ref 6.4–8.2)
RBC # BLD AUTO: 3.74 M/MM3 (ref 3.6–5.2)
SODIUM SERPL-SCNC: 141 MMOL/L (ref 136–145)
WBC # BLD AUTO: 11.3 K/MM3 (ref 4–10)

## 2017-12-31 RX ADMIN — MULTIVITAMIN TABLET SCH TAB: TABLET at 09:37

## 2017-12-31 RX ADMIN — POTASSIUM CHLORIDE SCH MEQ: 1500 TABLET, EXTENDED RELEASE ORAL at 09:38

## 2017-12-31 RX ADMIN — ENOXAPARIN SODIUM SCH MG: 40 INJECTION SUBCUTANEOUS at 09:36

## 2017-12-31 RX ADMIN — PANTOPRAZOLE SODIUM SCH MG: 40 TABLET, DELAYED RELEASE ORAL at 09:37

## 2017-12-31 RX ADMIN — BUDESONIDE AND FORMOTEROL FUMARATE DIHYDRATE SCH PUFF: 80; 4.5 AEROSOL RESPIRATORY (INHALATION) at 21:27

## 2017-12-31 RX ADMIN — BUDESONIDE AND FORMOTEROL FUMARATE DIHYDRATE SCH PUFF: 80; 4.5 AEROSOL RESPIRATORY (INHALATION) at 09:39

## 2017-12-31 RX ADMIN — TORSEMIDE SCH MG: 20 TABLET ORAL at 13:26

## 2017-12-31 RX ADMIN — MONTELUKAST SODIUM SCH MG: 10 TABLET, COATED ORAL at 21:27

## 2017-12-31 RX ADMIN — NYSTATIN SCH UNITS: 100000 SUSPENSION ORAL at 17:11

## 2017-12-31 RX ADMIN — Medication SCH TAB: at 09:38

## 2017-12-31 RX ADMIN — LATANOPROST SCH DROP: 50 SOLUTION OPHTHALMIC at 21:27

## 2017-12-31 RX ADMIN — Medication SCH APPLIC: at 09:41

## 2017-12-31 RX ADMIN — PREDNISONE SCH MG: 20 TABLET ORAL at 09:37

## 2017-12-31 RX ADMIN — FERROUS SULFATE TAB EC 324 MG (65 MG FE EQUIVALENT) SCH MG: 324 (65 FE) TABLET DELAYED RESPONSE at 09:37

## 2017-12-31 RX ADMIN — LORATADINE SCH MG: 10 TABLET ORAL at 09:37

## 2017-12-31 RX ADMIN — TIOTROPIUM BROMIDE SCH INH: 18 CAPSULE ORAL; RESPIRATORY (INHALATION) at 09:36

## 2017-12-31 RX ADMIN — POLYETHYLENE GLYCOL 3350 PRN GM: 17 POWDER, FOR SOLUTION ORAL at 09:40

## 2017-12-31 RX ADMIN — TORSEMIDE SCH MG: 20 TABLET ORAL at 06:17

## 2017-12-31 RX ADMIN — NYSTATIN SCH UNITS: 100000 SUSPENSION ORAL at 13:25

## 2017-12-31 RX ADMIN — ALBUTEROL SULFATE PRN AMP: 2.5 SOLUTION RESPIRATORY (INHALATION) at 21:57

## 2017-12-31 NOTE — PN
Progress Note, Physician


History of Present Illness: 





Feeling OK today, nurse noting patient c/o mouth soreness and thrush was seen.  

Still did not have bowel movement yet.





- Current Medication List


Current Medications: 


Active Medications





Al Hydroxide/Mg Hydroxide (Mylanta Oral Suspension -)  30 ml PO Q6H PRN


   PRN Reason: DYSPEPSIA


Albuterol Sulfate (Ventolin 0.083% Nebulizer Soln -)  1 amp NEB Q4H PRN


   PRN Reason: SHORT OF BREATH/WHEEZING


   Last Admin: 12/30/17 22:14 Dose:  1 amp


Atorvastatin Calcium (Lipitor -)  10 mg PO HS [facility]


   Last Admin: 12/30/17 21:43 Dose:  10 mg


Budesonide/Formoterol Fumarate (Symbicort 80/4.5mcg -)  2 puff IH BID [facility]


   Last Admin: 12/31/17 09:39 Dose:  2 puff


Calcium Carbonate/Cholecalciferol (Os-Saleem 500+D -)  1 tab PO DAILY [facility]


   Last Admin: 12/31/17 09:38 Dose:  1 tab


Diltiazem HCl (Cardizem Cd -)  180 mg PO BID [facility]


   Last Admin: 12/31/17 09:38 Dose:  180 mg


Enoxaparin Sodium (Lovenox -)  40 mg SQ DAILY [facility]


   Last Admin: 12/31/17 09:36 Dose:  40 mg


Ferrous Sulfate (Feosol -)  325 mg PO DAILY [facility]


   Last Admin: 12/31/17 09:37 Dose:  325 mg


Latanoprost (Xalatan 0.005% Eye Drops -)  1 drop OU HS [facility]


   Last Admin: 12/30/17 21:43 Dose:  1 drop


Loratadine (Claritin -)  10 mg PO DAILY [facility]


   Last Admin: 12/31/17 09:37 Dose:  10 mg


Montelukast Sodium (Singulair -)  10 mg PO HS [facility]


   Last Admin: 12/30/17 21:43 Dose:  10 mg


Multi-Ingredient Lotion (Eucerin (Small Jar) -)  1 applic TP DAILY [facility]


   Last Admin: 12/31/17 09:41 Dose:  1 applic


Multivitamins/Minerals/Vitamin C (Tab-A-Vit -)  1 tab PO DAILY [facility]


   Last Admin: 12/31/17 09:37 Dose:  1 tab


Ondansetron HCl (Zofran Injection)  4 mg IVPUSH Q6H PRN


   PRN Reason: NAUSEA AND/OR VOMITING


   Last Admin: 12/27/17 16:03 Dose:  4 mg


Oxycodone HCl (Roxicodone -)  5 mg PO Q4H PRN


   PRN Reason: PAIN LEVEL 6-10


   Last Admin: 12/30/17 10:56 Dose:  5 mg


Pantoprazole Sodium (Protonix -)  40 mg PO DAILY [facility]


   Last Admin: 12/31/17 09:37 Dose:  40 mg


Polyethylene Glycol (Miralax (For Daily Use) -)  17 gm PO BID PRN


   PRN Reason: CONSTIPATION


   Last Admin: 12/31/17 09:40 Dose:  17 gm


Potassium Chloride (K-Dur -)  20 meq PO DAILY [facility]


   Last Admin: 12/31/17 09:38 Dose:  20 meq


Prednisone (Deltasone -)  20 mg PO DAILY [facility]


   Last Admin: 12/31/17 09:37 Dose:  20 mg


Tiotropium Bromide (Spiriva -)  1 puff IH DAILY [facility]


   Last Admin: 12/31/17 09:36 Dose:  1 inh


Torsemide (Demadex -)  40 mg PO BIDLASIX [facility]


   Last Admin: 12/31/17 06:17 Dose:  40 mg











- Objective


Vital Signs: 


 Vital Signs











Temperature  97.7 F   12/31/17 06:00


 


Pulse Rate  98 H  12/31/17 11:03


 


Respiratory Rate  18   12/31/17 06:00


 


Blood Pressure  123/76   12/31/17 06:00


 


O2 Sat by Pulse Oximetry (%)  98   12/31/17 11:10











Constitutional: Yes: No Distress, Calm


Neck: Yes: Supple, Trachea Midline


Cardiovascular: Yes: Regular Rate and Rhythm, S1, S2.  No: Murmur


Respiratory: Yes: Regular, CTA Bilaterally


Gastrointestinal: Yes: Normal Bowel Sounds, Soft, Distention (softly)


Extremities: Yes: Other (surgical dressign on left hip with serosanguinous fluid

)


Edema: No


Neurological: Yes: Alert, Oriented


Labs: 


 CBC, BMP





 12/31/17 07:17 





 12/31/17 07:17 





 INR, PTT











INR  0.94  (0.82-1.09)   12/25/17  19:20    














Assessment/Plan





Current Active Problems





Acute and chronic respiratory failure with hypercapnia (Acute)


COPD


HTN


CHF


Anemia


Fracture, hip (Acute)


elevated LFT's





-LFT's elevated on labwork today. No abd pain (other than feeling constipated) 

and bilirubin normal.  May reflect congestive hepatopathy following her surgery

, as history of CHF -will repeat and check liver US as well, will also hold 

lipitor for now


-cont post-op care for now/ physical therapy


-advised to cont Miralax twice a day until bowel movement

## 2017-12-31 NOTE — PN
Progress Note (short form)





- Note


Progress Note: 





PULMONARY





AWAKE/ALERT/POST-OP DAY #5


 SUBJECTIVE IMPROVEMENT


REQUIRED NIPPV POST-OP DUE TO CO2 RETENTION/


PRESENTLY ON N/C O2 


OOB TO CHAIR





VSS/AFEBRILE


ANICTERIC


DIMINISHED BREATH SOUNDS B/L


S1S2


BS+


S/P LEFT GAMMA NAIL HIP





MEDS/LABS/NOTES/IMAGES REVIEWED








IMP COPD WITH  CHRONIC HYPOXEMIC RESPIRATORY FAILURE ON HOME O2


      ACUTE ON CHRONIC  HYPERCAPNEIC RESPIRATORY FAILURE 


      LEFT FEMORAL FX S/P MECHANICAL FALL


      S/P GAMMA NAIL LEFT HIP 


      HTN





PLAN  IV STEROIDS CHANGED TO PREDNISONE


        O2/NIPPV AS NEEDED


        INHALED BRONCHODILATORS


        DISCHARGE PLANNING


        DVT PROPHYLAXSIS


        NEEDS SNF REHAB





WILL FOLLOW





ADRIANO BOWENS MD

## 2018-01-01 LAB
ALBUMIN SERPL-MCNC: 2.8 G/DL (ref 3.4–5)
ALP SERPL-CCNC: 92 U/L (ref 45–117)
ALT SERPL-CCNC: 438 U/L (ref 12–78)
ANION GAP SERPL CALC-SCNC: 3 MMOL/L (ref 8–16)
AST SERPL-CCNC: 70 U/L (ref 15–37)
BASOPHILS # BLD: 0.1 % (ref 0–2)
BILIRUB CONJ SERPL-MCNC: < 0.2 MG/DL (ref 0–0.2)
BILIRUB SERPL-MCNC: 0.6 MG/DL (ref 0.2–1)
BUN SERPL-MCNC: 29 MG/DL (ref 7–18)
CALCIUM SERPL-MCNC: 7.8 MG/DL (ref 8.5–10.1)
CHLORIDE SERPL-SCNC: 89 MMOL/L (ref 98–107)
CO2 SERPL-SCNC: 47 MMOL/L (ref 21–32)
CREAT SERPL-MCNC: 0.7 MG/DL (ref 0.55–1.02)
DEPRECATED RDW RBC AUTO: 15.1 % (ref 11.6–15.6)
EOSINOPHIL # BLD: 1.3 % (ref 0–4.5)
GLUCOSE SERPL-MCNC: 74 MG/DL (ref 74–106)
HCT VFR BLD CALC: 31.3 % (ref 32.4–45.2)
HGB BLD-MCNC: 9.5 GM/DL (ref 10.7–15.3)
LYMPHOCYTES # BLD: 6 % (ref 8–40)
MCH RBC QN AUTO: 24.5 PG (ref 25.7–33.7)
MCHC RBC AUTO-ENTMCNC: 30.5 G/DL (ref 32–36)
MCV RBC: 80.2 FL (ref 80–96)
MONOCYTES # BLD AUTO: 10.6 % (ref 3.8–10.2)
NEUTROPHILS # BLD: 82 % (ref 42.8–82.8)
PLATELET # BLD AUTO: 187 K/MM3 (ref 134–434)
PMV BLD: 9.1 FL (ref 7.5–11.1)
POTASSIUM SERPLBLD-SCNC: 4.2 MMOL/L (ref 3.5–5.1)
PROT SERPL-MCNC: 5.5 G/DL (ref 6.4–8.2)
RBC # BLD AUTO: 3.9 M/MM3 (ref 3.6–5.2)
SODIUM SERPL-SCNC: 139 MMOL/L (ref 136–145)
WBC # BLD AUTO: 13.7 K/MM3 (ref 4–10)

## 2018-01-01 RX ADMIN — NYSTATIN SCH UNITS: 100000 SUSPENSION ORAL at 06:14

## 2018-01-01 RX ADMIN — ENOXAPARIN SODIUM SCH MG: 40 INJECTION SUBCUTANEOUS at 09:56

## 2018-01-01 RX ADMIN — NYSTATIN SCH UNITS: 100000 SUSPENSION ORAL at 23:10

## 2018-01-01 RX ADMIN — PANTOPRAZOLE SODIUM SCH MG: 40 TABLET, DELAYED RELEASE ORAL at 09:56

## 2018-01-01 RX ADMIN — BUDESONIDE AND FORMOTEROL FUMARATE DIHYDRATE SCH PUFF: 80; 4.5 AEROSOL RESPIRATORY (INHALATION) at 21:57

## 2018-01-01 RX ADMIN — NYSTATIN SCH UNITS: 100000 SUSPENSION ORAL at 00:10

## 2018-01-01 RX ADMIN — NYSTATIN SCH UNITS: 100000 SUSPENSION ORAL at 12:53

## 2018-01-01 RX ADMIN — MONTELUKAST SODIUM SCH MG: 10 TABLET, COATED ORAL at 21:57

## 2018-01-01 RX ADMIN — BUDESONIDE AND FORMOTEROL FUMARATE DIHYDRATE SCH PUFF: 80; 4.5 AEROSOL RESPIRATORY (INHALATION) at 09:57

## 2018-01-01 RX ADMIN — PREDNISONE SCH MG: 20 TABLET ORAL at 09:56

## 2018-01-01 RX ADMIN — LATANOPROST SCH DROP: 50 SOLUTION OPHTHALMIC at 21:57

## 2018-01-01 RX ADMIN — NYSTATIN SCH UNITS: 100000 SUSPENSION ORAL at 17:12

## 2018-01-01 RX ADMIN — TIOTROPIUM BROMIDE SCH INH: 18 CAPSULE ORAL; RESPIRATORY (INHALATION) at 12:53

## 2018-01-01 RX ADMIN — Medication SCH APPLIC: at 09:56

## 2018-01-01 RX ADMIN — LORATADINE SCH MG: 10 TABLET ORAL at 09:56

## 2018-01-01 RX ADMIN — FERROUS SULFATE TAB EC 324 MG (65 MG FE EQUIVALENT) SCH MG: 324 (65 FE) TABLET DELAYED RESPONSE at 09:56

## 2018-01-01 RX ADMIN — Medication SCH TAB: at 09:56

## 2018-01-01 RX ADMIN — MULTIVITAMIN TABLET SCH TAB: TABLET at 09:56

## 2018-01-01 RX ADMIN — POTASSIUM CHLORIDE SCH MEQ: 1500 TABLET, EXTENDED RELEASE ORAL at 09:56

## 2018-01-01 RX ADMIN — TORSEMIDE SCH MG: 20 TABLET ORAL at 06:14

## 2018-01-01 RX ADMIN — TORSEMIDE SCH MG: 20 TABLET ORAL at 17:12

## 2018-01-01 NOTE — PN
Progress Note (short form)





- Note


Progress Note: 





PULMONARY





AWAKE/ALERT/POST-OP DAY #6


SUBJECTIVE IMPROVEMENT IN BREATHING


NO BM/MILD ABD DISCOMFORT


REQUIRED NIPPV POST-OP DUE TO CO2 RETENTION/


PRESENTLY ON N/C O2 


OOB TO CHAIR





VSS/AFEBRILE


ANICTERIC


DIMINISHED BREATH SOUNDS B/L


S1S2


BS+


S/P LEFT GAMMA NAIL HIP





MEDS/LABS/NOTES/IMAGES REVIEWED


US NOTED/LFT'S ELEVATED BUT DOWN TRENDING








IMP COPD WITH  CHRONIC HYPOXEMIC RESPIRATORY FAILURE ON HOME O2


      ACUTE ON CHRONIC  HYPERCAPNEIC RESPIRATORY FAILURE 


      LEFT FEMORAL FX S/P MECHANICAL FALL


      S/P GAMMA NAIL LEFT HIP 


      HTN


      CONSTIPATION/HYDRONEPHROSIS





PLAN  IV STEROIDS CHANGED TO PREDNISONE


        O2/NIPPV AS NEEDED


        INHALED BRONCHODILATORS


        CT ABD/PELVIS PENDING/CONSIDER GI EVAL


        DVT PROPHYLAXSIS


        WILL ULTIMATELY NEED SNF REHAB





WILL FOLLOW





ADRIANO BOWENS MD

## 2018-01-01 NOTE — PN
Progress Note, Physician


History of Present Illness: 





Feeling some pressure in right upper quadrant.  Had US this morning showing 

right hydronephrosis.  Still has not had bowel moveent since admission. 

Breathing has been feeling OK. LFT's improved today.  Mouth still hurting (

started on nystatin solution)





- Current Medication List


Current Medications: 


Active Medications





Al Hydroxide/Mg Hydroxide (Mylanta Oral Suspension -)  30 ml PO Q6H PRN


   PRN Reason: DYSPEPSIA


   Last Admin: 01/01/18 06:40 Dose:  30 ml


Albuterol Sulfate (Ventolin 0.083% Nebulizer Soln -)  1 amp NEB Q4H PRN


   PRN Reason: SHORT OF BREATH/WHEEZING


   Last Admin: 12/31/17 21:57 Dose:  1 amp


Budesonide/Formoterol Fumarate (Symbicort 80/4.5mcg -)  2 puff IH BID [facility]


   Last Admin: 01/01/18 09:57 Dose:  2 puff


Calcium Carbonate/Cholecalciferol (Os-Saleem 500+D -)  1 tab PO DAILY [facility]


   Last Admin: 01/01/18 09:56 Dose:  1 tab


Diltiazem HCl (Cardizem Cd -)  180 mg PO BID [facility]


   Last Admin: 01/01/18 09:55 Dose:  180 mg


Enoxaparin Sodium (Lovenox -)  40 mg SQ DAILY [facility]


   Last Admin: 01/01/18 09:56 Dose:  40 mg


Ferrous Sulfate (Feosol -)  325 mg PO DAILY [facility]


   Last Admin: 01/01/18 09:56 Dose:  325 mg


Latanoprost (Xalatan 0.005% Eye Drops -)  1 drop OU HS [facility]


   Last Admin: 12/31/17 21:27 Dose:  1 drop


Loratadine (Claritin -)  10 mg PO DAILY [facility]


   Last Admin: 01/01/18 09:56 Dose:  10 mg


Montelukast Sodium (Singulair -)  10 mg PO HS [facility]


   Last Admin: 12/31/17 21:27 Dose:  10 mg


Multi-Ingredient Lotion (Eucerin (Small Jar) -)  1 applic TP DAILY [facility]


   Last Admin: 01/01/18 09:56 Dose:  1 applic


Multivitamins/Minerals/Vitamin C (Tab-A-Vit -)  1 tab PO DAILY [facility]


   Last Admin: 01/01/18 09:56 Dose:  1 tab


Nystatin (Nystatin Oral Suspension -)  500,000 units PO Q6HPO [facility]


   Last Admin: 01/01/18 06:14 Dose:  500,000 units


Ondansetron HCl (Zofran Injection)  4 mg IVPUSH Q6H PRN


   PRN Reason: NAUSEA AND/OR VOMITING


   Last Admin: 12/27/17 16:03 Dose:  4 mg


Oxycodone HCl (Roxicodone -)  5 mg PO Q4H PRN


   PRN Reason: PAIN LEVEL 6-10


   Last Admin: 12/30/17 10:56 Dose:  5 mg


Pantoprazole Sodium (Protonix -)  40 mg PO DAILY [facility]


   Last Admin: 01/01/18 09:56 Dose:  40 mg


Polyethylene Glycol (Miralax (For Daily Use) -)  17 gm PO BID PRN


   PRN Reason: CONSTIPATION


   Last Admin: 12/31/17 09:40 Dose:  17 gm


Potassium Chloride (K-Dur -)  20 meq PO DAILY [facility]


   Last Admin: 01/01/18 09:56 Dose:  20 meq


Prednisone (Deltasone -)  20 mg PO DAILY [facility]


   Last Admin: 01/01/18 09:56 Dose:  20 mg


Tiotropium Bromide (Spiriva -)  1 puff IH DAILY [facility]


   Last Admin: 12/31/17 09:36 Dose:  1 inh


Torsemide (Demadex -)  40 mg PO BIDLASIX [facility]


   Last Admin: 01/01/18 06:14 Dose:  40 mg











- Objective


Vital Signs: 


 Vital Signs











Temperature  98.3 F   01/01/18 09:55


 


Pulse Rate  110 H  01/01/18 09:55


 


Respiratory Rate  19   01/01/18 09:55


 


Blood Pressure  103/62   01/01/18 09:55


 


O2 Sat by Pulse Oximetry (%)  98   12/31/17 21:00











Constitutional: Yes: No Distress, Calm


Neck: Yes: Supple, Trachea Midline


Cardiovascular: Yes: Regular Rate and Rhythm, S1, S2.  No: Murmur


Respiratory: Yes: Regular, CTA Bilaterally.  No: Rales, Rhonchi, Wheezes


Gastrointestinal: Yes: Normal Bowel Sounds, Soft, Distention (mild, soft).  No: 

Tenderness


Edema: No


Neurological: Yes: Alert, Oriented


Labs: 


 CBC, BMP





 01/01/18 06:30 





 01/01/18 06:30 





 INR, PTT











INR  0.94  (0.82-1.09)   12/25/17  19:20    














Assessment/Plan





Current Active Problems





Acute and chronic respiratory failure with hypercapnia (Acute)


COPD


HTN


CHF


Anemia


Fracture, hip (Acute)


elevated LFT's





-will check CT abd as still no bowel movement and now hydronephrosis on US


-cont PT for hip fracture/ ORIF

## 2018-01-02 LAB
ALBUMIN SERPL-MCNC: 2.9 G/DL (ref 3.4–5)
ALP SERPL-CCNC: 85 U/L (ref 45–117)
ALT SERPL-CCNC: 311 U/L (ref 12–78)
ANION GAP SERPL CALC-SCNC: 1 MMOL/L (ref 8–16)
AST SERPL-CCNC: 40 U/L (ref 15–37)
BASOPHILS # BLD: 0.1 % (ref 0–2)
BILIRUB SERPL-MCNC: 0.7 MG/DL (ref 0.2–1)
BUN SERPL-MCNC: 20 MG/DL (ref 7–18)
CALCIUM SERPL-MCNC: 8.4 MG/DL (ref 8.5–10.1)
CHLORIDE SERPL-SCNC: 87 MMOL/L (ref 98–107)
CO2 SERPL-SCNC: 50 MMOL/L (ref 21–32)
CREAT SERPL-MCNC: 0.8 MG/DL (ref 0.55–1.02)
DEPRECATED RDW RBC AUTO: 15.7 % (ref 11.6–15.6)
EOSINOPHIL # BLD: 2 % (ref 0–4.5)
GLUCOSE SERPL-MCNC: 106 MG/DL (ref 74–106)
HCT VFR BLD CALC: 32.3 % (ref 32.4–45.2)
HGB BLD-MCNC: 9.8 GM/DL (ref 10.7–15.3)
LYMPHOCYTES # BLD: 6.1 % (ref 8–40)
MCH RBC QN AUTO: 24.2 PG (ref 25.7–33.7)
MCHC RBC AUTO-ENTMCNC: 30.3 G/DL (ref 32–36)
MCV RBC: 80 FL (ref 80–96)
MONOCYTES # BLD AUTO: 8.5 % (ref 3.8–10.2)
NEUTROPHILS # BLD: 83.3 % (ref 42.8–82.8)
PLATELET # BLD AUTO: 202 K/MM3 (ref 134–434)
PMV BLD: 9.3 FL (ref 7.5–11.1)
POTASSIUM SERPLBLD-SCNC: 3.7 MMOL/L (ref 3.5–5.1)
PROT SERPL-MCNC: 5.5 G/DL (ref 6.4–8.2)
RBC # BLD AUTO: 4.03 M/MM3 (ref 3.6–5.2)
SODIUM SERPL-SCNC: 138 MMOL/L (ref 136–145)
WBC # BLD AUTO: 14.1 K/MM3 (ref 4–10)

## 2018-01-02 RX ADMIN — TORSEMIDE SCH: 20 TABLET ORAL at 14:05

## 2018-01-02 RX ADMIN — TIOTROPIUM BROMIDE SCH INH: 18 CAPSULE ORAL; RESPIRATORY (INHALATION) at 09:46

## 2018-01-02 RX ADMIN — Medication SCH APPLIC: at 09:47

## 2018-01-02 RX ADMIN — PANTOPRAZOLE SODIUM SCH MG: 40 TABLET, DELAYED RELEASE ORAL at 09:47

## 2018-01-02 RX ADMIN — DOCUSATE SODIUM SCH MG: 100 CAPSULE, LIQUID FILLED ORAL at 21:40

## 2018-01-02 RX ADMIN — POLYETHYLENE GLYCOL 3350 SCH GM: 17 POWDER, FOR SOLUTION ORAL at 21:39

## 2018-01-02 RX ADMIN — PREDNISONE SCH MG: 20 TABLET ORAL at 09:46

## 2018-01-02 RX ADMIN — FERROUS SULFATE TAB EC 324 MG (65 MG FE EQUIVALENT) SCH MG: 324 (65 FE) TABLET DELAYED RESPONSE at 09:47

## 2018-01-02 RX ADMIN — Medication SCH TAB: at 09:47

## 2018-01-02 RX ADMIN — NYSTATIN SCH UNITS: 100000 SUSPENSION ORAL at 17:47

## 2018-01-02 RX ADMIN — MULTIVITAMIN TABLET SCH TAB: TABLET at 09:46

## 2018-01-02 RX ADMIN — DOCUSATE SODIUM SCH MG: 100 CAPSULE, LIQUID FILLED ORAL at 14:03

## 2018-01-02 RX ADMIN — BUDESONIDE AND FORMOTEROL FUMARATE DIHYDRATE SCH PUFF: 80; 4.5 AEROSOL RESPIRATORY (INHALATION) at 09:46

## 2018-01-02 RX ADMIN — LATANOPROST SCH DROP: 50 SOLUTION OPHTHALMIC at 21:41

## 2018-01-02 RX ADMIN — BUDESONIDE AND FORMOTEROL FUMARATE DIHYDRATE SCH PUFF: 80; 4.5 AEROSOL RESPIRATORY (INHALATION) at 21:40

## 2018-01-02 RX ADMIN — ENOXAPARIN SODIUM SCH MG: 40 INJECTION SUBCUTANEOUS at 09:47

## 2018-01-02 RX ADMIN — TORSEMIDE SCH MG: 20 TABLET ORAL at 05:47

## 2018-01-02 RX ADMIN — MONTELUKAST SODIUM SCH MG: 10 TABLET, COATED ORAL at 21:40

## 2018-01-02 RX ADMIN — NYSTATIN SCH UNITS: 100000 SUSPENSION ORAL at 12:21

## 2018-01-02 RX ADMIN — LORATADINE SCH MG: 10 TABLET ORAL at 09:46

## 2018-01-02 RX ADMIN — POLYETHYLENE GLYCOL 3350 SCH GM: 17 POWDER, FOR SOLUTION ORAL at 14:03

## 2018-01-02 RX ADMIN — POTASSIUM CHLORIDE SCH MEQ: 1500 TABLET, EXTENDED RELEASE ORAL at 09:47

## 2018-01-02 RX ADMIN — NYSTATIN SCH UNITS: 100000 SUSPENSION ORAL at 05:47

## 2018-01-02 RX ADMIN — ALBUTEROL SULFATE PRN AMP: 2.5 SOLUTION RESPIRATORY (INHALATION) at 22:54

## 2018-01-02 NOTE — PN
Progress Note, Physician


Chief Complaint: 





sob


History of Present Illness: 





feels modestly sob, not her usual baseline.


worse when lays flat.


no cp/pressure


no leg swelling


no palpitations





- Current Medication List


Current Medications: 


Active Medications





Al Hydroxide/Mg Hydroxide (Mylanta Oral Suspension -)  30 ml PO Q6H PRN


   PRN Reason: DYSPEPSIA


   Last Admin: 01/01/18 06:40 Dose:  30 ml


Albuterol Sulfate (Ventolin 0.083% Nebulizer Soln -)  1 amp NEB Q4H PRN


   PRN Reason: SHORT OF BREATH/WHEEZING


   Last Admin: 12/31/17 21:57 Dose:  1 amp


Budesonide/Formoterol Fumarate (Symbicort 80/4.5mcg -)  2 puff IH BID [facility]


   Last Admin: 01/02/18 09:46 Dose:  2 puff


Calcium Carbonate/Cholecalciferol (Os-Saleem 500+D -)  1 tab PO DAILY [facility]


   Last Admin: 01/02/18 09:47 Dose:  1 tab


Diltiazem HCl (Cardizem Cd -)  180 mg PO BID [facility]


   Last Admin: 01/02/18 09:46 Dose:  180 mg


Docusate Sodium (Colace -)  100 mg PO TID [facility]


   Last Admin: 01/02/18 14:03 Dose:  100 mg


Enoxaparin Sodium (Lovenox -)  40 mg SQ DAILY [facility]


   Last Admin: 01/02/18 09:47 Dose:  40 mg


Ferrous Sulfate (Feosol -)  325 mg PO DAILY [facility]


   Last Admin: 01/02/18 09:47 Dose:  325 mg


Latanoprost (Xalatan 0.005% Eye Drops -)  1 drop OU HS [facility]


   Last Admin: 01/01/18 21:57 Dose:  1 drop


Loratadine (Claritin -)  10 mg PO DAILY [facility]


   Last Admin: 01/02/18 09:46 Dose:  10 mg


Montelukast Sodium (Singulair -)  10 mg PO HS [facility]


   Last Admin: 01/01/18 21:57 Dose:  10 mg


Multi-Ingredient Lotion (Eucerin (Small Jar) -)  1 applic TP DAILY [facility]


   Last Admin: 01/02/18 09:47 Dose:  1 applic


Multivitamins/Minerals/Vitamin C (Tab-A-Vit -)  1 tab PO DAILY [facility]


   Last Admin: 01/02/18 09:46 Dose:  1 tab


Nystatin (Nystatin Oral Suspension -)  500,000 units PO Q6HPO [facility]


   Last Admin: 01/02/18 17:47 Dose:  500,000 units


Ondansetron HCl (Zofran Injection)  4 mg IVPUSH Q6H PRN


   PRN Reason: NAUSEA AND/OR VOMITING


   Last Admin: 12/27/17 16:03 Dose:  4 mg


Oxycodone HCl (Roxicodone -)  5 mg PO Q4H PRN


   PRN Reason: PAIN LEVEL 6-10


   Last Admin: 12/30/17 10:56 Dose:  5 mg


Pantoprazole Sodium (Protonix -)  40 mg PO DAILY [facility]


   Last Admin: 01/02/18 09:47 Dose:  40 mg


Polyethylene Glycol (Miralax (For Daily Use) -)  17 gm PO BID [facility]


   Last Admin: 01/02/18 14:03 Dose:  17 gm


Potassium Chloride (K-Dur -)  20 meq PO DAILY [facility]


   Last Admin: 01/02/18 09:47 Dose:  20 meq


Prednisone (Deltasone -)  20 mg PO DAILY [facility]


   Last Admin: 01/02/18 09:46 Dose:  20 mg


Tiotropium Bromide (Spiriva -)  1 puff IH DAILY [facility]


   Last Admin: 01/02/18 09:46 Dose:  1 inh


Torsemide (Demadex -)  40 mg PO BIDLASIX [facility]


   Last Admin: 01/02/18 14:05 Dose:  Not Given











- Objective


Vital Signs: 


 Vital Signs











Temperature  97.8 F   01/02/18 14:00


 


Pulse Rate  101 H  01/02/18 14:00


 


Respiratory Rate  20   01/02/18 14:00


 


Blood Pressure  98/55   01/02/18 14:00


 


O2 Sat by Pulse Oximetry (%)  98   01/02/18 10:52











Constitutional: Yes: Well Nourished, No Distress, Calm


Cardiovascular: Yes: Regular Rate and Rhythm, S1, S2.  No: Gallop, Murmur


Respiratory: Yes: Regular, Wheezes (bases).  No: Accessory Muscle Use, Rales


Extremities: No: Cold


Edema: No


Neurological: Yes: Alert, Oriented


Psychiatric: No: Agitated


Labs: 


 CBC, BMP





 01/02/18 08:11 





 01/02/18 08:11 





 INR, PTT











INR  0.94  (0.82-1.09)   12/25/17  19:20    














Assessment/Plan








cxr: no acute pulm pathology





Echo 4/16: nl LV/EF; RV tds; nl LA; valves WNL; RVSP 40-50





78 yo with h/o htn, hfpef, O2 dep't copd, h/o GI ulcer c/b anemia, oa who 

presents s/p mechanical fall c/b  fracture with plan for surgery. 





s/p fall, with fracture:


- now s/p  left hip intramedullary nail to repair left hip intertrochanteric 

fracture on 12/27


 


chronic diast chf:


- mild pulm HTN on recent echo, ? WHO 2 etiol.


- remains likely euvolemic. on torsemide 40 bid at home, continued here (only 

held 12/26 and am 12/27).  


- c/o sob with orthopnea not her usual baseline--? a.e. copd.


- cont torsemide same, rpt CXR in am--low threshold to give trial of 1-2 doses 

IV lasix if sob persists


 


COPD, ? with a.e.:


-? sec to LV diast chf vs ? hypoxic chronic lung dz (on home O2 for copd)-


-pulmonary following.





HTN:


- bp controlled. 


- con't diltiazem 180 mg bid,





anemia, chronic:


- stable (improved) at present

## 2018-01-02 NOTE — PN
Progress Note (short form)





- Note


Progress Note: 


NAD on 3 L NC O2. 


Overall breathing improving. 


No CP or SOB. 





 Intake & Output











 12/30/17 12/31/17 01/01/18 01/02/18





 23:59 23:59 23:59 23:59


 


Intake Total 675 240 300 


 


Balance 675 240 300 


 


Weight 119 lb 5 oz 119 lb 9 oz 117 lb 5 oz 120 lb








 Last Vital Signs











Temp Pulse Resp BP Pulse Ox


 


 98.6 F   103 H  18   107/59   98 


 


 01/02/18 05:35  01/02/18 10:52  01/02/18 09:00  01/02/18 09:00  01/02/18 10:52








Active Medications





Al Hydroxide/Mg Hydroxide (Mylanta Oral Suspension -)  30 ml PO Q6H PRN


   PRN Reason: DYSPEPSIA


   Last Admin: 01/01/18 06:40 Dose:  30 ml


Albuterol Sulfate (Ventolin 0.083% Nebulizer Soln -)  1 amp NEB Q4H PRN


   PRN Reason: SHORT OF BREATH/WHEEZING


   Last Admin: 12/31/17 21:57 Dose:  1 amp


Budesonide/Formoterol Fumarate (Symbicort 80/4.5mcg -)  2 puff IH BID [facility]


   Last Admin: 01/02/18 09:46 Dose:  2 puff


Calcium Carbonate/Cholecalciferol (Os-Saleem 500+D -)  1 tab PO DAILY [facility]


   Last Admin: 01/02/18 09:47 Dose:  1 tab


Diltiazem HCl (Cardizem Cd -)  180 mg PO BID [facility]


   Last Admin: 01/02/18 09:46 Dose:  180 mg


Docusate Sodium (Colace -)  100 mg PO TID [facility]


Enoxaparin Sodium (Lovenox -)  40 mg SQ DAILY [facility]


   Last Admin: 01/02/18 09:47 Dose:  40 mg


Ferrous Sulfate (Feosol -)  325 mg PO DAILY [facility]


   Last Admin: 01/02/18 09:47 Dose:  325 mg


Latanoprost (Xalatan 0.005% Eye Drops -)  1 drop OU HS [facility]


   Last Admin: 01/01/18 21:57 Dose:  1 drop


Loratadine (Claritin -)  10 mg PO DAILY [facility]


   Last Admin: 01/02/18 09:46 Dose:  10 mg


Montelukast Sodium (Singulair -)  10 mg PO HS [facility]


   Last Admin: 01/01/18 21:57 Dose:  10 mg


Multi-Ingredient Lotion (Eucerin (Small Jar) -)  1 applic TP DAILY [facility]


   Last Admin: 01/02/18 09:47 Dose:  1 applic


Multivitamins/Minerals/Vitamin C (Tab-A-Vit -)  1 tab PO DAILY [facility]


   Last Admin: 01/02/18 09:46 Dose:  1 tab


Nystatin (Nystatin Oral Suspension -)  500,000 units PO Q6HPO [facility]


   Last Admin: 01/02/18 12:21 Dose:  500,000 units


Ondansetron HCl (Zofran Injection)  4 mg IVPUSH Q6H PRN


   PRN Reason: NAUSEA AND/OR VOMITING


   Last Admin: 12/27/17 16:03 Dose:  4 mg


Oxycodone HCl (Roxicodone -)  5 mg PO Q4H PRN


   PRN Reason: PAIN LEVEL 6-10


   Last Admin: 12/30/17 10:56 Dose:  5 mg


Pantoprazole Sodium (Protonix -)  40 mg PO DAILY [facility]


   Last Admin: 01/02/18 09:47 Dose:  40 mg


Polyethylene Glycol (Miralax (For Daily Use) -)  17 gm PO BID [facility]


Potassium Chloride (K-Dur -)  20 meq PO DAILY [facility]


   Last Admin: 01/02/18 09:47 Dose:  20 meq


Prednisone (Deltasone -)  20 mg PO DAILY [facility]


   Last Admin: 01/02/18 09:46 Dose:  20 mg


Tiotropium Bromide (Spiriva -)  1 puff IH DAILY [facility]


   Last Admin: 01/02/18 09:46 Dose:  1 inh


Torsemide (Demadex -)  40 mg PO BIDLASIX [facility]


   Last Admin: 01/02/18 05:47 Dose:  40 mg








Constitutional: Yes: No Distress 


Neck: Yes: Supple, Trachea Midline


Cardiovascular: Yes: Regular Rate and Rhythm, S1, S2.  No: Murmur


Respiratory: Yes: Clear.  No: Rales, Rhonchi, Wheezes


Gastrointestinal: Yes: Normal Bowel Sounds, Soft, Distention (mild, soft).  No: 

Tenderness


Edema: No


Neurological: Yes: Alert, Oriented


Labs: 


 


 Laboratory Results - last 24 hr











  12/29/17 01/02/18 01/02/18





  13:07 08:11 08:11


 


WBC   14.1 H 


 


RBC   4.03 


 


Hgb   9.8 L 


 


Hct   32.3 L 


 


MCV   80.0 


 


MCH   24.2 L 


 


MCHC   30.3 L 


 


RDW   15.7 H 


 


Plt Count   202 


 


MPV   9.3 


 


Neutrophils %   83.3 H 


 


Lymphocytes %   6.1 L 


 


Monocytes %   8.5 


 


Eosinophils %   2.0 


 


Basophils %   0.1 


 


Sodium    138


 


Potassium    3.7


 


Chloride    87 L


 


Carbon Dioxide    50 H


 


Anion Gap    1 L


 


BUN    20 H D


 


Creatinine    0.8


 


Creat Clearance w eGFR    > 60


 


Random Glucose    106  D


 


Calcium    8.4 L


 


Total Bilirubin    0.7


 


AST    40 H D


 


ALT    311 H D


 


Alkaline Phosphatase    85


 


Total Protein    5.5 L


 


Albumin    2.9 L


 


Blood Type  O POSITIVE  


 


Antibody Screen  Negative  


 


Crossmatch  See Detail  

















Assessment/Plan


Acute and chronic respiratory failure with hypercapnia


COPD


HTN


CHF


Anemia


Fracture, hip (Acute)


Elevated LFT's





Symbicort BID 


Spiriva


Prednisone 


Lovenox 


O2 as needed


Sleep Apnea workup after discharge 


BD TX PRN 





Dr Delgado

## 2018-01-02 NOTE — PN
Progress Note, Physician


Chief Complaint: 


Ms Peter complains of abdominal discomfort. Says her breathing is improved. 

no chest pain.





- Current Medication List


Current Medications: 


Active Medications





Al Hydroxide/Mg Hydroxide (Mylanta Oral Suspension -)  30 ml PO Q6H PRN


   PRN Reason: DYSPEPSIA


   Last Admin: 01/01/18 06:40 Dose:  30 ml


Albuterol Sulfate (Ventolin 0.083% Nebulizer Soln -)  1 amp NEB Q4H PRN


   PRN Reason: SHORT OF BREATH/WHEEZING


   Last Admin: 12/31/17 21:57 Dose:  1 amp


Budesonide/Formoterol Fumarate (Symbicort 80/4.5mcg -)  2 puff IH BID [facility]


   Last Admin: 01/02/18 09:46 Dose:  2 puff


Calcium Carbonate/Cholecalciferol (Os-Saleem 500+D -)  1 tab PO DAILY [facility]


   Last Admin: 01/02/18 09:47 Dose:  1 tab


Diltiazem HCl (Cardizem Cd -)  180 mg PO BID [facility]


   Last Admin: 01/02/18 09:46 Dose:  180 mg


Enoxaparin Sodium (Lovenox -)  40 mg SQ DAILY [facility]


   Last Admin: 01/02/18 09:47 Dose:  40 mg


Ferrous Sulfate (Feosol -)  325 mg PO DAILY [facility]


   Last Admin: 01/02/18 09:47 Dose:  325 mg


Latanoprost (Xalatan 0.005% Eye Drops -)  1 drop OU HS [facility]


   Last Admin: 01/01/18 21:57 Dose:  1 drop


Loratadine (Claritin -)  10 mg PO DAILY [facility]


   Last Admin: 01/02/18 09:46 Dose:  10 mg


Montelukast Sodium (Singulair -)  10 mg PO HS [facility]


   Last Admin: 01/01/18 21:57 Dose:  10 mg


Multi-Ingredient Lotion (Eucerin (Small Jar) -)  1 applic TP DAILY [facility]


   Last Admin: 01/02/18 09:47 Dose:  1 applic


Multivitamins/Minerals/Vitamin C (Tab-A-Vit -)  1 tab PO DAILY [facility]


   Last Admin: 01/02/18 09:46 Dose:  1 tab


Nystatin (Nystatin Oral Suspension -)  500,000 units PO Q6HPO [facility]


   Last Admin: 01/02/18 12:21 Dose:  500,000 units


Ondansetron HCl (Zofran Injection)  4 mg IVPUSH Q6H PRN


   PRN Reason: NAUSEA AND/OR VOMITING


   Last Admin: 12/27/17 16:03 Dose:  4 mg


Oxycodone HCl (Roxicodone -)  5 mg PO Q4H PRN


   PRN Reason: PAIN LEVEL 6-10


   Last Admin: 12/30/17 10:56 Dose:  5 mg


Pantoprazole Sodium (Protonix -)  40 mg PO DAILY [facility]


   Last Admin: 01/02/18 09:47 Dose:  40 mg


Potassium Chloride (K-Dur -)  20 meq PO DAILY [facility]


   Last Admin: 01/02/18 09:47 Dose:  20 meq


Prednisone (Deltasone -)  20 mg PO DAILY [facility]


   Last Admin: 01/02/18 09:46 Dose:  20 mg


Tiotropium Bromide (Spiriva -)  1 puff IH DAILY [facility]


   Last Admin: 01/02/18 09:46 Dose:  1 inh


Torsemide (Demadex -)  40 mg PO BIDLASIX [facility]


   Last Admin: 01/02/18 05:47 Dose:  40 mg











- Objective


Vital Signs: 


 Vital Signs











Temperature  37.0 C   01/02/18 05:35


 


Pulse Rate  103 H  01/02/18 10:52


 


Respiratory Rate  18   01/02/18 09:00


 


Blood Pressure  107/59   01/02/18 09:00


 


O2 Sat by Pulse Oximetry (%)  98   01/02/18 10:52











Constitutional: Yes: Well Nourished, No Distress, Calm


Cardiovascular: Yes: Regular Rate and Rhythm.  No: Gallop, Murmur, Rub


Respiratory: Yes: Regular, CTA Bilaterally.  No: Rales, Rhonchi, Wheezes


Gastrointestinal: Yes: Normal Bowel Sounds, Soft, Distention, Tenderness


Extremities: Yes: WNL


Edema: No


Labs: 


 CBC, BMP





 01/02/18 08:11 





 01/02/18 08:11 





 INR, PTT











INR  0.94  (0.82-1.09)   12/25/17  19:20    














Problem List





- Problems


(1) Fracture, hip


Code(s): S72.009A - FRACTURE OF UNSP PART OF NECK OF UNSP FEMUR, INIT   


Qualifiers: 


   Encounter type: initial encounter   Fracture type: closed   Laterality: left

   Qualified Code(s): S72.002A - Fracture of unspecified part of neck of left 

femur, initial encounter for closed fracture   





(2) COPD (chronic obstructive pulmonary disease)


Code(s): J44.9 - CHRONIC OBSTRUCTIVE PULMONARY DISEASE, UNSPECIFIED   





(3) CHF (congestive heart failure)


Code(s): I50.9 - HEART FAILURE, UNSPECIFIED   


Qualifiers: 


   Congestive heart failure type: diastolic   Congestive heart failure 

chronicity: chronic   Qualified Code(s): I50.32 - Chronic diastolic (congestive

) heart failure   





(4) Hypertension


Code(s): I10 - ESSENTIAL (PRIMARY) HYPERTENSION   





(5) Chronic respiratory failure


Code(s): J96.10 - CHRONIC RESPIRATORY FAILURE, UNSP W HYPOXIA OR HYPERCAPNIA   





(6) Anemia


Code(s): D64.9 - ANEMIA, UNSPECIFIED   


Qualifiers: 


   Anemia type: other cause   Other causes of anemia: acute posthemorrhagic   

Qualified Code(s): D62 - Acute posthemorrhagic anemia   





Assessment/Plan





(1) Fracture, hip


Assessment/Plan: 


-stable 


-continue PT and DVT PPx


Code(s): S72.009A - FRACTURE OF UNSP PART OF NECK OF UNSP FEMUR, INIT   


Qualifiers: 


   Encounter type: initial encounter   Fracture type: closed   Laterality: left

   Qualified Code(s): S72.002A - Fracture of unspecified part of neck of left 

femur, initial encounter for closed fracture   





(2) COPD (chronic obstructive pulmonary disease)


Assessment/Plan


-suspect close to baseline


-continue prednisone and bronchodilators


Code(s): J44.9 - CHRONIC OBSTRUCTIVE PULMONARY DISEASE, UNSPECIFIED   





(3) CHF (congestive heart failure)


Assessment/Plan: 


-continue torsemide with hold parameters


-cardiology following


Code(s): I50.9 - HEART FAILURE, UNSPECIFIED   


Qualifiers: 


   Congestive heart failure type: diastolic   Congestive heart failure 

chronicity: chronic   Qualified Code(s): I50.32 - Chronic diastolic (congestive

) heart failure   





(4) Hypertension


Assessment/Plan: 


-controlled


-continue torsemide and diltiazem


Code(s): I10 - ESSENTIAL (PRIMARY) HYPERTENSION   





(5) Chronic respiratory failure


Assessment/Plan: 


-continue oxygen 


Code(s): J96.10 - CHRONIC RESPIRATORY FAILURE, UNSP W HYPOXIA OR HYPERCAPNIA   





(6) ABLA


-stable





(7) Severe constipation


-seen on CT scan


-schedule miralax and colace


-enema





(8) Hydronephrosis


-consult urology

## 2018-01-03 LAB
ALBUMIN SERPL-MCNC: 2.9 G/DL (ref 3.4–5)
ALP SERPL-CCNC: 81 U/L (ref 45–117)
ALT SERPL-CCNC: 231 U/L (ref 12–78)
ANION GAP SERPL CALC-SCNC: 4 MMOL/L (ref 8–16)
AST SERPL-CCNC: 32 U/L (ref 15–37)
BASOPHILS # BLD: 0.1 % (ref 0–2)
BILIRUB SERPL-MCNC: 0.9 MG/DL (ref 0.2–1)
BUN SERPL-MCNC: 20 MG/DL (ref 7–18)
CALCIUM SERPL-MCNC: 8.2 MG/DL (ref 8.5–10.1)
CHLORIDE SERPL-SCNC: 90 MMOL/L (ref 98–107)
CO2 SERPL-SCNC: 44 MMOL/L (ref 21–32)
CREAT SERPL-MCNC: 0.7 MG/DL (ref 0.55–1.02)
DEPRECATED RDW RBC AUTO: 15.9 % (ref 11.6–15.6)
EOSINOPHIL # BLD: 1.8 % (ref 0–4.5)
GLUCOSE SERPL-MCNC: 79 MG/DL (ref 74–106)
HCT VFR BLD CALC: 30.6 % (ref 32.4–45.2)
HGB BLD-MCNC: 9.5 GM/DL (ref 10.7–15.3)
LYMPHOCYTES # BLD: 5.9 % (ref 8–40)
MAGNESIUM SERPL-MCNC: 2.6 MG/DL (ref 1.8–2.4)
MCH RBC QN AUTO: 24.5 PG (ref 25.7–33.7)
MCHC RBC AUTO-ENTMCNC: 30.9 G/DL (ref 32–36)
MCV RBC: 79.3 FL (ref 80–96)
MONOCYTES # BLD AUTO: 9.8 % (ref 3.8–10.2)
NEUTROPHILS # BLD: 82.4 % (ref 42.8–82.8)
PHOSPHATE SERPL-MCNC: 4.7 MG/DL (ref 2.5–4.9)
PLATELET # BLD AUTO: 213 K/MM3 (ref 134–434)
PMV BLD: 9.1 FL (ref 7.5–11.1)
POTASSIUM SERPLBLD-SCNC: 3.6 MMOL/L (ref 3.5–5.1)
PROT SERPL-MCNC: 5.7 G/DL (ref 6.4–8.2)
RBC # BLD AUTO: 3.86 M/MM3 (ref 3.6–5.2)
SODIUM SERPL-SCNC: 138 MMOL/L (ref 136–145)
WBC # BLD AUTO: 14.9 K/MM3 (ref 4–10)

## 2018-01-03 RX ADMIN — LATANOPROST SCH DROP: 50 SOLUTION OPHTHALMIC at 22:32

## 2018-01-03 RX ADMIN — NYSTATIN SCH UNITS: 100000 SUSPENSION ORAL at 00:31

## 2018-01-03 RX ADMIN — BUDESONIDE AND FORMOTEROL FUMARATE DIHYDRATE SCH PUFF: 80; 4.5 AEROSOL RESPIRATORY (INHALATION) at 22:14

## 2018-01-03 RX ADMIN — LACTULOSE SCH GM: 20 SOLUTION ORAL at 14:19

## 2018-01-03 RX ADMIN — ENOXAPARIN SODIUM SCH MG: 40 INJECTION SUBCUTANEOUS at 09:58

## 2018-01-03 RX ADMIN — Medication SCH APPLIC: at 10:02

## 2018-01-03 RX ADMIN — NYSTATIN SCH UNITS: 100000 SUSPENSION ORAL at 11:24

## 2018-01-03 RX ADMIN — NYSTATIN SCH UNITS: 100000 SUSPENSION ORAL at 17:59

## 2018-01-03 RX ADMIN — MULTIVITAMIN TABLET SCH TAB: TABLET at 09:58

## 2018-01-03 RX ADMIN — Medication SCH TAB: at 09:58

## 2018-01-03 RX ADMIN — LACTULOSE SCH GM: 20 SOLUTION ORAL at 22:14

## 2018-01-03 RX ADMIN — NYSTATIN SCH UNITS: 100000 SUSPENSION ORAL at 05:24

## 2018-01-03 RX ADMIN — PREDNISONE SCH MG: 20 TABLET ORAL at 09:58

## 2018-01-03 RX ADMIN — PANTOPRAZOLE SODIUM SCH MG: 40 TABLET, DELAYED RELEASE ORAL at 09:58

## 2018-01-03 RX ADMIN — CALCIUM CARBONATE SCH MG: 1250 SUSPENSION ORAL at 14:55

## 2018-01-03 RX ADMIN — TORSEMIDE SCH MG: 20 TABLET ORAL at 05:24

## 2018-01-03 RX ADMIN — TIOTROPIUM BROMIDE SCH INH: 18 CAPSULE ORAL; RESPIRATORY (INHALATION) at 09:59

## 2018-01-03 RX ADMIN — DOCUSATE SODIUM SCH MG: 100 CAPSULE, LIQUID FILLED ORAL at 22:14

## 2018-01-03 RX ADMIN — POTASSIUM CHLORIDE SCH MEQ: 1500 TABLET, EXTENDED RELEASE ORAL at 09:58

## 2018-01-03 RX ADMIN — MONTELUKAST SODIUM SCH MG: 10 TABLET, COATED ORAL at 22:14

## 2018-01-03 RX ADMIN — POLYETHYLENE GLYCOL 3350 SCH GM: 17 POWDER, FOR SOLUTION ORAL at 22:15

## 2018-01-03 RX ADMIN — DOCUSATE SODIUM SCH MG: 100 CAPSULE, LIQUID FILLED ORAL at 14:19

## 2018-01-03 RX ADMIN — POLYETHYLENE GLYCOL 3350 SCH GM: 17 POWDER, FOR SOLUTION ORAL at 10:03

## 2018-01-03 RX ADMIN — BUDESONIDE AND FORMOTEROL FUMARATE DIHYDRATE SCH PUFF: 80; 4.5 AEROSOL RESPIRATORY (INHALATION) at 09:59

## 2018-01-03 RX ADMIN — FERROUS SULFATE TAB EC 324 MG (65 MG FE EQUIVALENT) SCH MG: 324 (65 FE) TABLET DELAYED RESPONSE at 09:58

## 2018-01-03 RX ADMIN — DOCUSATE SODIUM SCH MG: 100 CAPSULE, LIQUID FILLED ORAL at 05:24

## 2018-01-03 RX ADMIN — LORATADINE SCH MG: 10 TABLET ORAL at 09:58

## 2018-01-03 NOTE — CON.GU
Consult


Consult Specialty:: 


Referred by:: Medicine


Reason for Consultation:: 77 year old female admitted with a fall.  She was 

noted to have hydronephrosis on CT





- History of Present Illness


Chief Complaint: hydronephrosis


History of Present Illness: 





77 year old female who denies any  history. She has incidental finding of a 

UPJ obstruction on CT.  She has history of recurrent UTIs but no other major  

complaints and no flank pain. She is unaware of this finding earlier





- History Source


History Provided By: Patient, Medical Record


Limitations to Obtaining History: No Limitations





- Past Medical History


Cardio/Vascular: Yes: HTN


Pulmonary: Yes: COPD, Other (oxygen at home)


Gastrointestinal: Yes: Other (History of  ulcer disease diagnosed 3 years ago 

when patient presented with anemia)


Renal/: Yes: UTI


Musculoskeletal: Yes: Osteoarthritis





- Past Surgical History


Past Surgical History: Yes: None





- Alcohol/Substance Use


Hx Alcohol Use: No


History of Substance Use: reports: None





- Smoking History


Smoking history: Former smoker


Have you smoked in the past 12 months: No


Aproximately how many cigarettes per day: 0


If you are a former smoker, when did you quit?: Over 15 years ago





- Social History


ADL: Independent


Occupation: Retired dental , , 2 children


History of Recent Travel: No





Home Medications





- Allergies


Allergies/Adverse Reactions: 


 Allergies











Allergy/AdvReac Type Severity Reaction Status Date / Time


 


No Known Allergies Allergy   Verified 12/25/17 19:39














- Home Medications


Home Medications: 


Ambulatory Orders





Albuterol Sulfate [Proair Hfa -] 1 - 2 inh PO PRN PRN 06/14/12 


Fluticasone/Salmeterol [Advair 250-50 Diskus] 1 each IH BID 06/14/12 


Tiotropium Bromide [Spiriva] 18 mcg IH DAILY 06/14/12 


Montelukast Na [Singulair -] 10 mg PO HS #0 tablet 06/19/12 


Multivitamins [Multivit ([facility] Formulary)] 1 udtab PO DAILY #0 tab 06/19/12 


Ferrous Sulfate [Feosol] 325 mg PO DAILY 09/17/12 


Calcium Carbonate/Vitamin D3 [Calcium 600-Vit D3 200 Tablet] 1 each PO DAILY 07/ 06/13 


Docosahexanoic Acid/Epa [Fish Oil Softgel] 1 each PO DAILY 07/06/13 


Potassium Chloride [K-Dur] 20 meq PO DAILY #0 tab.er.prt 07/06/13 


Alendronate Sodium [Fosamax] 35 mg PO COSTA 05/01/16 


Atorvastatin Ca [Lipitor] 10 mg PO HS 05/01/16 


Latanoprost 0.005% Eye Drops [Xalatan 0.005% Eye Drops -] 1 drop OU HS 05/01/16 


Torsemide 40 mg PO BID #120 tablet 05/02/16 


Diltiazem Cd [Cardizem Cd -] 180 mg PO BID 02/28/17 


Diphenhydramine HCl [Benadryl Capsule -] 25 mg PO Q6H PRN 02/28/17 


Pantoprazole Sodium 40 mg PO DAILY 02/28/17 


Azithromycin 250 mg PO DAILY 12/25/17 











Family Disease History





- Family Disease History


Family Disease History: Heart Disease: Father, Other: Mother (Alzheimers 

Dementia)





Review of Systems





- Review of Systems


Genitourinary: reports: No Symptoms





Physical Exam-


Vital Signs: 


 Vital Signs











Temperature  98 F   01/03/18 06:00


 


Pulse Rate  95 H  01/03/18 06:00


 


Respiratory Rate  20   01/03/18 06:00


 


Blood Pressure  110/56   01/03/18 06:00


 


O2 Sat by Pulse Oximetry (%)  98   01/02/18 10:52











Renal/: No: Bladder Distention, CVA Tenderness - Left, CVA Tenderness - Right


Labs: 


 CBC, BMP





 01/03/18 07:00 





 01/03/18 07:00 











Imaging





- Results


Cat Scan: Report Reviewed





Problem List





- Problems


(1) Ureteropelvic junction (UPJ) obstruction, right


Assessment/Plan: 


will obtain a renal scan to be ordered to determine differential function and 

degree of obstruction


Code(s): N13.5 - CROSSING VESSEL AND STRICTURE OF URETER W/O HYDRONEPHROSIS

## 2018-01-03 NOTE — PN
Progress Note (short form)





- Note


Progress Note: 





Chief Complaint: 





sob


History of Present Illness: 





feels modestly sob, not her usual baseline.  is attributing it to anxiety.  s/p 

torsemide daily yesterday and today instead of BID.  bicarb up to 50 yesterday, 

improved today. 


cxr today with clear lung fields. 


no cp/pressure


no leg swelling


no palpitations











 Current Medications





Albuterol Sulfate (Ventolin 0.083% Nebulizer Soln -)  1 amp NEB Q4H PRN


   PRN Reason: SHORT OF BREATH/WHEEZING


   Last Admin: 01/02/18 22:54 Dose:  1 amp


Budesonide/Formoterol Fumarate (Symbicort 80/4.5mcg -)  2 puff IH BID [facility]


   Last Admin: 01/03/18 09:59 Dose:  2 puff


Calcium Carbonate (Calcium Carb Oral Suspension -)  500 mg PO DAILY [facility]


   Last Admin: 01/03/18 14:55 Dose:  500 mg


Calcium Carbonate/Cholecalciferol (Os-Saleem 500+D -)  1 tab PO DAILY [facility]


   Last Admin: 01/03/18 09:58 Dose:  1 tab


Diltiazem HCl (Cardizem Cd -)  180 mg PO BID [facility]


   Last Admin: 01/03/18 09:58 Dose:  180 mg


Docusate Sodium (Colace -)  100 mg PO TID [facility]


   Last Admin: 01/03/18 14:19 Dose:  100 mg


Enoxaparin Sodium (Lovenox -)  40 mg SQ DAILY [facility]


   Last Admin: 01/03/18 09:58 Dose:  40 mg


Ferrous Sulfate (Feosol -)  325 mg PO DAILY [facility]


   Last Admin: 01/03/18 09:58 Dose:  325 mg


Lactulose (Cephulac (Oral Use))  20 gm PO TID [facility]


   Last Admin: 01/03/18 14:19 Dose:  20 gm


Latanoprost (Xalatan 0.005% Eye Drops -)  1 drop OU [facility]


   Last Admin: 01/02/18 21:41 Dose:  1 drop


Loratadine (Claritin -)  10 mg PO DAILY [facility]


   Last Admin: 01/03/18 09:58 Dose:  10 mg


Montelukast Sodium (Singulair -)  10 mg PO HS [facility]


   Last Admin: 01/02/18 21:40 Dose:  10 mg


Multi-Ingredient Lotion (Eucerin (Small Jar) -)  1 applic TP DAILY [facility]


   Last Admin: 01/03/18 10:02 Dose:  1 applic


Multivitamins/Minerals/Vitamin C (Tab-A-Vit -)  1 tab PO DAILY [facility]


   Last Admin: 01/03/18 09:58 Dose:  1 tab


Nystatin (Nystatin Oral Suspension -)  500,000 units PO Q6HPO [facility]


   Last Admin: 01/03/18 17:59 Dose:  500,000 units


Ondansetron HCl (Zofran Injection)  4 mg IVPUSH Q6H PRN


   PRN Reason: NAUSEA AND/OR VOMITING


   Last Admin: 12/27/17 16:03 Dose:  4 mg


Pantoprazole Sodium (Protonix -)  40 mg PO DAILY [facility]


   Last Admin: 01/03/18 09:58 Dose:  40 mg


Polyethylene Glycol (Miralax (For Daily Use) -)  17 gm PO BID [facility]


   Last Admin: 01/03/18 10:03 Dose:  17 gm


Potassium Chloride (K-Dur -)  20 meq PO DAILY [facility]


   Last Admin: 01/03/18 09:58 Dose:  20 meq


Prednisone (Deltasone -)  10 mg PO DAILY [facility]


Tiotropium Bromide (Spiriva -)  1 puff IH DAILY [facility]


   Last Admin: 01/03/18 09:59 Dose:  1 inh


Torsemide (Demadex -)  40 mg PO BIDLASIX [facility]


   Last Admin: 01/03/18 05:24 Dose:  40 mg











 Vital Signs - 24 hr











  01/03/18 01/03/18 01/03/18





  06:00 09:00 10:00


 


Temperature 98 F  


 


Pulse Rate 95 H  108 H


 


Respiratory 20  18





Rate   


 


Blood Pressure 110/56  113/56


 


O2 Sat by Pulse  95 





Oximetry (%)   














  01/03/18 01/03/18 01/03/18





  14:47 15:21 19:00


 


Temperature  98.0 F 99.9 F H


 


Pulse Rate 95 H 98 H 100 H


 


Respiratory  20 20





Rate   


 


Blood Pressure  120/61 125/69


 


O2 Sat by Pulse 95  





Oximetry (%)   











 Intake & Output











 01/01/18 01/02/18 01/03/18 01/04/18





 07:59 07:59 07:59 07:59


 


Intake Total 240 300 0 


 


Output Total   1600 600


 


Balance 240 300 -1600 -600


 


Weight 117 lb 5 oz 120 lb 120 lb 











Constitutional: Yes: Well Nourished, No Distress, Calm


Cardiovascular: Yes: Regular Rate and Rhythm, S1, S2.  No: Gallop, Murmur


Respiratory: Yes: Regular, diminshed air mov't.  No: Accessory Muscle Use, Rales


Extremities: No: Cold


Edema: No


Neurological: Yes: Alert, Oriented


Psychiatric: No: Agitated


Labs: 


 


 CBC, BMP





 01/03/18 07:00 





 01/03/18 07:00 














Assessment/Plan








cxr: no acute pulm pathology





Echo 4/16: nl LV/EF; RV tds; nl LA; valves WNL; RVSP 40-50





78 yo with h/o htn, hfpef, O2 dep't copd, h/o GI ulcer c/b anemia, oa who 

presents s/p mechanical fall c/b  fracture with plan for surgery. 





s/p fall, with fracture:


- now s/p  left hip intramedullary nail to repair left hip intertrochanteric 

fracture on 12/27


 


chronic diast chf:


- mild pulm HTN on recent echo, ? WHO 2 etiol.


- remains likely euvolemic. on torsemide 40 bid at home, continued here (only 

held 12/26 and am 12/27).  


- c/o sob with orthopnea not her usual baseline--? a.e. copd.


- cont torsemide same, rpt CXR in am--low threshold to give trial of 1-2 doses 

IV lasix if sob persists


- 1/3: bicarb up to 50 on 1/2.  had been on torsemide bid, but received daily 

dosing yesterday and today.  sob may be slightly worse today, but cxr clear (no 

edema).  lyte repleiton.  daily weights.  con't daily dosing for now (will give 

slightly higher dose of 60 mg daily)  


 


COPD, ? with a.e.:


-? sec to LV diast chf vs ? hypoxic chronic lung dz (on home O2 for copd)-


-pulmonary following.





HTN:


- bp controlled. intermittently running low. 


- con't diltiazem 180 mg bid, monitor for need to decrease dose





anemia, chronic:


- stable (improved) at present

## 2018-01-03 NOTE — OP
DATE OF OPERATION:  12/27/2017

 

PREOPERATIVE DIAGNOSIS:  Left intertrochanteric hip fracture.  

 

POSTOPERATIVE DIAGNOSIS:  Left intertrochanteric hip fracture.  

 

PROCEDURE:  Left hip intramedullary nail.  

 

SURGEON:  Aj Mireles MD

 

ASSISTANT:  COREY Craig, whose skillful assistance was necessary for the

safe and timely performance of this procedure.  Mr. Merida was able to help provide

limb positioning, retraction, significant fracture reduction and the insertion of

orthopedic fixation hardware.  

 

ANESTHESIA:  Spinal.

 

POSTOPERATIVE CONDITION:  Stable.

 

COMPLICATIONS:  None.

 

IMPLANTS:  Umesh Gamma Nail, Gamma3 System, 10 mm x 340 mm nail at 125 degrees with

90 mm x 10 mm proximal lag screw and 40 mm x 5 mm distal locking screw.  

 

BLOOD LOSS:  150 mL.

 

INDICATIONS:  This is a pleasant 77-year-old female who suffered a trip and fall. 

She was found to have an intertrochanteric hip fracture.  Treatment options including

nonoperative versus operative management were discussed.  Operative management was

highly recommended to provide for early mobilization and limit complications from the

fracture as well as restore ambulatory ability.  The surgical risks were reviewed in

detail including bleeding, infection, neurovascular injury, need for further surgery,

postoperative pain or stiffness, nonunion, malunion, hardware failure or cutup.  We

discussed medical risks such as heart attack, stroke, DVT, PE, and death.  We

discussed use of perioperative DVT prophylaxis as well as perioperative antibiotic

prophylaxis.  I addressed all the patients questions.  We reviewed the postoperative

rehabilitation protocol.  She was understanding and was electing to proceed.  

 

DESCRIPTION OF PROCEDURE:  Patient was brought to the operating room where spinal

anesthesia was administered.  The patient was then placed on the fracture table. 

Care was taken to pad all the bony prominences.  The left lower extremity was then

prepped and draped in the usual sterile fashion after being placed into position of

traction, adduction and internal rotation.  Preoperative fluoroscopy was used to

confirm satisfactorily reduction.  Patient was now given a dose of preoperative

antibiotics, and the usual time-out procedure was performed.  

 

A stab wound was now made with a 10 blade just proximal to the greater trochanter. 

Guidewire was then advanced through the site into the femoral canal.  The opening

reamer was now passed down to the level of the _____.  One guidewire was now inserted

down to the _____ scar.  Distance was measured to allow for positioning the nail.  A

340-mm nail was chosen.  The guidewire initially was confirmed fluoroscopically in

_____.  

 

The canal was now reamed up to a size 11.5.  The nail was then inserted into the

canal.  Nail placement was verified fluoroscopically.  The trocar was now inserted

through the jig, and a small incision was made through the skin where the trocar was

_____.  _____ over the bone.  Guidewire was _____ femoral neck into the center of the

femoral head.  This was confirmed fluoroscopically in 2 planes.  The guidewire was

now measured, and a 90-mm proximal lag screw was chosen.  The guidewire was then 

over-reamed, and the screw was inserted.  Screw placement was confirmed

fluoroscopically in 2 planes.  Facet screw was now inserted all the way down and then

back up a quarter-turn to allow for compression.  The _____ removed.  The jig was

removed.

 

Attention was then turned distally.  Utilizing perfect [place] technique, _____

locking oblong hole was identified.  A small incision was made over the hole.  Blunt

spreading was carried down to the bone.  The drill was then used to create a hole. 

It was measured and a screw was inserted.  At this point, the hardware placement was

examined both digitally and fluoroscopically.  The fracture _____ placement were

satisfactory.  The wounds were irrigated.  _____ tissue was approximated with 2-0

Vicryl.  The subcutaneous tissue was approximately using 2-0 Vicryl.  The skin was

closed using 3-0 nylon.  Sterile dressings were placed.  The patient transferred to

the recovery room in stable condition.

 

 

RODGER [id number]

DD: 12/27/2017 09:40

DT: 12/27/2017 10:17

Job #:  [id number]

## 2018-01-03 NOTE — PN
Progress Note, Physician


Chief Complaint: 


Ms Peter says she is feeling better today. Had enema this morning and had 

bowel movement. Denies cp, sob, n/v.





- Current Medication List


Current Medications: 


Active Medications





Albuterol Sulfate (Ventolin 0.083% Nebulizer Soln -)  1 amp NEB Q4H PRN


   PRN Reason: SHORT OF BREATH/WHEEZING


   Last Admin: 01/02/18 22:54 Dose:  1 amp


Budesonide/Formoterol Fumarate (Symbicort 80/4.5mcg -)  2 puff IH BID [facility]


   Last Admin: 01/03/18 09:59 Dose:  2 puff


Calcium Carbonate (Calcium Carb Oral Suspension -)  500 mg PO DAILY [facility]


Calcium Carbonate/Cholecalciferol (Os-Saleem 500+D -)  1 tab PO DAILY [facility]


   Last Admin: 01/03/18 09:58 Dose:  1 tab


Diltiazem HCl (Cardizem Cd -)  180 mg PO BID [facility]


   Last Admin: 01/03/18 09:58 Dose:  180 mg


Docusate Sodium (Colace -)  100 mg PO TID [facility]


   Last Admin: 01/03/18 05:24 Dose:  100 mg


Enoxaparin Sodium (Lovenox -)  40 mg SQ DAILY [facility]


   Last Admin: 01/03/18 09:58 Dose:  40 mg


Ferrous Sulfate (Feosol -)  325 mg PO DAILY [facility]


   Last Admin: 01/03/18 09:58 Dose:  325 mg


Lactulose (Cephulac (Oral Use))  20 gm PO TID [facility]


Latanoprost (Xalatan 0.005% Eye Drops -)  1 drop OU HS [facility]


   Last Admin: 01/02/18 21:41 Dose:  1 drop


Loratadine (Claritin -)  10 mg PO DAILY [facility]


   Last Admin: 01/03/18 09:58 Dose:  10 mg


Montelukast Sodium (Singulair -)  10 mg PO HS [facility]


   Last Admin: 01/02/18 21:40 Dose:  10 mg


Multi-Ingredient Lotion (Eucerin (Small Jar) -)  1 applic TP DAILY [facility]


   Last Admin: 01/03/18 10:02 Dose:  1 applic


Multivitamins/Minerals/Vitamin C (Tab-A-Vit -)  1 tab PO DAILY [facility]


   Last Admin: 01/03/18 09:58 Dose:  1 tab


Nystatin (Nystatin Oral Suspension -)  500,000 units PO Q6HPO [facility]


   Last Admin: 01/03/18 11:24 Dose:  500,000 units


Ondansetron HCl (Zofran Injection)  4 mg IVPUSH Q6H PRN


   PRN Reason: NAUSEA AND/OR VOMITING


   Last Admin: 12/27/17 16:03 Dose:  4 mg


Pantoprazole Sodium (Protonix -)  40 mg PO DAILY [facility]


   Last Admin: 01/03/18 09:58 Dose:  40 mg


Polyethylene Glycol (Miralax (For Daily Use) -)  17 gm PO BID [facility]


   Last Admin: 01/03/18 10:03 Dose:  17 gm


Potassium Chloride (K-Dur -)  20 meq PO DAILY [facility]


   Last Admin: 01/03/18 09:58 Dose:  20 meq


Prednisone (Deltasone -)  20 mg PO DAILY [facility]


   Last Admin: 01/03/18 09:58 Dose:  20 mg


Tiotropium Bromide (Spiriva -)  1 puff IH DAILY [facility]


   Last Admin: 01/03/18 09:59 Dose:  1 inh


Torsemide (Demadex -)  40 mg PO BIDLASIX [facility]


   Last Admin: 01/03/18 05:24 Dose:  40 mg











- Objective


Vital Signs: 


 Vital Signs











Temperature  36.6 C   01/03/18 06:00


 


Pulse Rate  95 H  01/03/18 06:00


 


Respiratory Rate  20   01/03/18 06:00


 


Blood Pressure  110/56   01/03/18 06:00


 


O2 Sat by Pulse Oximetry (%)  98   01/02/18 10:52











Constitutional: Yes: Well Nourished, No Distress, Calm


Cardiovascular: Yes: Regular Rate and Rhythm.  No: Gallop, Murmur, Rub


Respiratory: Yes: Regular, CTA Bilaterally.  No: Rales, Rhonchi, Wheezes


Gastrointestinal: Yes: Normal Bowel Sounds, Soft.  No: Distention, Tenderness


Extremities: Yes: WNL


Edema: No


Labs: 


 CBC, BMP





 01/03/18 07:00 





 01/03/18 07:00 





 INR, PTT











INR  0.94  (0.82-1.09)   12/25/17  19:20    














Problem List





- Problems


(1) Fracture, hip


Code(s): S72.009A - FRACTURE OF UNSP PART OF NECK OF UNSP FEMUR, INIT   


Qualifiers: 


   Encounter type: initial encounter   Fracture type: closed   Laterality: left

   Qualified Code(s): S72.002A - Fracture of unspecified part of neck of left 

femur, initial encounter for closed fracture   





(2) COPD (chronic obstructive pulmonary disease)


Code(s): J44.9 - CHRONIC OBSTRUCTIVE PULMONARY DISEASE, UNSPECIFIED   





(3) CHF (congestive heart failure)


Code(s): I50.9 - HEART FAILURE, UNSPECIFIED   


Qualifiers: 


   Congestive heart failure type: diastolic   Congestive heart failure 

chronicity: chronic   Qualified Code(s): I50.32 - Chronic diastolic (congestive

) heart failure   





(4) Hypertension


Code(s): I10 - ESSENTIAL (PRIMARY) HYPERTENSION   





(5) Chronic respiratory failure


Code(s): J96.10 - CHRONIC RESPIRATORY FAILURE, UNSP W HYPOXIA OR HYPERCAPNIA   





(6) Anemia


Code(s): D64.9 - ANEMIA, UNSPECIFIED   


Qualifiers: 


   Anemia type: other cause   Other causes of anemia: acute posthemorrhagic   

Qualified Code(s): D62 - Acute posthemorrhagic anemia   





Assessment/Plan





(1) Fracture, hip


Assessment/Plan: 


-stable 


-continue PT and DVT PPx


-planning on transfer to SNF when stable


Code(s): S72.009A - FRACTURE OF UNSP PART OF NECK OF UNSP FEMUR, INIT   


Qualifiers: 


   Encounter type: initial encounter   Fracture type: closed   Laterality: left

   Qualified Code(s): S72.002A - Fracture of unspecified part of neck of left 

femur, initial encounter for closed fracture   





(2) COPD (chronic obstructive pulmonary disease)


Assessment/Plan


-appears at baseline


-continue prednisone and bronchodilators


-pulmonary following


-will titrate prednisone to 10mg


Code(s): J44.9 - CHRONIC OBSTRUCTIVE PULMONARY DISEASE, UNSPECIFIED   





(3) CHF (congestive heart failure)


Assessment/Plan: 


-continue torsemide with hold parameters


-cardiology following


Code(s): I50.9 - HEART FAILURE, UNSPECIFIED   


Qualifiers: 


   Congestive heart failure type: diastolic   Congestive heart failure 

chronicity: chronic   Qualified Code(s): I50.32 - Chronic diastolic (congestive

) heart failure   





(4) Hypertension


Assessment/Plan: 


-controlled


-continue diltiazem


-holding torsemide for nuclear medicine scan


Code(s): I10 - ESSENTIAL (PRIMARY) HYPERTENSION   





(5) Chronic respiratory failure


Assessment/Plan: 


-continue oxygen 


Code(s): J96.10 - CHRONIC RESPIRATORY FAILURE, UNSP W HYPOXIA OR HYPERCAPNIA   





(6) ABLA


-stable





(7) Severe constipation


-small bowel movement this am after enema


-will add scheduled lactulose





(8) Hydronephrosis


-appreciate urology consult


-planning for renal scan tomorrow


-hold torsemide in preparation

## 2018-01-04 LAB
ALBUMIN SERPL-MCNC: 2.7 G/DL (ref 3.4–5)
ALP SERPL-CCNC: 81 U/L (ref 45–117)
ALT SERPL-CCNC: 170 U/L (ref 12–78)
ANION GAP SERPL CALC-SCNC: 6 MMOL/L (ref 8–16)
AST SERPL-CCNC: 23 U/L (ref 15–37)
BASOPHILS # BLD: 0.1 % (ref 0–2)
BILIRUB CONJ SERPL-MCNC: 0.2 MG/DL (ref 0–0.2)
BILIRUB SERPL-MCNC: 0.6 MG/DL (ref 0.2–1)
BUN SERPL-MCNC: 19 MG/DL (ref 7–18)
CALCIUM SERPL-MCNC: 8.6 MG/DL (ref 8.5–10.1)
CHLORIDE SERPL-SCNC: 92 MMOL/L (ref 98–107)
CO2 SERPL-SCNC: 42 MMOL/L (ref 21–32)
CREAT SERPL-MCNC: 0.8 MG/DL (ref 0.55–1.02)
DEPRECATED RDW RBC AUTO: 16.2 % (ref 11.6–15.6)
EOSINOPHIL # BLD: 1.7 % (ref 0–4.5)
GLUCOSE SERPL-MCNC: 80 MG/DL (ref 74–106)
HCT VFR BLD CALC: 30.3 % (ref 32.4–45.2)
HGB BLD-MCNC: 9.3 GM/DL (ref 10.7–15.3)
LYMPHOCYTES # BLD: 6.3 % (ref 8–40)
MAGNESIUM SERPL-MCNC: 2.8 MG/DL (ref 1.8–2.4)
MCH RBC QN AUTO: 24.3 PG (ref 25.7–33.7)
MCHC RBC AUTO-ENTMCNC: 30.6 G/DL (ref 32–36)
MCV RBC: 79.6 FL (ref 80–96)
MONOCYTES # BLD AUTO: 10.8 % (ref 3.8–10.2)
NEUTROPHILS # BLD: 81.1 % (ref 42.8–82.8)
PHOSPHATE SERPL-MCNC: 4.8 MG/DL (ref 2.5–4.9)
PLATELET # BLD AUTO: 213 K/MM3 (ref 134–434)
PMV BLD: 8.9 FL (ref 7.5–11.1)
POTASSIUM SERPLBLD-SCNC: 3.8 MMOL/L (ref 3.5–5.1)
PROT SERPL-MCNC: 5.3 G/DL (ref 6.4–8.2)
RBC # BLD AUTO: 3.81 M/MM3 (ref 3.6–5.2)
SODIUM SERPL-SCNC: 140 MMOL/L (ref 136–145)
WBC # BLD AUTO: 13.8 K/MM3 (ref 4–10)

## 2018-01-04 RX ADMIN — DOCUSATE SODIUM SCH MG: 100 CAPSULE, LIQUID FILLED ORAL at 05:31

## 2018-01-04 RX ADMIN — DOCUSATE SODIUM SCH MG: 100 CAPSULE, LIQUID FILLED ORAL at 22:20

## 2018-01-04 RX ADMIN — MULTIVITAMIN TABLET SCH TAB: TABLET at 10:36

## 2018-01-04 RX ADMIN — LACTULOSE SCH GM: 20 SOLUTION ORAL at 05:31

## 2018-01-04 RX ADMIN — TORSEMIDE SCH MG: 20 TABLET ORAL at 10:37

## 2018-01-04 RX ADMIN — NYSTATIN SCH UNITS: 100000 SUSPENSION ORAL at 05:31

## 2018-01-04 RX ADMIN — ENOXAPARIN SODIUM SCH MG: 40 INJECTION SUBCUTANEOUS at 10:37

## 2018-01-04 RX ADMIN — LORATADINE SCH MG: 10 TABLET ORAL at 10:37

## 2018-01-04 RX ADMIN — DOCUSATE SODIUM SCH MG: 100 CAPSULE, LIQUID FILLED ORAL at 13:29

## 2018-01-04 RX ADMIN — PANTOPRAZOLE SODIUM SCH MG: 40 TABLET, DELAYED RELEASE ORAL at 10:37

## 2018-01-04 RX ADMIN — CALCIUM CARBONATE SCH MG: 1250 SUSPENSION ORAL at 10:36

## 2018-01-04 RX ADMIN — NYSTATIN SCH UNITS: 100000 SUSPENSION ORAL at 00:07

## 2018-01-04 RX ADMIN — POTASSIUM CHLORIDE SCH MEQ: 1500 TABLET, EXTENDED RELEASE ORAL at 10:36

## 2018-01-04 RX ADMIN — FERROUS SULFATE TAB EC 324 MG (65 MG FE EQUIVALENT) SCH MG: 324 (65 FE) TABLET DELAYED RESPONSE at 10:36

## 2018-01-04 RX ADMIN — Medication SCH APPLIC: at 10:37

## 2018-01-04 RX ADMIN — MONTELUKAST SODIUM SCH MG: 10 TABLET, COATED ORAL at 22:20

## 2018-01-04 RX ADMIN — Medication SCH TAB: at 10:37

## 2018-01-04 RX ADMIN — LATANOPROST SCH DROP: 50 SOLUTION OPHTHALMIC at 22:21

## 2018-01-04 RX ADMIN — POLYETHYLENE GLYCOL 3350 SCH GM: 17 POWDER, FOR SOLUTION ORAL at 22:21

## 2018-01-04 RX ADMIN — POLYETHYLENE GLYCOL 3350 SCH GM: 17 POWDER, FOR SOLUTION ORAL at 10:38

## 2018-01-04 RX ADMIN — TIOTROPIUM BROMIDE SCH INH: 18 CAPSULE ORAL; RESPIRATORY (INHALATION) at 10:38

## 2018-01-04 RX ADMIN — PREDNISONE SCH MG: 10 TABLET ORAL at 10:37

## 2018-01-04 RX ADMIN — LACTULOSE SCH GM: 20 SOLUTION ORAL at 13:29

## 2018-01-04 RX ADMIN — NYSTATIN SCH UNITS: 100000 SUSPENSION ORAL at 17:35

## 2018-01-04 RX ADMIN — BUDESONIDE AND FORMOTEROL FUMARATE DIHYDRATE SCH PUFF: 80; 4.5 AEROSOL RESPIRATORY (INHALATION) at 22:20

## 2018-01-04 RX ADMIN — BUDESONIDE AND FORMOTEROL FUMARATE DIHYDRATE SCH PUFF: 80; 4.5 AEROSOL RESPIRATORY (INHALATION) at 10:38

## 2018-01-04 RX ADMIN — LACTULOSE SCH: 20 SOLUTION ORAL at 22:22

## 2018-01-04 RX ADMIN — ALBUTEROL SULFATE PRN AMP: 2.5 SOLUTION RESPIRATORY (INHALATION) at 11:34

## 2018-01-04 RX ADMIN — NYSTATIN SCH UNITS: 100000 SUSPENSION ORAL at 11:31

## 2018-01-04 NOTE — PN
Progress Note (short form)





- Note


Progress Note: 





PULMONARY





AWAKE/ALERT/POST-OP DAY #7


SUBJECTIVE IMPROVEMENT IN BREATHING





PRESENTLY ON N/C O2 


OOB TO CHAIR





VSS/TMAX 99


ANICTERIC


DIMINISHED BREATH SOUNDS B/L


S1S2


BS+


S/P LEFT GAMMA NAIL HIP





MEDS/LABS/NOTES/IMAGES REVIEWED


US NOTED/LFT'S ELEVATED BUT DOWN TRENDING








IMP COPD WITH  CHRONIC HYPOXEMIC RESPIRATORY FAILURE ON HOME O2


      ACUTE ON CHRONIC  HYPERCAPNEIC RESPIRATORY FAILURE 


      LEFT FEMORAL FX S/P MECHANICAL FALL


      S/P GAMMA NAIL LEFT HIP 


      HTN


      CONSTIPATION/HYDRONEPHROSIS





PLAN  IV STEROIDS CHANGED TO PREDNISONE


        O2/NIPPV AS NEEDED


        INHALED BRONCHODILATORS/DIURETICS


        DVT PROPHYLAXSIS


        WILL ULTIMATELY NEED SNF REHAB





WILL FOLLOW





ADRIANO BOWENS MD

## 2018-01-04 NOTE — PN
Progress Note (short form)





- Note


Progress Note: 





Patient seen for Dr. Tucker


History of mechanical fall at home and had gamma nail inserted by Dr. Mireles on 

12/25/17.


Had elevated liver enzymes and CAT scan evaluation has revealed ureteropelvic 

obstruction and hydronephrosis on the right side and liver and pancreatic 

lesions which have not been further defined.  Patient went for a renal scan to 

determine the degree of obstruction  for the right kidney.  She is having PT 

and progressing well since her gamma nail insertion.  She has a headache and 

because of her liver chemistries I will just give her ibuprofen 200 mg when 

necessary.





on exam:


 Vital Signs











Temp  98.5 F   01/04/18 15:25


 


Pulse  110 H  01/04/18 15:25


 


Resp  22   01/04/18 15:25


 


BP  106/56   01/04/18 15:25


 


Pulse Ox  97   01/04/18 11:33








 Intake & Output











 01/03/18 01/04/18 01/04/18





 23:59 11:59 23:59


 


Intake Total   450


 


Output Total 1100  


 


Balance -1100  450


 


Weight  114 lb 6.4 oz 


 


Intake:   


 


  Oral   450


 


Output:   


 


  Urine 1100  


 


    Void 1100  


 


Other:   


 


  Voiding Method Bedpan Bedside Commode Bedside Commode


 


  # Unmeasured Voids   


 


    Void 4 1 3


 


  Bowel Movement Yes Yes Yes


 


  # Bowel Movements 3 1 2


 


  Weight Measurement Method  Built in Bedscale 








sitting at the bedside


Alert


Chest decreased breath sounds


Heart regular


Abdomen soft


no pedal edema left lower extremity and bandages from surgery are stable.





 Abnormal Lab Results











  01/04/18 01/04/18 01/04/18





  07:30 07:30 07:30


 


WBC  13.8 H  


 


Hgb  9.3 L  


 


Hct  30.3 L  


 


MCV  79.6 L  


 


MCH  24.3 L  


 


MCHC  30.6 L  


 


RDW  16.2 H  


 


Lymphocytes %  6.3 L  


 


Monocytes %  10.8 H  


 


Chloride   92 L 


 


Carbon Dioxide   42 H 


 


Anion Gap   6 L 


 


BUN   19 H 


 


Magnesium   2.8 H 


 


ALT    170 H D


 


Total Protein    5.3 L


 


Albumin    2.7 L











Impression:


Mechanical fall with left hip fracture


Gamma nail insertion left leg


Elevated liver chemistries


Pancreas and liver lesions questionable benign


Hydronephrosis right kidney being evaluated by urology 


COPD


Headache





Plan:


When necessary ibuprofen


Followup lab


Await renal scan


Continue physical therapy


Eventual rehabilitation

## 2018-01-04 NOTE — PN
Progress Note, Physician


Chief Complaint: 





sob


History of Present Illness: 





breathing is a little better.


still feels a bit "labored".


no orthopnea (sleeping flat in bed).


no edema.


no cp, palpit





- Current Medication List


Current Medications: 


Active Medications





Albuterol Sulfate (Ventolin 0.083% Nebulizer Soln -)  1 amp NEB Q4H PRN


   PRN Reason: SHORT OF BREATH/WHEEZING


   Last Admin: 01/02/18 22:54 Dose:  1 amp


Budesonide/Formoterol Fumarate (Symbicort 80/4.5mcg -)  2 puff IH BID [facility]


   Last Admin: 01/04/18 10:38 Dose:  2 puff


Calcium Carbonate (Calcium Carb Oral Suspension -)  500 mg PO DAILY [facility]


   Last Admin: 01/04/18 10:36 Dose:  500 mg


Calcium Carbonate/Cholecalciferol (Os-Saleem 500+D -)  1 tab PO DAILY [facility]


   Last Admin: 01/04/18 10:37 Dose:  1 tab


Diltiazem HCl (Cardizem Cd -)  120 mg PO BID [facility]


   Last Admin: 01/04/18 10:36 Dose:  120 mg


Docusate Sodium (Colace -)  100 mg PO TID [facility]


   Last Admin: 01/04/18 05:31 Dose:  100 mg


Enoxaparin Sodium (Lovenox -)  40 mg SQ DAILY [facility]


   Last Admin: 01/04/18 10:37 Dose:  40 mg


Ferrous Sulfate (Feosol -)  325 mg PO DAILY [facility]


   Last Admin: 01/04/18 10:36 Dose:  325 mg


Lactulose (Cephulac (Oral Use))  20 gm PO TID [facility]


   Last Admin: 01/04/18 05:31 Dose:  20 gm


Latanoprost (Xalatan 0.005% Eye Drops -)  1 drop OU HS [facility]


   Last Admin: 01/03/18 22:32 Dose:  1 drop


Loratadine (Claritin -)  10 mg PO DAILY [facility]


   Last Admin: 01/04/18 10:37 Dose:  10 mg


Montelukast Sodium (Singulair -)  10 mg PO HS [facility]


   Last Admin: 01/03/18 22:14 Dose:  10 mg


Multi-Ingredient Lotion (Eucerin (Small Jar) -)  1 applic TP DAILY [facility]


   Last Admin: 01/04/18 10:37 Dose:  1 applic


Multivitamins/Minerals/Vitamin C (Tab-A-Vit -)  1 tab PO DAILY [facility]


   Last Admin: 01/04/18 10:36 Dose:  1 tab


Nystatin (Nystatin Oral Suspension -)  500,000 units PO Q6HPO [facility]


   Last Admin: 01/04/18 05:31 Dose:  500,000 units


Ondansetron HCl (Zofran Injection)  4 mg IVPUSH Q6H PRN


   PRN Reason: NAUSEA AND/OR VOMITING


   Last Admin: 12/27/17 16:03 Dose:  4 mg


Pantoprazole Sodium (Protonix -)  40 mg PO DAILY [facility]


   Last Admin: 01/04/18 10:37 Dose:  40 mg


Polyethylene Glycol (Miralax (For Daily Use) -)  17 gm PO BID [facility]


   Last Admin: 01/04/18 10:38 Dose:  17 gm


Potassium Chloride (K-Dur -)  20 meq PO DAILY [facility]


   Last Admin: 01/04/18 10:36 Dose:  20 meq


Prednisone (Deltasone -)  10 mg PO DAILY [facility]


   Last Admin: 01/04/18 10:37 Dose:  10 mg


Tiotropium Bromide (Spiriva -)  1 puff IH DAILY [facility]


   Last Admin: 01/04/18 10:38 Dose:  1 inh


Torsemide (Demadex -)  60 mg PO DAILY [facility]


   Last Admin: 01/04/18 10:37 Dose:  60 mg











- Objective


Vital Signs: 


 Vital Signs











Temperature  99 F   01/04/18 10:00


 


Pulse Rate  97 H  01/04/18 10:00


 


Respiratory Rate  22   01/04/18 10:00


 


Blood Pressure  97/62   01/04/18 10:00


 


O2 Sat by Pulse Oximetry (%)  95   01/04/18 09:00











Constitutional: Yes: Well Nourished, No Distress, Calm


Cardiovascular: Yes: Regular Rate and Rhythm, S1, S2.  No: Gallop, Murmur


Respiratory: Yes: Regular, CTA Bilaterally.  No: Accessory Muscle Use, Rales, 

Wheezes


Extremities: No: Cold


Edema: No


Neurological: Yes: Alert, Oriented


Psychiatric: No: Agitated


Labs: 


 CBC, BMP





 01/04/18 07:30 





 01/04/18 07:30 





 INR, PTT











INR  0.94  (0.82-1.09)   12/25/17  19:20    














Assessment/Plan





cxr 1/3: no acute pulm pathology





Echo 4/16: nl LV/EF; RV tds; nl LA; valves WNL; RVSP 40-50








78 yo with h/o htn, hfpef, O2 dep't copd, h/o GI ulcer c/b anemia, oa who 

presents s/p mechanical fall c/b  fracture with plan for surgery. 





s/p fall, with fracture:


- now s/p  left hip intramedullary nail to repair left hip intertrochanteric 

fracture on 12/27


 


acute on chronic hypercapneic resp failure, COPD, chronic diast chf:


- mild pulm HTN on recent echo, ? WHO 2 etiol.


- remains likely euvolemic. on torsemide 40 bid at home, continued here (only 

held 12/26 and am 12/27).  


- c/o sob with orthopnea not her usual baseline--clinically euvolemic with 

clear CXR--? a.e. copd, ? anxiety 


- 1/3: bicarb up to 50 on 1/2.  torsemide bid changed to qd for ? contration 

alkalosis. pt with acute on chronic hypercapneic resp failure of pulmonary 

etiology, which may be contributing to rising bicarb as well.


- 1/4: sob mild, stable--improving. observe. low threshold to empirically 

increase diuretics if sob worsens


- pulm f/u as doing





HTN:


- bp controlled. intermittently running mildly low. 


- con't diltiazem 180 mg bid, consider decrease dose if bp range remains the 

same





anemia, chronic:


- stable (improved) at present

## 2018-01-05 VITALS — HEART RATE: 108 BPM | DIASTOLIC BLOOD PRESSURE: 57 MMHG | TEMPERATURE: 98.2 F | SYSTOLIC BLOOD PRESSURE: 107 MMHG

## 2018-01-05 LAB
ALBUMIN SERPL-MCNC: 2.6 G/DL (ref 3.4–5)
ALP SERPL-CCNC: 84 U/L (ref 45–117)
ALT SERPL-CCNC: 129 U/L (ref 12–78)
ANION GAP SERPL CALC-SCNC: 8 MMOL/L (ref 8–16)
AST SERPL-CCNC: 25 U/L (ref 15–37)
BASOPHILS # BLD: 0.2 % (ref 0–2)
BILIRUB SERPL-MCNC: 0.7 MG/DL (ref 0.2–1)
BUN SERPL-MCNC: 19 MG/DL (ref 7–18)
CALCIUM SERPL-MCNC: 8.1 MG/DL (ref 8.5–10.1)
CHLORIDE SERPL-SCNC: 92 MMOL/L (ref 98–107)
CO2 SERPL-SCNC: 38 MMOL/L (ref 21–32)
CREAT SERPL-MCNC: 0.8 MG/DL (ref 0.55–1.02)
DEPRECATED RDW RBC AUTO: 16.4 % (ref 11.6–15.6)
EOSINOPHIL # BLD: 1.7 % (ref 0–4.5)
GLUCOSE SERPL-MCNC: 76 MG/DL (ref 74–106)
HCT VFR BLD CALC: 29.8 % (ref 32.4–45.2)
HGB BLD-MCNC: 9.2 GM/DL (ref 10.7–15.3)
LIPASE SERPL-CCNC: 224 U/L (ref 73–393)
LYMPHOCYTES # BLD: 7.9 % (ref 8–40)
MCH RBC QN AUTO: 24.7 PG (ref 25.7–33.7)
MCHC RBC AUTO-ENTMCNC: 30.9 G/DL (ref 32–36)
MCV RBC: 79.9 FL (ref 80–96)
MONOCYTES # BLD AUTO: 11.2 % (ref 3.8–10.2)
NEUTROPHILS # BLD: 79 % (ref 42.8–82.8)
PLATELET # BLD AUTO: 195 K/MM3 (ref 134–434)
PMV BLD: 9.1 FL (ref 7.5–11.1)
POTASSIUM SERPLBLD-SCNC: 3.6 MMOL/L (ref 3.5–5.1)
PROT SERPL-MCNC: 5.2 G/DL (ref 6.4–8.2)
RBC # BLD AUTO: 3.73 M/MM3 (ref 3.6–5.2)
SODIUM SERPL-SCNC: 138 MMOL/L (ref 136–145)
WBC # BLD AUTO: 11.4 K/MM3 (ref 4–10)

## 2018-01-05 RX ADMIN — FERROUS SULFATE TAB EC 324 MG (65 MG FE EQUIVALENT) SCH MG: 324 (65 FE) TABLET DELAYED RESPONSE at 09:34

## 2018-01-05 RX ADMIN — BUDESONIDE AND FORMOTEROL FUMARATE DIHYDRATE SCH PUFF: 80; 4.5 AEROSOL RESPIRATORY (INHALATION) at 09:35

## 2018-01-05 RX ADMIN — NYSTATIN SCH UNITS: 100000 SUSPENSION ORAL at 17:54

## 2018-01-05 RX ADMIN — Medication SCH TAB: at 09:34

## 2018-01-05 RX ADMIN — POLYETHYLENE GLYCOL 3350 SCH: 17 POWDER, FOR SOLUTION ORAL at 09:35

## 2018-01-05 RX ADMIN — LACTULOSE SCH: 20 SOLUTION ORAL at 06:33

## 2018-01-05 RX ADMIN — PANTOPRAZOLE SODIUM SCH MG: 40 TABLET, DELAYED RELEASE ORAL at 09:34

## 2018-01-05 RX ADMIN — CALCIUM CARBONATE SCH MG: 1250 SUSPENSION ORAL at 09:33

## 2018-01-05 RX ADMIN — DOCUSATE SODIUM SCH: 100 CAPSULE, LIQUID FILLED ORAL at 14:38

## 2018-01-05 RX ADMIN — NYSTATIN SCH UNITS: 100000 SUSPENSION ORAL at 06:33

## 2018-01-05 RX ADMIN — NYSTATIN SCH UNITS: 100000 SUSPENSION ORAL at 11:40

## 2018-01-05 RX ADMIN — MULTIVITAMIN TABLET SCH TAB: TABLET at 09:34

## 2018-01-05 RX ADMIN — TIOTROPIUM BROMIDE SCH INH: 18 CAPSULE ORAL; RESPIRATORY (INHALATION) at 09:35

## 2018-01-05 RX ADMIN — LORATADINE SCH MG: 10 TABLET ORAL at 09:34

## 2018-01-05 RX ADMIN — ENOXAPARIN SODIUM SCH MG: 40 INJECTION SUBCUTANEOUS at 09:34

## 2018-01-05 RX ADMIN — NYSTATIN SCH: 100000 SUSPENSION ORAL at 00:10

## 2018-01-05 RX ADMIN — Medication SCH APPLIC: at 09:34

## 2018-01-05 RX ADMIN — POTASSIUM CHLORIDE SCH MEQ: 1500 TABLET, EXTENDED RELEASE ORAL at 09:34

## 2018-01-05 RX ADMIN — TORSEMIDE SCH MG: 20 TABLET ORAL at 09:34

## 2018-01-05 RX ADMIN — DOCUSATE SODIUM SCH MG: 100 CAPSULE, LIQUID FILLED ORAL at 06:33

## 2018-01-05 RX ADMIN — PREDNISONE SCH MG: 10 TABLET ORAL at 09:34

## 2018-01-05 NOTE — DS
Physical Examination


Vital Signs: 


 Vital Signs











Temperature  36.9 C   01/05/18 09:00


 


Pulse Rate  92 H  01/05/18 10:25


 


Respiratory Rate  18   01/05/18 09:00


 


Blood Pressure  94/54   01/05/18 09:00


 


O2 Sat by Pulse Oximetry (%)  96   01/05/18 10:25











Constitutional: Yes: Well Nourished, No Distress, Calm


Cardiovascular: Yes: Pulse Irregular.  No: Tachycardia, Gallop, Murmur, Rub


Respiratory: Yes: Regular, CTA Bilaterally, On Nasal O2.  No: Rales, Rhonchi, 

Wheezes


Gastrointestinal: Yes: Normal Bowel Sounds, Soft.  No: Distention, Tenderness


Extremities: Yes: WNL


Edema: No


Labs: 


 CBC, BMP





 01/05/18 06:00 





 01/05/18 06:00 











Discharge Summary


Reason For Visit: FRACTURE OF HIP


Current Active Problems





Acute and chronic respiratory failure with hypercapnia (Acute)


Chronic respiratory failure (Acute)


Fracture, hip (Acute)


Ureteropelvic junction (UPJ) obstruction, right (Acute)








Hospital Course: 





(1) Fracture, hip


Code(s): S72.009A - FRACTURE OF UNSP PART OF NECK OF UNSP FEMUR, INIT   


Qualifiers: 


   Encounter type: initial encounter   Fracture type: closed   Laterality: left

   Qualified Code(s): S72.002A - Fracture of unspecified part of neck of left 

femur, initial encounter for closed fracture   





(2) COPD (chronic obstructive pulmonary disease)


Code(s): J44.9 - CHRONIC OBSTRUCTIVE PULMONARY DISEASE, UNSPECIFIED   





(3) CHF (congestive heart failure)


Code(s): I50.9 - HEART FAILURE, UNSPECIFIED   


Qualifiers: 


   Congestive heart failure type: diastolic   Congestive heart failure 

chronicity: chronic   Qualified Code(s): I50.32 - Chronic diastolic (congestive

) heart failure   





(4) Hypertension


Code(s): I10 - ESSENTIAL (PRIMARY) HYPERTENSION   





(5) Chronic respiratory failure


Code(s): J96.10 - CHRONIC RESPIRATORY FAILURE, UNSP W HYPOXIA OR HYPERCAPNIA   





(6) Anemia


Code(s): D64.9 - ANEMIA, UNSPECIFIED   


Qualifiers: 


   Anemia type: other cause   Other causes of anemia: acute posthemorrhagic   

Qualified Code(s): D62 - Acute posthemorrhagic anemia   





Ms Peter is a very pleasant 77 year old female who came in after a fall and 

had a fractured hip. She has history of CHF and COPD, and was having an 

exacerbation of both secondary to stress. She was admitted to the hospital. She 

was seen by orthopedic surgery and recommended to undergo surgical repair as 

soon as medically stable. Cardiology and pulmonary saw the patient, her CHF and 

COPD were treated and she was stabilized but still with risk. She underwent 

surgery and tolerated it well. She did well with PT and she reached her 

baseline from a respiratory status, however she began to have abdominal pain. 

She was found to have severe constipation and was given an enema and started on 

lactulose. She had multiple bowel movements and the pain resolved. She 

incidentally was found to have chronic hydronephrosis. Urology was consulted 

and she underwent functional studies of her kidneys. it was confirmed the 

hydronephrosis was chronic and while she had an obstruction it was not complete 

nor was it affecting her renal function. She is currently stable for discharge 

to SNF.





34 minutes spent in preparation of this discharge


Condition: Stable





- Instructions


Diet, Activity, Other Instructions: 


low salt diet. up with assistance, further activity per PT at SNF


Referrals: 


Alonso Gonzalez MD [Primary Care Provider] - 


Dougie Ferguson MD [Staff Physician] - 


Homero Soto MD [Staff Physician] - 


Aj Mireles MD [Staff Physician] - 


Disposition: SKILLED NURSING FACILITY





- Home Medications


Comprehensive Discharge Medication List: 


Ambulatory Orders





Albuterol Sulfate [Proair Hfa -] 1 - 2 inh PO PRN PRN 06/14/12 


Fluticasone/Salmeterol [Advair 250-50 Diskus] 1 each IH BID 06/14/12 


Tiotropium Bromide [Spiriva] 18 mcg IH DAILY 06/14/12 


Montelukast Na [Singulair -] 10 mg PO HS #0 tablet 06/19/12 


Multivitamins [Multivit ([facility] Formulary)] 1 udtab PO DAILY #0 tab 06/19/12 


Ferrous Sulfate [Feosol] 325 mg PO DAILY 09/17/12 


Calcium Carbonate/Vitamin D3 [Calcium 600-Vit D3 200 Tablet] 1 each PO DAILY 07/ 06/13 


Docosahexanoic Acid/Epa [Fish Oil Softgel] 1 each PO DAILY 07/06/13 


Potassium Chloride [K-Dur] 20 meq PO DAILY #0 tab.er.prt 07/06/13 


Alendronate Sodium [Fosamax] 35 mg PO COSTA 05/01/16 


Atorvastatin Ca [Lipitor] 10 mg PO HS 05/01/16 


Latanoprost 0.005% Eye Drops [Xalatan 0.005% Eye Drops -] 1 drop OU HS 05/01/16 


Pantoprazole Sodium 40 mg PO DAILY 02/28/17 


Acetaminophen [Tylenol .Regular Strength -] 650 mg PO Q4H PRN  tablet 01/05/18 


Diltiazem Cd [Cardizem Cd -] 120 mg PO BID  cap.cd.24h 01/05/18 


Docusate Sodium [Colace -] 100 mg PO TID  capsule 01/05/18 


Enoxaparin [Lovenox -] 40 mg SQ DAILY  disp.syrin 01/05/18 


Ibuprofen [Advil -] 200 mg PO Q6H PRN  tablet 01/05/18 


Loratadine [Claritin -] 10 mg PO DAILY  tablet 01/05/18 


Mineral Oil/Petrolat,Wht/Water [Eucerin (Small Jar) -] 1 applic TP DAILY  jar 01 /05/18 


Nystatin Oral Suspension - [Nystatin Oral Susp 287976 Units/5 ML -] 500,000 

units PO Q6HPO  cup 01/05/18 


Polyethylene Glycol 3350 [Miralax 119 gm Btl -] 17 gm PO BID  bottle 01/05/18 


Prednisone [Deltasone -] 10 mg PO DAILY  tablet 01/05/18 


Torsemide [Demadex -] 60 mg PO DAILY  tablet 01/05/18

## 2018-01-05 NOTE — PN
Progress Note (short form)





- Note


Progress Note: 





renal scan consistent with UPJ without functional obstruction.  No further 

treatment needed at this time.  








Thank you for this referral.  





Problem List





- Problems


(1) Ureteropelvic junction (UPJ) obstruction, right


Code(s): N13.5 - CROSSING VESSEL AND STRICTURE OF URETER W/O HYDRONEPHROSIS

## 2018-01-23 ENCOUNTER — HOSPITAL ENCOUNTER (INPATIENT)
Dept: HOSPITAL 74 - JER | Age: 78
LOS: 11 days | DRG: 207 | End: 2018-02-03
Attending: INTERNAL MEDICINE | Admitting: INTERNAL MEDICINE
Payer: COMMERCIAL

## 2018-01-23 VITALS — BODY MASS INDEX: 21.7 KG/M2

## 2018-01-23 DIAGNOSIS — J12.1: ICD-10-CM

## 2018-01-23 DIAGNOSIS — K72.00: ICD-10-CM

## 2018-01-23 DIAGNOSIS — N39.0: ICD-10-CM

## 2018-01-23 DIAGNOSIS — D72.828: ICD-10-CM

## 2018-01-23 DIAGNOSIS — S72.092D: ICD-10-CM

## 2018-01-23 DIAGNOSIS — R65.21: ICD-10-CM

## 2018-01-23 DIAGNOSIS — E87.2: ICD-10-CM

## 2018-01-23 DIAGNOSIS — J44.1: ICD-10-CM

## 2018-01-23 DIAGNOSIS — F17.200: ICD-10-CM

## 2018-01-23 DIAGNOSIS — J98.11: ICD-10-CM

## 2018-01-23 DIAGNOSIS — J96.22: Primary | ICD-10-CM

## 2018-01-23 DIAGNOSIS — Z99.81: ICD-10-CM

## 2018-01-23 DIAGNOSIS — J96.21: ICD-10-CM

## 2018-01-23 DIAGNOSIS — D64.9: ICD-10-CM

## 2018-01-23 DIAGNOSIS — A41.89: ICD-10-CM

## 2018-01-23 DIAGNOSIS — I27.20: ICD-10-CM

## 2018-01-23 DIAGNOSIS — G93.1: ICD-10-CM

## 2018-01-23 DIAGNOSIS — N17.0: ICD-10-CM

## 2018-01-23 DIAGNOSIS — I50.32: ICD-10-CM

## 2018-01-23 DIAGNOSIS — E78.00: ICD-10-CM

## 2018-01-23 DIAGNOSIS — I46.9: ICD-10-CM

## 2018-01-23 DIAGNOSIS — E83.51: ICD-10-CM

## 2018-01-23 DIAGNOSIS — I11.0: ICD-10-CM

## 2018-01-23 DIAGNOSIS — J90: ICD-10-CM

## 2018-01-23 DIAGNOSIS — I24.8: ICD-10-CM

## 2018-01-23 DIAGNOSIS — N13.30: ICD-10-CM

## 2018-01-23 LAB
ALBUMIN SERPL-MCNC: 3.1 G/DL (ref 3.4–5)
ALP SERPL-CCNC: 195 U/L (ref 45–117)
ALT SERPL-CCNC: 52 U/L (ref 12–78)
ANION GAP SERPL CALC-SCNC: 7 MMOL/L (ref 8–16)
APPEARANCE UR: (no result)
APTT BLD: 26.7 SECONDS (ref 26.9–34.4)
ARTERIAL BLOOD GAS PCO2: 58.3 MMHG (ref 35–45)
ARTERIAL BLOOD GAS PCO2: 68.9 MMHG (ref 35–45)
ARTERIAL PATENCY WRIST A: POSITIVE
ARTERIAL PATENCY WRIST A: POSITIVE
AST SERPL-CCNC: 24 U/L (ref 15–37)
BACTERIA #/AREA URNS HPF: (no result) /HPF
BASE EXCESS BLDA CALC-SCNC: 0.8 MEQ/L (ref -2–2)
BASE EXCESS BLDA CALC-SCNC: 6 MEQ/L (ref -2–2)
BASOPHILS # BLD: 0.2 % (ref 0–2)
BILIRUB SERPL-MCNC: 0.4 MG/DL (ref 0.2–1)
BILIRUB UR STRIP.AUTO-MCNC: NEGATIVE MG/DL
BNP SERPL-MCNC: 398.34 PG/ML (ref 5–450)
BUN SERPL-MCNC: 13 MG/DL (ref 7–18)
CALCIUM SERPL-MCNC: 8.1 MG/DL (ref 8.5–10.1)
CHLORIDE SERPL-SCNC: 94 MMOL/L (ref 98–107)
CO2 SERPL-SCNC: 37 MMOL/L (ref 21–32)
COHGB MFR BLD: 2 GM% (ref 0.5–2)
COLOR UR: (no result)
CREAT SERPL-MCNC: 0.8 MG/DL (ref 0.55–1.02)
DEPRECATED RDW RBC AUTO: 20.3 % (ref 11.6–15.6)
EOSINOPHIL # BLD: 0.5 % (ref 0–4.5)
EPITH CASTS URNS QL MICRO: (no result) /HPF
GLUCOSE SERPL-MCNC: 144 MG/DL (ref 74–106)
HCT VFR BLD CALC: 35.7 % (ref 32.4–45.2)
HGB BLD-MCNC: 11.3 GM/DL (ref 10.7–15.3)
INR BLD: 0.96 (ref 0.82–1.09)
KETONES UR QL STRIP: NEGATIVE
LEUKOCYTE ESTERASE UR QL STRIP.AUTO: (no result)
LYMPHOCYTES # BLD: 0.9 % (ref 8–40)
MAGNESIUM SERPL-MCNC: 1.8 MG/DL (ref 1.8–2.4)
MCH RBC QN AUTO: 26.6 PG (ref 25.7–33.7)
MCHC RBC AUTO-ENTMCNC: 31.7 G/DL (ref 32–36)
MCV RBC: 83.8 FL (ref 80–96)
MONOCYTES # BLD AUTO: 0.7 % (ref 3.8–10.2)
NEUTROPHILS # BLD: 97.7 % (ref 42.8–82.8)
NITRITE UR QL STRIP: NEGATIVE
PH BLDV: 7.31 [PH] (ref 7.32–7.42)
PH UR: 6 [PH] (ref 5–8)
PHOSPHATE SERPL-MCNC: 3.5 MG/DL (ref 2.5–4.9)
PLATELET # BLD AUTO: 209 K/MM3 (ref 134–434)
PMV BLD: 8.6 FL (ref 7.5–11.1)
PO2 BLDA: 71 MMHG (ref 70–100)
PO2 BLDA: 97.9 MMHG (ref 70–100)
POTASSIUM SERPLBLD-SCNC: 3.6 MMOL/L (ref 3.5–5.1)
PROT SERPL-MCNC: 6.5 G/DL (ref 6.4–8.2)
PROT UR QL STRIP: (no result)
PROT UR QL STRIP: NEGATIVE
PT PNL PPP: 10.8 SEC (ref 9.98–11.88)
RBC # BLD AUTO: 4.26 M/MM3 (ref 3.6–5.2)
RBC # UR STRIP: NEGATIVE /UL
SAO2 % BLDA: 92.9 % (ref 90–98.9)
SAO2 % BLDA: 97 % (ref 90–98.9)
SODIUM SERPL-SCNC: 138 MMOL/L (ref 136–145)
SP GR UR: 1.03 (ref 1–1.03)
UROBILINOGEN UR STRIP-MCNC: NEGATIVE MG/DL (ref 0.2–1)
VENOUS PC02: 72.4 MMHG (ref 38–52)
VENOUS PO2: 117 MMHG (ref 28–48)
WBC # BLD AUTO: 10.3 K/MM3 (ref 4–10)

## 2018-01-23 PROCEDURE — 0BH17EZ INSERTION OF ENDOTRACHEAL AIRWAY INTO TRACHEA, VIA NATURAL OR ARTIFICIAL OPENING: ICD-10-PCS | Performed by: INTERNAL MEDICINE

## 2018-01-23 PROCEDURE — 5A1955Z RESPIRATORY VENTILATION, GREATER THAN 96 CONSECUTIVE HOURS: ICD-10-PCS

## 2018-01-23 RX ADMIN — METHYLPREDNISOLONE SODIUM SUCCINATE SCH MG: 40 INJECTION, POWDER, FOR SOLUTION INTRAMUSCULAR; INTRAVENOUS at 20:56

## 2018-01-23 RX ADMIN — CHLORHEXIDINE GLUCONATE SCH APPLIC: 213 SOLUTION TOPICAL at 22:46

## 2018-01-23 RX ADMIN — METHYLPREDNISOLONE SODIUM SUCCINATE SCH MG: 40 INJECTION, POWDER, FOR SOLUTION INTRAMUSCULAR; INTRAVENOUS at 16:00

## 2018-01-23 RX ADMIN — MUPIROCIN SCH APPLIC: 20 OINTMENT TOPICAL at 22:46

## 2018-01-23 NOTE — HP
Admitting History and Physical





- Primary Care Physician


PCP: Alonso Gonzalez





- Admission


Chief Complaint: Unable to obtain


History of Present Illness: 


Ms Peter is a 77 year old female who presents from the SNF with acute SOB. 

Upon seeing patient has been intubated secondary to hypercapneic respiratory 

failure so history is from ER physician. Patient was recently discharged to SNF 

after hip replacement that was complicated by COPD exacerbation. She presents 

today to the ED with acute onset shortness of breath. She says she was woken up 

by difficulty breathing and was brought here. In the ED she was noted to be 

tachypneic and had respiratory acidosis. BiPAP was attempted but patient was 

not improving and required intubation.


History Source: Medical Record


Limitations to Obtaining History: Clinical Condition





- Past Medical History


Cardiovascular: Yes: HTN


Pulmonary: Yes: COPD, Other (oxygen at home)


Gastrointestinal: Yes: Other (History of  ulcer disease diagnosed 3 years ago 

when patient presented with anemia)


Renal/: Yes: UTI


Heme/Onc: Yes: Anemia


Musculoskeletal: Yes: Osteoarthritis





- Past Surgical History


Past Surgical History: Yes: None





- Smoking History


Smoking history: Current every day smoker


Have you smoked in the past 12 months: No


Aproximately how many cigarettes per day: 0


If you are a former smoker, when did you quit?: Over 15 years ago





- Alcohol/Substance Use


Hx Alcohol Use: No


History of Substance Use: reports: None





- Social History


Usual Living Arrangement: Yes: Alone, Nursing Home


ADL: Independent


Occupation: Retired dental , , 2 children


History of Recent Travel: No





Home Medications





- Allergies


Allergies/Adverse Reactions: 


 Allergies











Allergy/AdvReac Type Severity Reaction Status Date / Time


 


No Known Allergies Allergy   Verified 01/23/18 05:33














- Home Medications


Home Medications: 


Ambulatory Orders





Fluticasone/Salmeterol [Advair 250-50 Diskus] 1 each IH BID 06/14/12 


Tiotropium Bromide [Spiriva] 18 mcg IH DAILY 06/14/12 


Montelukast Na [Singulair -] 10 mg PO HS #0 tablet 06/19/12 


Multivitamins [Multivit ([facility] Formulary)] 1 udtab PO DAILY #0 tab 06/19/12 


Ferrous Sulfate [Feosol] 325 mg PO TID 09/17/12 


Calcium Carbonate/Vitamin D3 [Calcium 600-Vit D3 200 Tablet] 1 each PO DAILY 07/ 06/13 


Docosahexanoic Acid/Epa [Fish Oil Softgel] 1 each PO DAILY 07/06/13 


Potassium Chloride [K-Dur] 20 meq PO DAILY #0 tab.er.prt 07/06/13 


Alendronate Sodium [Fosamax] 35 mg PO COSTA 05/01/16 


Atorvastatin Ca [Lipitor] 10 mg PO HS 05/01/16 


Latanoprost 0.005% Eye Drops [Xalatan 0.005% Eye Drops -] 1 drop OU HS 05/01/16 


Diltiazem Cd [Cardizem Cd -] 120 mg PO BID  cap.cd.24h 01/05/18 


Enoxaparin [Lovenox -] 40 mg SQ DAILY  disp.syrin 01/05/18 


Loratadine [Claritin -] 10 mg PO DAILY  tablet 01/05/18 


Mineral Oil/Petrolat,Wht/Water [Eucerin (Small Jar) -] 1 applic TP DAILY  jar 01 /05/18 


Polyethylene Glycol 3350 [Miralax 119 gm Btl -] 17 gm PO BID  bottle 01/05/18 


Prednisone [Deltasone -] 10 mg PO DAILY  tablet 01/05/18 


Torsemide [Demadex -] 60 mg PO DAILY  tablet 01/05/18 


Acetaminophen [Tylenol .Regular Strength -] 650 mg PO Q6H PRN 01/23/18 


Albuterol 2.5/Ipratropium 0.5 [Duoneb -] 1 amp IH QSHIFT 01/23/18 


Ascorbic Acid [Vitamin C -] 500 mg PO TID 01/23/18 


Chlorpheniramine/Dextromethorp [Robitussin Long-Acting Liq] 15 ml PO Q6H 01/23/ 18 


Docusate Sodium [Colace -] 300 mg PO HS 01/23/18 


Menthol/Zinc Oxide [Calmoseptine Ointment] 71 gm TP QSHIFT 01/23/18 


Nystatin Oral Suspension - [Nystatin Oral Susp 285479 Units/5 ML -] 500,000 

units PO QID 01/23/18 


Omeprazole Magnesium [Prilosec Otc] 20 mg PO DAILY 01/23/18 


Sodium Chloride Nasal Spray [Ocean Spray Nasal Spray] 2 spray NS QSHIFT 01/23/ 18 











Family Disease History





- Family Disease History


Family Disease History: Heart Disease: Father, Other: Mother (Alzheimers 

Dementia)





Review of Systems


Unable to obtain ROS, reason: intubated





Physical Examination


Vital Signs: 


 Vital Signs











Temperature  37.9 C H  01/23/18 09:26


 


Pulse Rate  114 H  01/23/18 12:52


 


Respiratory Rate  16   01/23/18 12:52


 


Blood Pressure  113/69   01/23/18 12:52


 


O2 Sat by Pulse Oximetry (%)  100   01/23/18 12:52











Constitutional: Yes: Other (sedated)


Eyes: Yes: Conjunctiva Clear, EOM Intact, PERRL


HENT: Yes: Atraumatic, Normocephalic


Cardiovascular: Yes: Tachycardia.  No: Pulse Irregular, Gallop, Murmur, Rub


Respiratory: Yes: Regular, CTA Bilaterally, Intubated, Mechanically Ventilated.

  No: Rales, Rhonchi, Wheezes


Gastrointestinal: Yes: Normal Bowel Sounds, Soft.  No: Distention, Tenderness


Extremities: Yes: WNL


Edema: No


Labs: 


 CBC, BMP





 01/23/18 05:55 





 01/23/18 05:55 











Imaging





- Results


Chest X-ray: Report Reviewed, Image Reviewed


Cat Scan: Report Reviewed, Image Reviewed





Problem List





- Problems


(1) Acute and chronic respiratory failure with hypercapnia


Assessment/Plan: 


-CT scan reviewed, showing mucous plugging


-unclear source of hypercapneic respiratory failure


-fever noted on admission


-influenza negative


-cultures pending


-no pneumonia noted on CT scan


-pulmonary made aware


-intubated and admission to the ICU


-consult ID for antibiotics


Code(s): J96.22 - ACUTE AND CHRONIC RESPIRATORY FAILURE WITH HYPERCAPNIA   





(2) COPD exacerbation


Assessment/Plan: 


-causing hypercapneic respiratory failure


-intubated


-solumedrol


-pulmonary consulted


Code(s): J44.1 - CHRONIC OBSTRUCTIVE PULMONARY DISEASE W (ACUTE) EXACERBATION   





(3) CHF (congestive heart failure)


Assessment/Plan: 


-will hold torsemide at this time


-gentle hydration with close monitoring since intubated and npo


Code(s): I50.9 - HEART FAILURE, UNSPECIFIED   


Qualifiers: 


   Congestive heart failure type: diastolic   Congestive heart failure 

chronicity: chronic   Qualified Code(s): I50.32 - Chronic diastolic (congestive

) heart failure   





(4) Fracture, hip


Assessment/Plan: 


-reason for being at SNF


-continue lovenox for DVT PPx


Code(s): S72.009A - FRACTURE OF UNSP PART OF NECK OF UNSP FEMUR, INIT   


Qualifiers: 


   Encounter type: initial encounter   Fracture type: closed   Laterality: left

   Qualified Code(s): S72.002A - Fracture of unspecified part of neck of left 

femur, initial encounter for closed fracture   





(5) Hypertension


Assessment/Plan: 


-low to normal blood pressure


-hold antihypertensives


-gentle hydration 


-monitor for improvement


Code(s): I10 - ESSENTIAL (PRIMARY) HYPERTENSION   





Assessment/Plan


67 minutes spent in critical care time with this patient

## 2018-01-23 NOTE — PDOC
Attending Attestation





- Resident


Resident Name: Scott Beltrán





- ED Attending Attestation


I have performed the following: I have examined & evaluated the patient, The 

case was reviewed & discussed with the resident, I agree w/resident's findings 

& plan, Exceptions are as noted





- HPI


HPI: 





01/29/18 17:46


Please see medical decision making for HPI details





- Physicial Exam


PE: 





Tachypneic, tachycardic, speaking in full sentences


Pulm: decreased breath sounds, rales at bases, occasional exp wheeze


CV: tachycardic, regular


LE: no edema








- Medical Decision Making





01/23/18 06:52


Pt seen and examined with Dr. Beltrán, PGY1. Pt with complex hx of COPD/CHF, 

recent hip frx s/p repair in dec 2017, recently discharged from inpt stay, 

presenting with SOB x2 days but worsening tonight. Pt tachypneic on arrival, 

slight wheezing, decreased breath sounds throughout lung fields. Reportedly 

with low O2 sat on arrival. CXR with no s/o infiltrate, does not appear 

overloaded. Ddx includes COPD exacerbation vs PE. PCO2 in 70s, will initiate 

bipap for CO2 retention/hypercarbia. Pt tachycardic 130s on arrival, recent 

surgery and hospitalization, pt pending CT chest with IV contrast to eval for 

PE. EKG with STD in lateral leads, will trend troponin and EKG.

## 2018-01-23 NOTE — PDOC
*Physical Exam





- Vital Signs


 Last Vital Signs











Temp Pulse Resp BP Pulse Ox


 


 100.3 F H  117 H  23   106/64   99 


 


 01/23/18 09:26  01/23/18 09:26  01/23/18 09:26  01/23/18 09:26  01/23/18 09:26














<Love Unger - Last Filed: 01/23/18 11:04>





- Vital Signs


 Last Vital Signs











Temp Pulse Resp BP Pulse Ox


 


 100.3 F H  117 H  23   106/64   99 


 


 01/23/18 09:26  01/23/18 09:26  01/23/18 09:26  01/23/18 09:26  01/23/18 09:26














<Dom Anayatorie - Last Filed: 01/23/18 18:47>





ED Treatment Course





- LABORATORY


CBC & Chemistry Diagram: 


 01/23/18 05:55





 01/23/18 05:55





- ADDITIONAL ORDERS


Additional order review: 


 Laboratory  Results











  01/23/18 01/23/18 01/23/18





  08:30 06:09 05:55


 


PT with INR   


 


INR   


 


PTT (Actin FS)   


 


Anticoagulation Therapy  No Result Required.  


 


Puncture Site  Left radial  


 


ABG pH  7.31 L  


 


ABG pCO2 at Pt Temp  68.9 H*  


 


ABG pO2 at Pt Temp  71.0  D  


 


ABG HCO3  33.7 H  


 


ABG O2 Sat (Measured)  92.9  


 


ABG O2 Content  14.6 L  


 


ABG Base Excess  6.0 H  


 


Alexy Test  Positive  


 


VBG pH   


 


POC VBG pCO2   


 


POC VBG pO2   


 


Mixed VBG HCO3   


 


Carboxyhemoglobin  2.0  


 


O2 Delivery Device  No Result Required.  


 


Oxygen Flow Rate  Yes  


 


Vent Mode  No Result Required.  


 


Vent Rate  No Result Required.  


 


Mechanical Rate  No Result Required.  


 


Pressure Support Vent  No Result Required.  


 


Sodium   


 


Potassium   


 


Chloride   


 


Carbon Dioxide   


 


Anion Gap   


 


BUN   


 


Creatinine   


 


Creat Clearance w eGFR   


 


Random Glucose   


 


Lactic Acid   


 


Calcium   


 


Phosphorus   


 


Magnesium   


 


Total Bilirubin   


 


AST   


 


ALT   


 


Alkaline Phosphatase   


 


Creatine Kinase   


 


Troponin I   


 


B-Natriuretic Peptide   Cancelled 


 


Total Protein   


 


Albumin   


 


Blood Type    O POSITIVE


 


Antibody Screen    Negative














  01/23/18 01/23/18 01/23/18





  05:55 05:55 05:55


 


PT with INR   


 


INR   


 


PTT (Actin FS)   


 


Anticoagulation Therapy   


 


Puncture Site   


 


ABG pH   


 


ABG pCO2 at Pt Temp   


 


ABG pO2 at Pt Temp   


 


ABG HCO3   


 


ABG O2 Sat (Measured)   


 


ABG O2 Content   


 


ABG Base Excess   


 


Alexy Test   


 


VBG pH    7.31 L


 


POC VBG pCO2    72.4 H*


 


POC VBG pO2    117.0 H


 


Mixed VBG HCO3    35.6 H


 


Carboxyhemoglobin   


 


O2 Delivery Device   


 


Oxygen Flow Rate   


 


Vent Mode   


 


Vent Rate   


 


Mechanical Rate   


 


Pressure Support Vent   


 


Sodium   138 


 


Potassium   3.6 


 


Chloride   94 L 


 


Carbon Dioxide   37 H 


 


Anion Gap   7 L 


 


BUN   13 


 


Creatinine   0.8 


 


Creat Clearance w eGFR   > 60 


 


Random Glucose   144 H 


 


Lactic Acid  1.9  


 


Calcium   8.1 L 


 


Phosphorus   


 


Magnesium   


 


Total Bilirubin   0.4  D 


 


AST   24 


 


ALT   52 


 


Alkaline Phosphatase   195 H 


 


Creatine Kinase   24 L 


 


Troponin I   < 0.02 


 


B-Natriuretic Peptide   398.34 


 


Total Protein   6.5 


 


Albumin   3.1 L 


 


Blood Type   


 


Antibody Screen   














  01/23/18 01/23/18





  05:55 05:55


 


PT with INR  10.80 


 


INR  0.96 


 


PTT (Actin FS)  26.7 L 


 


Anticoagulation Therapy  


 


Puncture Site  


 


ABG pH  


 


ABG pCO2 at Pt Temp  


 


ABG pO2 at Pt Temp  


 


ABG HCO3  


 


ABG O2 Sat (Measured)  


 


ABG O2 Content  


 


ABG Base Excess  


 


Alexy Test  


 


VBG pH  


 


POC VBG pCO2  


 


POC VBG pO2  


 


Mixed VBG HCO3  


 


Carboxyhemoglobin  


 


O2 Delivery Device  


 


Oxygen Flow Rate  


 


Vent Mode  


 


Vent Rate  


 


Mechanical Rate  


 


Pressure Support Vent  


 


Sodium  


 


Potassium  


 


Chloride  


 


Carbon Dioxide  


 


Anion Gap  


 


BUN  


 


Creatinine  


 


Creat Clearance w eGFR  


 


Random Glucose  


 


Lactic Acid  


 


Calcium  


 


Phosphorus   3.5


 


Magnesium   1.8


 


Total Bilirubin  


 


AST  


 


ALT  


 


Alkaline Phosphatase  


 


Creatine Kinase  


 


Troponin I  


 


B-Natriuretic Peptide  


 


Total Protein  


 


Albumin  


 


Blood Type  


 


Antibody Screen  




















 01/23/18 07:13 Influenza Types A,B Antigen (LISANDRO) - Final





 Nasopharyngeal Swab  - Final








 











  01/23/18





  05:55


 


RBC  4.26


 


MCV  83.8


 


MCHC  31.7 L


 


RDW  20.3 H D


 


MPV  8.6


 


Neutrophils %  97.7 H


 


Lymphocytes %  0.9 L


 


Monocytes %  0.7 L D


 


Eosinophils %  0.5


 


Basophils %  0.2














- Medications


Given in the ED: 


ED Medications














Discontinued Medications














Generic Name Dose Route Start Last Admin





  Trade Name Freq  PRN Reason Stop Dose Admin


 


Albuterol/Ipratropium  1 amp  01/23/18 05:38  01/23/18 06:31





  Duoneb -  NEB  01/23/18 05:39  1 amp





  ONCE ONE   Administration


 


Magnesium Sulfate  1 gm  01/23/18 05:39  01/23/18 05:53





  Magnesium Sulfate  IVPB  01/23/18 05:40  1 gm





  ONCE ONE   Administration


 


Methylprednisolone Sodium Succinate  125 mg  01/23/18 05:39  01/23/18 05:53





  Solu-Medrol -  IVPUSH  01/23/18 05:40  125 mg





  ONCE ONE   Administration


 


Sodium Chloride  500 ml  01/23/18 08:16  01/23/18 08:17





  Normal Saline -  IV  01/23/18 08:17  500 ml





  ONCE ONE   Administration














<Love Unger - Last Filed: 01/23/18 11:04>





- LABORATORY


CBC & Chemistry Diagram: 


 01/23/18 05:55





 01/23/18 05:55





- ADDITIONAL ORDERS


Additional order review: 


 Laboratory  Results











  01/23/18 01/23/18 01/23/18





  08:30 06:09 05:55


 


PT with INR   


 


INR   


 


PTT (Actin FS)   


 


Anticoagulation Therapy  No Result Required.  


 


Puncture Site  Left radial  


 


ABG pH  7.31 L  


 


ABG pCO2 at Pt Temp  68.9 H*  


 


ABG pO2 at Pt Temp  71.0  D  


 


ABG HCO3  33.7 H  


 


ABG O2 Sat (Measured)  92.9  


 


ABG O2 Content  14.6 L  


 


ABG Base Excess  6.0 H  


 


Alexy Test  Positive  


 


VBG pH   


 


POC VBG pCO2   


 


POC VBG pO2   


 


Mixed VBG HCO3   


 


Carboxyhemoglobin  2.0  


 


O2 Delivery Device  No Result Required.  


 


Oxygen Flow Rate  Yes  


 


Vent Mode  No Result Required.  


 


Vent Rate  No Result Required.  


 


Mechanical Rate  No Result Required.  


 


Pressure Support Vent  No Result Required.  


 


Sodium   


 


Potassium   


 


Chloride   


 


Carbon Dioxide   


 


Anion Gap   


 


BUN   


 


Creatinine   


 


Creat Clearance w eGFR   


 


Random Glucose   


 


Lactic Acid   


 


Calcium   


 


Phosphorus   


 


Magnesium   


 


Total Bilirubin   


 


AST   


 


ALT   


 


Alkaline Phosphatase   


 


Creatine Kinase   


 


Troponin I   


 


B-Natriuretic Peptide   Cancelled 


 


Total Protein   


 


Albumin   


 


Blood Type    O POSITIVE


 


Antibody Screen    Negative














  01/23/18 01/23/18 01/23/18





  05:55 05:55 05:55


 


PT with INR   


 


INR   


 


PTT (Actin FS)   


 


Anticoagulation Therapy   


 


Puncture Site   


 


ABG pH   


 


ABG pCO2 at Pt Temp   


 


ABG pO2 at Pt Temp   


 


ABG HCO3   


 


ABG O2 Sat (Measured)   


 


ABG O2 Content   


 


ABG Base Excess   


 


Alexy Test   


 


VBG pH    7.31 L


 


POC VBG pCO2    72.4 H*


 


POC VBG pO2    117.0 H


 


Mixed VBG HCO3    35.6 H


 


Carboxyhemoglobin   


 


O2 Delivery Device   


 


Oxygen Flow Rate   


 


Vent Mode   


 


Vent Rate   


 


Mechanical Rate   


 


Pressure Support Vent   


 


Sodium   138 


 


Potassium   3.6 


 


Chloride   94 L 


 


Carbon Dioxide   37 H 


 


Anion Gap   7 L 


 


BUN   13 


 


Creatinine   0.8 


 


Creat Clearance w eGFR   > 60 


 


Random Glucose   144 H 


 


Lactic Acid  1.9  


 


Calcium   8.1 L 


 


Phosphorus   


 


Magnesium   


 


Total Bilirubin   0.4  D 


 


AST   24 


 


ALT   52 


 


Alkaline Phosphatase   195 H 


 


Creatine Kinase   24 L 


 


Troponin I   < 0.02 


 


B-Natriuretic Peptide   398.34 


 


Total Protein   6.5 


 


Albumin   3.1 L 


 


Blood Type   


 


Antibody Screen   














  01/23/18 01/23/18





  05:55 05:55


 


PT with INR  10.80 


 


INR  0.96 


 


PTT (Actin FS)  26.7 L 


 


Anticoagulation Therapy  


 


Puncture Site  


 


ABG pH  


 


ABG pCO2 at Pt Temp  


 


ABG pO2 at Pt Temp  


 


ABG HCO3  


 


ABG O2 Sat (Measured)  


 


ABG O2 Content  


 


ABG Base Excess  


 


Alexy Test  


 


VBG pH  


 


POC VBG pCO2  


 


POC VBG pO2  


 


Mixed VBG HCO3  


 


Carboxyhemoglobin  


 


O2 Delivery Device  


 


Oxygen Flow Rate  


 


Vent Mode  


 


Vent Rate  


 


Mechanical Rate  


 


Pressure Support Vent  


 


Sodium  


 


Potassium  


 


Chloride  


 


Carbon Dioxide  


 


Anion Gap  


 


BUN  


 


Creatinine  


 


Creat Clearance w eGFR  


 


Random Glucose  


 


Lactic Acid  


 


Calcium  


 


Phosphorus   3.5


 


Magnesium   1.8


 


Total Bilirubin  


 


AST  


 


ALT  


 


Alkaline Phosphatase  


 


Creatine Kinase  


 


Troponin I  


 


B-Natriuretic Peptide  


 


Total Protein  


 


Albumin  


 


Blood Type  


 


Antibody Screen  




















 01/23/18 07:13 Influenza Types A,B Antigen (LISANDRO) - Final





 Nasopharyngeal Swab  - Final








 











  01/23/18





  05:55


 


RBC  4.26


 


MCV  83.8


 


MCHC  31.7 L


 


RDW  20.3 H D


 


MPV  8.6


 


Neutrophils %  97.7 H


 


Lymphocytes %  0.9 L


 


Monocytes %  0.7 L D


 


Eosinophils %  0.5


 


Basophils %  0.2














- Medications


Given in the ED: 


ED Medications














Discontinued Medications














Generic Name Dose Route Start Last Admin





  Trade Name Freq  PRN Reason Stop Dose Admin


 


Albuterol/Ipratropium  1 amp  01/23/18 05:38  01/23/18 06:31





  Duoneb -  NEB  01/23/18 05:39  1 amp





  ONCE ONE   Administration


 


Magnesium Sulfate  1 gm  01/23/18 05:39  01/23/18 05:53





  Magnesium Sulfate  IVPB  01/23/18 05:40  1 gm





  ONCE ONE   Administration


 


Methylprednisolone Sodium Succinate  125 mg  01/23/18 05:39  01/23/18 05:53





  Solu-Medrol -  IVPUSH  01/23/18 05:40  125 mg





  ONCE ONE   Administration


 


Sodium Chloride  500 ml  01/23/18 08:16  01/23/18 08:17





  Normal Saline -  IV  01/23/18 08:17  500 ml





  ONCE ONE   Administration














<Jose Daniel Anaya - Last Filed: 01/23/18 18:47>





Medical Decision Making





- Medical Decision Making





01/23/18 11:00


Called Dr. Gonzalez but were told he does not work on Tuesdays and was unable to 

take our call. 





<Love Unger - Last Filed: 01/23/18 11:04>





- Medical Decision Making








01/23/18 18:43


Patient signed out to me by overnight attending, was pending CTA to rule out 

PE. Patient was started on BiPAP overnight. On my evaluation initially, the 

patient reported improvement on BiPAP. Repeat ABG was stable, however 

subsequent ABG is unchanged with no improvement. Patient began to become more 

lethargic and complaining of feeling tired. We called the patient's son and 

spoke with him and also consulted her pulmonologist Dr. Soto. The decision was 

made to intubate the patient given she was becoming tired and due to poor 

improvement on the BiPAP. Using 100 mg of ketamine and the 100 mg of rocuronium

, the patient was intubated with an 8.0 ET tube via direct laryngoscopy with no 

complications. A Versed drip was initiated for sedation. Chest x-ray showed 

tube to be in good place. ABG with improved CO2. Patient has been admitted to 

Dr. Tucker and is awaiting an ICU bed.








<Jose Daniel Anaya - Last Filed: 01/23/18 18:47>





*DC/Admit/Observation/Transfer





<Love Unger - Last Filed: 01/23/18 11:04>





<Jose Daniel Anaya - Last Filed: 01/23/18 18:47>


Diagnosis at time of Disposition: 


 COPD exacerbation





Respiratory failure


Qualifiers:


 Chronicity: acute Respiratory failure complication: hypoxia and hypercapnia 

Qualified Code(s): J96.01 - Acute respiratory failure with hypoxia





Sepsis


Qualifiers:


 Sepsis type: sepsis due to unspecified organism Qualified Code(s): A41.9 - 

Sepsis, unspecified organism








- Discharge Dispostion


Condition at time of disposition: Guarded

## 2018-01-23 NOTE — PDOC
*Physical Exam





- Vital Signs


 Last Vital Signs











Temp Pulse Resp BP Pulse Ox


 


 100.4 F H  142 H  18   141/70   99 


 


 01/23/18 05:33  01/23/18 05:33  01/23/18 05:33  01/23/18 05:33  01/23/18 07:30











01/23/18 07:32


Patient's care signed out to me at the beginning of my shift. 77 YOF with h/o 

CHF, COPD, CVA, femur fracture and surgical repair about a month ago (DC from 

[facility] on 1/3/18) who was BIBA for acute onset SOB awoke her this AM from sleep. 

Had breathing tx x2 per EMS and placed on nonrebreather. On arrival VS notable 

for temp 100.4, tachycardic but SR, normotensive. On VBG PCO2 72.4, CT PE 

protocol ordered and awaiting results.





- Physical Exam


General Appearance: Yes: Appropriately Dressed, Moderate Distress, Other (

Patient on BiPAP, tachypneic, speaking occasional 3 word sentences, paradoxical 

breathing, intercostal retractions, )


HEENT: positive: EOMI, GWEN, Normal ENT Inspection.  negative: Pharyngeal 

Erythema, Rhinorrhea


Neck: positive: Trachea midline, Supple.  negative: Tender, Lymphadenopathy (R)

, Lymphadenopathy (L)


Respiratory/Chest: positive: Lungs Clear, Labored Respiration, Decreased Breath 

Sounds, Paradoxal Breathing, Wheezing (faint expiratory)


Cardiovascular: positive: Regular Rhythm, S1, S2, Tachycardia.  negative: Edema

, JVD


Gastrointestinal/Abdominal: positive: Normal Bowel Sounds, Soft.  negative: 

Tender


Musculoskeletal: positive: Normal Inspection.  negative: Decreased Range of 

Motion


Extremity: positive: Normal Capillary Refill, Normal Inspection, Normal Range 

of Motion, Other (warm to the touch).  negative: Tender, Cyanosis


Integumentary: positive: Normal Color, Dry, Warm.  negative: Cyanotic, Erythema

, Jaundice, Mottled, Diaphoresis


Neurologic: positive: CNs II-XII NML intact (grossly), Fully Oriented, Alert, 

Normal Mood/Affect, Normal Response, Motor Strength 5/5





ED Treatment Course





- LABORATORY


CBC & Chemistry Diagram: 


 01/23/18 05:55





 01/23/18 05:55





- ADDITIONAL ORDERS


Additional order review: 


 Laboratory  Results











  01/23/18 01/23/18 01/23/18





  05:55 05:55 05:55


 


PT with INR   


 


INR   


 


PTT (Actin FS)   


 


VBG pH    7.31 L


 


POC VBG pCO2    72.4 H*


 


POC VBG pO2    117.0 H


 


Mixed VBG HCO3    35.6 H


 


Sodium   138 


 


Potassium   3.6 


 


Chloride   94 L 


 


Carbon Dioxide   37 H 


 


Anion Gap   7 L 


 


BUN   13 


 


Creatinine   0.8 


 


Creat Clearance w eGFR   > 60 


 


Random Glucose   144 H 


 


Lactic Acid  1.9  


 


Calcium   8.1 L 


 


Phosphorus   


 


Magnesium   


 


Total Bilirubin   0.4  D 


 


AST   24 


 


ALT   52 


 


Alkaline Phosphatase   195 H 


 


Creatine Kinase   24 L 


 


Troponin I   < 0.02 


 


Total Protein   6.5 


 


Albumin   3.1 L 














  01/23/18 01/23/18





  05:55 05:55


 


PT with INR  10.80 


 


INR  0.96 


 


PTT (Actin FS)  26.7 L 


 


VBG pH  


 


POC VBG pCO2  


 


POC VBG pO2  


 


Mixed VBG HCO3  


 


Sodium  


 


Potassium  


 


Chloride  


 


Carbon Dioxide  


 


Anion Gap  


 


BUN  


 


Creatinine  


 


Creat Clearance w eGFR  


 


Random Glucose  


 


Lactic Acid  


 


Calcium  


 


Phosphorus   3.5


 


Magnesium   1.8


 


Total Bilirubin  


 


AST  


 


ALT  


 


Alkaline Phosphatase  


 


Creatine Kinase  


 


Troponin I  


 


Total Protein  


 


Albumin  








 











  01/23/18





  05:55


 


RBC  4.26


 


MCV  83.8


 


MCHC  31.7 L


 


RDW  20.3 H D


 


MPV  8.6


 


Neutrophils %  97.7 H


 


Lymphocytes %  0.9 L


 


Monocytes %  0.7 L D


 


Eosinophils %  0.5


 


Basophils %  0.2














- Medications


Given in the ED: 


ED Medications














Discontinued Medications














Generic Name Dose Route Start Last Admin





  Trade Name Freq  PRN Reason Stop Dose Admin


 


Albuterol/Ipratropium  1 amp  01/23/18 05:38  01/23/18 06:31





  Duoneb -  NEB  01/23/18 05:39  1 amp





  ONCE ONE   Administration


 


Magnesium Sulfate  1 gm  01/23/18 05:39  01/23/18 05:53





  Magnesium Sulfate  IVPB  01/23/18 05:40  1 gm





  ONCE ONE   Administration


 


Methylprednisolone Sodium Succinate  125 mg  01/23/18 05:39  01/23/18 05:53





  Solu-Medrol -  IVPUSH  01/23/18 05:40  125 mg





  ONCE ONE   Administration














Medical Decision Making





- Medical Decision Making


01/23/18 07:57


At this point patient is tachycardic to 125, pulse ox is 91% on BiPAP with 30% 

FiO2, sleepy but arousable with conversation.





01/23/18 08:22


ABG drawn, FiO2 increased to 40, BP measured in the mid-80s systolic once but re

-checked to be low 100s systolic.


Patient taken to CT with her RN.





01/23/18 10:00


Chest CTA without e/o PE but with RLL mucus plugs


Called lab about patient's BNP (not resulted yet).


Ordered is saline neb, azithromycin, awaiting BNP results to decide on more IVF.





01/23/18 10:34


Patient noted to be attempting to pull her BiPAP off, also tube to ventilator 

fell off.


Patient noted to have increased WOB and states tiring out.





01/23/18 10:54


Patient again pulls me into the room and states she is tiring out.


She states that she would want intubation if needed as the BiPAP is not working 

well enough now.


Dr. Anaya spoke with Dr. Soto (Pt's pulmonologist) who is notified of the 

patient's condition.


Plan at this time is to RSI with ketamine and rocuronium.





01/23/18 12:02


Patient intubated with sz 8 ET tube.


Spoke with Dr. Delgado who agrees with plan for ICU placement.





01/23/18 12:36


Spoke with Dr. Tucker, admitting for Dr. Gonzalez, who agrees with plan.


Decision to admit order placed and consult to ICU placed.





01/23/18 15:00


Patient's repeat ABG with improved PCO2.


She requires re-bolus of Versed and drip rate titrated up.





01/23/18 18:29


Patient requires repeat 2 mg bolus of Versed as muscles are tensing and she is 

increasingly tachycardic.





01/23/18 18:42


Recommend Fentanyl for any future bolus in the ED given soft pressures.





*DC/Admit/Observation/Transfer


Diagnosis at time of Disposition: 


 COPD exacerbation





Respiratory failure


Qualifiers:


 Chronicity: acute Respiratory failure complication: hypoxia and hypercapnia 

Qualified Code(s): J96.01 - Acute respiratory failure with hypoxia





Sepsis


Qualifiers:


 Sepsis type: sepsis due to unspecified organism Qualified Code(s): A41.9 - 

Sepsis, unspecified organism








- Discharge Dispostion


Condition at time of disposition: Guarded


Admit: Yes





- Referrals





- Patient Instructions





- Post Discharge Activity

## 2018-01-23 NOTE — PDOC
History of Present Illness





- General


Chief Complaint: Respiratory Distress


Stated Complaint: DIFFICULTY BREATHING


Time Seen by Provider: 01/23/18 05:43


History Source: Patient





- History of Present Illness


Initial Comments: 





01/23/18 05:53


77F with hx of CHF, severe COPD and recent Left femur fracture (surgery on 12/27

, discharged on 1/05)  presenting with shortness of breath that woke her up 

this morning. 


Patient was BIBA given 2 albuterol tx, on Nonrebreather. 





Past History





- Past Medical History


Allergies/Adverse Reactions: 


 Allergies











Allergy/AdvReac Type Severity Reaction Status Date / Time


 


No Known Allergies Allergy   Verified 01/23/18 05:33











Home Medications: 


Ambulatory Orders





Albuterol Sulfate [Proair Hfa -] 1 - 2 inh PO PRN PRN 06/14/12 


Fluticasone/Salmeterol [Advair 250-50 Diskus] 1 each IH BID 06/14/12 


Tiotropium Bromide [Spiriva] 18 mcg IH DAILY 06/14/12 


Montelukast Na [Singulair -] 10 mg PO HS #0 tablet 06/19/12 


Multivitamins [Multivit ([facility] Formulary)] 1 udtab PO DAILY #0 tab 06/19/12 


Ferrous Sulfate [Feosol] 325 mg PO DAILY 09/17/12 


Calcium Carbonate/Vitamin D3 [Calcium 600-Vit D3 200 Tablet] 1 each PO DAILY 07/ 06/13 


Docosahexanoic Acid/Epa [Fish Oil Softgel] 1 each PO DAILY 07/06/13 


Potassium Chloride [K-Dur] 20 meq PO DAILY #0 tab.er.prt 07/06/13 


Alendronate Sodium [Fosamax] 35 mg PO COSTA 05/01/16 


Atorvastatin Ca [Lipitor] 10 mg PO HS 05/01/16 


Latanoprost 0.005% Eye Drops [Xalatan 0.005% Eye Drops -] 1 drop OU HS 05/01/16 


Pantoprazole Sodium 40 mg PO DAILY 02/28/17 


Acetaminophen [Tylenol .Regular Strength -] 650 mg PO Q4H PRN  tablet 01/05/18 


Diltiazem Cd [Cardizem Cd -] 120 mg PO BID  cap.cd.24h 01/05/18 


Docusate Sodium [Colace -] 100 mg PO TID  capsule 01/05/18 


Enoxaparin [Lovenox -] 40 mg SQ DAILY  disp.syrin 01/05/18 


Ibuprofen [Advil -] 200 mg PO Q6H PRN  tablet 01/05/18 


Loratadine [Claritin -] 10 mg PO DAILY  tablet 01/05/18 


Mineral Oil/Petrolat,Wht/Water [Eucerin (Small Jar) -] 1 applic TP DAILY  jar 01 /05/18 


Nystatin Oral Suspension - [Nystatin Oral Susp 000340 Units/5 ML -] 500,000 

units PO Q6HPO  cup 01/05/18 


Polyethylene Glycol 3350 [Miralax 119 gm Btl -] 17 gm PO BID  bottle 01/05/18 


Prednisone [Deltasone -] 10 mg PO DAILY  tablet 01/05/18 


Torsemide [Demadex -] 60 mg PO DAILY  tablet 01/05/18 








Anemia: Yes (BLOOD TRANSFUSION)


Asthma: No


Cancer: No


Cardiac Disorders: No


CVA: No


COPD: Yes (USES O2 AT HOME 2 LITERS)


CHF: No


Dementia: No


Diabetes: No


GI Disorders: Yes


 Disorders: No


HTN: Yes


Hypercholesterolemia: Yes


Liver Disease: No


Seizures: No


Thyroid Disease: No





- Surgical History


Abdominal Surgery: No


Appendectomy: No


Cardiac Surgery: No


Cholecystectomy: No


Lung Surgery: No


Neurologic Surgery: No


Orthopedic Surgery: No





- Suicide/Smoking/Psychosocial Hx


Smoking Status: Yes


Smoking History: Current every day smoker


Have you smoked in the past 12 months: No


Number of Cigarettes Smoked Daily: 0


If you are a former smoker, when did you quit?: Over 15 years ago


Information on smoking cessation initiated: No


Hx Alcohol Use: No


Drug/Substance Use Hx: No


Substance Use Type: None


Hx Substance Use Treatment: No





**Review of Systems





- Review of Systems


Able to Perform ROS?: Yes


Constitutional: No: Symptoms Reported


HEENTM: No: Symptoms Reported


Respiratory: Yes: See HPI, Shortness of Breath, SOB with Exertion, SOB at Rest.

  No: Productive cough


Cardiac (ROS): No: Symptoms Reported


ABD/GI: No: Symptoms Reported


: No: Symptoms Reported


Musculoskeletal: No: Symptoms Reported


Integumentary: No: Symptoms Reported


Neurological: No: Symptoms reported





*Physical Exam





- Vital Signs


 Last Vital Signs











Temp Pulse Resp BP Pulse Ox


 


 100.4 F H  142 H  18   141/70   98 


 


 01/23/18 05:33  01/23/18 05:33  01/23/18 05:33  01/23/18 05:33  01/23/18 05:33














- Physical Exam


General Appearance: Yes: Nourished, Appropriately Dressed.  No: Apparent 

Distress


HEENT: positive: EOMI


Respiratory/Chest: positive: Crackles, Rhonchi.  negative: Chest Tender


Cardiovascular: positive: Regular Rhythm, Tachycardia


Gastrointestinal/Abdominal: positive: Normal Bowel Sounds, Flat, Soft.  negative

: Tender


Musculoskeletal: positive: Normal Inspection.  negative: CVA Tenderness


Extremity: positive: Normal Capillary Refill, Normal Inspection, Normal Range 

of Motion, Pedal Edema, Swelling, Other (hematomas left leg)


Integumentary: positive: Normal Color, Dry, Diaphoresis





ED Treatment Course





- RADIOLOGY


Radiology Studies Ordered: 














 Category Date Time Status


 


 CHEST X-RAY PORTABLE* [RAD] [facility]  01/23/18 05:38 Taken














Medical Decision Making





- Medical Decision Making





01/23/18 06:11


EKG: Sinus tachycardia with occasional premature ventricular complexes. 

Biatrial enlargement. Nonspecific st and t abnormality. 


01/23/18 06:14


Septic workup. 





*DC/Admit/Observation/Transfer





- Referrals


Referrals: 


Alonso Gonzalez MD [Primary Care Provider] - 





- Patient Instructions





- Post Discharge Activity

## 2018-01-23 NOTE — CONSULT
Consultation: 


REQUESTING PROVIDER:





CONSULT REQUEST: We have been asked to medically evaluate this patient for 

acute respiratory failure (intubated).





HISTORY OF PRESENT ILLNESS:





77F with PMHx of CHF, HTN, anemia, severe COPD ( with home oxygen) and recent 

Left femur fracture (surgery on 12/27, discharged on 1/05)  presenting with SOB 

x1 day. Pt was brought in by ambulance yesterday, given 2 albuterol tx, started 

on nonbreather with poor response, and now intubated and sedated.





REVIEW OF SYSTEMS: Unable to assess


CONSTITUTIONAL: 


Absent:  fever, chills, diaphoresis, generalized weakness, malaise, loss of 

appetite, weight change


HEENT: 


Absent:  rhinorrhea, nasal congestion, throat pain, throat swelling, difficulty 

swallowing, mouth swelling, ear pain, eye pain, visual changes


CARDIOVASCULAR: 


Absent: chest pain, syncope, palpitations, irregular heart rate, lightheadedness

, peripheral edema


RESPIRATORY: 


Absent: cough, shortness of breath, dyspnea with exertion, orthopnea, wheezing, 

stridor, hemoptysis


GASTROINTESTINAL:


Absent: abdominal pain, abdominal distension, nausea, vomiting, diarrhea, 

constipation, melena, hematochezia


GENITOURINARY: 


Absent: dysuria, frequency, urgency, hesitancy, hematuria, flank pain, genital 

pain


MUSCULOSKELETAL: 


Absent: myalgia, arthralgia, joint swelling, back pain, neck pain


SKIN: 


Absent: rash, itching, pallor


HEMATOLOGIC/IMMUNOLOGIC: 


Absent: easy bleeding, easy bruising, lymphadenopathy, frequent infections


ENDOCRINE:


Absent: unexplained weight gain, unexplained weight loss, heat intolerance, 

cold intolerance


NEUROLOGIC: 


Absent: headache, focal weakness or paresthesias, dizziness, unsteady gait, 

seizure, mental status changes, bladder or bowel incontinence


PSYCHIATRIC: 


Absent: anxiety, depression, suicidal or homicidal ideation, hallucinations.





PHYSICAL EXAMINATION





 Vital Signs - 24 hr











  01/23/18 01/23/18 01/23/18





  05:33 06:09 06:30


 


Temperature 100.4 F H  


 


Pulse Rate 142 H  


 


Pulse Rate [   





Apical]   


 


Respiratory 18  





Rate   


 


Blood Pressure 141/70  


 


Blood Pressure   





[Right Arm]   


 


O2 Sat by Pulse 98 98 98





Oximetry (%)   














  01/23/18 01/23/18 01/23/18





  07:30 08:00 08:16


 


Temperature  100.4 F H 100.4 F H


 


Pulse Rate   


 


Pulse Rate [  130 H 





Apical]   


 


Respiratory  30 H 





Rate   


 


Blood Pressure   


 


Blood Pressure  86/51 





[Right Arm]   


 


O2 Sat by Pulse 100 91 L 





Oximetry (%)   














  01/23/18 01/23/18 01/23/18





  08:17 08:34 09:16


 


Temperature   


 


Pulse Rate   


 


Pulse Rate [ 123 H  120 H





Apical]   


 


Respiratory 30 H  





Rate   


 


Blood Pressure   


 


Blood Pressure 94/57  93/67





[Right Arm]   


 


O2 Sat by Pulse  98 





Oximetry (%)   














  01/23/18 01/23/18 01/23/18





  09:20 09:26 11:24


 


Temperature  100.3 F H 


 


Pulse Rate   


 


Pulse Rate [  117 H 132 H





Apical]   


 


Respiratory  23 30 H





Rate   


 


Blood Pressure   


 


Blood Pressure  106/64 121/65





[Right Arm]   


 


O2 Sat by Pulse 96 99 98





Oximetry (%)   














  01/23/18 01/23/18 01/23/18





  11:37 11:39 11:40


 


Temperature   


 


Pulse Rate   


 


Pulse Rate [ 127 H 120 H 





Apical]   


 


Respiratory 30 H  16





Rate   


 


Blood Pressure   


 


Blood Pressure 117/83 92/61 





[Right Arm]   


 


O2 Sat by Pulse 100  





Oximetry (%)   














  01/23/18 01/23/18 01/23/18





  11:50 11:58 12:15


 


Temperature   


 


Pulse Rate   


 


Pulse Rate [ 110 H  109 H





Apical]   


 


Respiratory  16 





Rate   


 


Blood Pressure   


 


Blood Pressure 94/60  109/60





[Right Arm]   


 


O2 Sat by Pulse   





Oximetry (%)   














  01/23/18 01/23/18 01/23/18





  12:28 12:40 12:52


 


Temperature   


 


Pulse Rate   


 


Pulse Rate [ 110 H 113 H 114 H





Apical]   


 


Respiratory 16 16 16





Rate   


 


Blood Pressure   


 


Blood Pressure 83/55 96/64 113/69





[Right Arm]   


 


O2 Sat by Pulse 100  100





Oximetry (%)   














  01/23/18 01/23/18 01/23/18





  14:10 14:52 15:27


 


Temperature   


 


Pulse Rate 85  


 


Pulse Rate [  117 H 119 H





Apical]   


 


Respiratory 16 16 18





Rate   


 


Blood Pressure   


 


Blood Pressure  97/52 102/58





[Right Arm]   


 


O2 Sat by Pulse 98 100 100





Oximetry (%)   














  01/23/18 01/23/18





  15:46 15:52


 


Temperature  


 


Pulse Rate  


 


Pulse Rate [  





Apical]  


 


Respiratory 16 16





Rate  


 


Blood Pressure  


 


Blood Pressure  





[Right Arm]  


 


O2 Sat by Pulse 100 





Oximetry (%)  














GENERAL: Awake, alert, and fully oriented, in no acute distress.


HEAD: Normal with no signs of trauma.


EYES: Pupils equal, round and reactive to light, extraocular movements intact, 

sclera anicteric, conjunctiva clear. No lid lag.


EARS, NOSE, THROAT: Ears normal, nares patent, oropharynx clear without 

exudates. Moist mucous membranes.


NECK: Normal range of motion, supple without lymphadenopathy, JVD, or masses.


LUNGS: Breath sounds equal, clear to auscultation bilaterally. No wheezes, and 

no crackles. No accessory muscle use.


HEART: Regular rate and rhythm, normal S1 and S2 without murmur, rub or gallop.


ABDOMEN: Soft, nontender, not distended, normoactive bowel sounds, no guarding, 

no rebound, no masses.  No hepatomegaly or splenomegaly. 


MUSCULOSKELETAL: Normal range of motion at all joints. No bony deformities or 

tenderness. No CVA tenderness.


UPPER EXTREMITIES: 2+ pulses, warm, well-perfused. No cyanosis. No clubbing. 

Cap refill <2 seconds. No peripheral edema.


LOWER EXTREMITIES: 2+ pulses, warm, well-perfused. No calf tenderness. No 

peripheral edema. 


NEUROLOGICAL:  Cranial nerves II-XII intact. Normal speech. Normal gait.


PSYCHIATRIC: Cooperative. Good eye contact. Appropriate mood and affect.


SKIN: Warm, dry, normal turgor, no rashes or lesions noted. 











 Laboratory Results - last 24 hr











  01/23/18 01/23/18 01/23/18





  05:55 05:55 05:55


 


WBC  10.3 H  


 


RBC  4.26  


 


Hgb  11.3  D  


 


Hct  35.7  D  


 


MCV  83.8  


 


MCH  26.6  


 


MCHC  31.7 L  


 


RDW  20.3 H D  


 


Plt Count  209  


 


MPV  8.6  


 


Neutrophils %  97.7 H  


 


Lymphocytes %  0.9 L  


 


Monocytes %  0.7 L D  


 


Eosinophils %  0.5  


 


Basophils %  0.2  


 


PT with INR    10.80


 


INR    0.96


 


PTT (Actin FS)    26.7 L


 


Anticoagulation Therapy   


 


Puncture Site   


 


ABG pH   


 


ABG pCO2 at Pt Temp   


 


ABG pO2 at Pt Temp   


 


ABG HCO3   


 


ABG O2 Sat (Measured)   


 


ABG O2 Content   


 


ABG Base Excess   


 


Alexy Test   


 


VBG pH   


 


POC VBG pCO2   


 


POC VBG pO2   


 


Mixed VBG HCO3   


 


Carboxyhemoglobin   


 


O2 Delivery Device   


 


Oxygen Flow Rate   


 


Vent Mode   


 


Vent Rate   


 


Mechanical Rate   


 


PEEP   


 


Pressure Support Vent   


 


Sodium   


 


Potassium   


 


Chloride   


 


Carbon Dioxide   


 


Anion Gap   


 


BUN   


 


Creatinine   


 


Creat Clearance w eGFR   


 


Random Glucose   


 


Lactic Acid   


 


Calcium   


 


Phosphorus   3.5 


 


Magnesium   1.8 


 


Total Bilirubin   


 


AST   


 


ALT   


 


Alkaline Phosphatase   


 


Creatine Kinase   


 


Troponin I   


 


B-Natriuretic Peptide   


 


Total Protein   


 


Albumin   


 


Blood Type   


 


Antibody Screen   














  01/23/18 01/23/18 01/23/18





  05:55 05:55 05:55


 


WBC   


 


RBC   


 


Hgb   


 


Hct   


 


MCV   


 


MCH   


 


MCHC   


 


RDW   


 


Plt Count   


 


MPV   


 


Neutrophils %   


 


Lymphocytes %   


 


Monocytes %   


 


Eosinophils %   


 


Basophils %   


 


PT with INR   


 


INR   


 


PTT (Actin FS)   


 


Anticoagulation Therapy   


 


Puncture Site   


 


ABG pH   


 


ABG pCO2 at Pt Temp   


 


ABG pO2 at Pt Temp   


 


ABG HCO3   


 


ABG O2 Sat (Measured)   


 


ABG O2 Content   


 


ABG Base Excess   


 


Alexy Test   


 


VBG pH  7.31 L  


 


POC VBG pCO2  72.4 H*  


 


POC VBG pO2  117.0 H  


 


Mixed VBG HCO3  35.6 H  


 


Carboxyhemoglobin   


 


O2 Delivery Device   


 


Oxygen Flow Rate   


 


Vent Mode   


 


Vent Rate   


 


Mechanical Rate   


 


PEEP   


 


Pressure Support Vent   


 


Sodium   138 


 


Potassium   3.6 


 


Chloride   94 L 


 


Carbon Dioxide   37 H 


 


Anion Gap   7 L 


 


BUN   13 


 


Creatinine   0.8 


 


Creat Clearance w eGFR   > 60 


 


Random Glucose   144 H 


 


Lactic Acid    1.9


 


Calcium   8.1 L 


 


Phosphorus   


 


Magnesium   


 


Total Bilirubin   0.4  D 


 


AST   24 


 


ALT   52 


 


Alkaline Phosphatase   195 H 


 


Creatine Kinase   24 L 


 


Troponin I   < 0.02 


 


B-Natriuretic Peptide   398.34 


 


Total Protein   6.5 


 


Albumin   3.1 L 


 


Blood Type   


 


Antibody Screen   














  01/23/18 01/23/18 01/23/18





  05:55 06:09 08:30


 


WBC   


 


RBC   


 


Hgb   


 


Hct   


 


MCV   


 


MCH   


 


MCHC   


 


RDW   


 


Plt Count   


 


MPV   


 


Neutrophils %   


 


Lymphocytes %   


 


Monocytes %   


 


Eosinophils %   


 


Basophils %   


 


PT with INR   


 


INR   


 


PTT (Actin FS)   


 


Anticoagulation Therapy    No Result Required.


 


Puncture Site    Left radial


 


ABG pH    7.31 L


 


ABG pCO2 at Pt Temp    68.9 H*


 


ABG pO2 at Pt Temp    71.0  D


 


ABG HCO3    33.7 H


 


ABG O2 Sat (Measured)    92.9


 


ABG O2 Content    14.6 L


 


ABG Base Excess    6.0 H


 


Alexy Test    Positive


 


VBG pH   


 


POC VBG pCO2   


 


POC VBG pO2   


 


Mixed VBG HCO3   


 


Carboxyhemoglobin    2.0


 


O2 Delivery Device    No Result Required.


 


Oxygen Flow Rate    Yes


 


Vent Mode    No Result Required.


 


Vent Rate    No Result Required.


 


Mechanical Rate    No Result Required.


 


PEEP   


 


Pressure Support Vent    No Result Required.


 


Sodium   


 


Potassium   


 


Chloride   


 


Carbon Dioxide   


 


Anion Gap   


 


BUN   


 


Creatinine   


 


Creat Clearance w eGFR   


 


Random Glucose   


 


Lactic Acid   


 


Calcium   


 


Phosphorus   


 


Magnesium   


 


Total Bilirubin   


 


AST   


 


ALT   


 


Alkaline Phosphatase   


 


Creatine Kinase   


 


Troponin I   


 


B-Natriuretic Peptide   Cancelled 


 


Total Protein   


 


Albumin   


 


Blood Type  O POSITIVE  


 


Antibody Screen  Negative  














  01/23/18





  14:41


 


WBC 


 


RBC 


 


Hgb 


 


Hct 


 


MCV 


 


MCH 


 


MCHC 


 


RDW 


 


Plt Count 


 


MPV 


 


Neutrophils % 


 


Lymphocytes % 


 


Monocytes % 


 


Eosinophils % 


 


Basophils % 


 


PT with INR 


 


INR 


 


PTT (Actin FS) 


 


Anticoagulation Therapy  No Result Required.


 


Puncture Site  Left radial


 


ABG pH  7.30 L


 


ABG pCO2 at Pt Temp  58.3 H


 


ABG pO2 at Pt Temp  97.9  D


 


ABG HCO3  27.6 H


 


ABG O2 Sat (Measured)  97.0


 


ABG O2 Content  14.8 L


 


ABG Base Excess  0.8


 


Alexy Test  Positive


 


VBG pH 


 


POC VBG pCO2 


 


POC VBG pO2 


 


Mixed VBG HCO3 


 


Carboxyhemoglobin 


 


O2 Delivery Device  Mech vent


 


Oxygen Flow Rate  50


 


Vent Mode  A/c


 


Vent Rate  16


 


Mechanical Rate  No Result Required.


 


PEEP  5.0


 


Pressure Support Vent  400


 


Sodium 


 


Potassium 


 


Chloride 


 


Carbon Dioxide 


 


Anion Gap 


 


BUN 


 


Creatinine 


 


Creat Clearance w eGFR 


 


Random Glucose 


 


Lactic Acid 


 


Calcium 


 


Phosphorus 


 


Magnesium 


 


Total Bilirubin 


 


AST 


 


ALT 


 


Alkaline Phosphatase 


 


Creatine Kinase 


 


Troponin I 


 


B-Natriuretic Peptide 


 


Total Protein 


 


Albumin 


 


Blood Type 


 


Antibody Screen 








Active Medications











Generic Name Dose Route Start Last Admin





  Trade Name Freq  PRN Reason Stop Dose Admin


 


Acetaminophen  325 mg  01/23/18 13:34  





  Tylenol Suppository -  MN   





  Q4H PRN   





  FEVER   


 


Chlorhexidine Gluconate  1 applic  01/23/18 22:00  





  Hibiclens For Decolonization -  TP   





  HS ELI   


 


Enoxaparin Sodium  40 mg  01/24/18 10:00  





  Lovenox -  SQ   





  DAILY ELI   


 


Midazolam HCl 100 mg/ Sodium  100 mls @ 2 mls/hr  01/23/18 12:00  01/23/18 14:35





  Chloride  IVPB   4 mg/hr





  TITR ELI   4 mls/hr





  Protocol   Titration





  2 MG/HR   


 


Sodium Chloride  1,000 mls @ 42 mls/hr  01/23/18 13:45  01/23/18 14:28





  Normal Saline -  IV   42 mls/hr





  ASDIR LEI   Administration


 


Latanoprost  1 drop  01/23/18 22:00  





  Xalatan 0.005% Eye Drops -  OU   





  HS ELI   


 


Methylprednisolone Sodium Succinate  40 mg  01/23/18 15:00  01/23/18 16:00





  Solu-Medrol -  IVPUSH   40 mg





  Q6H-IV ELI   Administration


 


Mupirocin  1 applic  01/23/18 22:00  





  Bactroban Ointment (For Decolonization) -  NS  01/28/18 21:59  





  BID ELI   


 


Pantoprazole Sodium  40 mg  01/24/18 10:00  





  Protonix Iv  IVPUSH   





  DAILY [facility]   











ASSESSMENT/PLAN:





Dispo: We will continue to follow the patient. Thank you for this consultative 

opportunity.

## 2018-01-24 LAB
ALBUMIN SERPL-MCNC: 2 G/DL (ref 3.4–5)
ALP SERPL-CCNC: 104 U/L (ref 45–117)
ALT SERPL-CCNC: 77 U/L (ref 12–78)
ANION GAP SERPL CALC-SCNC: 9 MMOL/L (ref 8–16)
AST SERPL-CCNC: 50 U/L (ref 15–37)
BASOPHILS # BLD: 0.1 % (ref 0–2)
BILIRUB SERPL-MCNC: 0.4 MG/DL (ref 0.2–1)
BUN SERPL-MCNC: 24 MG/DL (ref 7–18)
CALCIUM SERPL-MCNC: 5.9 MG/DL (ref 8.5–10.1)
CHLORIDE SERPL-SCNC: 108 MMOL/L (ref 98–107)
CO2 SERPL-SCNC: 28 MMOL/L (ref 21–32)
CREAT SERPL-MCNC: 0.7 MG/DL (ref 0.55–1.02)
DEPRECATED RDW RBC AUTO: 21 % (ref 11.6–15.6)
EOSINOPHIL # BLD: 0 % (ref 0–4.5)
GLUCOSE SERPL-MCNC: 104 MG/DL (ref 74–106)
HCT VFR BLD CALC: 30.3 % (ref 32.4–45.2)
HGB BLD-MCNC: 9.2 GM/DL (ref 10.7–15.3)
LYMPHOCYTES # BLD: 0.7 % (ref 8–40)
MAGNESIUM SERPL-MCNC: 1.9 MG/DL (ref 1.8–2.4)
MCH RBC QN AUTO: 25.5 PG (ref 25.7–33.7)
MCHC RBC AUTO-ENTMCNC: 30.5 G/DL (ref 32–36)
MCV RBC: 83.7 FL (ref 80–96)
MONOCYTES # BLD AUTO: 3.6 % (ref 3.8–10.2)
NEUTROPHILS # BLD: 95.6 % (ref 42.8–82.8)
PHOSPHATE SERPL-MCNC: 3.1 MG/DL (ref 2.5–4.9)
PLATELET # BLD AUTO: 130 K/MM3 (ref 134–434)
PMV BLD: 9 FL (ref 7.5–11.1)
POTASSIUM SERPLBLD-SCNC: 3.6 MMOL/L (ref 3.5–5.1)
PROT SERPL-MCNC: 4.5 G/DL (ref 6.4–8.2)
RBC # BLD AUTO: 3.62 M/MM3 (ref 3.6–5.2)
SODIUM SERPL-SCNC: 145 MMOL/L (ref 136–145)
WBC # BLD AUTO: 15.3 K/MM3 (ref 4–10)

## 2018-01-24 RX ADMIN — METHYLPREDNISOLONE SODIUM SUCCINATE SCH MG: 40 INJECTION, POWDER, FOR SOLUTION INTRAMUSCULAR; INTRAVENOUS at 21:15

## 2018-01-24 RX ADMIN — ENOXAPARIN SODIUM SCH MG: 40 INJECTION SUBCUTANEOUS at 09:06

## 2018-01-24 RX ADMIN — MUPIROCIN SCH APPLIC: 20 OINTMENT TOPICAL at 11:04

## 2018-01-24 RX ADMIN — OSELTAMIVIR PHOSPHATE SCH MG: 75 CAPSULE ORAL at 21:15

## 2018-01-24 RX ADMIN — METHYLPREDNISOLONE SODIUM SUCCINATE SCH MG: 40 INJECTION, POWDER, FOR SOLUTION INTRAMUSCULAR; INTRAVENOUS at 15:55

## 2018-01-24 RX ADMIN — CHLORHEXIDINE GLUCONATE SCH APPLIC: 213 SOLUTION TOPICAL at 21:15

## 2018-01-24 RX ADMIN — PIPERACILLIN SODIUM,TAZOBACTAM SODIUM SCH MLS/HR: 3; .375 INJECTION, POWDER, FOR SOLUTION INTRAVENOUS at 17:18

## 2018-01-24 RX ADMIN — METHYLPREDNISOLONE SODIUM SUCCINATE SCH MG: 40 INJECTION, POWDER, FOR SOLUTION INTRAMUSCULAR; INTRAVENOUS at 02:20

## 2018-01-24 RX ADMIN — OSELTAMIVIR PHOSPHATE SCH MG: 75 CAPSULE ORAL at 13:12

## 2018-01-24 RX ADMIN — PANTOPRAZOLE SODIUM SCH MG: 40 INJECTION, POWDER, FOR SOLUTION INTRAVENOUS at 09:05

## 2018-01-24 RX ADMIN — MUPIROCIN SCH APPLIC: 20 OINTMENT TOPICAL at 21:15

## 2018-01-24 RX ADMIN — METHYLPREDNISOLONE SODIUM SUCCINATE SCH MG: 40 INJECTION, POWDER, FOR SOLUTION INTRAMUSCULAR; INTRAVENOUS at 09:06

## 2018-01-24 NOTE — CON.ID
Consult


Consult Specialty:: infectious diseases


Referred by:: 


Reason for Consultation:: pneumonia,resp failure uti,sepsis





- History of Present Illness


History of Present Illness: 





77 year old female who presents from the SNF with acute SOB. Patient has just 

been extubated.


Patient was intubated  secondary to hypercapneic respiratory failure in the ER 

physician. Patient was recently discharged to SNF after hip replacement that 

was complicated by COPD exacerbation. 


patient came in with sob


patient was worked up 


currently the patient has been spiking fevers and an infiltrate is seen in the 

xray


patient feels better 





- History Source


History Provided By: Patient, Medical Record


Limitations to Obtaining History: Poor Historian





- Past Medical History


Cardio/Vascular: Yes: HTN


Pulmonary: Yes: COPD, Other (oxygen at home)


Gastrointestinal: Yes: Other (History of  ulcer disease diagnosed 3 years ago 

when patient presented with anemia)


Renal/: Yes: UTI


Musculoskeletal: Yes: Osteoarthritis





- Past Surgical History


Past Surgical History: Yes: None





- Alcohol/Substance Use


Hx Alcohol Use: No


History of Substance Use: reports: None





- Smoking History


Smoking history: Current every day smoker


Have you smoked in the past 12 months: No


Aproximately how many cigarettes per day: 0


If you are a former smoker, when did you quit?: Over 15 years ago





- Social History


ADL: Independent


Occupation: Retired dental , , 2 children


History of Recent Travel: No





Home Medications





- Allergies


Allergies/Adverse Reactions: 


 Allergies











Allergy/AdvReac Type Severity Reaction Status Date / Time


 


No Known Allergies Allergy   Verified 01/23/18 05:33














- Home Medications


Home Medications: 


Ambulatory Orders





Fluticasone/Salmeterol [Advair 250-50 Diskus] 1 each IH BID 06/14/12 


Tiotropium Bromide [Spiriva] 18 mcg IH DAILY 06/14/12 


Montelukast Na [Singulair -] 10 mg PO HS #0 tablet 06/19/12 


Multivitamins [Multivit ([facility] Formulary)] 1 udtab PO DAILY #0 tab 06/19/12 


Ferrous Sulfate [Feosol] 325 mg PO TID 09/17/12 


Calcium Carbonate/Vitamin D3 [Calcium 600-Vit D3 200 Tablet] 1 each PO DAILY 07/ 06/13 


Docosahexanoic Acid/Epa [Fish Oil Softgel] 1 each PO DAILY 07/06/13 


Potassium Chloride [K-Dur] 20 meq PO DAILY #0 tab.er.prt 07/06/13 


Alendronate Sodium [Fosamax] 35 mg PO COSTA 05/01/16 


Atorvastatin Ca [Lipitor] 10 mg PO HS 05/01/16 


Latanoprost 0.005% Eye Drops [Xalatan 0.005% Eye Drops -] 1 drop OU HS 05/01/16 


Diltiazem Cd [Cardizem Cd -] 120 mg PO BID  cap.cd.24h 01/05/18 


Enoxaparin [Lovenox -] 40 mg SQ DAILY  disp.syrin 01/05/18 


Loratadine [Claritin -] 10 mg PO DAILY  tablet 01/05/18 


Mineral Oil/Petrolat,Wht/Water [Eucerin (Small Jar) -] 1 applic TP DAILY  jar 01 /05/18 


Polyethylene Glycol 3350 [Miralax 119 gm Btl -] 17 gm PO BID  bottle 01/05/18 


Prednisone [Deltasone -] 10 mg PO DAILY  tablet 01/05/18 


Torsemide [Demadex -] 60 mg PO DAILY  tablet 01/05/18 


Acetaminophen [Tylenol .Regular Strength -] 650 mg PO Q6H PRN 01/23/18 


Albuterol 2.5/Ipratropium 0.5 [Duoneb -] 1 amp IH QSHIFT 01/23/18 


Ascorbic Acid [Vitamin C -] 500 mg PO TID 01/23/18 


Chlorpheniramine/Dextromethorp [Robitussin Long-Acting Liq] 15 ml PO Q6H 01/23/ 18 


Docusate Sodium [Colace -] 300 mg PO HS 01/23/18 


Menthol/Zinc Oxide [Calmoseptine Ointment] 71 gm TP QSHIFT 01/23/18 


Nystatin Oral Suspension - [Nystatin Oral Susp 373225 Units/5 ML -] 500,000 

units PO QID 01/23/18 


Omeprazole Magnesium [Prilosec Otc] 20 mg PO DAILY 01/23/18 


Sodium Chloride Nasal Spray [Ocean Spray Nasal Spray] 2 spray NS QSHIFT 01/23/ 18 











Family Disease History





- Family Disease History


Family Disease History: Heart Disease: Father, Other: Mother (Alzheimers 

Dementia)





Review of Systems





- Review of Systems


Constitutional: reports: No Symptoms


Eyes: reports: No Symptoms


HENT: reports: No Symptoms


Neck: reports: No Symptoms


Cardiovascular: reports: No Symptoms


Respiratory: reports: SOB, SOB on Exertion


Gastrointestinal: reports: No Symptoms


Genitourinary: reports: No Symptoms


Musculoskeletal: reports: No Symptoms


Integumentary: reports: No Symptoms


Neurological: reports: No Symptoms


Endocrine: reports: No Symptoms


Hematology/Lymphatic: reports: No Symptoms


Psychiatric: reports: No Symptoms





Physical Exam


Vital Signs: 


 Vital Signs











Temperature  101.1 F H  01/24/18 10:00


 


Pulse Rate  102 H  01/24/18 10:00


 


Respiratory Rate  16   01/24/18 14:51


 


Blood Pressure  92/61   01/24/18 10:00


 


O2 Sat by Pulse Oximetry (%)  99   01/24/18 15:00











Constitutional: Yes: No Distress, Calm


Eyes: Yes: Conjunctiva Clear


Neck: Yes: Supple, Trachea Midline


Cardiovascular: Yes: Regular Rate and Rhythm.  No: Pulse Irregular


Respiratory: Yes: Regular, Poor Air Entry (rt side)


Gastrointestinal: Yes: Normal Bowel Sounds, Soft


Musculoskeletal: Yes: WNL


Extremities: Yes: WNL


Neurological: Yes: Alert, Oriented


Psychiatric: Yes: Alert, Oriented


Labs: 


 CBC, BMP





 01/24/18 06:35 





 01/24/18 06:35 











Imaging





- Results


Chest X-ray: Report Reviewed, Image Reviewed


Cat Scan: Report Reviewed, Image Reviewed





Assessment/Plan








Problem List





- Problems


(1) Acute and chronic respiratory failure with hypercapnia


Code(s): J96.22 - ACUTE AND CHRONIC RESPIRATORY FAILURE WITH HYPERCAPNIA   





(2) COPD exacerbation


Code(s): J44.1 - CHRONIC OBSTRUCTIVE PULMONARY DISEASE W (ACUTE) EXACERBATION   





(3) CHF (congestive heart failure)


Code(s): I50.9 - HEART FAILURE, UNSPECIFIED   


Qualifiers: 


   Congestive heart failure type: diastolic   Congestive heart failure 

chronicity: chronic   Qualified Code(s): I50.32 - Chronic diastolic (congestive

) heart failure   





(4) Fracture, hip


Code(s): S72.009A - FRACTURE OF UNSP PART OF NECK OF UNSP FEMUR, INIT   


Qualifiers: 


   Encounter type: initial encounter   Fracture type: closed   Laterality: left

   Qualified Code(s): S72.002A - Fracture of unspecified part of neck of left 

femur, initial encounter for closed fracture   





(5) Hypertension


Code(s): I10 - ESSENTIAL (PRIMARY) HYPERTENSION   





6 pneumonia





7 leukocytosis





plan





patient receive abx





plan


will contiue zosyn


monitor for fevers


if fevers reculture


await for all reports


incentive bebo


will stop tamiflu





rest as per icu


and primary





cc time 45 min

## 2018-01-24 NOTE — PN
Physical Exam: 


SUBJECTIVE: Patient seen and examined in ICU


Patient intubated/sedated.


Vent settings: Rate 16//FIO2-40/peep 5








OBJECTIVE:





 Vital Signs











 Period  Temp  Pulse  Resp  BP Sys/Gonzalez  Pulse Ox


 


 Last 24 Hr  100 F-105 F    14-18  /  











GENERAL: The patient is Intubated/sedated


HEAD: Normal with no signs of trauma.


LUNGS: Breath sounds equal, intubated


HEART: Tachycardic, S1, S2 without murmur, rub or gallop.


ABDOMEN: Soft, nontender, nondistended, normoactive bowel sounds, no guarding, 

no 


rebound, no hepatosplenomegaly, no masses.


EXTREMITIES: 2+ pulses, warm, well-perfused, no edema. 


NEUROLOGICAL: Unable to assses 2/2 to intubation/sedation


SKIN: Warm, dry, normal turgor, no rashes or lesions noted














 Laboratory Results - last 24 hr











  01/23/18 01/23/18 01/23/18





  05:55 05:55 08:30


 


WBC   


 


RBC   


 


Hgb   


 


Hct   


 


MCV   


 


MCH   


 


MCHC   


 


RDW   


 


Plt Count   


 


MPV   


 


Neutrophils %   


 


Lymphocytes %   


 


Monocytes %   


 


Eosinophils %   


 


Basophils %   


 


Anticoagulation Therapy   


 


Puncture Site   


 


ABG pH   


 


ABG pCO2 at Pt Temp   


 


ABG pO2 at Pt Temp   


 


ABG HCO3   


 


ABG O2 Sat (Measured)   


 


ABG O2 Content   


 


ABG Base Excess   


 


Alexy Test   


 


VBG pH  7.31 L  


 


POC VBG pCO2  72.4 H*  


 


POC VBG pO2  117.0 H  


 


Mixed VBG HCO3  35.6 H  


 


Methemoglobin    0.7


 


O2 Delivery Device   


 


Oxygen Flow Rate   


 


Vent Mode   


 


Vent Rate   


 


Mechanical Rate   


 


PEEP   


 


Pressure Support Vent   


 


Sodium   


 


Potassium   


 


Chloride   


 


Carbon Dioxide   


 


Anion Gap   


 


BUN   


 


Creatinine   


 


Creat Clearance w eGFR   


 


Random Glucose   


 


Lactic Acid   1.9 


 


Calcium   


 


Phosphorus   


 


Magnesium   


 


Total Bilirubin   


 


AST   


 


ALT   


 


Alkaline Phosphatase   


 


Total Protein   


 


Albumin   


 


Urine Color   


 


Urine Appearance   


 


Urine pH   


 


Ur Specific Gravity   


 


Urine Protein   


 


Urine Glucose (UA)   


 


Urine Ketones   


 


Urine Blood   


 


Urine Nitrite   


 


Urine Bilirubin   


 


Urine Urobilinogen   


 


Ur Leukocyte Esterase   


 


Urine WBC (Auto)   


 


Urine RBC (Auto)   


 


Ur Epithelial Cells   


 


Urine Bacteria   














  01/23/18 01/23/18 01/24/18





  14:41 16:26 06:35


 


WBC    15.3 H D


 


RBC    3.62


 


Hgb    9.2 L D


 


Hct    30.3 L D


 


MCV    83.7


 


MCH    25.5 L


 


MCHC    30.5 L


 


RDW    21.0 H


 


Plt Count    130 L D


 


MPV    9.0


 


Neutrophils %    95.6 H


 


Lymphocytes %    0.7 L


 


Monocytes %    3.6 L D


 


Eosinophils %    0.0  D


 


Basophils %    0.1


 


Anticoagulation Therapy  No Result Required.  


 


Puncture Site  Left radial  


 


ABG pH  7.30 L  


 


ABG pCO2 at Pt Temp  58.3 H  


 


ABG pO2 at Pt Temp  97.9  D  


 


ABG HCO3  27.6 H  


 


ABG O2 Sat (Measured)  97.0  


 


ABG O2 Content  14.8 L  


 


ABG Base Excess  0.8  


 


Alexy Test  Positive  


 


VBG pH   


 


POC VBG pCO2   


 


POC VBG pO2   


 


Mixed VBG HCO3   


 


Methemoglobin   


 


O2 Delivery Device  Mech vent  


 


Oxygen Flow Rate  50  


 


Vent Mode  A/c  


 


Vent Rate  16  


 


Mechanical Rate  No Result Required.  


 


PEEP  5.0  


 


Pressure Support Vent  400  


 


Sodium   


 


Potassium   


 


Chloride   


 


Carbon Dioxide   


 


Anion Gap   


 


BUN   


 


Creatinine   


 


Creat Clearance w eGFR   


 


Random Glucose   


 


Lactic Acid   


 


Calcium   


 


Phosphorus   


 


Magnesium   


 


Total Bilirubin   


 


AST   


 


ALT   


 


Alkaline Phosphatase   


 


Total Protein   


 


Albumin   


 


Urine Color   Ltyellow 


 


Urine Appearance   Slcloudy 


 


Urine pH   6.0 


 


Ur Specific Gravity   1.030 


 


Urine Protein   Negative 


 


Urine Glucose (UA)   2+ H 


 


Urine Ketones   Negative 


 


Urine Blood   Negative 


 


Urine Nitrite   Negative 


 


Urine Bilirubin   Negative 


 


Urine Urobilinogen   Negative 


 


Ur Leukocyte Esterase   3+ H 


 


Urine WBC (Auto)   41 


 


Urine RBC (Auto)   4 


 


Ur Epithelial Cells   Rare 


 


Urine Bacteria   Moderate 














  01/24/18





  06:35


 


WBC 


 


RBC 


 


Hgb 


 


Hct 


 


MCV 


 


MCH 


 


MCHC 


 


RDW 


 


Plt Count 


 


MPV 


 


Neutrophils % 


 


Lymphocytes % 


 


Monocytes % 


 


Eosinophils % 


 


Basophils % 


 


Anticoagulation Therapy 


 


Puncture Site 


 


ABG pH 


 


ABG pCO2 at Pt Temp 


 


ABG pO2 at Pt Temp 


 


ABG HCO3 


 


ABG O2 Sat (Measured) 


 


ABG O2 Content 


 


ABG Base Excess 


 


Alexy Test 


 


VBG pH 


 


POC VBG pCO2 


 


POC VBG pO2 


 


Mixed VBG HCO3 


 


Methemoglobin 


 


O2 Delivery Device 


 


Oxygen Flow Rate 


 


Vent Mode 


 


Vent Rate 


 


Mechanical Rate 


 


PEEP 


 


Pressure Support Vent 


 


Sodium  145


 


Potassium  3.6


 


Chloride  108 H


 


Carbon Dioxide  28


 


Anion Gap  9


 


BUN  24 H


 


Creatinine  0.7


 


Creat Clearance w eGFR  > 60


 


Random Glucose  104


 


Lactic Acid 


 


Calcium  5.9 L*


 


Phosphorus  3.1


 


Magnesium  1.9


 


Total Bilirubin  0.4


 


AST  50 H


 


ALT  77


 


Alkaline Phosphatase  104


 


Total Protein  4.5 L


 


Albumin  2.0 L


 


Urine Color 


 


Urine Appearance 


 


Urine pH 


 


Ur Specific Gravity 


 


Urine Protein 


 


Urine Glucose (UA) 


 


Urine Ketones 


 


Urine Blood 


 


Urine Nitrite 


 


Urine Bilirubin 


 


Urine Urobilinogen 


 


Ur Leukocyte Esterase 


 


Urine WBC (Auto) 


 


Urine RBC (Auto) 


 


Ur Epithelial Cells 


 


Urine Bacteria 








Active Medications











Generic Name Dose Route Start Last Admin





  Trade Name Freq  PRN Reason Stop Dose Admin


 


Acetaminophen  325 mg  01/23/18 13:34  





  Tylenol Suppository -  MD   





  Q4H PRN   





  FEVER   


 


Chlorhexidine Gluconate  1 applic  01/23/18 22:00  01/23/18 22:46





  Hibiclens For Decolonization -  TP   1 applic





  HS ELI   Administration


 


Enoxaparin Sodium  40 mg  01/24/18 10:00  01/24/18 09:06





  Lovenox -  SQ   40 mg





  DAILY ELI   Administration


 


Midazolam HCl 100 mg/ Sodium  100 mls @ 2 mls/hr  01/23/18 12:00  01/24/18 02:24





  Chloride  IVPB   2 mg/hr





  TITR ELI   2 mls/hr





  Protocol   Titration





  2 MG/HR   


 


Sodium Chloride  1,000 mls @ 42 mls/hr  01/23/18 13:45  01/23/18 14:28





  Normal Saline -  IV   42 mls/hr





  ASDIR ELI   Administration


 


Latanoprost  1 drop  01/23/18 22:00  





  Xalatan 0.005% Eye Drops -  OU   





  HS ELI   


 


Methylprednisolone Sodium Succinate  40 mg  01/23/18 15:00  01/24/18 09:06





  Solu-Medrol -  IVPUSH   40 mg





  Q6H-IV ELI   Administration


 


Mupirocin  1 applic  01/23/18 22:00  01/24/18 11:04





  Bactroban Ointment (For Decolonization) -  NS  01/28/18 21:59  1 applic





  BID ELI   Administration


 


Oseltamivir Phosphate  75 mg  01/24/18 10:45  01/24/18 13:12





  Tamiflu -  PO  01/29/18 08:00  75 mg





  BID ELI   Administration


 


Pantoprazole Sodium  40 mg  01/24/18 10:00  01/24/18 09:05





  Protonix Iv  IVPUSH   40 mg





  DAILY ELI   Administration











ASSESSMENT/PLAN:


77 year old F with pmh of COPD admitted for acute hypoxic respiratory failure 

now intubated/sedated.





#Resp


Acute Hypoxic and Hypercapneic Respiratory Failure





-Continue versed


-Solumederol 40 q6


-Assess mental status to see if patient can be extubated


-Monitor blood gas





#ID


Pneumonia


r/o Influenza 


UTI





-Continue Tamiflu.


-Previously given Vanco/Zosyn


-Follow up cultures





#FEN/GI


-NS @ 42 cc/hr


-wnl


-Tube feeds, OG in place





#PPx


-Lovenox 40 sq daily


-Protonix 40 IV daily for GI ppx





#Dispo


Continue ICU level of care





Visit type





- Emergency Visit


Emergency Visit: Yes


ED Registration Date: 01/23/18


Care time: The patient presented to the Emergency Department on the above date 

and was hospitalized for further evaluation of their emergent condition.





- New Patient


This patient is new to me today: No





- Critical Care


Critical Care patient: Yes


Total Critical Care Time (in minutes): 40


Critical Care Statement: The care of this patient involved high complexity 

decision making to prevent further life threatening deterioration of the patient

's condition and/or to evaluate & treat vital organ system(s) failure or risk 

of failure.

## 2018-01-24 NOTE — EKG
Test Reason : 

Blood Pressure : ***/*** mmHG

Vent. Rate : 138 BPM     Atrial Rate : 138 BPM

   P-R Int : 142 ms          QRS Dur : 070 ms

    QT Int : 278 ms       P-R-T Axes : [phone] degrees

   QTc Int : 421 ms

 

SINUS TACHYCARDIA WITH OCCASIONAL PREMATURE VENTRICULAR COMPLEXES

BIATRIAL ENLARGEMENT

NONSPECIFIC ST AND T WAVE ABNORMALITY

ABNORMAL ECG

NO PREVIOUS ECGS AVAILABLE

Confirmed by STACY WEEMS MD ([id number]) on 1/24/2018 8:09:36 AM

 

Referred By:             Confirmed By:STACY WEEMS MD

## 2018-01-24 NOTE — PN
Progress Note, Physician


Chief Complaint: 


Unable to obtain, intubated and sedated





- Current Medication List


Current Medications: 


Active Medications





Acetaminophen (Tylenol Suppository -)  325 mg NM Q4H PRN


   PRN Reason: FEVER


Calcium Gluconate (Calcium Gluconate 10% -)  1,000 mg IVPB ONCE ONE


   Stop: 18 11:01


Chlorhexidine Gluconate (Hibiclens For Decolonization -)  1 applic TP HS [facility], [facility]


   Last Admin: 18 22:46 Dose:  1 applic


Enoxaparin Sodium (Lovenox -)  40 mg SQ DAILY [facility], [facility]


   Last Admin: 18 09:06 Dose:  40 mg


Midazolam HCl 100 mg/ Sodium (Chloride)  100 mls @ 2 mls/hr IVPB TITR ELI; 2 MG/

HR


   PRN Reason: Protocol


   Last Titration: 18 02:24 Dose:  2 mg/hr, 2 mls/hr


Sodium Chloride (Normal Saline -)  1,000 mls @ 42 mls/hr IV ASDIR [facility], [facility]


   Last Admin: 18 14:28 Dose:  42 mls/hr


Piperacillin Sod/Tazobactam (Sod 3.375 gm/ Dextrose)  100 mls @ 200 mls/hr IVPB 

ONCE ONE


   Stop: 18 11:29


Vancomycin HCl 1,000 mg/ (Dextrose)  250 mls @ 250 mls/hr IVPB ONCE ONE


   Stop: 18 11:59


Latanoprost (Xalatan 0.005% Eye Drops -)  1 drop OU HS [facility]


Methylprednisolone Sodium Succinate (Solu-Medrol -)  40 mg IVPUSH Q6H-IV [facility]


   Last Admin: 18 09:06 Dose:  40 mg


Mupirocin (Bactroban Ointment (For Decolonization) -)  1 applic NS BID [facility]


   Stop: 18 21:59


   Last Admin: 18 22:46 Dose:  1 applic


Oseltamivir Phosphate (Tamiflu -)  75 mg PO BID [facility]


   Stop: 18 08:00


Pantoprazole Sodium (Protonix Iv)  40 mg IVPUSH DAILY [facility]


   Last Admin: 18 09:05 Dose:  40 mg











- Objective


Vital Signs: 


 Vital Signs











Temperature  38.4 C H  18 10:00


 


Pulse Rate  102 H  18 10:00


 


Respiratory Rate  14   01/24/18 10:00


 


Blood Pressure  92/61   18 10:00


 


O2 Sat by Pulse Oximetry (%)  100   18 08:35











Constitutional: Yes: No Distress, Calm, Other (sedated)


Cardiovascular: Yes: Tachycardia.  No: Pulse Irregular, Gallop, Murmur, Rub


Respiratory: Yes: Regular, CTA Bilaterally, Intubated, Mechanically Ventilated.

  No: Rales, Rhonchi, Wheezes


Gastrointestinal: Yes: Normal Bowel Sounds, Soft.  No: Distention, Tenderness


Extremities: Yes: WNL


Edema: No


Labs: 


 CBC, BMP





 18 06:35 





 18 06:35 





 INR, PTT











INR  0.96  (0.82-1.09)   18  05:55    














Problem List





- Problems


(1) Acute and chronic respiratory failure with hypercapnia


Code(s): J96.22 - ACUTE AND CHRONIC RESPIRATORY FAILURE WITH HYPERCAPNIA   





(2) COPD exacerbation


Code(s): J44.1 - CHRONIC OBSTRUCTIVE PULMONARY DISEASE W (ACUTE) EXACERBATION   





(3) CHF (congestive heart failure)


Code(s): I50.9 - HEART FAILURE, UNSPECIFIED   


Qualifiers: 


   Congestive heart failure type: diastolic   Congestive heart failure 

chronicity: chronic   Qualified Code(s): I50.32 - Chronic diastolic (congestive

) heart failure   





(4) Fracture, hip


Code(s): S72.009A - FRACTURE OF UNSP PART OF NECK OF UNSP FEMUR, INIT   


Qualifiers: 


   Encounter type: initial encounter   Fracture type: closed   Laterality: left

   Qualified Code(s): S72.002A - Fracture of unspecified part of neck of left 

femur, initial encounter for closed fracture   





(5) Hypertension


Code(s): I10 - ESSENTIAL (PRIMARY) HYPERTENSION   





Assessment/Plan





(1) Acute and chronic respiratory failure with hypercapnia


Assessment/Plan: 


-patient appears septic


-case d/w pulmonary and ID


-continue vancomycin and zosyn for HCAP


-tamiflu added for possible influenza


-continue mechanical ventilation


Code(s): J96.22 - ACUTE AND CHRONIC RESPIRATORY FAILURE WITH HYPERCAPNIA   





(2) COPD exacerbation


Assessment/Plan: 


-causing hypercapneic respiratory failure


-intubated


-continue solumedrol


- d/w pulmonary


Code(s): J44.1 - CHRONIC OBSTRUCTIVE PULMONARY DISEASE W (ACUTE) EXACERBATION   





(3) CHF (congestive heart failure)


Assessment/Plan: 


-will hold torsemide at this time


-gentle hydration with close monitoring since intubated and npo


Code(s): I50.9 - HEART FAILURE, UNSPECIFIED   


Qualifiers: 


   Congestive heart failure type: diastolic   Congestive heart failure 

chronicity: chronic   Qualified Code(s): I50.32 - Chronic diastolic (congestive

) heart failure   





(4) Fracture, hip


Assessment/Plan: 


-reason for being at SNF


-continue lovenox for DVT PPx


Code(s): S72.009A - FRACTURE OF UNSP PART OF NECK OF UNSP FEMUR, INIT   


Qualifiers: 


   Encounter type: initial encounter   Fracture type: closed   Laterality: left

   Qualified Code(s): S72.002A - Fracture of unspecified part of neck of left 

femur, initial encounter for closed fracture   





(5) Hypertension


Assessment/Plan: 


-low to normal blood pressure


-hold antihypertensives


-gentle hydration 


-monitor for improvement


Code(s): I10 - ESSENTIAL (PRIMARY) HYPERTENSION   





(6) Hypocalcemia


-corrected for albumin, calcium still low


-calcium gluconate x1


-recheck in am





Assessment/Plan


37 minutes spent in critical care time with this patient

## 2018-01-24 NOTE — PN
Teaching Attending Note


Name of Resident: Matthias Santamaria





ATTENDING PHYSICIAN STATEMENT





I saw and evaluated the patient.


I reviewed the resident's note and discussed the case with the resident.


I agree with the resident's findings and plan as documented.








SUBJECTIVE:





Pt seen and examined in the ICU. Remains intubated, sedated. Febrile overnight. 

Vented on volume assist control with 50% FiO2, PEEP 5. Not on pressors.





OBJECTIVE:





 Last Vital Signs











Temp Pulse Resp BP Pulse Ox


 


 101.1 F H  102 H  16   92/61   100 


 


 01/24/18 10:00  01/24/18 10:00  01/24/18 11:27  01/24/18 10:00  01/24/18 08:35








 Intake & Output











 01/21/18 01/22/18 01/23/18 01/24/18





 23:59 23:59 23:59 23:59


 


Intake Total    440


 


Output Total   400 600


 


Balance   -400 -160


 


Weight   57.4 kg 








Gen:  intubated, sedated


Heart: tachycardic, regular


Lung: distant breath sounds


Abd: soft, nontender


Ext: no edema





 CBC, BMP





 01/24/18 06:35 





 01/24/18 06:35 





Active Medications





Acetaminophen (Tylenol Suppository -)  325 mg HI Q4H PRN


   PRN Reason: FEVER


Chlorhexidine Gluconate (Hibiclens For Decolonization -)  1 applic TP HS ELI


   Last Admin: 01/23/18 22:46 Dose:  1 applic


Enoxaparin Sodium (Lovenox -)  40 mg SQ DAILY ELI


   Last Admin: 01/24/18 09:06 Dose:  40 mg


Midazolam HCl 100 mg/ Sodium (Chloride)  100 mls @ 2 mls/hr IVPB TITR ELI; 2 MG/

HR


   PRN Reason: Protocol


   Last Titration: 01/24/18 02:24 Dose:  2 mg/hr, 2 mls/hr


Sodium Chloride (Normal Saline -)  1,000 mls @ 42 mls/hr IV ASDIR [facility]


   Last Admin: 01/23/18 14:28 Dose:  42 mls/hr


Latanoprost (Xalatan 0.005% Eye Drops -)  1 drop OU HS LEI


Methylprednisolone Sodium Succinate (Solu-Medrol -)  40 mg IVPUSH Q6H-IV ELI


   Last Admin: 01/24/18 09:06 Dose:  40 mg


Mupirocin (Bactroban Ointment (For Decolonization) -)  1 applic NS BID [facility]


   Stop: 01/28/18 21:59


   Last Admin: 01/24/18 11:04 Dose:  1 applic


Oseltamivir Phosphate (Tamiflu -)  75 mg PO BID [facility]


   Stop: 01/29/18 08:00


   Last Admin: 01/24/18 13:12 Dose:  75 mg


Pantoprazole Sodium (Protonix Iv)  40 mg IVPUSH DAILY [facility]


   Last Admin: 01/24/18 09:05 Dose:  40 mg








ASSESSMENT AND PLAN:


Acute Hypoxic and Hypercapneic Respiratory Failure


Pneumonia


r/o Influenza 


r/o UTI


Acute COPD Exacerbation


LV Diastolic Dysfunction


Pulmonary HTN





-  continue antibiotics, tamiflu


-  f/u cultures


-  agree with medrol


-  inhaled bronchodilators standing and PRN


-  monitor ABG


-  lighten sedation to assess mental status


-  spontaneous breathing trials as tolerated when mental status improved


-  enteral feeds if unable to extubate


-  DVT/GI prophylaxis


-  continue ICU monitoring











critical care time spent in reviewing chart, evaluating patient and formulating 

plan 35 min

## 2018-01-25 LAB
ANION GAP SERPL CALC-SCNC: 7 MMOL/L (ref 8–16)
ANISOCYTOSIS BLD QL: (no result)
BUN SERPL-MCNC: 18 MG/DL (ref 7–18)
CALCIUM SERPL-MCNC: 6.8 MG/DL (ref 8.5–10.1)
CHLORIDE SERPL-SCNC: 108 MMOL/L (ref 98–107)
CO2 SERPL-SCNC: 31 MMOL/L (ref 21–32)
CREAT SERPL-MCNC: 0.4 MG/DL (ref 0.55–1.02)
DEPRECATED RDW RBC AUTO: 20.9 % (ref 11.6–15.6)
GLUCOSE SERPL-MCNC: 104 MG/DL (ref 74–106)
HCT VFR BLD CALC: 34.5 % (ref 32.4–45.2)
HGB BLD-MCNC: 10.5 GM/DL (ref 10.7–15.3)
MACROCYTES BLD QL: 0
MAGNESIUM SERPL-MCNC: 2.5 MG/DL (ref 1.8–2.4)
MCH RBC QN AUTO: 25.8 PG (ref 25.7–33.7)
MCHC RBC AUTO-ENTMCNC: 30.3 G/DL (ref 32–36)
MCV RBC: 85.1 FL (ref 80–96)
PHOSPHATE SERPL-MCNC: 2.6 MG/DL (ref 2.5–4.9)
PLATELET # BLD AUTO: 139 K/MM3 (ref 134–434)
PLATELET BLD QL SMEAR: (no result)
PMV BLD: 9.3 FL (ref 7.5–11.1)
POTASSIUM SERPLBLD-SCNC: 3.9 MMOL/L (ref 3.5–5.1)
RBC # BLD AUTO: 4.05 M/MM3 (ref 3.6–5.2)
RBC MORPH BLD: YES
SODIUM SERPL-SCNC: 146 MMOL/L (ref 136–145)
WBC # BLD AUTO: 20.1 K/MM3 (ref 4–10)

## 2018-01-25 RX ADMIN — ALBUTEROL SULFATE PRN AMP: 2.5 SOLUTION RESPIRATORY (INHALATION) at 18:26

## 2018-01-25 RX ADMIN — ENOXAPARIN SODIUM SCH MG: 40 INJECTION SUBCUTANEOUS at 09:00

## 2018-01-25 RX ADMIN — PIPERACILLIN SODIUM,TAZOBACTAM SODIUM SCH MLS/HR: 3; .375 INJECTION, POWDER, FOR SOLUTION INTRAVENOUS at 17:04

## 2018-01-25 RX ADMIN — METHYLPREDNISOLONE SODIUM SUCCINATE SCH MG: 40 INJECTION, POWDER, FOR SOLUTION INTRAMUSCULAR; INTRAVENOUS at 14:49

## 2018-01-25 RX ADMIN — ATORVASTATIN CALCIUM SCH MG: 10 TABLET, FILM COATED ORAL at 22:27

## 2018-01-25 RX ADMIN — METHYLPREDNISOLONE SODIUM SUCCINATE SCH MG: 40 INJECTION, POWDER, FOR SOLUTION INTRAMUSCULAR; INTRAVENOUS at 22:26

## 2018-01-25 RX ADMIN — METHYLPREDNISOLONE SODIUM SUCCINATE SCH MG: 40 INJECTION, POWDER, FOR SOLUTION INTRAMUSCULAR; INTRAVENOUS at 02:02

## 2018-01-25 RX ADMIN — PIPERACILLIN SODIUM,TAZOBACTAM SODIUM SCH MLS/HR: 3; .375 INJECTION, POWDER, FOR SOLUTION INTRAVENOUS at 09:31

## 2018-01-25 RX ADMIN — FERROUS SULFATE TAB EC 324 MG (65 MG FE EQUIVALENT) SCH MG: 324 (65 FE) TABLET DELAYED RESPONSE at 22:27

## 2018-01-25 RX ADMIN — PANTOPRAZOLE SODIUM SCH MG: 40 INJECTION, POWDER, FOR SOLUTION INTRAVENOUS at 09:01

## 2018-01-25 RX ADMIN — MONTELUKAST SODIUM SCH MG: 10 TABLET, COATED ORAL at 22:27

## 2018-01-25 RX ADMIN — MUPIROCIN SCH APPLIC: 20 OINTMENT TOPICAL at 09:00

## 2018-01-25 RX ADMIN — METHYLPREDNISOLONE SODIUM SUCCINATE SCH MG: 40 INJECTION, POWDER, FOR SOLUTION INTRAMUSCULAR; INTRAVENOUS at 08:55

## 2018-01-25 RX ADMIN — LATANOPROST SCH DROP: 50 SOLUTION OPHTHALMIC at 22:31

## 2018-01-25 RX ADMIN — PIPERACILLIN SODIUM,TAZOBACTAM SODIUM SCH MLS/HR: 3; .375 INJECTION, POWDER, FOR SOLUTION INTRAVENOUS at 01:36

## 2018-01-25 NOTE — PN
Progress Note, Physician


Chief Complaint: 


Mrs Peter says she is feeling much better today. No cp or n/v. Still with 

some shortness of breath but improved.





- Current Medication List


Current Medications: 


Active Medications





Acetaminophen (Tylenol Suppository -)  325 mg [place] Q4H PRN


   PRN Reason: FEVER


Albuterol Sulfate (Ventolin 0.083% Nebulizer Soln -)  1 amp NEB Q4H PRN


   PRN Reason: SHORT OF BREATH/WHEEZING


Chlorhexidine Gluconate (Hibiclens For Decolonization -)  1 applic TP HS [facility]


   Last Admin: 01/24/18 21:15 Dose:  1 applic


Enoxaparin Sodium (Lovenox -)  40 mg SQ DAILY [facility]


   Last Admin: 01/25/18 09:00 Dose:  40 mg


Piperacillin Sod/Tazobactam (Sod 3.375 gm/ Dextrose)  100 mls @ 200 mls/hr IVPB 

Q8H-IV [facility]


   Last Admin: 01/25/18 09:31 Dose:  200 mls/hr


Latanoprost (Xalatan 0.005% Eye Drops -)  1 drop OU HS [facility]


   Last Admin: 01/24/18 21:45 Dose:  1 drop


Methylprednisolone Sodium Succinate (Solu-Medrol -)  40 mg IVPUSH Q6H-IV [facility]


   Last Admin: 01/25/18 08:55 Dose:  40 mg


Mupirocin (Bactroban Ointment (For Decolonization) -)  1 applic NS BID [facility]


   Stop: 01/28/18 21:59


   Last Admin: 01/25/18 09:00 Dose:  1 applic


Pantoprazole Sodium (Protonix Iv)  40 mg IVPUSH DAILY [facility]


   Last Admin: 01/25/18 09:01 Dose:  40 mg











- Objective


Vital Signs: 


 Vital Signs











Temperature  36.8 C   01/25/18 06:00


 


Pulse Rate  116 H  01/25/18 08:00


 


Respiratory Rate  20   01/25/18 08:00


 


Blood Pressure  118/74   01/25/18 08:00


 


O2 Sat by Pulse Oximetry (%)  96   01/25/18 08:05











Constitutional: Yes: Well Nourished, No Distress, Calm


Cardiovascular: Yes: Regular Rate and Rhythm.  No: Gallop, Murmur, Rub


Respiratory: Yes: Regular, On Nasal O2, Rhonchi.  No: CTA Bilaterally, Rales, 

Tachypnea, Wheezes


Gastrointestinal: Yes: Normal Bowel Sounds, Soft.  No: Distention, Tenderness


Extremities: Yes: WNL


Edema: No


Labs: 


 CBC, BMP





 01/25/18 05:27 





 01/25/18 05:27 





 INR, PTT











INR  0.96  (0.82-1.09)   01/23/18  05:55    














Problem List





- Problems


(1) RSV (respiratory syncytial virus pneumonia)


Code(s): J12.1 - RESPIRATORY SYNCYTIAL VIRUS PNEUMONIA   





(2) Acute and chronic respiratory failure with hypercapnia


Code(s): J96.22 - ACUTE AND CHRONIC RESPIRATORY FAILURE WITH HYPERCAPNIA   





(3) COPD exacerbation


Code(s): J44.1 - CHRONIC OBSTRUCTIVE PULMONARY DISEASE W (ACUTE) EXACERBATION   





(4) CHF (congestive heart failure)


Code(s): I50.9 - HEART FAILURE, UNSPECIFIED   


Qualifiers: 


   Congestive heart failure type: diastolic   Congestive heart failure 

chronicity: chronic   Qualified Code(s): I50.32 - Chronic diastolic (congestive

) heart failure   





(5) Fracture, hip


Code(s): S72.009A - FRACTURE OF UNSP PART OF NECK OF UNSP FEMUR, INIT   


Qualifiers: 


   Encounter type: initial encounter   Fracture type: closed   Laterality: left

   Qualified Code(s): S72.002A - Fracture of unspecified part of neck of left 

femur, initial encounter for closed fracture   





(6) Hypertension


Code(s): I10 - ESSENTIAL (PRIMARY) HYPERTENSION   





Assessment/Plan





(1) Acute and chronic respiratory failure with hypercapnia


Assessment/Plan: 


-patient with RSV pneumonia


-much improved, extubated


-Dr Kailash rodriguez, currently on zosyn


-may be able to de-escalate antibiotics soon


-continue current management


Code(s): J96.22 - ACUTE AND CHRONIC RESPIRATORY FAILURE WITH HYPERCAPNIA   





(2) COPD exacerbation


Assessment/Plan: 


-now extubated


-case d/w pulmonary


-continue solumedrol 40mg IV q6h


-de-escalate when improving


-placed on albuterol, restart spiriva


-will hold on inhaled corticosteroids at this time


Code(s): J44.1 - CHRONIC OBSTRUCTIVE PULMONARY DISEASE W (ACUTE) EXACERBATION   





(3) CHF (congestive heart failure)


Assessment/Plan: 


-will hold torsemide at this time


-plan to restart tomorrow


Code(s): I50.9 - HEART FAILURE, UNSPECIFIED   


Qualifiers: 


   Congestive heart failure type: diastolic   Congestive heart failure 

chronicity: chronic   Qualified Code(s): I50.32 - Chronic diastolic (congestive

) heart failure   





(4) Fracture, hip


Assessment/Plan: 


-reason for being at SNF


-continue lovenox for DVT PPx


Code(s): S72.009A - FRACTURE OF UNSP PART OF NECK OF UNSP FEMUR, INIT   


Qualifiers: 


   Encounter type: initial encounter   Fracture type: closed   Laterality: left

   Qualified Code(s): S72.002A - Fracture of unspecified part of neck of left 

femur, initial encounter for closed fracture   





(5) Hypertension


Assessment/Plan: 


-much improved


-restart diltiazem and monitor


Code(s): I10 - ESSENTIAL (PRIMARY) HYPERTENSION   





(6) Hypocalcemia


-improved after replacement





Dispo


-ok to transfer to floor

## 2018-01-25 NOTE — PN
Progress Note, Physician


History of Present Illness: 





patient looking much better


no complaints


stable


breathing much better





- Current Medication List


Current Medications: 


Active Medications





Acetaminophen (Tylenol Suppository -)  325 mg [place] Q4H PRN


   PRN Reason: FEVER


Albuterol Sulfate (Ventolin 0.083% Nebulizer Soln -)  1 amp NEB Q4H PRN


   PRN Reason: SHORT OF BREATH/WHEEZING


Atorvastatin Calcium (Lipitor -)  10 mg PO HS [facility]


Diltiazem HCl (Cardizem Cd -)  120 mg PO BID [facility]


Enoxaparin Sodium (Lovenox -)  40 mg SQ DAILY [facility]


Ferrous Sulfate (Feosol -)  325 mg PO TID [facility]


Piperacillin Sod/Tazobactam (Sod 3.375 gm/ Dextrose)  100 mls @ 200 mls/hr IVPB 

Q8H-IV [facility]


Latanoprost (Xalatan 0.005% Eye Drops -)  1 drop OU HS [facility]


Loratadine (Claritin -)  10 mg PO DAILY [facility]


Methylprednisolone Sodium Succinate (Solu-Medrol -)  40 mg IVPUSH Q6H-IV [facility]


   Last Admin: 01/25/18 14:49 Dose:  40 mg


Montelukast Sodium (Singulair -)  10 mg PO HS [facility]


Multivitamins/Minerals/Vitamin C (Tab-A-Vit -)  1 tab PO DAILY [facility]


Pantoprazole Sodium (Protonix Iv)  40 mg IVPUSH DAILY [facility]


Tiotropium Bromide (Spiriva -)   puff IH DAILY [facility]











- Objective


Vital Signs: 


 Vital Signs











Temperature  99.8 F H  01/25/18 14:00


 


Pulse Rate  110 H  01/25/18 16:00


 


Respiratory Rate  24   01/25/18 16:00


 


Blood Pressure  124/78   01/25/18 16:00


 


O2 Sat by Pulse Oximetry (%)  96   01/25/18 08:05











Constitutional: Yes: No Distress, Calm


Cardiovascular: Yes: Regular Rate and Rhythm


Respiratory: Yes: Regular, Poor Air Entry


Gastrointestinal: Yes: Normal Bowel Sounds, Soft


Musculoskeletal: Yes: WNL


Extremities: Yes: WNL


Neurological: Yes: Alert, Oriented


Psychiatric: Yes: Alert, Oriented


Labs: 


 CBC, BMP





 01/25/18 05:27 





 01/25/18 05:27 





 INR, PTT











INR  0.96  (0.82-1.09)   01/23/18  05:55    














Assessment/Plan








Problem List





- Problems


(1) Acute and chronic respiratory failure with hypercapnia


Code(s): J96.22 - ACUTE AND CHRONIC RESPIRATORY FAILURE WITH HYPERCAPNIA   





(2) COPD exacerbation


Code(s): J44.1 - CHRONIC OBSTRUCTIVE PULMONARY DISEASE W (ACUTE) EXACERBATION   





(3) CHF (congestive heart failure)


Code(s): I50.9 - HEART FAILURE, UNSPECIFIED   


Qualifiers: 


   Congestive heart failure type: diastolic   Congestive heart failure 

chronicity: chronic   Qualified Code(s): I50.32 - Chronic diastolic (congestive

) heart failure   





(4) Fracture, hip


Code(s): S72.009A - FRACTURE OF UNSP PART OF NECK OF UNSP FEMUR, INIT   


Qualifiers: 


   Encounter type: initial encounter   Fracture type: closed   Laterality: left

   Qualified Code(s): S72.002A - Fracture of unspecified part of neck of left 

femur, initial encounter for closed fracture   





(5) Hypertension


Code(s): I10 - ESSENTIAL (PRIMARY) HYPERTENSION   





6 pneumonia





7 leukocytosis





plan





patient receive abx





plan


continue zosyn


continue monitoring


patient looking stable


no complaints


wound care





rest as per icu


and primary





cc time 40 min

## 2018-01-25 NOTE — PN
Teaching Attending Note


Name of Resident: Matthias Santamaria





ATTENDING PHYSICIAN STATEMENT





I saw and evaluated the patient.


I reviewed the resident's note and discussed the case with the resident.


I agree with the resident's findings and plan as documented.








SUBJECTIVE:


Patient seen and examined in the ICU.  Remains extubated.  Awake and alert on 

VM O2. 


Mildly tachypneic at rest. 


Not on pressors.





CXR: increasing infiltrate/effusion/atelectasis RLL 





OBJECTIVE:


 Intake & Output











 01/22/18 01/23/18 01/24/18 01/25/18





 23:59 23:59 23:59 23:59


 


Intake Total   1522 200


 


Output Total  400 1900 550


 


Balance  -400 -378 -350


 


Weight  126 lb 8.725 oz  127 lb 3.2 oz








 Last Vital Signs











Temp Pulse Resp BP Pulse Ox


 


 98.3 F   116 H  20   118/74   96 


 


 01/25/18 10:00  01/25/18 08:00  01/25/18 08:00  01/25/18 08:00  01/25/18 08:05








Active Medications





Acetaminophen (Tylenol Suppository -)  325 mg [place] Q4H PRN


   PRN Reason: FEVER


Albuterol Sulfate (Ventolin 0.083% Nebulizer Soln -)  1 amp NEB Q4H PRN


   PRN Reason: SHORT OF BREATH/WHEEZING


Chlorhexidine Gluconate (Hibiclens For Decolonization -)  1 applic TP HS [facility]


   Last Admin: 01/24/18 21:15 Dose:  1 applic


Enoxaparin Sodium (Lovenox -)  40 mg SQ DAILY [facility]


   Last Admin: 01/25/18 09:00 Dose:  40 mg


Piperacillin Sod/Tazobactam (Sod 3.375 gm/ Dextrose)  100 mls @ 200 mls/hr IVPB 

Q8H-IV [facility]


   Last Admin: 01/25/18 09:31 Dose:  200 mls/hr


Latanoprost (Xalatan 0.005% Eye Drops -)  1 drop OU HS [facility]


   Last Admin: 01/24/18 21:45 Dose:  1 drop


Methylprednisolone Sodium Succinate (Solu-Medrol -)  40 mg IVPUSH Q6H-IV [facility]


   Last Admin: 01/25/18 08:55 Dose:  40 mg


Mupirocin (Bactroban Ointment (For Decolonization) -)  1 applic NS BID [facility]


   Stop: 01/28/18 21:59


   Last Admin: 01/25/18 09:00 Dose:  1 applic


Pantoprazole Sodium (Protonix Iv)  40 mg IVPUSH DAILY [facility]


   Last Admin: 01/25/18 09:01 Dose:  40 mg








Gen: Extubated, awake and alert 


Heart: tachycardic, regular


Lung: distant breath sounds, RLL rhonchi/crackles  


Abd: soft, nontender


Ext: no edema





 


 Laboratory Results - last 24 hr











  01/25/18 01/25/18 01/25/18





  05:27 05:27 06:05


 


WBC  20.1 H D  


 


RBC  4.05  


 


Hgb  10.5 L D  


 


Hct  34.5  


 


MCV  85.1  


 


MCH  25.8  


 


MCHC  30.3 L  


 


RDW  20.9 H  


 


Plt Count  139  


 


MPV  9.3  


 


Neutrophils %  No Result Required.  


 


Lymphocytes %  No Result Required.  


 


Anticoagulation Therapy    Cancelled


 


Puncture Site    Cancelled


 


Patient Temperature    Cancelled


 


ABG pH    Cancelled


 


ABG pCO2 at Pt Temp    Cancelled


 


ABG pO2 at Pt Temp    Cancelled


 


ABG HCO3    Cancelled


 


ABG O2 Sat (Measured)    Cancelled


 


ABG O2 Content    Cancelled


 


ABG Base Excess    Cancelled


 


Alexy Test    Cancelled


 


O2 Delivery Device    Cancelled


 


Oxygen Flow Rate    Cancelled


 


Vent Mode    Cancelled


 


Vent Rate    Cancelled


 


Mechanical Rate    Cancelled


 


PEEP    Cancelled


 


Pressure Support Vent    Cancelled


 


Sodium   146 H 


 


Potassium   3.9 


 


Chloride   108 H 


 


Carbon Dioxide   31 


 


Anion Gap   7 L 


 


BUN   18 


 


Creatinine   0.4 L 


 


Random Glucose   104 


 


Calcium   6.8 L* 


 


Phosphorus   2.6 


 


Magnesium   2.5 H 














ASSESSMENT AND PLAN:


Acute Hypoxic and Hypercapneic Respiratory Failure


Pneumonia


r/o Influenza 


r/o UTI


Acute COPD Exacerbation


LV Diastolic Dysfunction


Pulmonary HTN








-  continue antibiotics, tamiflu


-  Can taper Medrol starting tomorrow 


-  inhaled bronchodilators standing and PRN


-  Incentive Spirometry 


-  PO as tolerated 


-  DVT/GI prophylaxis


-  4W/4S monitoring 








Dr Delgado 


Critical care time spent in reviewing chart, evaluating patient and formulating 

plan 35 min

## 2018-01-25 NOTE — PN
Physical Exam: 


SUBJECTIVE: Patient seen and examined


No acute events overnight. Patient comfortable and satting well on ventimask. 





OBJECTIVE:





 Vital Signs











 Period  Temp  Pulse  Resp  BP Sys/Gonzalez  Pulse Ox


 


 Last 24 Hr  98.2 F-99.2 F  108-118  15-23  104-127/66-96  











GENERAL: The patient is awake alert and oriented x3. No acute distress


HEAD: Normal with no signs of trauma.


LUNGS: Breath sounds equal, decreased breath sounds, clear to auscultation 

bilaterally


HEART: Tachycardic, S1, S2 without murmur, rub or gallop.


ABDOMEN: Soft, nontender, nondistended, normoactive bowel sounds, no guarding, 

no 


rebound, no hepatosplenomegaly, no masses.


EXTREMITIES: 2+ pulses, warm, well-perfused, no edema. 


NEUROLOGICAL: No focal deficits, patient is awake alert, and oriented x3


SKIN: Warm, dry, normal turgor, no rashes or lesions noted

















 Laboratory Results - last 24 hr











  01/25/18 01/25/18 01/25/18





  05:27 05:27 06:05


 


WBC  20.1 H D  


 


RBC  4.05  


 


Hgb  10.5 L D  


 


Hct  34.5  


 


MCV  85.1  


 


MCH  25.8  


 


MCHC  30.3 L  


 


RDW  20.9 H  


 


Plt Count  139  


 


MPV  9.3  


 


Neutrophils %  No Result Required.  


 


Neutrophils % (Manual)  68.7  D  


 


Band Neutrophils %  28.3  


 


Lymphocytes %  No Result Required.  


 


Lymphocytes % (Manual)  1.0 L D  


 


Monocytes % (Manual)  2 L  


 


Eosinophils % (Manual)  0.0  


 


Basophils % (Manual)  0.0  


 


Myelocytes % (Man)  0  


 


Promyelocytes % (Man)  0  


 


Metamyelocytes  0  


 


Hypochromia  0  


 


Platelet Estimate  Decreased  


 


Polychromasia  1+  


 


Poikilocytosis  0  


 


Anisocytosis  2+  


 


Microcytosis  0  


 


Macrocytosis  0  


 


Anticoagulation Therapy    Cancelled


 


Puncture Site    Cancelled


 


Patient Temperature    Cancelled


 


ABG pH    Cancelled


 


ABG pCO2 at Pt Temp    Cancelled


 


ABG pO2 at Pt Temp    Cancelled


 


ABG HCO3    Cancelled


 


ABG O2 Sat (Measured)    Cancelled


 


ABG O2 Content    Cancelled


 


ABG Base Excess    Cancelled


 


Alexy Test    Cancelled


 


O2 Delivery Device    Cancelled


 


Oxygen Flow Rate    Cancelled


 


Vent Mode    Cancelled


 


Vent Rate    Cancelled


 


Mechanical Rate    Cancelled


 


PEEP    Cancelled


 


Pressure Support Vent    Cancelled


 


Sodium   146 H 


 


Potassium   3.9 


 


Chloride   108 H 


 


Carbon Dioxide   31 


 


Anion Gap   7 L 


 


BUN   18 


 


Creatinine   0.4 L 


 


Random Glucose   104 


 


Calcium   6.8 L* 


 


Phosphorus   2.6 


 


Magnesium   2.5 H 








Active Medications











Generic Name Dose Route Start Last Admin





  Trade Name Freq  PRN Reason Stop Dose Admin


 


Acetaminophen  325 mg  01/25/18 12:22  





  Tylenol Suppository -  [place]   





  Q4H PRN   





  FEVER   


 


Albuterol Sulfate  1 amp  01/25/18 12:22  





  Ventolin 0.083% Nebulizer Soln -  NEB   





  Q4H PRN   





  SHORT OF BREATH/WHEEZING   


 


Enoxaparin Sodium  40 mg  01/26/18 10:00  





  Lovenox -  SQ   





  DAILY ELI   


 


Piperacillin Sod/Tazobactam  100 mls @ 200 mls/hr  01/25/18 18:00  





  Sod 3.375 gm/ Dextrose  IVPB   





  Q8H-IV [facility]   


 


Latanoprost  1 drop  01/25/18 22:00  





  Xalatan 0.005% Eye Drops -  OU   





  HS [facility]I   


 


Methylprednisolone Sodium Succinate  40 mg  01/25/18 15:00  





  Solu-Medrol -  IVPUSH   





  Q6H-IV [facility]   


 


Pantoprazole Sodium  40 mg  01/26/18 10:00  





  Protonix Iv  IVPUSH   





  DAILY [facility]   











ASSESSMENT/PLAN:


77 year old F with pmh of COPD admitted for acute hypoxic respiratory failure 

now intubated/sedated.





#Resp


Acute Hypoxic and Hypercapneic Respiratory Failure





-Solumederol 40 q12, slowly taper


-Patient now extubated, satting well on venti mask


-Monitor blood gas





#ID


Pneumonia


r/o Influenza 


RSV +


UTI





-continue zosyn


-F/u recs from ID, Dr. Linder


-Follow up cultures, RSV+





#FEN/GI


-No IVF


-wnl


-Sodium controlled diet





#PPx


-Lovenox 40 sq daily


-Protonix 40 IV daily for GI ppx





#Dispo


Transfer to telemetry





Visit type





- Emergency Visit


Emergency Visit: Yes


ED Registration Date: 01/23/18


Care time: The patient presented to the Emergency Department on the above date 

and was hospitalized for further evaluation of their emergent condition.





- New Patient


This patient is new to me today: No





- Critical Care


Critical Care patient: Yes


Total Critical Care Time (in minutes): 45


Critical Care Statement: The care of this patient involved high complexity 

decision making to prevent further life threatening deterioration of the patient

's condition and/or to evaluate & treat vital organ system(s) failure or risk 

of failure.

## 2018-01-26 LAB
ALBUMIN SERPL-MCNC: 2.1 G/DL (ref 3.4–5)
ALP SERPL-CCNC: 166 U/L (ref 45–117)
ALT SERPL-CCNC: 1109 U/L (ref 12–78)
ANION GAP SERPL CALC-SCNC: 12 MMOL/L (ref 8–16)
ANION GAP SERPL CALC-SCNC: 6 MMOL/L (ref 8–16)
ANION GAP SERPL CALC-SCNC: 6 MMOL/L (ref 8–16)
APTT BLD: 28.8 SECONDS (ref 26.9–34.4)
ARTERIAL BLOOD GAS PCO2: 57.2 MMHG (ref 35–45)
ARTERIAL BLOOD GAS PCO2: 78.3 MMHG (ref 35–45)
ARTERIAL PATENCY WRIST A: POSITIVE
ARTERIAL PATENCY WRIST A: POSITIVE
AST SERPL-CCNC: 1425 U/L (ref 15–37)
BASE EXCESS BLDA CALC-SCNC: -2.6 MEQ/L (ref -2–2)
BASE EXCESS BLDA CALC-SCNC: 5 MEQ/L (ref -2–2)
BASOPHILS # BLD: 0.1 % (ref 0–2)
BILIRUB SERPL-MCNC: 0.5 MG/DL (ref 0.2–1)
BUN SERPL-MCNC: 16 MG/DL (ref 7–18)
BUN SERPL-MCNC: 19 MG/DL (ref 7–18)
BUN SERPL-MCNC: 33 MG/DL (ref 7–18)
CALCIUM SERPL-MCNC: 11.5 MG/DL (ref 8.5–10.1)
CALCIUM SERPL-MCNC: 7.3 MG/DL (ref 8.5–10.1)
CALCIUM SERPL-MCNC: 7.4 MG/DL (ref 8.5–10.1)
CHLORIDE SERPL-SCNC: 108 MMOL/L (ref 98–107)
CHLORIDE SERPL-SCNC: 108 MMOL/L (ref 98–107)
CHLORIDE SERPL-SCNC: 111 MMOL/L (ref 98–107)
CO2 SERPL-SCNC: 31 MMOL/L (ref 21–32)
CO2 SERPL-SCNC: 33 MMOL/L (ref 21–32)
CO2 SERPL-SCNC: 34 MMOL/L (ref 21–32)
CREAT SERPL-MCNC: 0.4 MG/DL (ref 0.55–1.02)
CREAT SERPL-MCNC: 0.9 MG/DL (ref 0.55–1.02)
CREAT SERPL-MCNC: 1.2 MG/DL (ref 0.55–1.02)
DACRYOCYTES BLD QL SMEAR: (no result)
DEPRECATED RDW RBC AUTO: 20.9 % (ref 11.6–15.6)
DEPRECATED RDW RBC AUTO: 21.4 % (ref 11.6–15.6)
EOSINOPHIL # BLD: 0 % (ref 0–4.5)
GLUCOSE SERPL-MCNC: 101 MG/DL (ref 74–106)
GLUCOSE SERPL-MCNC: 131 MG/DL (ref 74–106)
GLUCOSE SERPL-MCNC: 228 MG/DL (ref 74–106)
HCT VFR BLD CALC: 36.2 % (ref 32.4–45.2)
HCT VFR BLD CALC: 39 % (ref 32.4–45.2)
HGB BLD-MCNC: 10.7 GM/DL (ref 10.7–15.3)
HGB BLD-MCNC: 11.1 GM/DL (ref 10.7–15.3)
INR BLD: 0.95 (ref 0.82–1.09)
LYMPHOCYTES # BLD: 1.4 % (ref 8–40)
MAGNESIUM SERPL-MCNC: 2.8 MG/DL (ref 1.8–2.4)
MAGNESIUM SERPL-MCNC: 5.8 MG/DL (ref 1.8–2.4)
MCH RBC QN AUTO: 25.3 PG (ref 25.7–33.7)
MCH RBC QN AUTO: 25.7 PG (ref 25.7–33.7)
MCHC RBC AUTO-ENTMCNC: 28.4 G/DL (ref 32–36)
MCHC RBC AUTO-ENTMCNC: 29.6 G/DL (ref 32–36)
MCV RBC: 86.8 FL (ref 80–96)
MCV RBC: 89 FL (ref 80–96)
MONOCYTES # BLD AUTO: 1.8 % (ref 3.8–10.2)
NEUTROPHILS # BLD: 96.7 % (ref 42.8–82.8)
PHOSPHATE SERPL-MCNC: 2.3 MG/DL (ref 2.5–4.9)
PHOSPHATE SERPL-MCNC: 6.7 MG/DL (ref 2.5–4.9)
PLATELET # BLD AUTO: 140 K/MM3 (ref 134–434)
PLATELET # BLD AUTO: 174 K/MM3 (ref 134–434)
PLATELET BLD QL SMEAR: NORMAL
PMV BLD: 9.5 FL (ref 7.5–11.1)
PMV BLD: 9.6 FL (ref 7.5–11.1)
PO2 BLDA: 216 MMHG (ref 70–100)
PO2 BLDA: 96.2 MMHG (ref 70–100)
POTASSIUM SERPLBLD-SCNC: 4.4 MMOL/L (ref 3.5–5.1)
POTASSIUM SERPLBLD-SCNC: 4.5 MMOL/L (ref 3.5–5.1)
POTASSIUM SERPLBLD-SCNC: 4.6 MMOL/L (ref 3.5–5.1)
PROT SERPL-MCNC: 4.9 G/DL (ref 6.4–8.2)
PT PNL PPP: 10.7 SEC (ref 9.98–11.88)
RBC # BLD AUTO: 4.17 M/MM3 (ref 3.6–5.2)
RBC # BLD AUTO: 4.38 M/MM3 (ref 3.6–5.2)
SAO2 % BLDA: 97.9 % (ref 90–98.9)
SAO2 % BLDA: 99.4 % (ref 90–98.9)
SODIUM SERPL-SCNC: 147 MMOL/L (ref 136–145)
SODIUM SERPL-SCNC: 148 MMOL/L (ref 136–145)
SODIUM SERPL-SCNC: 154 MMOL/L (ref 136–145)
TARGETS BLD QL SMEAR: (no result)
WBC # BLD AUTO: 19.1 K/MM3 (ref 4–10)
WBC # BLD AUTO: 21.1 K/MM3 (ref 4–10)

## 2018-01-26 PROCEDURE — 5A12012 PERFORMANCE OF CARDIAC OUTPUT, SINGLE, MANUAL: ICD-10-PCS | Performed by: INTERNAL MEDICINE

## 2018-01-26 PROCEDURE — 0BH17EZ INSERTION OF ENDOTRACHEAL AIRWAY INTO TRACHEA, VIA NATURAL OR ARTIFICIAL OPENING: ICD-10-PCS

## 2018-01-26 PROCEDURE — 05HM33Z INSERTION OF INFUSION DEVICE INTO RIGHT INTERNAL JUGULAR VEIN, PERCUTANEOUS APPROACH: ICD-10-PCS | Performed by: INTERNAL MEDICINE

## 2018-01-26 PROCEDURE — 0D9670Z DRAINAGE OF STOMACH WITH DRAINAGE DEVICE, VIA NATURAL OR ARTIFICIAL OPENING: ICD-10-PCS

## 2018-01-26 RX ADMIN — FERROUS SULFATE TAB EC 324 MG (65 MG FE EQUIVALENT) SCH MG: 324 (65 FE) TABLET DELAYED RESPONSE at 06:30

## 2018-01-26 RX ADMIN — LORATADINE SCH: 10 TABLET ORAL at 09:30

## 2018-01-26 RX ADMIN — ENOXAPARIN SODIUM SCH: 40 INJECTION SUBCUTANEOUS at 09:30

## 2018-01-26 RX ADMIN — FERROUS SULFATE TAB EC 324 MG (65 MG FE EQUIVALENT) SCH MG: 324 (65 FE) TABLET DELAYED RESPONSE at 21:26

## 2018-01-26 RX ADMIN — PROPOFOL SCH MLS/HR: 10 INJECTION, EMULSION INTRAVENOUS at 12:06

## 2018-01-26 RX ADMIN — METHYLPREDNISOLONE SODIUM SUCCINATE SCH: 40 INJECTION, POWDER, FOR SOLUTION INTRAMUSCULAR; INTRAVENOUS at 17:46

## 2018-01-26 RX ADMIN — TIOTROPIUM BROMIDE SCH: 18 CAPSULE ORAL; RESPIRATORY (INHALATION) at 09:30

## 2018-01-26 RX ADMIN — FENTANYL CITRATE SCH MLS/HR: 50 INJECTION INTRAMUSCULAR; INTRAVENOUS at 12:41

## 2018-01-26 RX ADMIN — PIPERACILLIN SODIUM,TAZOBACTAM SODIUM SCH: 3; .375 INJECTION, POWDER, FOR SOLUTION INTRAVENOUS at 10:00

## 2018-01-26 RX ADMIN — METHYLPREDNISOLONE SODIUM SUCCINATE SCH MG: 40 INJECTION, POWDER, FOR SOLUTION INTRAMUSCULAR; INTRAVENOUS at 21:26

## 2018-01-26 RX ADMIN — METHYLPREDNISOLONE SODIUM SUCCINATE SCH: 40 INJECTION, POWDER, FOR SOLUTION INTRAMUSCULAR; INTRAVENOUS at 02:27

## 2018-01-26 RX ADMIN — SODIUM CHLORIDE SCH MLS/HR: 9 INJECTION, SOLUTION INTRAVENOUS at 18:47

## 2018-01-26 RX ADMIN — MULTIVITAMIN TABLET SCH: TABLET at 09:30

## 2018-01-26 RX ADMIN — PANTOPRAZOLE SODIUM SCH: 40 INJECTION, POWDER, FOR SOLUTION INTRAVENOUS at 09:30

## 2018-01-26 RX ADMIN — SODIUM CHLORIDE SCH MLS/HR: 9 INJECTION, SOLUTION INTRAVENOUS at 17:46

## 2018-01-26 RX ADMIN — ALBUTEROL SULFATE PRN AMP: 2.5 SOLUTION RESPIRATORY (INHALATION) at 08:07

## 2018-01-26 RX ADMIN — METHYLPREDNISOLONE SODIUM SUCCINATE SCH MG: 40 INJECTION, POWDER, FOR SOLUTION INTRAMUSCULAR; INTRAVENOUS at 01:48

## 2018-01-26 RX ADMIN — MONTELUKAST SODIUM SCH MG: 10 TABLET, COATED ORAL at 21:26

## 2018-01-26 RX ADMIN — FERROUS SULFATE TAB EC 324 MG (65 MG FE EQUIVALENT) SCH: 324 (65 FE) TABLET DELAYED RESPONSE at 14:51

## 2018-01-26 RX ADMIN — METHYLPREDNISOLONE SODIUM SUCCINATE SCH: 40 INJECTION, POWDER, FOR SOLUTION INTRAMUSCULAR; INTRAVENOUS at 09:30

## 2018-01-26 RX ADMIN — ATORVASTATIN CALCIUM SCH MG: 10 TABLET, FILM COATED ORAL at 21:27

## 2018-01-26 RX ADMIN — LATANOPROST SCH DROP: 50 SOLUTION OPHTHALMIC at 21:29

## 2018-01-26 RX ADMIN — PIPERACILLIN SODIUM,TAZOBACTAM SODIUM SCH MLS/HR: 3; .375 INJECTION, POWDER, FOR SOLUTION INTRAVENOUS at 17:47

## 2018-01-26 RX ADMIN — PIPERACILLIN SODIUM,TAZOBACTAM SODIUM SCH MLS/HR: 3; .375 INJECTION, POWDER, FOR SOLUTION INTRAVENOUS at 01:46

## 2018-01-26 RX ADMIN — SODIUM CHLORIDE SCH MLS/HR: 9 INJECTION, SOLUTION INTRAVENOUS at 17:47

## 2018-01-26 NOTE — RAPID
Physical Examination


Vital Signs: 


 Vital Signs











Temperature  99.1 F   01/26/18 05:58


 


Pulse Rate  114 H  01/26/18 08:07


 


Respiratory Rate  22   01/26/18 05:58


 


Blood Pressure  136/55   01/26/18 05:58


 


O2 Sat by Pulse Oximetry (%)  96   01/26/18 08:07











Labs: 


 CBC, BMP





 01/26/18 09:50 





 01/26/18 09:50 











Rapid Response





- Rapid Response


Assessment: 








Code 99 was called at 9:10am in 4W. Rapid response team reached there 

immediately. RN mentioned that patient was found to be in asystole. Checked in 

the chart, patient was full code. ACLS protocol was started with CPR. Total of 

3 amps of epi, 1 Sodium bicarbonate, 1 calcium gluconate was given. Anesthesia 

was called and patient was intubated. Obtained ROSC. Informed Dr. Tucker and 

the family who arrived shortly. 





Again, Code Gianna was called at 9:30am. She was found to be in asystole, 

saturation dropped to 60's/ . CPR was started, 2 amps of bicarbonate was given, 

2 mg of Magnesium given. Patient started to have abdominal distention, 

anesthesia was called again. ET tube was removed and reinserted successfully, 

NG was inserted and stomach was decompressed. 


ROSC was obtained at 9:43 am. 





All labs were sent stat: CBC, CMP, Mg, Phos, Cardiac profile, Lactic acid, ABG. 





ICU team was informed and transferred patient immediately to the ICU for 

further management.

## 2018-01-26 NOTE — PN
Progress Note, Physician


History of Present Illness: 





events noted 


patient coded  again was intubated back


now intubated





- Current Medication List


Current Medications: 


Active Medications





Acetaminophen (Tylenol Suppository -)  325 mg [place] Q4H PRN


   PRN Reason: FEVER


Albuterol Sulfate (Ventolin 0.083% Nebulizer Soln -)  1 amp NEB Q4H PRN


   PRN Reason: SHORT OF BREATH/WHEEZING


   Last Admin: 01/26/18 08:07 Dose:  1 amp


Atorvastatin Calcium (Lipitor -)  10 mg PO HS [facility]


   Last Admin: 01/25/18 22:27 Dose:  10 mg


Diltiazem HCl (Cardizem Cd -)  120 mg PO BID [facility]


   Last Admin: 01/26/18 09:30 Dose:  Not Given


Enoxaparin Sodium (Lovenox -)  40 mg SQ DAILY [facility]


   Last Admin: 01/26/18 09:30 Dose:  Not Given


Ferrous Sulfate (Feosol -)  325 mg PO TID [facility]


   Last Admin: 01/26/18 14:51 Dose:  Not Given


Piperacillin Sod/Tazobactam (Sod 3.375 gm/ Dextrose)  100 mls @ 200 mls/hr IVPB 

Q8H-IV [facility]


   Last Admin: 01/26/18 10:00 Dose:  Not Given


Norepinephrine Bitartrate 8, (000 mcg/ Dextrose)  500 mls @ 18.75 mls/hr IV 

TITR [facility]; 5 MCG/MIN


   PRN Reason: Protocol


   Last Admin: 01/26/18 12:03 Dose:  5 mcg/min, 18.75 mls/hr


Propofol (Diprivan -)  1,000,000 mcg in 100 mls @ 1.666 mls/hr IVPB TITR [facility]; 5 

MCG/KG/MIN


   PRN Reason: Protocol


   Last Admin: 01/26/18 12:06 Dose:  15 mcg/kg/min, 4.997 mls/hr


Fentanyl 500 mcg/ Sodium (Chloride)  100 mls @ 10 mls/hr IVPB TITR ELI; 50 MCG/

HR


   PRN Reason: Protocol


   Last Admin: 01/26/18 12:41 Dose:  10 mls/hr


Sodium Chloride (Normal Saline -)  1,000 mls @ 100 mls/hr IV ASDIR [facility]


Latanoprost (Xalatan 0.005% Eye Drops -)  1 drop OU HS [facility]


   Last Admin: 01/25/18 22:31 Dose:  1 drop


Loratadine (Claritin -)  10 mg PO DAILY [facility]


   Last Admin: 01/26/18 09:30 Dose:  Not Given


Methylprednisolone Sodium Succinate (Solu-Medrol -)  40 mg IVPUSH Q6H-IV [facility]


   Last Admin: 01/26/18 09:30 Dose:  Not Given


Montelukast Sodium (Singulair -)  10 mg PO HS [facility]


   Last Admin: 01/25/18 22:27 Dose:  10 mg


Multivitamins/Minerals/Vitamin C (Tab-A-Vit -)  1 tab PO DAILY [facility]


   Last Admin: 01/26/18 09:30 Dose:  Not Given


Pantoprazole Sodium (Protonix Iv)  40 mg IVPUSH DAILY [facility]


   Last Admin: 01/26/18 09:30 Dose:  Not Given


Tiotropium Bromide (Spiriva -)  1 puff IH DAILY [facility]


   Last Admin: 01/26/18 09:30 Dose:  Not Given











- Objective


Vital Signs: 


 Vital Signs











Temperature  97.9 F   01/26/18 08:00


 


Pulse Rate  114 H  01/26/18 08:07


 


Respiratory Rate  14   01/26/18 13:30


 


Blood Pressure  110/78   01/26/18 08:00


 


O2 Sat by Pulse Oximetry (%)  95   01/26/18 09:00











Constitutional: Yes: Other


Cardiovascular: Yes: Pulse Irregular


Respiratory: Yes: Intubated, Mechanically Ventilated, Other


Gastrointestinal: Yes: Normal Bowel Sounds, Soft


Musculoskeletal: Yes: WNL


Extremities: Yes: WNL


Wound/Incision: Yes: Other (small skin breakdown sacral area)


Neurological: Yes: Other


Labs: 


 CBC, BMP





 01/26/18 09:50 





 01/26/18 09:50 





 INR, PTT











INR  0.95  (0.82-1.09)   01/26/18  09:50    














Assessment/Plan








Problem List





- Problems


(1) Acute and chronic respiratory failure with hypercapnia


Code(s): J96.22 - ACUTE AND CHRONIC RESPIRATORY FAILURE WITH HYPERCAPNIA   





(2) COPD exacerbation


Code(s): J44.1 - CHRONIC OBSTRUCTIVE PULMONARY DISEASE W (ACUTE) EXACERBATION   





(3) CHF (congestive heart failure)


Code(s): I50.9 - HEART FAILURE, UNSPECIFIED   


Qualifiers: 


   Congestive heart failure type: diastolic   Congestive heart failure 

chronicity: chronic   Qualified Code(s): I50.32 - Chronic diastolic (congestive

) heart failure   





(4) Fracture, hip


Code(s): S72.009A - FRACTURE OF UNSP PART OF NECK OF UNSP FEMUR, INIT   


Qualifiers: 


   Encounter type: initial encounter   Fracture type: closed   Laterality: left

   Qualified Code(s): S72.002A - Fracture of unspecified part of neck of left 

femur, initial encounter for closed fracture   





(5) Hypertension


Code(s): I10 - ESSENTIAL (PRIMARY) HYPERTENSION   





6 pneumonia





7 leukocytosis





8 uti


plan





patient receive abx





looking at all the events and her getting re intubated will broaden the 

coverage now and also urine coming positive





plan


continue zosyn


continue monitoring


will add vanco


rest as per icu


and primary


close monitoring





cc time 40 min

## 2018-01-26 NOTE — PN
Progress Note, Physician


Chief Complaint: 


Called to room as patient was being resuscitated. Patient was found to be 

breathing agonally by staff. Rapid response called which turned into a code 

when patient became asystolic. She was intubated and resuscitated, however 

became asystolic a second time. ET tube adjusted and resuscitated second time 

successfully. Currently unresponsive.





- Current Medication List


Current Medications: 


Active Medications





Acetaminophen (Tylenol Suppository -)  325 mg [place] Q4H PRN


   PRN Reason: FEVER


Albuterol Sulfate (Ventolin 0.083% Nebulizer Soln -)  1 amp NEB Q4H PRN


   PRN Reason: SHORT OF BREATH/WHEEZING


   Last Admin: 01/26/18 08:07 Dose:  1 amp


Atorvastatin Calcium (Lipitor -)  10 mg PO HS [facility]


   Last Admin: 01/25/18 22:27 Dose:  10 mg


Diltiazem HCl (Cardizem Cd -)  120 mg PO BID [facility]


   Last Admin: 01/26/18 09:30 Dose:  Not Given


Enoxaparin Sodium (Lovenox -)  40 mg SQ DAILY [facility]


   Last Admin: 01/26/18 09:30 Dose:  Not Given


Ferrous Sulfate (Feosol -)  325 mg PO TID [facility]I


   Last Admin: 01/26/18 14:51 Dose:  Not Given


Piperacillin Sod/Tazobactam (Sod 3.375 gm/ Dextrose)  100 mls @ 200 mls/hr IVPB 

Q8H-IV [facility]


   Last Admin: 01/26/18 10:00 Dose:  Not Given


Norepinephrine Bitartrate 8, (000 mcg/ Dextrose)  500 mls @ 18.75 mls/hr IV 

TITR ELI; 5 MCG/MIN


   PRN Reason: Protocol


   Last Admin: 01/26/18 12:03 Dose:  5 mcg/min, 18.75 mls/hr


Propofol (Diprivan -)  1,000,000 mcg in 100 mls @ 1.666 mls/hr IVPB TITR ELI; 5 

MCG/KG/MIN


   PRN Reason: Protocol


   Last Admin: 01/26/18 12:06 Dose:  15 mcg/kg/min, 4.997 mls/hr


Fentanyl 500 mcg/ Sodium (Chloride)  100 mls @ 10 mls/hr IVPB TITR ELI; 50 MCG/

HR


   PRN Reason: Protocol


   Last Admin: 01/26/18 12:41 Dose:  10 mls/hr


Sodium Chloride (Normal Saline -)  1,000 mls @ 100 mls/hr IV ASDIR ELI


Vancomycin HCl 1,250 mg/ (Dextrose)  250 mls @ 166.667 mls/hr IVPB Q24H ELI


   PRN Reason: Protocol


Latanoprost (Xalatan 0.005% Eye Drops -)  1 drop OU HS [facility]


   Last Admin: 01/25/18 22:31 Dose:  1 drop


Loratadine (Claritin -)  10 mg PO DAILY [facility]


   Last Admin: 01/26/18 09:30 Dose:  Not Given


Methylprednisolone Sodium Succinate (Solu-Medrol -)  40 mg IVPUSH Q6H-IV [facility]


   Last Admin: 01/26/18 09:30 Dose:  Not Given


Montelukast Sodium (Singulair -)  10 mg PO HS [facility]


   Last Admin: 01/25/18 22:27 Dose:  10 mg


Multivitamins/Minerals/Vitamin C (Tab-A-Vit -)  1 tab PO DAILY [facility]


   Last Admin: 01/26/18 09:30 Dose:  Not Given


Pantoprazole Sodium (Protonix Iv)  40 mg IVPUSH DAILY [facility]


   Last Admin: 01/26/18 09:30 Dose:  Not Given


Tiotropium Bromide (Spiriva -)  1 puff IH DAILY [facility]


   Last Admin: 01/26/18 09:30 Dose:  Not Given











- Objective


Vital Signs: 


 Vital Signs











Temperature  36.6 C   01/26/18 08:00


 


Pulse Rate  114 H  01/26/18 08:07


 


Respiratory Rate  14   01/26/18 13:30


 


Blood Pressure  110/78   01/26/18 08:00


 


O2 Sat by Pulse Oximetry (%)  95   01/26/18 09:00











Constitutional: Yes: Other (obtunded, minimal response to pain)


Eyes: Yes: Other (minimal pupillary response secondary to cataracts, corneal 

response)


Cardiovascular: Yes: Tachycardia.  No: Gallop, Murmur, Rub


Respiratory: Yes: Regular, Intubated, Mechanically Ventilated, Rhonchi.  No: 

CTA Bilaterally, Rales, Wheezes


Gastrointestinal: Yes: Normal Bowel Sounds, Soft, Distention.  No: Tenderness


Extremities: Yes: WNL


Edema: No


Labs: 


 CBC, BMP





 01/26/18 09:50 





 01/26/18 09:50 





 INR, PTT











INR  0.95  (0.82-1.09)   01/26/18  09:50    














Problem List





- Problems


(1) Asystole


Code(s): I46.9 - CARDIAC ARREST, CAUSE UNSPECIFIED   





(2) Lactic acid acidosis


Code(s): E87.2 - ACIDOSIS   





(3) Hemodynamic instability


Code(s): R09.89 - OTH SYMPTOMS AND SIGNS INVOLVING THE CIRC AND RESP SYSTEMS   





(4) Shock liver


Code(s): K72.00 - ACUTE AND SUBACUTE HEPATIC FAILURE WITHOUT COMA   





(5) RSV (respiratory syncytial virus pneumonia)


Code(s): J12.1 - RESPIRATORY SYNCYTIAL VIRUS PNEUMONIA   





(6) Acute and chronic respiratory failure with hypercapnia


Code(s): J96.22 - ACUTE AND CHRONIC RESPIRATORY FAILURE WITH HYPERCAPNIA   





(7) COPD exacerbation


Code(s): J44.1 - CHRONIC OBSTRUCTIVE PULMONARY DISEASE W (ACUTE) EXACERBATION   





(8) CHF (congestive heart failure)


Code(s): I50.9 - HEART FAILURE, UNSPECIFIED   


Qualifiers: 


   Congestive heart failure type: diastolic   Congestive heart failure 

chronicity: chronic   Qualified Code(s): I50.32 - Chronic diastolic (congestive

) heart failure   





(9) Fracture, hip


Code(s): S72.009A - FRACTURE OF UNSP PART OF NECK OF UNSP FEMUR, INIT   


Qualifiers: 


   Encounter type: initial encounter   Fracture type: closed   Laterality: left

   Qualified Code(s): S72.002A - Fracture of unspecified part of neck of left 

femur, initial encounter for closed fracture   





(10) Hypertension


Code(s): I10 - ESSENTIAL (PRIMARY) HYPERTENSION   





Assessment/Plan





(1) Asystolic cardiac arrest


-secondary to hypoxia


-suspect from mucous plugging


-successfully resuscitated with pulse


-unclear if permanent neurological damage


-transfer and care in the ICU


-case d/w son and made aware of prognosis


-made DNR as to not be resuscitated again, but continue pressors and ventilator 

support to see if recovering





(2) Acute and chronic hypoxic respiratory failure


Assessment/Plan: 


-recurrent and cause of asystole


-intubated and mechanically ventilated


-pulmonary following 


-evaluate for improvement,family aware as above may need palliative wean if no 

recovery


Code(s): J96.22 - ACUTE AND CHRONIC RESPIRATORY FAILURE WITH HYPERCAPNIA   





(3) COPD exacerbation


Assessment/Plan: 


-continue steroids


Code(s): J44.1 - CHRONIC OBSTRUCTIVE PULMONARY DISEASE W (ACUTE) EXACERBATION   





(3) CHF (congestive heart failure)


Assessment/Plan: 


-currently hypotensive requiring pressors


-hold torsemide


Code(s): I50.9 - HEART FAILURE, UNSPECIFIED   


Qualifiers: 


   Congestive heart failure type: diastolic   Congestive heart failure 

chronicity: chronic   Qualified Code(s): I50.32 - Chronic diastolic (congestive

) heart failure   





(4) Fracture, hip


Assessment/Plan: 


-reason for being at SNF


-continue lovenox for DVT PPx


Code(s): S72.009A - FRACTURE OF UNSP PART OF NECK OF UNSP FEMUR, INIT   


Qualifiers: 


   Encounter type: initial encounter   Fracture type: closed   Laterality: left

   Qualified Code(s): S72.002A - Fracture of unspecified part of neck of left 

femur, initial encounter for closed fracture   





(5) Hypertension


Assessment/Plan: 


-hold diltiazem and torsemide


Code(s): I10 - ESSENTIAL (PRIMARY) HYPERTENSION   





(6) Hypocalcemia


-replaced as needed





(7) Shock Liver


-secondary to prolonged code


-support oxygenation and blood pressure


-monitor for improvement





(8) Hemodynamic instability 


-currently on pressors





(9) Lactic acidosis


-secondary to prolonged hypoxia


-treatment as above





Multiple discussions had with the son. Currently DNR but continue oxygenation 

and pressor support. Evaluate neurologic status over the weekend and see if has 

recovery.





62 minutes spent in total in critical care time with patient and family

## 2018-01-26 NOTE — PN
Teaching Attending Note


Name of Resident: Isma Garcia





ATTENDING PHYSICIAN STATEMENT





I saw and evaluated the patient.


I reviewed the resident's note and discussed the case with the resident.


I agree with the resident's findings and plan as documented.








SUBJECTIVE:


Patient seen and examined in the ICU.  Rapid response called on the floors due 

to cardiac arrest.   CPR : time to ROSC unclear. 


Now intubated and sedated on Fentanyl.  AC Mode of vent, 100% FiO2. 


NE infusing for hemodynamic support. 





CXR: ETT in place / increased dense infiltrates on the Right. 





OBJECTIVE:





 Intake & Output











 01/23/18 01/24/18 01/25/18 01/26/18





 23:59 23:59 23:59 23:59


 


Intake Total  1522 1240 


 


Output Total 400 1900 1150 


 


Balance -400 -378 90 


 


Weight 126 lb 8.725 oz  127 lb 3.2 oz 122 lb 6.4 oz











 Last Vital Signs











Temp Pulse Resp BP Pulse Ox


 


 99.1 F   114 H  22   136/55   96 


 


 01/26/18 05:58  01/26/18 08:07  01/26/18 05:58  01/26/18 05:58  01/26/18 08:07











Active Medications





Acetaminophen (Tylenol Suppository -)  325 mg [place] Q4H PRN


   PRN Reason: FEVER


Albuterol Sulfate (Ventolin 0.083% Nebulizer Soln -)  1 amp NEB Q4H PRN


   PRN Reason: SHORT OF BREATH/WHEEZING


   Last Admin: 01/26/18 08:07 Dose:  1 amp


Atorvastatin Calcium (Lipitor -)  10 mg PO HS [facility]


   Last Admin: 01/25/18 22:27 Dose:  10 mg


Diltiazem HCl (Cardizem Cd -)  120 mg PO BID [facility]


   Last Admin: 01/26/18 09:30 Dose:  Not Given


Enoxaparin Sodium (Lovenox -)  40 mg SQ DAILY [facility]


   Last Admin: 01/26/18 09:30 Dose:  Not Given


Ferrous Sulfate (Feosol -)  325 mg PO TID [facility]


   Last Admin: 01/26/18 06:30 Dose:  325 mg


Piperacillin Sod/Tazobactam (Sod 3.375 gm/ Dextrose)  100 mls @ 200 mls/hr IVPB 

Q8H-IV [facility]


   Last Admin: 01/26/18 10:00 Dose:  Not Given


Norepinephrine Bitartrate 8, (000 mcg/ Dextrose)  500 mls @ 18.75 mls/hr IV 

TITR ELI; 5 MCG/MIN


   PRN Reason: Protocol


   Last Admin: 01/26/18 12:03 Dose:  5 mcg/min, 18.75 mls/hr


Propofol (Diprivan -)  1,000,000 mcg in 100 mls @ 1.666 mls/hr IVPB TITR ELI; 5 

MCG/KG/MIN


   PRN Reason: Protocol


   Last Admin: 01/26/18 12:06 Dose:  15 mcg/kg/min, 4.997 mls/hr


Fentanyl 500 mcg/ Sodium (Chloride)  100 mls @ 10 mls/hr IVPB TITR ELI; 50 MCG/

HR


   PRN Reason: Protocol


   Last Admin: 01/26/18 12:41 Dose:  10 mls/hr


Latanoprost (Xalatan 0.005% Eye Drops -)  1 drop OU HS [facility]


   Last Admin: 01/25/18 22:31 Dose:  1 drop


Loratadine (Claritin -)  10 mg PO DAILY [facility]


   Last Admin: 01/26/18 09:30 Dose:  Not Given


Methylprednisolone Sodium Succinate (Solu-Medrol -)  40 mg IVPUSH Q6H-IV [facility]


   Last Admin: 01/26/18 09:30 Dose:  Not Given


Montelukast Sodium (Singulair -)  10 mg PO HS [facility]


   Last Admin: 01/25/18 22:27 Dose:  10 mg


Multivitamins/Minerals/Vitamin C (Tab-A-Vit -)  1 tab PO DAILY [facility]


   Last Admin: 01/26/18 09:30 Dose:  Not Given


Pantoprazole Sodium (Protonix Iv)  40 mg IVPUSH DAILY [facility]


   Last Admin: 01/26/18 09:30 Dose:  Not Given


Tiotropium Bromide (Spiriva -)  1 puff IH DAILY [facility]


   Last Admin: 01/26/18 09:30 Dose:  Not Given





 


Gen: Intubated and sedated  


Heart: tachycardic 


Lung: Coarse crackles/rhonchi on the right, no wheeze   


Abd: soft, nontender


Ext: no edema


Neuro: sedated 





 


 Laboratory Results - last 24 hr











  01/25/18 01/26/18 01/26/18





  05:27 06:45 06:45


 


WBC   19.1 H 


 


RBC   4.17 


 


Hgb   10.7 


 


Hct   36.2 


 


MCV   86.8 


 


MCH   25.7 


 


MCHC   29.6 L 


 


RDW   20.9 H 


 


Plt Count   140 


 


MPV   9.5 


 


Neutrophils %   96.7 H 


 


Neutrophils % (Manual)  68.7  D  


 


Band Neutrophils %  28.3  


 


Lymphocytes %   1.4 L D 


 


Lymphocytes % (Manual)  1.0 L D  


 


Monocytes %   1.8 L 


 


Monocytes % (Manual)  2 L  


 


Eosinophils %   0.0 


 


Eosinophils % (Manual)  0.0  


 


Basophils %   0.1 


 


Basophils % (Manual)  0.0  


 


Myelocytes % (Man)  0  


 


Promyelocytes % (Man)  0  


 


Metamyelocytes  0  


 


Hypochromia  0  


 


Platelet Estimate  Decreased  


 


Polychromasia  1+  


 


Poikilocytosis  0  


 


Anisocytosis  2+  


 


Microcytosis  0  


 


Macrocytosis  0  


 


PT with INR   


 


INR   


 


PTT (Actin FS)   


 


Puncture Site   


 


ABG pH   


 


ABG pCO2 at Pt Temp   


 


ABG pO2 at Pt Temp   


 


ABG HCO3   


 


ABG O2 Sat (Measured)   


 


ABG O2 Content   


 


ABG Base Excess   


 


Alexy Test   


 


O2 Delivery Device   


 


Oxygen Flow Rate   


 


Sodium    148 H


 


Potassium    4.4


 


Chloride    108 H


 


Carbon Dioxide    34 H


 


Anion Gap    6 L


 


BUN    16


 


Creatinine    0.4 L


 


Creat Clearance w eGFR   


 


POC Glucometer   


 


Random Glucose    131 H


 


Lactic Acid   


 


Calcium    7.4 L


 


Phosphorus    2.3 L


 


Magnesium    2.8 H


 


Total Bilirubin   


 


AST   


 


ALT   


 


Alkaline Phosphatase   


 


Creatine Kinase   


 


Creatine Kinase Index   


 


CK-MB (CK-2)   


 


Troponin I   


 


Total Protein   


 


Albumin   














  01/26/18 01/26/18 01/26/18





  09:26 09:45 09:50


 


WBC    21.1 H


 


RBC    4.38


 


Hgb    11.1


 


Hct    39.0


 


MCV    89.0


 


MCH    25.3 L


 


MCHC    28.4 L


 


RDW    21.4 H


 


Plt Count    174  D


 


MPV    9.6


 


Neutrophils %    No Result Required.


 


Neutrophils % (Manual)   


 


Band Neutrophils %   


 


Lymphocytes %    No Result Required.


 


Lymphocytes % (Manual)   


 


Monocytes %   


 


Monocytes % (Manual)   


 


Eosinophils %   


 


Eosinophils % (Manual)   


 


Basophils %   


 


Basophils % (Manual)   


 


Myelocytes % (Man)   


 


Promyelocytes % (Man)   


 


Metamyelocytes   


 


Hypochromia   


 


Platelet Estimate   


 


Polychromasia   


 


Poikilocytosis   


 


Anisocytosis   


 


Microcytosis   


 


Macrocytosis   


 


PT with INR   


 


INR   


 


PTT (Actin FS)   


 


Puncture Site   Left radial 


 


ABG pH   7.16 L* 


 


ABG pCO2 at Pt Temp   78.3 H* D 


 


ABG pO2 at Pt Temp   216.0 H* D 


 


ABG HCO3   27.0 H 


 


ABG O2 Sat (Measured)   99.4 H 


 


ABG O2 Content   14.9 L 


 


ABG Base Excess   -2.6 L 


 


Alexy Test   Positive 


 


O2 Delivery Device   Amble bag 


 


Oxygen Flow Rate   100% 


 


Sodium   


 


Potassium   


 


Chloride   


 


Carbon Dioxide   


 


Anion Gap   


 


BUN   


 


Creatinine   


 


Creat Clearance w eGFR   


 


POC Glucometer  122  


 


Random Glucose   


 


Lactic Acid   


 


Calcium   


 


Phosphorus   


 


Magnesium   


 


Total Bilirubin   


 


AST   


 


ALT   


 


Alkaline Phosphatase   


 


Creatine Kinase   


 


Creatine Kinase Index   


 


CK-MB (CK-2)   


 


Troponin I   


 


Total Protein   


 


Albumin   














  01/26/18 01/26/18 01/26/18





  09:50 09:50 09:50


 


WBC   


 


RBC   


 


Hgb   


 


Hct   


 


MCV   


 


MCH   


 


MCHC   


 


RDW   


 


Plt Count   


 


MPV   


 


Neutrophils %   


 


Neutrophils % (Manual)   


 


Band Neutrophils %   


 


Lymphocytes %   


 


Lymphocytes % (Manual)   


 


Monocytes %   


 


Monocytes % (Manual)   


 


Eosinophils %   


 


Eosinophils % (Manual)   


 


Basophils %   


 


Basophils % (Manual)   


 


Myelocytes % (Man)   


 


Promyelocytes % (Man)   


 


Metamyelocytes   


 


Hypochromia   


 


Platelet Estimate   


 


Polychromasia   


 


Poikilocytosis   


 


Anisocytosis   


 


Microcytosis   


 


Macrocytosis   


 


PT with INR  10.70  


 


INR  0.95  


 


PTT (Actin FS)  28.8  


 


Puncture Site   


 


ABG pH   


 


ABG pCO2 at Pt Temp   


 


ABG pO2 at Pt Temp   


 


ABG HCO3   


 


ABG O2 Sat (Measured)   


 


ABG O2 Content   


 


ABG Base Excess   


 


Alexy Test   


 


O2 Delivery Device   


 


Oxygen Flow Rate   


 


Sodium   154 H 


 


Potassium   4.6 


 


Chloride   111 H 


 


Carbon Dioxide   31 


 


Anion Gap   12 


 


BUN   19 H 


 


Creatinine   0.9 


 


Creat Clearance w eGFR   > 60 


 


POC Glucometer   


 


Random Glucose   101 


 


Lactic Acid    10.1 H*


 


Calcium   11.5 H 


 


Phosphorus   6.7 H 


 


Magnesium   5.8 H 


 


Total Bilirubin   0.5  D 


 


AST   1425 H 


 


ALT   1109 H 


 


Alkaline Phosphatase   166 H 


 


Creatine Kinase   187 


 


Creatine Kinase Index   1.8 


 


CK-MB (CK-2)   3.471 


 


Troponin I   0.17 H 


 


Total Protein   4.9 L 


 


Albumin   2.1 L 

















ASSESSMENT AND PLAN:


Acute Hypoxic and Hypercapneic Respiratory Failure


Pneumonia


r/o Influenza 


Acute COPD Exacerbation


LV Diastolic Dysfunction


Pulmonary HTN





-  continue antibiotics / vanco / ID follow up. 


-  Medrol  


-  inhaled bronchodilators standing and PRN


-  AC Mode of vent Incentive Spirometry 


-  Enteral feeds 


-  DVT/GI prophylaxis


-  IVF 


-  Follow Lactic Acid 


-  Strict I & O


-  Cardiac enzymes 


-  Patient was made DNR by her son  





Dr Delgado 


Critical care time spent in reviewing chart, evaluating patient and formulating 

plan 35 min

## 2018-01-26 NOTE — PROC
Intubation





- Intubation


Reason for Intubation: Respiratory Failure, Airway Protection (cardiopulmonary 

arrest)


Time of Intubation: 09:30


Intubation Method: orotracheal


Blade used: Mac (initial attempt with Sarkar-ETT in esophagus)


Tube Size (cm): 7.5


Tube position @ lip (cm): 20


Tube position confirmed by: Direct visualization, CO2 detector, Chest x-ray (

CXR pending), Breath sounds


Breath Sounds after Intubation: equal


Post Intubation Xray: Yes (pending)


Remarks: 





Called re. Code 99/Anesthesia Stat. On arrival, pt. in cardiopulmonary arrest 

with ACLS in progress. First attempt on airway, inadequate view due to 

secretions; suction initially unavailable. On initial placement of ETT, 

positive color change, breath sounds heard. However, called back as stomach was 

becoming distended. ETT removed and next attempt with improved view after 

suctioning copious secretions from airway; cords seen. ETT passed with ease, 

positive color change, bilateral breath sounds equal. NG tube placed and 

stomach decompressed. Airway secured by respiratory team. Further management/

care continued by medical team.

## 2018-01-26 NOTE — PROC
Central Line Insertion


Indication: Poor Venous Access, Vasopressor


Risks and Benefits Explained: No (emergent/no IV access)


Consent on Chart: No


Central Line: Triple Lumen Catheter


Anesthesia: 1% Lidocaine


Sterile Technique: Yes


Ultrasound Guided Assistance: Yes


Position: Right Internal Jugular


Post Insertion: Yes: Bilateral  Breath Sounds, Bilateral Chest Expansion, Chest 

X-Ray Ordered


Sterile Dressing Applied: Yes

## 2018-01-26 NOTE — PN
Physical Exam: 


SUBJECTIVE: Patient seen and examined


The patient is a 77 year old female with a history of COPD, CHF, recent hip Fx 

and replacement 12/17 c/b COPD exacerbation who presented with acute 

respiratory failure.  The patient was transferred to telemetry floor yesterday 

however was found unresponsive and a CODE 99 was called at approximately 9:

10am.  ROSC was obtained after 3 rounds of epi and the patient coded again at 9:

30am with RSOC once again being obtained.  The patient was transferred again to 

the ICU with hypotension.  A central line was placed and the patient was placed 

on levophed.








OBJECTIVE:





 Vital Signs











 Period  Temp  Pulse  Resp  BP Sys/Gonzalez  Pulse Ox


 


 Last 24 Hr  98.6 F-99.8 F  108-117  20-25  122-138/55-80  96-98











GENERAL: The patient is intubated, in no acute distress.


HEAD: Normal with no signs of trauma.


EYES: sclera anicteric, conjunctiva clear. No ptosis. 


ENT: oropharynx clear without exudates, moist mucous membranes.


NECK: Trachea midline, full range of motion, supple. 


LUNGS: Breath sounds equal, Course crackles auscultated on the right, no wheezes

, no accessory muscle use. 


HEART: Regular rate and rhythm, S1, S2 without murmur, rub or gallop.


ABDOMEN: Soft, nontender, nondistended, normoactive bowel sounds, no guarding, 

no rebound, no hepatosplenomegaly, no masses.


EXTREMITIES: 2+ pulses, warm, well-perfused, no edema. 


NEUROLOGICAL: Normal speech, gait not observed.


PSYCH: Normal mood, normal affect.


SKIN: Warm, dry, normal turgor, no rashes or lesions noted














 Laboratory Results - last 24 hr











  01/25/18 01/26/18 01/26/18





  05:27 06:45 06:45


 


WBC   19.1 H 


 


RBC   4.17 


 


Hgb   10.7 


 


Hct   36.2 


 


MCV   86.8 


 


MCH   25.7 


 


MCHC   29.6 L 


 


RDW   20.9 H 


 


Plt Count   140 


 


MPV   9.5 


 


Neutrophils %   96.7 H 


 


Neutrophils % (Manual)  68.7  D  


 


Band Neutrophils %  28.3  


 


Lymphocytes %   1.4 L D 


 


Lymphocytes % (Manual)  1.0 L D  


 


Monocytes %   1.8 L 


 


Monocytes % (Manual)  2 L  


 


Eosinophils %   0.0 


 


Eosinophils % (Manual)  0.0  


 


Basophils %   0.1 


 


Basophils % (Manual)  0.0  


 


Myelocytes % (Man)  0  


 


Promyelocytes % (Man)  0  


 


Metamyelocytes  0  


 


Hypochromia  0  


 


Platelet Estimate  Decreased  


 


Polychromasia  1+  


 


Poikilocytosis  0  


 


Anisocytosis  2+  


 


Microcytosis  0  


 


Macrocytosis  0  


 


PT with INR   


 


INR   


 


PTT (Actin FS)   


 


Puncture Site   


 


ABG pH   


 


ABG pCO2 at Pt Temp   


 


ABG pO2 at Pt Temp   


 


ABG HCO3   


 


ABG O2 Sat (Measured)   


 


ABG O2 Content   


 


ABG Base Excess   


 


Alexy Test   


 


O2 Delivery Device   


 


Oxygen Flow Rate   


 


Sodium    148 H


 


Potassium    4.4


 


Chloride    108 H


 


Carbon Dioxide    34 H


 


Anion Gap    6 L


 


BUN    16


 


Creatinine    0.4 L


 


Creat Clearance w eGFR   


 


POC Glucometer   


 


Random Glucose    131 H


 


Lactic Acid   


 


Calcium    7.4 L


 


Phosphorus    2.3 L


 


Magnesium    2.8 H


 


Total Bilirubin   


 


AST   


 


ALT   


 


Alkaline Phosphatase   


 


Creatine Kinase   


 


Creatine Kinase Index   


 


CK-MB (CK-2)   


 


Troponin I   


 


Total Protein   


 


Albumin   














  01/26/18 01/26/18 01/26/18





  09:26 09:45 09:50


 


WBC    21.1 H


 


RBC    4.38


 


Hgb    11.1


 


Hct    39.0


 


MCV    89.0


 


MCH    25.3 L


 


MCHC    28.4 L


 


RDW    21.4 H


 


Plt Count    174  D


 


MPV    9.6


 


Neutrophils %    No Result Required.


 


Neutrophils % (Manual)   


 


Band Neutrophils %   


 


Lymphocytes %    No Result Required.


 


Lymphocytes % (Manual)   


 


Monocytes %   


 


Monocytes % (Manual)   


 


Eosinophils %   


 


Eosinophils % (Manual)   


 


Basophils %   


 


Basophils % (Manual)   


 


Myelocytes % (Man)   


 


Promyelocytes % (Man)   


 


Metamyelocytes   


 


Hypochromia   


 


Platelet Estimate   


 


Polychromasia   


 


Poikilocytosis   


 


Anisocytosis   


 


Microcytosis   


 


Macrocytosis   


 


PT with INR   


 


INR   


 


PTT (Actin FS)   


 


Puncture Site   Left radial 


 


ABG pH   7.16 L* 


 


ABG pCO2 at Pt Temp   78.3 H* D 


 


ABG pO2 at Pt Temp   216.0 H* D 


 


ABG HCO3   27.0 H 


 


ABG O2 Sat (Measured)   99.4 H 


 


ABG O2 Content   14.9 L 


 


ABG Base Excess   -2.6 L 


 


Alexy Test   Positive 


 


O2 Delivery Device   Amble bag 


 


Oxygen Flow Rate   100% 


 


Sodium   


 


Potassium   


 


Chloride   


 


Carbon Dioxide   


 


Anion Gap   


 


BUN   


 


Creatinine   


 


Creat Clearance w eGFR   


 


POC Glucometer  122  


 


Random Glucose   


 


Lactic Acid   


 


Calcium   


 


Phosphorus   


 


Magnesium   


 


Total Bilirubin   


 


AST   


 


ALT   


 


Alkaline Phosphatase   


 


Creatine Kinase   


 


Creatine Kinase Index   


 


CK-MB (CK-2)   


 


Troponin I   


 


Total Protein   


 


Albumin   














  01/26/18 01/26/18 01/26/18





  09:50 09:50 09:50


 


WBC   


 


RBC   


 


Hgb   


 


Hct   


 


MCV   


 


MCH   


 


MCHC   


 


RDW   


 


Plt Count   


 


MPV   


 


Neutrophils %   


 


Neutrophils % (Manual)   


 


Band Neutrophils %   


 


Lymphocytes %   


 


Lymphocytes % (Manual)   


 


Monocytes %   


 


Monocytes % (Manual)   


 


Eosinophils %   


 


Eosinophils % (Manual)   


 


Basophils %   


 


Basophils % (Manual)   


 


Myelocytes % (Man)   


 


Promyelocytes % (Man)   


 


Metamyelocytes   


 


Hypochromia   


 


Platelet Estimate   


 


Polychromasia   


 


Poikilocytosis   


 


Anisocytosis   


 


Microcytosis   


 


Macrocytosis   


 


PT with INR  10.70  


 


INR  0.95  


 


PTT (Actin FS)  28.8  


 


Puncture Site   


 


ABG pH   


 


ABG pCO2 at Pt Temp   


 


ABG pO2 at Pt Temp   


 


ABG HCO3   


 


ABG O2 Sat (Measured)   


 


ABG O2 Content   


 


ABG Base Excess   


 


Alexy Test   


 


O2 Delivery Device   


 


Oxygen Flow Rate   


 


Sodium   154 H 


 


Potassium   4.6 


 


Chloride   111 H 


 


Carbon Dioxide   31 


 


Anion Gap   12 


 


BUN   19 H 


 


Creatinine   0.9 


 


Creat Clearance w eGFR   > 60 


 


POC Glucometer   


 


Random Glucose   101 


 


Lactic Acid    10.1 H*


 


Calcium   11.5 H 


 


Phosphorus   6.7 H 


 


Magnesium   5.8 H 


 


Total Bilirubin   0.5  D 


 


AST   1425 H 


 


ALT   1109 H 


 


Alkaline Phosphatase   166 H 


 


Creatine Kinase   187 


 


Creatine Kinase Index   1.8 


 


CK-MB (CK-2)   3.471 


 


Troponin I   0.17 H 


 


Total Protein   4.9 L 


 


Albumin   2.1 L 








Active Medications











Generic Name Dose Route Start Last Admin





  Trade Name Freq  PRN Reason Stop Dose Admin


 


Acetaminophen  325 mg  01/25/18 12:22  





  Tylenol Suppository -  SC   





  Q4H PRN   





  FEVER   


 


Albuterol Sulfate  1 amp  01/25/18 12:22  01/26/18 08:07





  Ventolin 0.083% Nebulizer Soln -  NEB   1 amp





  Q4H PRN   Administration





  SHORT OF BREATH/WHEEZING   


 


Atorvastatin Calcium  10 mg  01/25/18 22:00  01/25/18 22:27





  Lipitor -  PO   10 mg





  HS ELI   Administration


 


Diltiazem HCl  120 mg  01/25/18 22:00  01/26/18 09:30





  Cardizem Cd -  PO   Not Given





  BID ELI   


 


Enoxaparin Sodium  40 mg  01/26/18 10:00  01/26/18 09:30





  Lovenox -  SQ   Not Given





  DAILY ELI   


 


Ferrous Sulfate  325 mg  01/25/18 22:00  01/26/18 06:30





  Feosol -  PO   325 mg





  TID ELI   Administration


 


Piperacillin Sod/Tazobactam  100 mls @ 200 mls/hr  01/25/18 18:00  01/26/18 10:

00





  Sod 3.375 gm/ Dextrose  IVPB   Not Given





  Q8H-IV ELI   


 


Norepinephrine Bitartrate 8,  500 mls @ 18.75 mls/hr  01/26/18 11:30  01/26/18 

12:03





  000 mcg/ Dextrose  IV   5 mcg/min





  TITR ELI   18.75 mls/hr





  Protocol   Administration





  5 MCG/MIN   


 


Propofol  1,000,000 mcg in 100 mls @ 1.666 mls/hr  01/26/18 11:30  01/26/18 12:

06





  Diprivan -  IVPB   15 mcg/kg/min





  TITR ELI   4.997 mls/hr





  Protocol   Administration





  5 MCG/KG/MIN   


 


Fentanyl 500 mcg/ Dextrose  100 mls @ 10 mls/hr  01/26/18 12:30  





  IVPB   





  TITR ELI   





  50 MCG/HR   


 


Latanoprost  1 drop  01/25/18 22:00  01/25/18 22:31





  Xalatan 0.005% Eye Drops -  OU   1 drop





  HS ELI   Administration


 


Loratadine  10 mg  01/26/18 10:00  01/26/18 09:30





  Claritin -  PO   Not Given





  DAILY ELI   


 


Methylprednisolone Sodium Succinate  40 mg  01/25/18 15:00  01/26/18 09:30





  Solu-Medrol -  IVPUSH   Not Given





  Q6H-IV ELI   


 


Montelukast Sodium  10 mg  01/25/18 22:00  01/25/18 22:27





  Singulair -  PO   10 mg





  HS ELI   Administration


 


Multivitamins/Minerals/Vitamin C  1 tab  01/26/18 10:00  01/26/18 09:30





  Tab-A-Vit -  PO   Not Given





  DAILY ELI   


 


Pantoprazole Sodium  40 mg  01/26/18 10:00  01/26/18 09:30





  Protonix Iv  IVPUSH   Not Given





  DAILY ELI   


 


Tiotropium Bromide  1 puff  01/26/18 10:00  01/26/18 09:30





  Spiriva -  IH   Not Given





  DAILY ELI   











ASSESSMENT/PLAN:


The patient is a 77 year old female with a history of COPD, CHF, recent hip Fx 

and replacement 12/17 c/b COPD exacerbation who presented with acute 

respiratory failure.





NEURO


Patient sedated due to intubation


-Continue propofol and fentanyl drips.


-Will continue to monitor.





CV


#Hypotension


The patient had a 2 cardiac arrests earlier today with RSOC now hypotensive in 

the ICU requiring pressors.


-Continue levophed drip and titrate as needed to maintain MAP >65.


-Central line place


-Will continue to monitor.





RESP


#Acute respiratory failure


Patient now re-intubated following a cardiac arrest earlier today.  Chest plain 

film demonstrates right sided rib fractures likely due to chest compressions.  

It is likely the patient aspirated leading to a respiratory arrest that led to 

the cardiac arrest.


-Will cover the patient with vancomycin and zosyn.


-Will maintain the patient on the ventilator.


-Will follow up with a repeat ABG.


-Will continue to monitor.





GI


-OG tube with feeds.





Renal


No issues currently.


-Will continue to monitor bun/creatinine.





ID


#Pneumonia vs. Influenza vs. RSV


-Continue zosyn.


-Will start Vancomycin given concern for aspiration


-Follow recs from ID





MSK


No issues currently





FEN/GI


-Replete electrolytes PRN, will monitor


-IV fluids





PPX


-Lovenox 40 sq daily


-Protonix 40 IV daily for GI ppx





DISPO: Continue ICU level of care.








Visit type





- Emergency Visit


Emergency Visit: No





- New Patient


This patient is new to me today: No





- Critical Care


Critical Care patient: Yes


Total Critical Care Time (in minutes): 35


Critical Care Statement: The care of this patient involved high complexity 

decision making to prevent further life threatening deterioration of the patient

's condition and/or to evaluate & treat vital organ system(s) failure or risk 

of failure.

## 2018-01-27 LAB
ALBUMIN SERPL-MCNC: 1.9 G/DL (ref 3.4–5)
ALP SERPL-CCNC: 134 U/L (ref 45–117)
ALT SERPL-CCNC: 1609 U/L (ref 12–78)
ANION GAP SERPL CALC-SCNC: 5 MMOL/L (ref 8–16)
ARTERIAL BLOOD GAS PCO2: 51.2 MMHG (ref 35–45)
ARTERIAL PATENCY WRIST A: POSITIVE
AST SERPL-CCNC: 1187 U/L (ref 15–37)
BASE EXCESS BLDA CALC-SCNC: 3.6 MEQ/L (ref -2–2)
BASOPHILS # BLD: 0.1 % (ref 0–2)
BILIRUB SERPL-MCNC: 0.6 MG/DL (ref 0.2–1)
BUN SERPL-MCNC: 41 MG/DL (ref 7–18)
CALCIUM SERPL-MCNC: 7.1 MG/DL (ref 8.5–10.1)
CHLORIDE SERPL-SCNC: 109 MMOL/L (ref 98–107)
CO2 SERPL-SCNC: 33 MMOL/L (ref 21–32)
CREAT SERPL-MCNC: 1.5 MG/DL (ref 0.55–1.02)
DEPRECATED RDW RBC AUTO: 20.7 % (ref 11.6–15.6)
EOSINOPHIL # BLD: 0.1 % (ref 0–4.5)
GLUCOSE SERPL-MCNC: 177 MG/DL (ref 74–106)
HCT VFR BLD CALC: 30.5 % (ref 32.4–45.2)
HGB BLD-MCNC: 9.5 GM/DL (ref 10.7–15.3)
LYMPHOCYTES # BLD: 5.7 % (ref 8–40)
MAGNESIUM SERPL-MCNC: 3.2 MG/DL (ref 1.8–2.4)
MCH RBC QN AUTO: 26.3 PG (ref 25.7–33.7)
MCHC RBC AUTO-ENTMCNC: 31.2 G/DL (ref 32–36)
MCV RBC: 84.2 FL (ref 80–96)
MONOCYTES # BLD AUTO: 4.5 % (ref 3.8–10.2)
NEUTROPHILS # BLD: 89.6 % (ref 42.8–82.8)
PHOSPHATE SERPL-MCNC: 2.1 MG/DL (ref 2.5–4.9)
PLATELET # BLD AUTO: 112 K/MM3 (ref 134–434)
PMV BLD: 9.8 FL (ref 7.5–11.1)
PO2 BLDA: 91.3 MMHG (ref 70–100)
POTASSIUM SERPLBLD-SCNC: 4.8 MMOL/L (ref 3.5–5.1)
PROT SERPL-MCNC: 4.3 G/DL (ref 6.4–8.2)
RBC # BLD AUTO: 3.63 M/MM3 (ref 3.6–5.2)
SAO2 % BLDA: 97.4 % (ref 90–98.9)
SODIUM SERPL-SCNC: 147 MMOL/L (ref 136–145)
WBC # BLD AUTO: 11.8 K/MM3 (ref 4–10)

## 2018-01-27 RX ADMIN — PIPERACILLIN SODIUM AND TAZOBACTAM SODIUM SCH MLS/HR: .25; 2 INJECTION, POWDER, LYOPHILIZED, FOR SOLUTION INTRAVENOUS at 15:59

## 2018-01-27 RX ADMIN — FENTANYL CITRATE SCH MLS/HR: 50 INJECTION INTRAMUSCULAR; INTRAVENOUS at 15:58

## 2018-01-27 RX ADMIN — FERROUS SULFATE TAB EC 324 MG (65 MG FE EQUIVALENT) SCH MG: 324 (65 FE) TABLET DELAYED RESPONSE at 16:04

## 2018-01-27 RX ADMIN — FENTANYL CITRATE SCH MLS/HR: 50 INJECTION INTRAMUSCULAR; INTRAVENOUS at 06:29

## 2018-01-27 RX ADMIN — PANTOPRAZOLE SODIUM SCH MG: 40 INJECTION, POWDER, FOR SOLUTION INTRAVENOUS at 09:49

## 2018-01-27 RX ADMIN — LATANOPROST SCH DROP: 50 SOLUTION OPHTHALMIC at 21:42

## 2018-01-27 RX ADMIN — METHYLPREDNISOLONE SODIUM SUCCINATE SCH MG: 40 INJECTION, POWDER, FOR SOLUTION INTRAMUSCULAR; INTRAVENOUS at 16:03

## 2018-01-27 RX ADMIN — FERROUS SULFATE TAB EC 324 MG (65 MG FE EQUIVALENT) SCH MG: 324 (65 FE) TABLET DELAYED RESPONSE at 06:29

## 2018-01-27 RX ADMIN — METHYLPREDNISOLONE SODIUM SUCCINATE SCH MG: 40 INJECTION, POWDER, FOR SOLUTION INTRAMUSCULAR; INTRAVENOUS at 02:23

## 2018-01-27 RX ADMIN — SODIUM CHLORIDE SCH MLS/HR: 9 INJECTION, SOLUTION INTRAVENOUS at 15:58

## 2018-01-27 RX ADMIN — LORATADINE SCH MG: 10 TABLET ORAL at 09:50

## 2018-01-27 RX ADMIN — SODIUM CHLORIDE SCH MLS/HR: 9 INJECTION, SOLUTION INTRAVENOUS at 19:00

## 2018-01-27 RX ADMIN — PIPERACILLIN SODIUM,TAZOBACTAM SODIUM SCH MLS/HR: 3; .375 INJECTION, POWDER, FOR SOLUTION INTRAVENOUS at 02:18

## 2018-01-27 RX ADMIN — METHYLPREDNISOLONE SODIUM SUCCINATE SCH MG: 40 INJECTION, POWDER, FOR SOLUTION INTRAMUSCULAR; INTRAVENOUS at 09:50

## 2018-01-27 RX ADMIN — MONTELUKAST SODIUM SCH MG: 10 TABLET, COATED ORAL at 21:40

## 2018-01-27 RX ADMIN — MULTIVITAMIN TABLET SCH TAB: TABLET at 09:50

## 2018-01-27 RX ADMIN — PROPOFOL SCH MLS/HR: 10 INJECTION, EMULSION INTRAVENOUS at 15:57

## 2018-01-27 RX ADMIN — FERROUS SULFATE TAB EC 324 MG (65 MG FE EQUIVALENT) SCH MG: 324 (65 FE) TABLET DELAYED RESPONSE at 21:40

## 2018-01-27 RX ADMIN — METHYLPREDNISOLONE SODIUM SUCCINATE SCH MG: 40 INJECTION, POWDER, FOR SOLUTION INTRAMUSCULAR; INTRAVENOUS at 21:40

## 2018-01-27 RX ADMIN — ATORVASTATIN CALCIUM SCH MG: 10 TABLET, FILM COATED ORAL at 21:40

## 2018-01-27 RX ADMIN — TIOTROPIUM BROMIDE SCH: 18 CAPSULE ORAL; RESPIRATORY (INHALATION) at 09:53

## 2018-01-27 RX ADMIN — ENOXAPARIN SODIUM SCH MG: 40 INJECTION SUBCUTANEOUS at 09:50

## 2018-01-27 RX ADMIN — PIPERACILLIN SODIUM AND TAZOBACTAM SODIUM SCH MLS/HR: .25; 2 INJECTION, POWDER, LYOPHILIZED, FOR SOLUTION INTRAVENOUS at 21:39

## 2018-01-27 NOTE — PN
Progress Note, Physician


History of Present Illness: 





Remains intubated following respiratory and then cardiac arrest.  appears non-

responsive to stimulation.





- Current Medication List


Current Medications: 


Active Medications





Acetaminophen (Tylenol Suppository -)  325 mg [place] Q4H PRN


   PRN Reason: FEVER


Acetaminophen (Ofirmev Injection -)  1,000 mg IVPB Q6H PRN


   PRN Reason: FEVER


Albuterol Sulfate (Ventolin 0.083% Nebulizer Soln -)  1 amp NEB Q4H PRN


   PRN Reason: SHORT OF BREATH/WHEEZING


   Last Admin: 01/26/18 08:07 Dose:  1 amp


Atorvastatin Calcium (Lipitor -)  10 mg PO HS [facility]


   Last Admin: 01/26/18 21:27 Dose:  10 mg


Diltiazem HCl (Cardizem Cd -)  120 mg PO BID [facility]


   Last Admin: 01/27/18 09:53 Dose:  Not Given


Enoxaparin Sodium (Lovenox -)  40 mg SQ DAILY [facility]


   Last Admin: 01/27/18 09:50 Dose:  40 mg


Ferrous Sulfate (Feosol -)  325 mg PO TID ELI


   Last Admin: 01/27/18 06:29 Dose:  325 mg


Piperacillin Sod/Tazobactam (Sod 3.375 gm/ Dextrose)  100 mls @ 200 mls/hr IVPB 

Q8H-IV ELI


   Last Admin: 01/27/18 02:18 Dose:  200 mls/hr


Propofol (Diprivan -)  1,000,000 mcg in 100 mls @ 1.666 mls/hr IVPB TITR ELI; 5 

MCG/KG/MIN


   PRN Reason: Protocol


   Last Admin: 01/26/18 12:06 Dose:  15 mcg/kg/min, 4.997 mls/hr


Fentanyl 500 mcg/ Sodium (Chloride)  100 mls @ 10 mls/hr IVPB TITR ELI; 50 MCG/

HR


   PRN Reason: Protocol


   Last Admin: 01/27/18 06:29 Dose:  5 mls/hr


Sodium Chloride (Normal Saline -)  1,000 mls @ 100 mls/hr IV ASDIR ELI


   Last Admin: 01/26/18 17:46 Dose:  100 mls/hr


Vancomycin HCl 1,250 mg/ (Dextrose)  250 mls @ 166.667 mls/hr IVPB Q24H ELI


   PRN Reason: Protocol


   Last Admin: 01/27/18 09:50 Dose:  166.667 mls/hr


Vasopressin 50 units/ Sodium (Chloride)  100 mls @ 24 mls/hr IVPB TITR ELI; 0.2 

UNITS/MIN


   PRN Reason: Protocol


   Last Titration: 01/26/18 19:17 Dose:  0.05 units/min, 6 mls/hr


Norepinephrine Bitartrate 8, (000 mcg/ Sodium Chloride)  500 mls @ 18.75 mls/hr 

IV TITR ELI; 5 MCG/MIN


   PRN Reason: Protocol


   Last Titration: 01/27/18 04:15 Dose:  10 mcg/min, 37.5 mls/hr


Latanoprost (Xalatan 0.005% Eye Drops -)  1 drop OU HS [facility]


   Last Admin: 01/26/18 21:29 Dose:  1 drop


Loratadine (Claritin -)  10 mg PO DAILY [facility]


   Last Admin: 01/27/18 09:50 Dose:  10 mg


Methylprednisolone Sodium Succinate (Solu-Medrol -)  40 mg IVPUSH Q6H-IV [facility]


   Last Admin: 01/27/18 09:50 Dose:  40 mg


Montelukast Sodium (Singulair -)  10 mg PO HS [facility]


   Last Admin: 01/26/18 21:26 Dose:  10 mg


Multivitamins/Minerals/Vitamin C (Tab-A-Vit -)  1 tab PO DAILY [facility]


   Last Admin: 01/27/18 09:50 Dose:  1 tab


Pantoprazole Sodium (Protonix Iv)  40 mg IVPUSH DAILY [facility]


   Last Admin: 01/27/18 09:49 Dose:  40 mg


Tiotropium Bromide (Spiriva -)  1 puff IH DAILY [facility]


   Last Admin: 01/27/18 09:53 Dose:  Not Given











- Objective


Vital Signs: 


 Vital Signs











Temperature  99.9 F H  01/27/18 10:00


 


Pulse Rate  84   01/27/18 10:00


 


Respiratory Rate  14   01/27/18 10:00


 


Blood Pressure  137/74   01/27/18 10:00


 


O2 Sat by Pulse Oximetry (%)  98   01/27/18 09:13











Constitutional: Yes: No Distress


HENT: Yes: Other (ET Tube present)


Neck: Yes: Supple, Trachea Midline


Cardiovascular: Yes: Regular Rate and Rhythm, S1, S2.  No: Murmur


Respiratory: Yes: Mechanically Ventilated


Gastrointestinal: Yes: Soft.  No: Distention


Edema: No


Neurological: Yes: Unresponsive


Labs: 


 CBC, BMP





 01/27/18 06:10 





 01/27/18 06:10 





 INR, PTT











INR  0.95  (0.82-1.09)   01/26/18  09:50    














Assessment/Plan





Current Active Problems





Asystole (Acute)/ Cardiac arrest


COPD exacerbation (Acute)


Hemodynamic instability (Acute)


Lactic acid acidosis (Acute)


RSV (respiratory syncytial virus pneumonia) (Acute)


Respiratory failure (Acute)


Sepsis (Acute)


Shock liver (Acute)





-awaiting for signs mental status return following cardiac arrest


-cont supportive care, steroids for COPD/ resp failure


-patient now DNR if further cardiac arrest

## 2018-01-27 NOTE — PN
Progress Note, Physician


History of Present Illness: 





continues to be intubated and sedated


patients renal function is increasing








- Current Medication List


Current Medications: 


Active Medications





Acetaminophen (Tylenol Suppository -)  325 mg [place] Q4H PRN


   PRN Reason: FEVER


Acetaminophen (Ofirmev Injection -)  1,000 mg IVPB Q6H PRN


   PRN Reason: FEVER


Albuterol Sulfate (Ventolin 0.083% Nebulizer Soln -)  1 amp NEB Q4H PRN


   PRN Reason: SHORT OF BREATH/WHEEZING


   Last Admin: 01/26/18 08:07 Dose:  1 amp


Atorvastatin Calcium (Lipitor -)  10 mg PO HS ELI


   Last Admin: 01/26/18 21:27 Dose:  10 mg


Diltiazem HCl (Cardizem Cd -)  120 mg PO BID [facility]


   Last Admin: 01/27/18 09:53 Dose:  Not Given


Enoxaparin Sodium (Lovenox -)  40 mg SQ DAILY [facility]


   Last Admin: 01/27/18 09:50 Dose:  40 mg


Ferrous Sulfate (Feosol -)  325 mg PO TID ELI


   Last Admin: 01/27/18 06:29 Dose:  325 mg


Piperacillin Sod/Tazobactam (Sod 3.375 gm/ Dextrose)  100 mls @ 200 mls/hr IVPB 

Q8H-IV ELI


   Last Admin: 01/27/18 02:18 Dose:  200 mls/hr


Propofol (Diprivan -)  1,000,000 mcg in 100 mls @ 1.666 mls/hr IVPB TITR ELI; 5 

MCG/KG/MIN


   PRN Reason: Protocol


   Last Admin: 01/26/18 12:06 Dose:  15 mcg/kg/min, 4.997 mls/hr


Fentanyl 500 mcg/ Sodium (Chloride)  100 mls @ 10 mls/hr IVPB TITR ELI; 50 MCG/

HR


   PRN Reason: Protocol


   Last Admin: 01/27/18 06:29 Dose:  5 mls/hr


Sodium Chloride (Normal Saline -)  1,000 mls @ 100 mls/hr IV ASDIR ELI


   Last Admin: 01/26/18 17:46 Dose:  100 mls/hr


Vancomycin HCl 1,250 mg/ (Dextrose)  250 mls @ 166.667 mls/hr IVPB Q24H ELI


   PRN Reason: Protocol


   Last Admin: 01/27/18 09:50 Dose:  166.667 mls/hr


Vasopressin 50 units/ Sodium (Chloride)  100 mls @ 24 mls/hr IVPB TITR ELI; 0.2 

UNITS/MIN


   PRN Reason: Protocol


   Last Titration: 01/26/18 19:17 Dose:  0.05 units/min, 6 mls/hr


Norepinephrine Bitartrate 8, (000 mcg/ Sodium Chloride)  500 mls @ 18.75 mls/hr 

IV TITR ELI; 5 MCG/MIN


   PRN Reason: Protocol


   Last Titration: 01/27/18 04:15 Dose:  10 mcg/min, 37.5 mls/hr


Latanoprost (Xalatan 0.005% Eye Drops -)  1 drop OU HS [facility]


   Last Admin: 01/26/18 21:29 Dose:  1 drop


Loratadine (Claritin -)  10 mg PO DAILY [facility]


   Last Admin: 01/27/18 09:50 Dose:  10 mg


Methylprednisolone Sodium Succinate (Solu-Medrol -)  40 mg IVPUSH Q6H-IV [facility]


   Last Admin: 01/27/18 09:50 Dose:  40 mg


Montelukast Sodium (Singulair -)  10 mg PO HS [facility]


   Last Admin: 01/26/18 21:26 Dose:  10 mg


Multivitamins/Minerals/Vitamin C (Tab-A-Vit -)  1 tab PO DAILY [facility]


   Last Admin: 01/27/18 09:50 Dose:  1 tab


Pantoprazole Sodium (Protonix Iv)  40 mg IVPUSH DAILY [facility]


   Last Admin: 01/27/18 09:49 Dose:  40 mg


Tiotropium Bromide (Spiriva -)  1 puff IH DAILY [facility]


   Last Admin: 01/27/18 09:53 Dose:  Not Given











- Objective


Vital Signs: 


 Vital Signs











Temperature  99.9 F H  01/27/18 10:00


 


Pulse Rate  86   01/27/18 11:24


 


Respiratory Rate  14   01/27/18 11:43


 


Blood Pressure  147/78   01/27/18 11:24


 


O2 Sat by Pulse Oximetry (%)  98   01/27/18 09:13











Constitutional: Yes: Other


Cardiovascular: Yes: S1, S2


Respiratory: Yes: Intubated, Mechanically Ventilated


Gastrointestinal: Yes: Normal Bowel Sounds, Soft, Other (ng tube)


Musculoskeletal: Yes: WNL


Extremities: Yes: WNL


Neurological: Yes: Other


Psychiatric: Yes: Other


Labs: 


 CBC, BMP





 01/27/18 06:10 





 01/27/18 06:10 





 INR, PTT











INR  0.95  (0.82-1.09)   01/26/18  09:50    














Assessment/Plan








Problem List





- Problems


(1) Acute and chronic respiratory failure with hypercapnia


Code(s): J96.22 - ACUTE AND CHRONIC RESPIRATORY FAILURE WITH HYPERCAPNIA   





(2) COPD exacerbation


Code(s): J44.1 - CHRONIC OBSTRUCTIVE PULMONARY DISEASE W (ACUTE) EXACERBATION   





(3) CHF (congestive heart failure)


Code(s): I50.9 - HEART FAILURE, UNSPECIFIED   


Qualifiers: 


   Congestive heart failure type: diastolic   Congestive heart failure 

chronicity: chronic   Qualified Code(s): I50.32 - Chronic diastolic (congestive

) heart failure   





(4) Fracture, hip


Code(s): S72.009A - FRACTURE OF UNSP PART OF NECK OF UNSP FEMUR, INIT   


Qualifiers: 


   Encounter type: initial encounter   Fracture type: closed   Laterality: left

   Qualified Code(s): S72.002A - Fracture of unspecified part of neck of left 

femur, initial encounter for closed fracture   





(5) Hypertension


Code(s): I10 - ESSENTIAL (PRIMARY) HYPERTENSION   





6 pneumonia





7 leukocytosis





8 uti


plan








lcx of the urine noted





plan


continue zosyn


lozano top vanco


continue supportive measures


rest as per icu


close monitoring





cc time 40 min

## 2018-01-27 NOTE — PN
Progress Note (short form)





- Note


Progress Note: 


PULM/CCM





Pt seen & examined in the ICU. Pt remains intubated, sedated, on pressors. 

Urine Clxrs growing Enterococcus.





Active Medications





Acetaminophen (Tylenol Suppository -)  325 mg CA Q4H PRN


   PRN Reason: FEVER


Acetaminophen (Ofirmev Injection -)  1,000 mg IVPB Q6H PRN


   PRN Reason: FEVER


Albuterol Sulfate (Ventolin 0.083% Nebulizer Soln -)  1 amp NEB Q4H PRN


   PRN Reason: SHORT OF BREATH/WHEEZING


   Last Admin: 01/26/18 08:07 Dose:  1 amp


Atorvastatin Calcium (Lipitor -)  10 mg PO HS ELI


   Last Admin: 01/26/18 21:27 Dose:  10 mg


Diltiazem HCl (Cardizem Cd -)  120 mg PO BID [facility]I


   Last Admin: 01/27/18 09:53 Dose:  Not Given


Enoxaparin Sodium (Lovenox -)  40 mg SQ DAILY [facility]I


   Last Admin: 01/27/18 09:50 Dose:  40 mg


Ferrous Sulfate (Feosol -)  325 mg PO TID ELI


   Last Admin: 01/27/18 16:04 Dose:  325 mg


Propofol (Diprivan -)  1,000,000 mcg in 100 mls @ 1.666 mls/hr IVPB TITR ELI; 5 

MCG/KG/MIN


   PRN Reason: Protocol


   Last Admin: 01/27/18 15:57 Dose:  15 mcg/kg/min, 4.997 mls/hr


Fentanyl 500 mcg/ Sodium (Chloride)  100 mls @ 10 mls/hr IVPB TITR ELI; 50 MCG/

HR


   PRN Reason: Protocol


   Last Admin: 01/27/18 15:58 Dose:  10 mls/hr


Sodium Chloride (Normal Saline -)  1,000 mls @ 100 mls/hr IV ASDIR ELI


   Last Admin: 01/27/18 15:58 Dose:  100 mls/hr


Vasopressin 50 units/ Sodium (Chloride)  100 mls @ 24 mls/hr IVPB TITR ELI; 0.2 

UNITS/MIN


   PRN Reason: Protocol


   Last Titration: 01/26/18 19:17 Dose:  0.05 units/min, 6 mls/hr


Norepinephrine Bitartrate 8, (000 mcg/ Sodium Chloride)  500 mls @ 18.75 mls/hr 

IV TITR ELI; 5 MCG/MIN


   PRN Reason: Protocol


   Last Titration: 01/27/18 04:15 Dose:  10 mcg/min, 37.5 mls/hr


Piperacillin Sod/Tazobactam (Sod 2.25 gm/ Dextrose)  100 mls @ 100 mls/hr IVPB 

Q6H-IV [facility]


   Last Admin: 01/27/18 15:59 Dose:  100 mls/hr


Latanoprost (Xalatan 0.005% Eye Drops -)  1 drop [facility]U [facility]


   Last Admin: 01/26/18 21:29 Dose:  1 drop


Loratadine (Claritin -)  10 mg PO DAILY [facility]I


   Last Admin: 01/27/18 09:50 Dose:  10 mg


Methylprednisolone Sodium Succinate (Solu-Medrol -)  40 mg IVPUSH Q6H-IV [facility]


   Last Admin: 01/27/18 16:03 Dose:  40 mg


Montelukast Sodium (Singulair -)  10 mg PO HS [facility]


   Last Admin: 01/26/18 21:26 Dose:  10 mg


Multivitamins/Minerals/Vitamin C (Tab-A-Vit -)  1 tab PO DAILY [facility]


   Last Admin: 01/27/18 09:50 Dose:  1 tab


Pantoprazole Sodium (Protonix Iv)  40 mg IVPUSH DAILY [facility]


   Last Admin: 01/27/18 09:49 Dose:  40 mg


Tiotropium Bromide (Spiriva -)  1 puff IH DAILY [facility]


   Last Admin: 01/27/18 09:53 Dose:  Not Given





 V/S











 Period  Temp  Pulse  Resp  BP Sys/Gonzalez  Pulse Ox


 


 Last 24 Hr  98.1 F-100.3 F    14-14  /40-81  92-98








 I's & O's











 01/24/18 01/25/18 01/26/18 01/27/18





 23:59 23:59 23:59 23:59


 


Intake Total 1522 1240 1780 2004.4


 


Output Total 1900 1150 5 150


 


Balance -603 70 4305 1854.4


 


Weight  57.697 kg 55.52 kg 56.8 kg











GEN: Intubated, sedated, NAD


PULM: Coarse L/S, no wheezing  


CV: nml S1 S2, RR, unable to appreciate any G/M/R  


Abd: + BS,  soft, nontender


Ext: + Pulses, WWPX4, no edema


Neuro: sedated 


 CBC, BMP





 01/27/18 06:10 





 01/27/18 06:10 





 MICRO





01/23/18 05:57   Blood - Peripheral Venous   Blood Culture - Preliminary


                            NO GROWTH OBTAINED AFTER 96 HOURS, INCUBATION TO 

CONTINUE


                            FOR 1 DAYS.


01/23/18 05:57   Blood - Peripheral Venous   Blood Culture - Preliminary


                            NO GROWTH OBTAINED AFTER 96 HOURS, INCUBATION TO 

CONTINUE


                            FOR 1 DAYS.


01/23/18 16:26   Urine - Urine - Catheterized   Urine Culture - Final


                            Enterococcus Faecalis


01/23/18 07:13   Nasopharyngeal Swab   Influenza Types A,B Antigen (LISANDRO) - Final


01/23/18 07:13   Nasopharyngeal Swab    - Final





RECENT STUDIES TO NOTE:





CXR 1/27: Since the prior exam 1/26/2018 at 1209 hours, the right jugular line 

and endotracheal tube with nasogastric tube persist. Again noted is a right 

effusion and right infiltrate. The left lung is relatively well aerated. There 

is an old fracture deformity of the proximal left humerus. This is a sclerotic 

knob. Correlation recommended Impression: Little change since prior study. 





ASSESS: This is a 78 y/o woman w/ CHF, severe COPD, and recent L femur fx (

surgery on 12/27, discharged on 1/05), readmitted on 1/23 w/ acute on chronic 

hypercapneic respiratory failure in the setting of RSV PNA, exacerb/o COPD +/- 

CHF requiring intubation. Extubated on 1/25 & convalescing on the floor, re-

admitted to the ICU yesterday (1/26) s/p Cardiac Arrest on the floor, not 

cooled. Pt now w/ resp Fail and vasodilatory shock w/ unknown mental status.





PLAN:


-LPV


-Nebs


-Cont steroids


-Spiriva


-Wean FiO2 as tolerated


-Wean sedation


-Daily Sedation Holiday to eval mental status


-D/c Vanc


-Cont Zo


-ID


-IVFs for a CVP 12 - 16


-Press for a MAP of 65


-Trend LA


-Strict I's O's


-Monitor UOP


-Trend BUN/Cr


-Trend Roponin & BNP


-TFs


-Lovenox


-PPI


-DNR





DGL, ACNP-BC


[facility] ICU


PULM/CCM


[id number]

## 2018-01-28 LAB
ALBUMIN SERPL-MCNC: 1.8 G/DL (ref 3.4–5)
ALP SERPL-CCNC: 99 U/L (ref 45–117)
ALT SERPL-CCNC: 974 U/L (ref 12–78)
ANION GAP SERPL CALC-SCNC: 9 MMOL/L (ref 8–16)
AST SERPL-CCNC: 383 U/L (ref 15–37)
BASOPHILS # BLD: 0 % (ref 0–2)
BILIRUB SERPL-MCNC: 0.7 MG/DL (ref 0.2–1)
BUN SERPL-MCNC: 68 MG/DL (ref 7–18)
CALCIUM SERPL-MCNC: 6.8 MG/DL (ref 8.5–10.1)
CHLORIDE SERPL-SCNC: 110 MMOL/L (ref 98–107)
CO2 SERPL-SCNC: 28 MMOL/L (ref 21–32)
CREAT SERPL-MCNC: 2.6 MG/DL (ref 0.55–1.02)
DEPRECATED RDW RBC AUTO: 20.7 % (ref 11.6–15.6)
EOSINOPHIL # BLD: 0 % (ref 0–4.5)
GLUCOSE SERPL-MCNC: 172 MG/DL (ref 74–106)
HCT VFR BLD CALC: 25.4 % (ref 32.4–45.2)
HGB BLD-MCNC: 8 GM/DL (ref 10.7–15.3)
LYMPHOCYTES # BLD: 5.6 % (ref 8–40)
MCH RBC QN AUTO: 26.2 PG (ref 25.7–33.7)
MCHC RBC AUTO-ENTMCNC: 31.4 G/DL (ref 32–36)
MCV RBC: 83.4 FL (ref 80–96)
MONOCYTES # BLD AUTO: 4.7 % (ref 3.8–10.2)
NEUTROPHILS # BLD: 89.7 % (ref 42.8–82.8)
PLATELET # BLD AUTO: 80 K/MM3 (ref 134–434)
PMV BLD: 10.4 FL (ref 7.5–11.1)
POTASSIUM SERPLBLD-SCNC: 4.6 MMOL/L (ref 3.5–5.1)
PROT SERPL-MCNC: 4.2 G/DL (ref 6.4–8.2)
RBC # BLD AUTO: 3.04 M/MM3 (ref 3.6–5.2)
SODIUM SERPL-SCNC: 147 MMOL/L (ref 136–145)
WBC # BLD AUTO: 11.4 K/MM3 (ref 4–10)

## 2018-01-28 RX ADMIN — PANTOPRAZOLE SODIUM SCH MG: 40 INJECTION, POWDER, FOR SOLUTION INTRAVENOUS at 10:51

## 2018-01-28 RX ADMIN — MULTIVITAMIN TABLET SCH TAB: TABLET at 10:52

## 2018-01-28 RX ADMIN — LATANOPROST SCH DROP: 50 SOLUTION OPHTHALMIC at 21:46

## 2018-01-28 RX ADMIN — METHYLPREDNISOLONE SODIUM SUCCINATE SCH MG: 40 INJECTION, POWDER, FOR SOLUTION INTRAMUSCULAR; INTRAVENOUS at 21:38

## 2018-01-28 RX ADMIN — PROPOFOL SCH MLS/HR: 10 INJECTION, EMULSION INTRAVENOUS at 21:40

## 2018-01-28 RX ADMIN — METHYLPREDNISOLONE SODIUM SUCCINATE SCH MG: 40 INJECTION, POWDER, FOR SOLUTION INTRAMUSCULAR; INTRAVENOUS at 02:13

## 2018-01-28 RX ADMIN — SODIUM CHLORIDE SCH MLS/HR: 9 INJECTION, SOLUTION INTRAVENOUS at 05:09

## 2018-01-28 RX ADMIN — PIPERACILLIN SODIUM AND TAZOBACTAM SODIUM SCH MLS/HR: .25; 2 INJECTION, POWDER, LYOPHILIZED, FOR SOLUTION INTRAVENOUS at 21:31

## 2018-01-28 RX ADMIN — PIPERACILLIN SODIUM AND TAZOBACTAM SODIUM SCH MLS/HR: .25; 2 INJECTION, POWDER, LYOPHILIZED, FOR SOLUTION INTRAVENOUS at 10:58

## 2018-01-28 RX ADMIN — SODIUM CHLORIDE SCH: 9 INJECTION, SOLUTION INTRAVENOUS at 19:00

## 2018-01-28 RX ADMIN — PIPERACILLIN SODIUM AND TAZOBACTAM SODIUM SCH MLS/HR: .25; 2 INJECTION, POWDER, LYOPHILIZED, FOR SOLUTION INTRAVENOUS at 17:00

## 2018-01-28 RX ADMIN — FERROUS SULFATE TAB EC 324 MG (65 MG FE EQUIVALENT) SCH MG: 324 (65 FE) TABLET DELAYED RESPONSE at 05:08

## 2018-01-28 RX ADMIN — METHYLPREDNISOLONE SODIUM SUCCINATE SCH MG: 40 INJECTION, POWDER, FOR SOLUTION INTRAMUSCULAR; INTRAVENOUS at 10:51

## 2018-01-28 RX ADMIN — LORATADINE SCH MG: 10 TABLET ORAL at 10:52

## 2018-01-28 RX ADMIN — METHYLPREDNISOLONE SODIUM SUCCINATE SCH MG: 40 INJECTION, POWDER, FOR SOLUTION INTRAMUSCULAR; INTRAVENOUS at 16:00

## 2018-01-28 RX ADMIN — FERROUS SULFATE TAB EC 324 MG (65 MG FE EQUIVALENT) SCH MG: 324 (65 FE) TABLET DELAYED RESPONSE at 15:30

## 2018-01-28 RX ADMIN — SODIUM CHLORIDE SCH MLS/HR: 9 INJECTION, SOLUTION INTRAVENOUS at 21:45

## 2018-01-28 RX ADMIN — SODIUM CHLORIDE SCH MLS/HR: 9 INJECTION, SOLUTION INTRAVENOUS at 19:00

## 2018-01-28 RX ADMIN — FERROUS SULFATE TAB EC 324 MG (65 MG FE EQUIVALENT) SCH MG: 324 (65 FE) TABLET DELAYED RESPONSE at 21:40

## 2018-01-28 RX ADMIN — MONTELUKAST SODIUM SCH MG: 10 TABLET, COATED ORAL at 21:41

## 2018-01-28 RX ADMIN — TIOTROPIUM BROMIDE SCH: 18 CAPSULE ORAL; RESPIRATORY (INHALATION) at 10:52

## 2018-01-28 RX ADMIN — ATORVASTATIN CALCIUM SCH MG: 10 TABLET, FILM COATED ORAL at 21:40

## 2018-01-28 RX ADMIN — ENOXAPARIN SODIUM SCH MG: 40 INJECTION SUBCUTANEOUS at 10:51

## 2018-01-28 RX ADMIN — PIPERACILLIN SODIUM AND TAZOBACTAM SODIUM SCH MLS/HR: .25; 2 INJECTION, POWDER, LYOPHILIZED, FOR SOLUTION INTRAVENOUS at 02:13

## 2018-01-28 NOTE — PN
Progress Note, Physician


History of Present Illness: 





Remains unresponsive, intubated, on pressors, abx





- Current Medication List


Current Medications: 


Active Medications





Acetaminophen (Tylenol Suppository -)  325 mg WA Q4H PRN


   PRN Reason: FEVER


Acetaminophen (Ofirmev Injection -)  1,000 mg IVPB Q6H PRN


   PRN Reason: FEVER


Albuterol Sulfate (Ventolin 0.083% Nebulizer Soln -)  1 amp NEB Q4H PRN


   PRN Reason: SHORT OF BREATH/WHEEZING


   Last Admin: 01/26/18 08:07 Dose:  1 amp


Atorvastatin Calcium (Lipitor -)  10 mg PO HS ELI


   Last Admin: 01/27/18 21:40 Dose:  10 mg


Diltiazem HCl (Cardizem Cd -)  120 mg PO BID ELI


   Last Admin: 01/28/18 10:52 Dose:  Not Given


Enoxaparin Sodium (Lovenox -)  40 mg SQ DAILY ELI


   Last Admin: 01/28/18 10:51 Dose:  40 mg


Ferrous Sulfate (Feosol -)  325 mg PO TID ELI


   Last Admin: 01/28/18 05:08 Dose:  325 mg


Propofol (Diprivan -)  1,000,000 mcg in 100 mls @ 1.666 mls/hr IVPB TITR EIL; 5 

MCG/KG/MIN


   PRN Reason: Protocol


   Last Admin: 01/27/18 15:57 Dose:  15 mcg/kg/min, 4.997 mls/hr


Fentanyl 500 mcg/ Sodium (Chloride)  100 mls @ 10 mls/hr IVPB TITR ELI; 50 MCG/

HR


   PRN Reason: Protocol


   Last Admin: 01/27/18 15:58 Dose:  10 mls/hr


Sodium Chloride (Normal Saline -)  1,000 mls @ 100 mls/hr IV ASDIR ELI


   Last Admin: 01/28/18 05:09 Dose:  100 mls/hr


Vasopressin 50 units/ Sodium (Chloride)  100 mls @ 24 mls/hr IVPB TITR ELI; 0.2 

UNITS/MIN


   PRN Reason: Protocol


   Last Titration: 01/28/18 02:25 Dose:  0.03 units/min, 4 mls/hr


Norepinephrine Bitartrate 8, (000 mcg/ Sodium Chloride)  500 mls @ 18.75 mls/hr 

IV TITR ELI; 5 MCG/MIN


   PRN Reason: Protocol


   Last Titration: 01/28/18 05:09 Dose:  1 mcg/min, 3.75 mls/hr


Piperacillin Sod/Tazobactam (Sod 2.25 gm/ Dextrose)  100 mls @ 100 mls/hr IVPB 

Q6H-IV [facility]


   Last Admin: 01/28/18 10:58 Dose:  100 mls/hr


Latanoprost (Xalatan 0.005% Eye Drops -)  1 drop OU HS [facility]


   Last Admin: 01/27/18 21:42 Dose:  1 drop


Loratadine (Claritin -)  10 mg PO DAILY [facility]


   Last Admin: 01/28/18 10:52 Dose:  10 mg


Methylprednisolone Sodium Succinate (Solu-Medrol -)  40 mg IVPUSH Q6H-IV [facility]


   Last Admin: 01/28/18 10:51 Dose:  40 mg


Montelukast Sodium (Singulair -)  10 mg PO HS [facility]


   Last Admin: 01/27/18 21:40 Dose:  10 mg


Multivitamins/Minerals/Vitamin C (Tab-A-Vit -)  1 tab PO DAILY [facility]


   Last Admin: 01/28/18 10:52 Dose:  1 tab


Pantoprazole Sodium (Protonix Iv)  40 mg IVPUSH DAILY [facility]


   Last Admin: 01/28/18 10:51 Dose:  40 mg


Tiotropium Bromide (Spiriva -)  1 puff IH DAILY [facility]


   Last Admin: 01/28/18 10:52 Dose:  Not Given











- Objective


Vital Signs: 


 Vital Signs











Temperature  99.8 F H  01/28/18 10:00


 


Pulse Rate  63   01/28/18 10:00


 


Respiratory Rate  14   01/28/18 10:00


 


Blood Pressure  121/67   01/28/18 10:00


 


O2 Sat by Pulse Oximetry (%)  100   01/28/18 09:14











HENT: Yes: Other (ETTube)


Cardiovascular: Yes: Regular Rate and Rhythm


Respiratory: Yes: Mechanically Ventilated


Gastrointestinal: Yes: Normal Bowel Sounds, Soft


Edema: Yes


Edema: LUE: Trace, RUE: Trace, LLE: Trace, RLE: Trace


Neurological: Yes: Unresponsive


Labs: 


 CBC, BMP





 01/28/18 06:15 





 01/28/18 06:15 





 INR, PTT











INR  0.95  (0.82-1.09)   01/26/18  09:50    














Assessment/Plan





Current Active Problems





Asystole (Acute)/ Cardiac arrest


ARF


COPD exacerbation (Acute)


Hemodynamic instability (Acute)


Lactic acid acidosis (Acute)


RSV (respiratory syncytial virus pneumonia) (Acute)


Respiratory failure (Acute)


Sepsis (Acute)


Shock liver (Acute)





-continues on abx, pressors, vent support


-if no neurologic recovery, then palliative care?

## 2018-01-28 NOTE — PN
Progress Note (short form)





- Note


Progress Note: 


PULM/CCM





Pt seen & examined in the ICU. Pt remains intubated, sedated, on pressors.





Active Medications





Acetaminophen (Tylenol Suppository -)  325 mg [place] Q4H PRN


   PRN Reason: FEVER


Acetaminophen (Ofirmev Injection -)  1,000 mg IVPB Q6H PRN


   PRN Reason: FEVER


Albuterol Sulfate (Ventolin 0.083% Nebulizer Soln -)  1 amp NEB Q4H PRN


   PRN Reason: SHORT OF BREATH/WHEEZING


   Last Admin: 01/26/18 08:07 Dose:  1 amp


Atorvastatin Calcium (Lipitor -)  10 mg PO HS [facility]


   Last Admin: 01/27/18 21:40 Dose:  10 mg


Diltiazem HCl (Cardizem Cd -)  120 mg PO BID [facility]


   Last Admin: 01/28/18 10:52 Dose:  Not Given


Enoxaparin Sodium (Lovenox -)  40 mg SQ DAILY [facility]


   Last Admin: 01/28/18 10:51 Dose:  40 mg


Ferrous Sulfate (Feosol -)  325 mg PO TID [facility]


   Last Admin: 01/28/18 05:08 Dose:  325 mg


Propofol (Diprivan -)  1,000,000 mcg in 100 mls @ 1.666 mls/hr IVPB TITR ELI; 5 

MCG/KG/MIN


   PRN Reason: Protocol


   Last Admin: 01/27/18 15:57 Dose:  15 mcg/kg/min, 4.997 mls/hr


Fentanyl 500 mcg/ Sodium (Chloride)  100 mls @ 10 mls/hr IVPB TITR ELI; 50 MCG/

HR


   PRN Reason: Protocol


   Last Admin: 01/27/18 15:58 Dose:  10 mls/hr


Sodium Chloride (Normal Saline -)  1,000 mls @ 100 mls/hr IV ASDIR [facility]


   Last Admin: 01/28/18 05:09 Dose:  100 mls/hr


Vasopressin 50 units/ Sodium (Chloride)  100 mls @ 24 mls/hr IVPB TITR ELI; 0.2 

UNITS/MIN


   PRN Reason: Protocol


   Last Titration: 01/28/18 02:25 Dose:  0.03 units/min, 4 mls/hr


Norepinephrine Bitartrate 8, (000 mcg/ Sodium Chloride)  500 mls @ 18.75 mls/hr 

IV TITR ELI; 5 MCG/MIN


   PRN Reason: Protocol


   Last Titration: 01/28/18 05:09 Dose:  1 mcg/min, 3.75 mls/hr


Piperacillin Sod/Tazobactam (Sod 2.25 gm/ Dextrose)  100 mls @ 100 mls/hr IVPB 

Q6H-IV [facility]


   Last Admin: 01/28/18 10:58 Dose:  100 mls/hr


Latanoprost (Xalatan 0.005% Eye Drops -)  1 drop OU HS [facility]


   Last Admin: 01/27/18 21:42 Dose:  1 drop


Loratadine (Claritin -)  10 mg PO DAILY [facility]


   Last Admin: 01/28/18 10:52 Dose:  10 mg


Methylprednisolone Sodium Succinate (Solu-Medrol -)  40 mg IVPUSH Q6H-IV [facility]


   Last Admin: 01/28/18 10:51 Dose:  40 mg


Montelukast Sodium (Singulair -)  10 mg PO HS [facility]


   Last Admin: 01/27/18 21:40 Dose:  10 mg


Multivitamins/Minerals/Vitamin C (Tab-A-Vit -)  1 tab PO DAILY [facility]


   Last Admin: 01/28/18 10:52 Dose:  1 tab


Pantoprazole Sodium (Protonix Iv)  40 mg IVPUSH DAILY [facility]


   Last Admin: 01/28/18 10:51 Dose:  40 mg


Tiotropium Bromide (Spiriva -)  1 puff IH DAILY [facility]


   Last Admin: 01/28/18 10:52 Dose:  Not Given





 Vital Signs











 Period  Temp  Pulse  Resp  BP Sys/Gonzalez  Pulse Ox


 


 Last 24 Hr  98.7 F-100.1 F  61-76  14-15  /42-84  








 Intake & Output











 01/25/18 01/26/18 01/27/18 01/28/18





 23:59 23:59 23:59 23:59


 


Intake Total 1240 1780 4258.8 1596.0


 


Output Total 1150 5 200 250


 


Balance 90 1775 4058.8 1346.0


 


Weight 57.697 kg 55.52 kg 56.8 kg 60.3 kg











GEN: Intubated, sedated, NAD


PULM: Coarse L/S, no wheezing  


CV: nml S1 S2, RR, unable to appreciate any G/M/R  


Abd: + BS,  soft, nontender


Ext: + Pulses, WWPX4, no edema


Neuro: sedated 


 CBC, BMP





 01/28/18 06:15 





 01/28/18 06:15 





 Microbiology





01/23/18 05:57   Blood - Peripheral Venous   Blood Culture - Final


                            NO GROWTH AFTER 5 DAYS INCUBATION


01/23/18 05:57   Blood - Peripheral Venous   Blood Culture - Final


                            NO GROWTH AFTER 5 DAYS INCUBATION


01/23/18 16:26   Urine - Urine - Catheterized   Urine Culture - Final


                            Enterococcus Faecalis


01/23/18 07:13   Nasopharyngeal Swab   Influenza Types A,B Antigen (LISANDRO) - Final


01/23/18 07:13   Nasopharyngeal Swab    - Final








RECENT STUDIES TO NOTE:





CXR 1/28: Since the prior study of 1/27/2018 at 0749 hours, the endotracheal 

tube, right line and nasogastric tube persist. Again noted are the extensive 

right pulmonary and pleural changes with relative sparing of the left 

hemithorax. There is an old fracture deformity of the proximal left humerus. 

There are degenerative changes and sclerotic knob. Correlation recommended. 

Impression : no significant change since prior exam. 





ASSESS: This is a 78 y/o woman w/ CHF, severe COPD, and recent L femur fx (

surgery on 12/27, discharged on 1/05), readmitted on 1/23 w/ acute on chronic 

hypercapneic respiratory failure in the setting of RSV PNA, exacerb/o COPD +/- 

CHF requiring intubation. Extubated on 1/25 & convalescing on the floor, re-

admitted to the ICU yesterday (1/26) s/p Cardiac Arrest on the floor, not 

cooled. Pt now w/ resp Fail and vasodilatory shock w/ unknown mental status.





PLAN:


-LPV


-Nebs


-Cont steroids


-Spiriva


-Wean FiO2 as tolerated


-Wean sedation


-Daily Sedation Holiday to eval mental status


-Cont Zo


-ID


-IVFs for a CVP 12 - 16


-Press for a MAP of 65


-Trend LA


-Strict I's O's


-Monitor UOP


-Trend BUN/Cr


-Trend BNP


-TFs


-Lovenox


-PPI


-DNR





DGL, ACNP-[facility] ICU


PULM/[facility]


[id number]





Critical Care


Total Critical Care Time (in minutes): 38


Critical Care Statement: The care of this patient involved high complexity 

decision making to prevent further life threatening deterioration of the patient

's condition and/or to evaluate & treat vital organ system(s) failure or risk 

of failure.

## 2018-01-29 LAB
ALBUMIN SERPL-MCNC: 1.9 G/DL (ref 3.4–5)
ALP SERPL-CCNC: 96 U/L (ref 45–117)
ALT SERPL-CCNC: 701 U/L (ref 12–78)
ANION GAP SERPL CALC-SCNC: 8 MMOL/L (ref 8–16)
APPEARANCE UR: CLEAR
AST SERPL-CCNC: 175 U/L (ref 15–37)
BASOPHILS # BLD: 0 % (ref 0–2)
BILIRUB SERPL-MCNC: 0.6 MG/DL (ref 0.2–1)
BILIRUB UR STRIP.AUTO-MCNC: NEGATIVE MG/DL
BUN SERPL-MCNC: 85 MG/DL (ref 7–18)
CALCIUM SERPL-MCNC: 6.3 MG/DL (ref 8.5–10.1)
CHLORIDE SERPL-SCNC: 110 MMOL/L (ref 98–107)
CO2 SERPL-SCNC: 27 MMOL/L (ref 21–32)
COLOR UR: (no result)
CREAT SERPL-MCNC: 3.3 MG/DL (ref 0.55–1.02)
CREAT UR-MCNC: 25.8 MG/DL (ref 20–320)
DEPRECATED RDW RBC AUTO: 20.4 % (ref 11.6–15.6)
EOSINOPHIL # BLD: 0 % (ref 0–4.5)
EPITH CASTS URNS QL MICRO: (no result) /HPF
GLUCOSE SERPL-MCNC: 151 MG/DL (ref 74–106)
HCT VFR BLD CALC: 27.7 % (ref 32.4–45.2)
HGB BLD-MCNC: 8.6 GM/DL (ref 10.7–15.3)
KETONES UR QL STRIP: NEGATIVE
LEUKOCYTE ESTERASE UR QL STRIP.AUTO: NEGATIVE
LYMPHOCYTES # BLD: 2.3 % (ref 8–40)
MAGNESIUM SERPL-MCNC: 3 MG/DL (ref 1.8–2.4)
MCH RBC QN AUTO: 25.7 PG (ref 25.7–33.7)
MCHC RBC AUTO-ENTMCNC: 31 G/DL (ref 32–36)
MCV RBC: 82.8 FL (ref 80–96)
MONOCYTES # BLD AUTO: 3.1 % (ref 3.8–10.2)
NEUTROPHILS # BLD: 94.6 % (ref 42.8–82.8)
NITRITE UR QL STRIP: NEGATIVE
PH UR: 5 [PH] (ref 5–8)
PHOSPHATE SERPL-MCNC: 5.1 MG/DL (ref 2.5–4.9)
PLATELET # BLD AUTO: 150 K/MM3 (ref 134–434)
PMV BLD: 10.3 FL (ref 7.5–11.1)
POTASSIUM SERPLBLD-SCNC: 4.9 MMOL/L (ref 3.5–5.1)
PROT SERPL-MCNC: 4.6 G/DL (ref 6.4–8.2)
PROT UR QL STRIP: (no result)
PROT UR QL STRIP: NEGATIVE
RBC # BLD AUTO: 3.34 M/MM3 (ref 3.6–5.2)
RBC # UR STRIP: (no result) /UL
SODIUM SERPL-SCNC: 145 MMOL/L (ref 136–145)
SP GR UR: 1.01 (ref 1–1.03)
UROBILINOGEN UR STRIP-MCNC: NEGATIVE MG/DL (ref 0.2–1)
WBC # BLD AUTO: 22 K/MM3 (ref 4–10)

## 2018-01-29 RX ADMIN — ENOXAPARIN SODIUM SCH MG: 40 INJECTION SUBCUTANEOUS at 09:33

## 2018-01-29 RX ADMIN — PIPERACILLIN SODIUM AND TAZOBACTAM SODIUM SCH MLS/HR: .25; 2 INJECTION, POWDER, LYOPHILIZED, FOR SOLUTION INTRAVENOUS at 02:31

## 2018-01-29 RX ADMIN — HEPARIN SODIUM SCH UNIT: 5000 INJECTION, SOLUTION INTRAVENOUS; SUBCUTANEOUS at 15:06

## 2018-01-29 RX ADMIN — SODIUM CHLORIDE SCH MLS/HR: 9 INJECTION, SOLUTION INTRAVENOUS at 15:07

## 2018-01-29 RX ADMIN — PIPERACILLIN SODIUM AND TAZOBACTAM SODIUM SCH MLS/HR: .25; 2 INJECTION, POWDER, LYOPHILIZED, FOR SOLUTION INTRAVENOUS at 21:01

## 2018-01-29 RX ADMIN — MONTELUKAST SODIUM SCH MG: 10 TABLET, COATED ORAL at 21:02

## 2018-01-29 RX ADMIN — METHYLPREDNISOLONE SODIUM SUCCINATE SCH MG: 40 INJECTION, POWDER, FOR SOLUTION INTRAMUSCULAR; INTRAVENOUS at 02:31

## 2018-01-29 RX ADMIN — METHYLPREDNISOLONE SODIUM SUCCINATE SCH MG: 40 INJECTION, POWDER, FOR SOLUTION INTRAMUSCULAR; INTRAVENOUS at 09:34

## 2018-01-29 RX ADMIN — FERROUS SULFATE TAB EC 324 MG (65 MG FE EQUIVALENT) SCH MG: 324 (65 FE) TABLET DELAYED RESPONSE at 06:30

## 2018-01-29 RX ADMIN — TIOTROPIUM BROMIDE SCH: 18 CAPSULE ORAL; RESPIRATORY (INHALATION) at 09:34

## 2018-01-29 RX ADMIN — FERROUS SULFATE TAB EC 324 MG (65 MG FE EQUIVALENT) SCH MG: 324 (65 FE) TABLET DELAYED RESPONSE at 15:15

## 2018-01-29 RX ADMIN — METHYLPREDNISOLONE SODIUM SUCCINATE SCH MG: 40 INJECTION, POWDER, FOR SOLUTION INTRAMUSCULAR; INTRAVENOUS at 21:01

## 2018-01-29 RX ADMIN — PANTOPRAZOLE SODIUM SCH MG: 40 INJECTION, POWDER, FOR SOLUTION INTRAVENOUS at 09:34

## 2018-01-29 RX ADMIN — FENTANYL CITRATE SCH MLS/HR: 50 INJECTION INTRAMUSCULAR; INTRAVENOUS at 02:30

## 2018-01-29 RX ADMIN — PIPERACILLIN SODIUM AND TAZOBACTAM SODIUM SCH MLS/HR: .25; 2 INJECTION, POWDER, LYOPHILIZED, FOR SOLUTION INTRAVENOUS at 15:07

## 2018-01-29 RX ADMIN — HEPARIN SODIUM SCH UNIT: 5000 INJECTION, SOLUTION INTRAVENOUS; SUBCUTANEOUS at 21:02

## 2018-01-29 RX ADMIN — SODIUM CHLORIDE SCH: 9 INJECTION, SOLUTION INTRAVENOUS at 21:03

## 2018-01-29 RX ADMIN — ATORVASTATIN CALCIUM SCH MG: 10 TABLET, FILM COATED ORAL at 21:02

## 2018-01-29 RX ADMIN — PIPERACILLIN SODIUM AND TAZOBACTAM SODIUM SCH MLS/HR: .25; 2 INJECTION, POWDER, LYOPHILIZED, FOR SOLUTION INTRAVENOUS at 09:33

## 2018-01-29 RX ADMIN — SODIUM CHLORIDE SCH: 9 INJECTION, SOLUTION INTRAVENOUS at 21:02

## 2018-01-29 RX ADMIN — LATANOPROST SCH DROP: 50 SOLUTION OPHTHALMIC at 21:04

## 2018-01-29 RX ADMIN — LORATADINE SCH MG: 10 TABLET ORAL at 09:34

## 2018-01-29 RX ADMIN — FERROUS SULFATE TAB EC 324 MG (65 MG FE EQUIVALENT) SCH MG: 324 (65 FE) TABLET DELAYED RESPONSE at 21:02

## 2018-01-29 RX ADMIN — MULTIVITAMIN TABLET SCH TAB: TABLET at 09:34

## 2018-01-29 RX ADMIN — METHYLPREDNISOLONE SODIUM SUCCINATE SCH MG: 40 INJECTION, POWDER, FOR SOLUTION INTRAMUSCULAR; INTRAVENOUS at 15:06

## 2018-01-29 NOTE — CONSULT
Consult


Consult Specialty:: Nephrology


Referred by:: Dr. Tucker


Reason for Consultation:: Acute renal failure





- History of Present Illness


Chief Complaint: acute kidney injury


History of Present Illness: 





77F with multiple medical problems including COPD CHF and hip fracture presents 

to the hospital with shortness of breath found to have RSV pneumonia. On 1/26/ 2018 patient had an acute episode of respiratory failure causing cardiac arrest 

x2. Patient was resuscitated and now found to have a creatinine that has 

increased to 3.3 today. Patient has been on normal saline and has been having 

borderline adequate urine output. Patient is currently intubated, sedated, and 

on pressors. Asked to evaluate patient for progressively worsening renal 

function.





- History Source


History Provided By: Medical Record


Limitations to Obtaining History: Intubated





- Past Medical History


Cardio/Vascular: Yes: HTN


Pulmonary: Yes: COPD, Other (oxygen at home)


Gastrointestinal: Yes: Other (History of  ulcer disease diagnosed 3 years ago 

when patient presented with anemia)


Renal/: Yes: UTI


Musculoskeletal: Yes: Osteoarthritis





- Past Surgical History


Past Surgical History: Yes: None





- Alcohol/Substance Use


Hx Alcohol Use: No


History of Substance Use: reports: None





- Smoking History


Smoking history: Current every day smoker


Have you smoked in the past 12 months: No


Aproximately how many cigarettes per day: 0


If you are a former smoker, when did you quit?: Over 15 years ago





- Social History


ADL: Independent


Occupation: Retired dental , , 2 children


History of Recent Travel: No





Home Medications





- Allergies


Allergies/Adverse Reactions: 


 Allergies











Allergy/AdvReac Type Severity Reaction Status Date / Time


 


No Known Allergies Allergy   Verified 01/23/18 05:33














- Home Medications


Home Medications: 


Ambulatory Orders





Fluticasone/Salmeterol [Advair 250-50 Diskus] 1 each IH BID 06/14/12 


Tiotropium Bromide [Spiriva] 18 mcg IH DAILY 06/14/12 


Montelukast Na [Singulair -] 10 mg PO HS #0 tablet 06/19/12 


Multivitamins [Multivit ([facility] Formul[facility])] 1 udtab PO DAILY #0 tab 06/19/12 


Ferrous Sulfate [Feosol] 325 mg PO TID 09/17/12 


Calcium Carbonate/Vitamin D3 [Calcium 600-Vit D3 200 Tablet] 1 each PO DAILY 07/ 06/13 


Docosahexanoic Acid/Epa [Fish Oil Softgel] 1 each PO DAILY 07/06/13 


Potassium Chloride [K-Dur] 20 meq PO DAILY #0 tab.er.prt 07/06/13 


Alendronate Sodium [Fosamax] 35 mg PO COSTA 05/01/16 


Atorvastatin Ca [Lipitor] 10 mg PO HS 05/01/16 


Latanoprost 0.005% Eye Drops [Xalatan 0.005% Eye Drops -] 1 drop OU HS 05/01/16 


Diltiazem Cd [Cardizem Cd -] 120 mg PO BID  cap.cd.24h 01/05/18 


Enoxaparin [Lovenox -] 40 mg SQ DAILY  disp.syrin 01/05/18 


Loratadine [Claritin -] 10 mg PO DAILY  tablet 01/05/18 


Mineral Oil/Petrolat,Wht/Water [Eucerin (Small Jar) -] 1 applic TP DAILY  jar 01 /05/18 


Polyethylene Glycol 3350 [Miralax 119 gm Btl -] 17 gm PO BID  bottle 01/05/18 


Prednisone [Deltasone -] 10 mg PO DAILY  tablet 01/05/18 


Torsemide [Demadex -] 60 mg PO DAILY  tablet 01/05/18 


Acetaminophen [Tylenol .Regular Strength -] 650 mg PO Q6H PRN 01/23/18 


Albuterol 2.5/Ipratropium 0.5 [Duoneb -] 1 amp IH QSHIFT 01/23/18 


Ascorbic Acid [Vitamin C -] 500 mg PO TID 01/23/18 


Chlorpheniramine/Dextromethorp [Robitussin Long-Acting Liq] 15 ml PO Q6H 01/23/ 18 


Docusate Sodium [Colace -] 300 mg PO HS 01/23/18 


Menthol/Zinc Oxide [Calmoseptine Ointment] 71 gm TP QSHIFT 01/23/18 


Nystatin Oral Suspension - [Nystatin Oral Susp 185045 Units/5 ML -] 500,000 

units PO QID 01/23/18 


Omeprazole Magnesium [Prilosec Otc] 20 mg PO DAILY 01/23/18 


Sodium Chloride Nasal Spray [Ocean Spray Nasal Spray] 2 spray NS QSHIFT 01/23/ 18 











Family Disease History





- Family Disease History


Family Disease History: Heart Disease: Father, Other: Mother (Alzheimers 

Dementia)





Review of Systems


Unable to obtain ROS, reason: intubated and sedated 





Physical Exam


Vital Signs: 


 Vital Signs











Temperature  98 F   01/29/18 10:00


 


Pulse Rate  76   01/29/18 12:00


 


Respiratory Rate  14   01/29/18 12:00


 


Blood Pressure  140/76   01/29/18 12:00


 


O2 Sat by Pulse Oximetry (%)  99   01/29/18 09:40











Constitutional: Yes: No Distress


HENT: Yes: Atraumatic


Neck: Yes: Supple, Other (Right IJ central line in place)


Cardiovascular: Yes: Regular Rate and Rhythm


Respiratory: Yes: Rales (bilaterally at bases)


Gastrointestinal: Yes: Soft


Renal/: Yes: Rivera Present (with clear yellow urine output)


Edema: Yes


Edema: LUE: 2+, RUE: 2+, LLE: 2+, RLE: 2+


Neurological: Yes: Other (intubated and sedated)


Labs: 


 CBC, BMP





 01/29/18 05:30 





 01/29/18 05:30 











Imaging





- Results


Chest X-ray: Report Reviewed, Image Reviewed


Cat Scan: Report Reviewed, Image Reviewed





Assessment/Plan





77F PMH opf COPD s/p cardiac arrest likely secondary to acute hypoxic 

hypercarbic respiratory failure s/p cardiopulmonary resuscitation now with MO 

with Cr of 3.3.





MO-Likely a secondary to decreased perfusion during cardiac arrest x2 causing 

ATN. Patient also exposed to IV contrast on 1/23/2018


patient with borderline urine output-currently nonoligouric


check FeNa, FeUrea, UPCR


keep MAP >65 abd CVP 10-12 


current CVP 9


Continue IVF for now


can start to titrate IVF off once CVP and BP goals met 


continue to trend BUN/Cr to see peak 


Ultrasound kidney ordered


urine studies to calculate FeNa and FeUrea 


trend BMP Mg Phos 


avoid nephrotoxic drugs


renally dose all medications for CrCl <15 


no dialysis indicated at this time 


trend CXR 





COPD Exacerbation


continue vent support


wean as tolerated


Per critical care team 





Shock liver


likely secondary to hypoperfusion dring cardiac arrestx2


continue to improve perfusion to liver 


keep MAP >65


trend LFTs





s/p Cardiac Arrest


Per ICU team





Anemia


continue to trend cbc


transfuse PRN 





CHF:


currently on fluids


CVP 9 


once CVP increases will consider stopping IVF and starting diuresis 





RSV PNA


Hip fracture


Lactic acidosis


leukocytosis 





Thank you for this consultative opportunity 


Will continue to follow





Case discussed with attending Dr. Savage

## 2018-01-29 NOTE — PN
Progress Note, Physician


History of Present Illness: 





continues to be intubated


no response when sedation was turned off








- Current Medication List


Current Medications: 


Active Medications





Acetaminophen (Tylenol Suppository -)  325 mg ME Q4H PRN


   PRN Reason: FEVER


Acetaminophen (Ofirmev Injection -)  1,000 mg IVPB Q6H PRN


   PRN Reason: FEVER


Albuterol Sulfate (Ventolin 0.083% Nebulizer Soln -)  1 amp NEB Q4H PRN


   PRN Reason: SHORT OF BREATH/WHEEZING


   Last Admin: 01/26/18 08:07 Dose:  1 amp


Atorvastatin Calcium (Lipitor -)  10 mg PO HS ELI


   Last Admin: 01/28/18 21:40 Dose:  10 mg


Ferrous Sulfate (Feosol -)  325 mg PO TID ELI


   Last Admin: 01/29/18 06:30 Dose:  325 mg


Heparin Sodium (Porcine) (Heparin -)  5,000 unit SQ TID ELI


Propofol (Diprivan -)  1,000,000 mcg in 100 mls @ 1.666 mls/hr IVPB TITR ELI; 5 

MCG/KG/MIN


   PRN Reason: Protocol


   Last Admin: 01/28/18 21:40 Dose:  15 mcg/kg/min, 4.997 mls/hr


Fentanyl 500 mcg/ Sodium (Chloride)  100 mls @ 10 mls/hr IVPB TITR ELI; 50 MCG/

HR


   PRN Reason: Protocol


   Last Admin: 01/29/18 02:30 Dose:  10 mls/hr


Sodium Chloride (Normal Saline -)  1,000 mls @ 100 mls/hr IV ASDIR [facility]I


   Last Admin: 01/28/18 21:45 Dose:  100 mls/hr


Vasopressin 50 units/ Sodium (Chloride)  100 mls @ 24 mls/hr IVPB TITR ELI; 0.2 

UNITS/MIN


   PRN Reason: Protocol


   Last Admin: 01/28/18 19:00 Dose:  Not Given


Norepinephrine Bitartrate 8, (000 mcg/ Sodium Chloride)  500 mls @ 18.75 mls/hr 

IV TITR ELI; 5 MCG/MIN


   PRN Reason: Protocol


   Last Titration: 01/29/18 09:17 Dose:  2 mcg/min, 7.5 mls/hr


Piperacillin Sod/Tazobactam (Sod 2.25 gm/ Dextrose)  100 mls @ 100 mls/hr IVPB 

Q6H-IV ELI


   Last Admin: 01/29/18 09:33 Dose:  100 mls/hr


Latanoprost (Xalatan 0.005% Eye Drops -)  1 drop OU HS [facility]


   Last Admin: 01/28/18 21:46 Dose:  1 drop


Loratadine (Claritin -)  10 mg PO DAILY [facility]


   Last Admin: 01/29/18 09:34 Dose:  10 mg


Methylprednisolone Sodium Succinate (Solu-Medrol -)  40 mg IVPUSH Q6H-IV [facility]


   Last Admin: 01/29/18 09:34 Dose:  40 mg


Montelukast Sodium (Singulair -)  10 mg PO HS [facility]


   Last Admin: 01/28/18 21:41 Dose:  10 mg


Multivitamins/Minerals/Vitamin C (Tab-A-Vit -)  1 tab PO DAILY [facility]


   Last Admin: 01/29/18 09:34 Dose:  1 tab


Pantoprazole Sodium (Protonix Iv)  40 mg IVPUSH DAILY [facility]


   Last Admin: 01/29/18 09:34 Dose:  40 mg


Tiotropium Bromide (Spiriva -)  1 puff IH DAILY [facility]


   Last Admin: 01/29/18 09:34 Dose:  Not Given











- Objective


Vital Signs: 


 Vital Signs











Temperature  98 F   01/29/18 10:00


 


Pulse Rate  76   01/29/18 12:00


 


Respiratory Rate  14   01/29/18 12:00


 


Blood Pressure  140/76   01/29/18 12:00


 


O2 Sat by Pulse Oximetry (%)  99   01/29/18 09:40











Constitutional: Yes: Other


Cardiovascular: Yes: Regular Rate and Rhythm


Respiratory: Yes: Intubated, Mechanically Ventilated


Gastrointestinal: Yes: Normal Bowel Sounds, Soft


Genitourinary: Yes: Rivera Present


Musculoskeletal: Yes: WNL


Extremities: Yes: WNL


Labs: 


 CBC, BMP





 01/29/18 05:30 





 01/29/18 05:30 





 INR, PTT











INR  0.95  (0.82-1.09)   01/26/18  09:50    














Assessment/Plan








Problem List





- Problems


(1) Acute and chronic respiratory failure with hypercapnia


Code(s): J96.22 - ACUTE AND CHRONIC RESPIRATORY FAILURE WITH HYPERCAPNIA   





(2) COPD exacerbation


Code(s): J44.1 - CHRONIC OBSTRUCTIVE PULMONARY DISEASE W (ACUTE) EXACERBATION   





(3) CHF (congestive heart failure)


Code(s): I50.9 - HEART FAILURE, UNSPECIFIED   


Qualifiers: 


   Congestive heart failure type: diastolic   Congestive heart failure 

chronicity: chronic   Qualified Code(s): I50.32 - Chronic diastolic (congestive

) heart failure   





(4) Fracture, hip


Code(s): S72.009A - FRACTURE OF UNSP PART OF NECK OF UNSP FEMUR, INIT   


Qualifiers: 


   Encounter type: initial encounter   Fracture type: closed   Laterality: left

   Qualified Code(s): S72.002A - Fracture of unspecified part of neck of left 

femur, initial encounter for closed fracture   





(5) Hypertension


Code(s): I10 - ESSENTIAL (PRIMARY) HYPERTENSION   





6 pneumonia





7 leukocytosis





8 uti


plan








lcx of the urine noted





plan


continue abx


supportive measures


nutrition


monitor for any changes and mental respose


rest as per icu


patient continues to be critical








cc time 40 min

## 2018-01-29 NOTE — PN
Teaching Attending Note


Name of Resident: Jad Bhakta (Nephrology)





ATTENDING PHYSICIAN STATEMENT





I saw and evaluated the patient.


I reviewed the resident's note and discussed the case with the resident.


I agree with the resident's findings and plan as documented.








SUBJECTIVE:


This is a 77 year old woman with PMhx of COPD who presented with hypercarbic 

respiratory failure requring intubation complicated by cardiac arrest now with 

MO with Cr of 3.3. Pt with 2 arrests on 1/26.


Cr trend as follows:





 Laboratory Tests











  01/26/18 01/27/18 01/28/18





  19:35 06:10 06:15


 


Creatinine  1.2 H  1.5 H  2.6 H














  01/29/18





  05:30


 


Creatinine  3.3 H








Pt is currently intubated. Has antunez in place with yellow urine. + Contrast 

exposure on the 23rd. No NSIAD exposure. On Abx. On Levophed. On NS. 





 Home Medications











 Medication  Instructions  Recorded


 


Fluticasone/Salmeterol [Advair 1 each IH BID 06/14/12





250-50 Diskus]  


 


Tiotropium Bromide [Spiriva] 18 mcg IH DAILY 06/14/12


 


Montelukast Na [Singulair -] 10 mg PO HS #0 tablet 06/19/12


 


Multivitamins [Multivit ([facility] 1 udtab PO DAILY #0 tab 06/19/12





Formulary)]  


 


Ferrous Sulfate [Feosol] 325 mg PO TID 09/17/12


 


Calcium Carbonate/Vitamin D3 1 each PO DAILY 07/06/13





[Calcium 600-Vit D3 200 Tablet]  


 


Docosahexanoic Acid/Epa [Fish Oil 1 each PO DAILY 07/06/13





Softgel]  


 


Potassium Chloride [K-Dur] 20 meq PO DAILY #0 tab.er.prt 07/06/13


 


Alendronate Sodium [Fosamax] 35 mg PO COSTA 05/01/16


 


Atorvastatin Ca [Lipitor] 10 mg PO HS 05/01/16


 


Latanoprost 0.005% Eye Drops 1 drop OU HS 05/01/16





[Xalatan 0.005% Eye Drops -]  


 


Diltiazem Cd [Cardizem Cd -] 120 mg PO BID  cap.cd.24h 01/05/18


 


Enoxaparin [Lovenox -] 40 mg SQ DAILY  disp.syrin 01/05/18


 


Loratadine [Claritin -] 10 mg PO DAILY  tablet 01/05/18


 


Mineral Oil/Petrolat,Wht/Water 1 applic TP DAILY  jar 01/05/18





[Eucerin (Small Jar) -]  


 


Polyethylene Glycol 3350 [Miralax 17 gm PO BID  bottle 01/05/18





119 gm Btl -]  


 


Prednisone [Deltasone -] 10 mg PO DAILY  tablet 01/05/18


 


Torsemide [Demadex -] 60 mg PO DAILY  tablet 01/05/18


 


Acetaminophen [Tylenol .Regular 650 mg PO Q6H PRN 01/23/18





Strength -]  


 


Albuterol 2.5/Ipratropium 0.5 1 amp IH QSHIFT 01/23/18





[Duoneb -]  


 


Ascorbic Acid [Vitamin C -] 500 mg PO TID 01/23/18


 


Chlorpheniramine/Dextromethorp 15 ml PO Q6H 01/23/18





[Robitussin Long-Acting Liq]  


 


Docusate Sodium [Colace -] 300 mg PO HS 01/23/18


 


Menthol/Zinc Oxide [Calmoseptine 71 gm TP QSHIFT 01/23/18





Ointment]  


 


Nystatin Oral Suspension - 500,000 units PO QID 01/23/18





[Nystatin Oral Susp 012173 Units/5  





ML -]  


 


Omeprazole Magnesium [Prilosec Otc] 20 mg PO DAILY 01/23/18


 


Sodium Chloride Nasal Spray [[place]cean 2 spray NS QSHI 01/23/18





Spray Nasal Spray]  














OBJECTIVE:


 Vital Signs











Temperature  98 F   01/29/18 10:00


 


Pulse Rate  76   01/29/18 12:00


 


Respiratory Rate  14   01/29/18 12:00


 


Blood Pressure  140/76   01/29/18 12:00


 


O2 Sat by Pulse Oximetry (%)  99   01/29/18 09:40








 Intake & Output











 01/26/18 01/27/18 01/28/18 01/29/18





 23:59 23:59 23:59 23:59


 


Intake Total 1780 4258.8 2162.0 1060


 


Output Total 5 200 450 200


 


Balance 1775 4058.8 1712.0 860


 


Weight 55.52 kg 56.8 kg 60.3 kg 60.2 kg








NAD on Vent via ET tube


no JVD, Neck supple


RRR, No M/R


Dec BS lung bases


soft NT/ND


+ sacral edema, + UE edema 


no clubbing or cyanosis





 CBC, BMP





 01/29/18 05:30 





 01/29/18 05:30 








 Current Medications





Acetaminophen (Tylenol Suppository -)  325 mg [place] Q4H PRN


   PRN Reason: FEVER


Acetaminophen (Ofirmev Injection -)  1,000 mg IVPB Q6H PRN


   PRN Reason: FEVER


Albuterol Sulfate (Ventolin 0.083% Nebulizer Soln -)  1 amp NEB Q4H PRN


   PRN Reason: SHORT OF BREATH/WHEEZING


   Last Admin: 01/26/18 08:07 Dose:  1 amp


Atorvastatin Calcium (Lipitor -)  10 mg PO HS [facility]


   Last Admin: 01/28/18 21:40 Dose:  10 mg


Ferrous Sulfate (Feosol -)  325 mg PO TID [facility]


   Last Admin: 01/29/18 06:30 Dose:  325 mg


Heparin Sodium (Porcine) (Heparin -)  5,000 unit SQ TID ELI


Propofol (Diprivan -)  1,000,000 mcg in 100 mls @ 1.666 mls/hr IVPB TITR ELI; 5 

MCG/KG/MIN


   PRN Reason: Protocol


   Last Admin: 01/28/18 21:40 Dose:  15 mcg/kg/min, 4.997 mls/hr


Fentanyl 500 mcg/ Sodium (Chloride)  100 mls @ 10 mls/hr IVPB TITR ELI; 50 MCG/

HR


   PRN Reason: Protocol


   Last Admin: 01/29/18 02:30 Dose:  10 mls/hr


Sodium Chloride (Normal Saline -)  1,000 mls @ 100 mls/hr IV ASDIR [facility]


   Last Admin: 01/28/18 21:45 Dose:  100 mls/hr


Vasopressin 50 units/ Sodium (Chloride)  100 mls @ 24 mls/hr IVPB TITR ELI; 0.2 

UNITS/MIN


   PRN Reason: Protocol


   Last Admin: 01/28/18 19:00 Dose:  Not Given


Norepinephrine Bitartrate 8, (000 mcg/ Sodium Chloride)  500 mls @ 18.75 mls/hr 

IV TITR ELI; 5 MCG/MIN


   PRN Reason: Protocol


   Last Titration: 01/29/18 09:17 Dose:  2 mcg/min, 7.5 mls/hr


Piperacillin Sod/Tazobactam (Sod 2.25 gm/ Dextrose)  100 mls @ 100 mls/hr IVPB 

Q6H-IV [facility]


   Last Admin: 01/29/18 09:33 Dose:  100 mls/hr


Latanoprost (Xalatan 0.005% Eye Drops -)  1 drop OU HS [facility]


   Last Admin: 01/28/18 21:46 Dose:  1 drop


Loratadine (Claritin -)  10 mg PO DAILY [facility]


   Last Admin: 01/29/18 09:34 Dose:  10 mg


Methylprednisolone Sodium Succinate (Solu-Medrol -)  40 mg IVPUSH Q6H-IV [facility]


   Last Admin: 01/29/18 09:34 Dose:  40 mg


Montelukast Sodium (Singulair -)  10 mg PO HS [facility]


   Last Admin: 01/28/18 21:41 Dose:  10 mg


Multivitamins/Minerals/Vitamin C (Tab-A-Vit -)  1 tab PO DAILY [facility]


   Last Admin: 01/29/18 09:34 Dose:  1 tab


Pantoprazole Sodium (Protonix Iv)  40 mg IVPUSH DAILY [facility]


   Last Admin: 01/29/18 09:34 Dose:  40 mg


Tiotropium Bromide (Spiriva -)  1 puff IH DAILY [facility]


   Last Admin: 01/29/18 09:34 Dose:  Not Given











ASSESSMENT AND PLAN:


77 year old woman with PMhx of COPD who presented with hypercarbic respiratory 

failure requring intubation complicated by cardiac arrest now with MO with Cr 

of 3.3. Pt with 2 arrests on 1/26.





#Acute Renal Failure likely secondary to ATN in setting of Cardiac Arrest


pt is non-oliguric but remains grossly overloaded


check FeNa, FeUrea, UPCR


continue Vasopressers and IVF to keep MAP > 65 and cVP 10-12


can decrease IVF once CVP goal is obtained 


no acute indication for RRT


trend BUN/cr and electrolyses for now 


avoid nephrotoxins including IV contrast


US of kidney pending 





#COPD Exacerbation


continue vent support


ICU follow up





#s/p Cardiac Arrest


ICU care


supportive care





#Anemia


Trend cbc


transfuse as per ICU protocol 


no indication for HAILE given renal insufficiency is acute 





Thank you 


Will follow





Andre Savage DO

## 2018-01-29 NOTE — PN
Progress Note, Physician


Chief Complaint: 


Unable to obtain, patient obtunded





- Current Medication List


Current Medications: 


Active Medications





Acetaminophen (Tylenol Suppository -)  325 mg [place] Q4H PRN


   PRN Reason: FEVER


Acetaminophen (Ofirmev Injection -)  1,000 mg IVPB Q6H PRN


   PRN Reason: FEVER


Albuterol Sulfate (Ventolin 0.083% Nebulizer Soln -)  1 amp NEB Q4H PRN


   PRN Reason: SHORT OF BREATH/WHEEZING


   Last Admin: 01/26/18 08:07 Dose:  1 amp


Atorvastatin Calcium (Lipitor -)  10 mg PO HS [facility]


   Last Admin: 01/28/18 21:40 Dose:  10 mg


Ferrous Sulfate (Feosol -)  325 mg PO TID [facility]


   Last Admin: 01/29/18 06:30 Dose:  325 mg


Heparin Sodium (Porcine) (Heparin -)  5,000 unit SQ TID ELI


Propofol (Diprivan -)  1,000,000 mcg in 100 mls @ 1.666 mls/hr IVPB TITR ELI; 5 

MCG/KG/MIN


   PRN Reason: Protocol


   Last Admin: 01/28/18 21:40 Dose:  15 mcg/kg/min, 4.997 mls/hr


Fentanyl 500 mcg/ Sodium (Chloride)  100 mls @ 10 mls/hr IVPB TITR ELI; 50 MCG/

HR


   PRN Reason: Protocol


   Last Admin: 01/29/18 02:30 Dose:  10 mls/hr


Sodium Chloride (Normal Saline -)  1,000 mls @ 100 mls/hr IV ASDIR [facility]


   Last Admin: 01/28/18 21:45 Dose:  100 mls/hr


Vasopressin 50 units/ Sodium (Chloride)  100 mls @ 24 mls/hr IVPB TITR ELI; 0.2 

UNITS/MIN


   PRN Reason: Protocol


   Last Admin: 01/28/18 19:00 Dose:  Not Given


Norepinephrine Bitartrate 8, (000 mcg/ Sodium Chloride)  500 mls @ 18.75 mls/hr 

IV TITR ELI; 5 MCG/MIN


   PRN Reason: Protocol


   Last Titration: 01/29/18 09:17 Dose:  2 mcg/min, 7.5 mls/hr


Piperacillin Sod/Tazobactam (Sod 2.25 gm/ Dextrose)  100 mls @ 100 mls/hr IVPB 

Q6H-IV [facility]


   Last Admin: 01/29/18 09:33 Dose:  100 mls/hr


Latanoprost (Xalatan 0.005% Eye Drops -)  1 drop OU HS [facility]


   Last Admin: 01/28/18 21:46 Dose:  1 drop


Loratadine (Claritin -)  10 mg PO DAILY [facility]


   Last Admin: 01/29/18 09:34 Dose:  10 mg


Methylprednisolone Sodium Succinate (Solu-Medrol -)  40 mg IVPUSH Q6H-IV [facility]


   Last Admin: 01/29/18 09:34 Dose:  40 mg


Montelukast Sodium (Singulair -)  10 mg PO HS [facility]


   Last Admin: 01/28/18 21:41 Dose:  10 mg


Multivitamins/Minerals/Vitamin C (Tab-A-Vit -)  1 tab PO DAILY [facility]


   Last Admin: 01/29/18 09:34 Dose:  1 tab


Pantoprazole Sodium (Protonix Iv)  40 mg IVPUSH DAILY [facility]


   Last Admin: 01/29/18 09:34 Dose:  40 mg


Tiotropium Bromide (Spiriva -)  1 puff IH DAILY [facility]


   Last Admin: 01/29/18 09:34 Dose:  Not Given











- Objective


Vital Signs: 


 Vital Signs











Temperature  36.7 C   01/29/18 06:00


 


Pulse Rate  75   01/29/18 09:40


 


Respiratory Rate  14   01/29/18 08:50


 


Blood Pressure  148/82   01/29/18 09:17


 


O2 Sat by Pulse Oximetry (%)  99   01/29/18 09:40











Constitutional: Yes: Other (obtunded)


Eyes: Yes: Cataracts, Other (corneal response)


Cardiovascular: Yes: Regular Rate and Rhythm.  No: Gallop, Murmur, Rub


Respiratory: Yes: Regular, CTA Bilaterally, Intubated, Mechanically Ventilated.

  No: Rales, Rhonchi, Wheezes


Gastrointestinal: Yes: Normal Bowel Sounds, Soft.  No: Distention, Tenderness


Extremities: Yes: WNL


Edema: Yes


Edema: LUE: 1+, RUE: 1+, LLE: 1+, RLE: 1+


Labs: 


 CBC, BMP





 01/29/18 05:30 





 01/29/18 05:30 





 INR, PTT











INR  0.95  (0.82-1.09)   01/26/18  09:50    














Problem List





- Problems


(1) Asystole


Code(s): I46.9 - CARDIAC ARREST, CAUSE UNSPECIFIED   





(2) Lactic acid acidosis


Code(s): E87.2 - ACIDOSIS   





(3) Hemodynamic instability


Code(s): R09.89 - OTH SYMPTOMS AND SIGNS INVOLVING THE CIRC AND RESP SYSTEMS   





(4) Shock liver


Code(s): K72.00 - ACUTE AND SUBACUTE HEPATIC FAILURE WITHOUT COMA   





(5) RSV (respiratory syncytial virus pneumonia)


Code(s): J12.1 - RESPIRATORY SYNCYTIAL VIRUS PNEUMONIA   





(6) Acute and chronic respiratory failure with hypercapnia


Code(s): J96.22 - ACUTE AND CHRONIC RESPIRATORY FAILURE WITH HYPERCAPNIA   





(7) COPD exacerbation


Code(s): J44.1 - CHRONIC OBSTRUCTIVE PULMONARY DISEASE W (ACUTE) EXACERBATION   





(8) CHF (congestive heart failure)


Code(s): I50.9 - HEART FAILURE, UNSPECIFIED   


Qualifiers: 


   Congestive heart failure type: diastolic   Congestive heart failure 

chronicity: chronic   Qualified Code(s): I50.32 - Chronic diastolic (congestive

) heart failure   





(9) Fracture, hip


Code(s): S72.009A - FRACTURE OF UNSP PART OF NECK OF UNSP FEMUR, INIT   


Qualifiers: 


   Encounter type: initial encounter   Fracture type: closed   Laterality: left

   Qualified Code(s): S72.002A - Fracture of unspecified part of neck of left 

femur, initial encounter for closed fracture   





(10) Hypertension


Code(s): I10 - ESSENTIAL (PRIMARY) HYPERTENSION   





Assessment/Plan





(1) Asystolic cardiac arrest


-successfully resuscitated


-currently DNR


-patient off of propofol for 4 hours prior to seeing with minimal neurologic 

improvement (+corneal response but minimal to pain, pupil response inaccurate 

secondary to cataracts)


-case d/w pulmonary, holding propofol to see if recovers


-son made aware


-if no improvement in next 24 hours will consider palliative wean





(2) Acute and chronic hypoxic respiratory failure


Assessment/Plan: 


-recurrent and cause of asystole


-intubated and mechanically ventilated


-pulmonary following and case discussed


Code(s): J96.22 - ACUTE AND CHRONIC RESPIRATORY FAILURE WITH HYPERCAPNIA   





(3) COPD exacerbation


Assessment/Plan: 


-continue steroids


Code(s): J44.1 - CHRONIC OBSTRUCTIVE PULMONARY DISEASE W (ACUTE) EXACERBATION   





(3) CHF (congestive heart failure)


Assessment/Plan: 


-currently hypotensive requiring pressors


-hold torsemide


Code(s): I50.9 - HEART FAILURE, UNSPECIFIED   


Qualifiers: 


   Congestive heart failure type: diastolic   Congestive heart failure 

chronicity: chronic   Qualified Code(s): I50.32 - Chronic diastolic (congestive

) heart failure   





(4) Fracture, hip


Assessment/Plan: 


-[facility] for being at [facility]F


-continue lovenox for DVT PPx


Code(s): S72.009A - FRACTURE OF UNSP PART OF NECK OF UNSP FEMUR, INIT   


Qualifiers: 


   Encounter type: initial encounter   Fracture type: closed   Laterality: left

   Qualified Code(s): S72.002A - Fracture of unspecified part of neck of left 

femur, initial encounter for closed fracture   





(5) Hypertension


Assessment/Plan: 


-hold diltiazem and torsemide


Code(s): I10 - ESSENTIAL (PRIMARY) HYPERTENSION   





(6) Hypocalcemia


-replaced as needed





(7) Shock Liver


-improving





(8) Hemodynamic instability 


-currently on pressors





(9) Lactic acidosis


-resolved





(10) MO


-suspect secondary to code/ATN


-case d/w Dr Savage


-checking CVP, may need lasix


-monitor for improvement





(11) RSV pneumonia


-case d/w ID


-RSV found in the outpatient setting


-chest x-ray reviewed and unchanged


-continue zosyn as may have a bacterial infection on RSV





Case d/w son as well





33 minutes spent in critical care time with this patient

## 2018-01-29 NOTE — PN
Teaching Attending Note


Name of Resident: Isma Garcia





ATTENDING PHYSICIAN STATEMENT





I saw and evaluated the patient.


I reviewed the resident's note and discussed the case with the resident.


I agree with the resident's findings and plan as documented.








SUBJECTIVE:





Pt seen and examined in the ICU. Remains intubated, sedated on levophed gtt. 





OBJECTIVE:





 Last Vital Signs











Temp Pulse Resp BP Pulse Ox


 


 98 F   76   14   140/76   99 


 


 01/29/18 10:00  01/29/18 12:00  01/29/18 12:00  01/29/18 12:00  01/29/18 09:40








 Intake & Output











 01/26/18 01/27/18 01/28/18 01/29/18





 23:59 23:59 23:59 23:59


 


Intake Total 1780 4258.8 2162.0 1060


 


Output Total 5 200 450 200


 


Balance 1775 4058.8 1712.0 860


 


Weight 55.52 kg 56.8 kg 60.3 kg 60.2 kg








Gen:  intubated, sedated, tachypneic


Heart: RRR


Lung: scattered rhonchi


Abd: soft, nontender


Ext: + edema





 CBC, BMP





 01/29/18 05:30 





 01/29/18 05:30 





Active Medications





Acetaminophen (Tylenol Suppository -)  325 mg [place] Q4H PRN


   PRN Reason: FEVER


Acetaminophen (Ofirmev Injection -)  1,000 mg IVPB Q6H PRN


   PRN Reason: FEVER


Albuterol Sulfate (Ventolin 0.083% Nebulizer Soln -)  1 amp NEB Q4H PRN


   PRN Reason: SHORT OF BREATH/WHEEZING


   Last Admin: 01/26/18 08:07 Dose:  1 amp


Atorvastatin Calcium (Lipitor -)  10 mg PO HS [facility]


   Last Admin: 01/28/18 21:40 Dose:  10 mg


Ferrous Sulfate (Feosol -)  325 mg PO TID [facility]


   Last Admin: 01/29/18 06:30 Dose:  325 mg


Heparin Sodium (Porcine) (Heparin -)  5,000 unit SQ TID [facility]


Propofol (Diprivan -)  1,000,000 mcg in 100 mls @ 1.666 mls/hr IVPB TITR ELI; 5 

MCG/KG/MIN


   PRN Reason: Protocol


   Last Admin: 01/28/18 21:40 Dose:  15 mcg/kg/min, 4.997 mls/hr


Fentanyl 500 mcg/ Sodium (Chloride)  100 mls @ 10 mls/hr IVPB TITR ELI; 50 MCG/

HR


   PRN Reason: Protocol


   Last Admin: 01/29/18 02:30 Dose:  10 mls/hr


Sodium Chloride (Normal Saline -)  1,000 mls @ 100 mls/hr IV ASDIR [facility]


   Last Admin: 01/28/18 21:45 Dose:  100 mls/hr


Vasopressin 50 units/ Sodium (Chloride)  100 mls @ 24 mls/hr IVPB TITR [facility]; 0.2 

UNITS/MIN


   PRN Reason: Protocol


   Last Admin: 01/28/18 19:00 Dose:  Not Given


Norepinephrine Bitartrate 8, (000 mcg/ Sodium Chloride)  500 mls @ 18.75 mls/hr 

IV TITR ELI; 5 MCG/MIN


   PRN Reason: Protocol


   Last Titration: 01/29/18 09:17 Dose:  2 mcg/min, 7.5 mls/hr


Piperacillin Sod/Tazobactam (Sod 2.25 gm/ Dextrose)  100 mls @ 100 mls/hr IVPB 

Q6H-IV [facility]


   Last Admin: 01/29/18 09:33 Dose:  100 mls/hr


Latanoprost (Xalatan 0.005% Eye Drops -)  1 drop OU HS [facility]


   Last Admin: 01/28/18 21:46 Dose:  1 drop


Loratadine (Claritin -)  10 mg PO DAILY [facility]


   Last Admin: 01/29/18 09:34 Dose:  10 mg


Methylprednisolone Sodium Succinate (Solu-Medrol -)  40 mg IVPUSH Q6H-IV [facility]


   Last Admin: 01/29/18 09:34 Dose:  40 mg


Montelukast Sodium (Singulair -)  10 mg PO HS [facility]


   Last Admin: 01/28/18 21:41 Dose:  10 mg


Multivitamins/Minerals/Vitamin C (Tab-A-Vit -)  1 tab PO DAILY [facility]


   Last Admin: 01/29/18 09:34 Dose:  1 tab


Pantoprazole Sodium (Protonix Iv)  40 mg IVPUSH DAILY [facility]


   Last Admin: 01/29/18 09:34 Dose:  40 mg


Tiotropium Bromide (Spiriva -)  1 puff IH DAILY [facility]


   Last Admin: 01/29/18 09:34 Dose:  Not Given








ASSESSMENT AND PLAN:


Acute Hypoxic and Hypercapneic Respiratory Failure


Pneumonia


UTI


Septic Shock


Acute Kidney Injury


+Troponins likely Demand Ischemia


Acute COPD Exacerbation


LV Diastolic Dysfunction


Pulmonary HTN





-  continue antibiotics


-  f/u cultures


-  continue medrol at current dose


-  inhaled bronchodilators standing and PRN


-  IVF to keep CVP 8-12


-  monitor urine output, creatinine


-  titrate pressors to maintain MAP >65


-  lighten sedation to assess mental status


-  spontaneous breathing trials as tolerated when mental status improved


-  enteral feeds 


-  DVT/GI prophylaxis


-  continue ICU monitoring











critical care time spent in reviewing chart, evaluating patient and formulating 

plan 35 min

## 2018-01-29 NOTE — PN
Physical Exam: 


SUBJECTIVE: Patient seen and examined


The patient is a 77 year old female with a history of COPD, CHF, recent hip Fx 

and replacement 12/17 c/b COPD exacerbation who presented with acute 

respiratory failure readmitted to the ICU following a cardiac arrest event.  

The patient has remained minimally responsive off sedation.  Patient remains 

intubated on pressors.  Otherwise no acute events overnight.





OBJECTIVE:





 Vital Signs











 Period  Temp  Pulse  Resp  BP Sys/Gonzalez  Pulse Ox


 


 Last 24 Hr  98.1 F-99.9 F  58-74  14-14  113-157/61-86  100-100











GENERAL: The patient is intubated, in no acute distress.


HEAD: Normal with no signs of trauma.


EYES: sclera anicteric, conjunctiva clear. No ptosis. 


ENT: oropharynx clear without exudates, moist mucous membranes.


NECK: Trachea midline, full range of motion, supple. 


LUNGS: Breath sounds equal, Course crackles auscultated on the right, no wheezes

, no accessory muscle use. 


HEART: Regular rate and rhythm, S1, S2 without murmur, rub or gallop.


ABDOMEN: Soft, nontender, nondistended, normoactive bowel sounds, no guarding, 

no rebound, no hepatosplenomegaly, no masses.


EXTREMITIES: 2+ pulses, warm, well-perfused,


NEUROLOGICAL: Normal speech, gait not observed.


PSYCH: Normal mood, normal affect.


SKIN: Warm, dry, normal turgor, no rashes or lesions noted














 Laboratory Results - last 24 hr











  01/29/18 01/29/18





  05:30 05:30


 


WBC  22.0 H D 


 


RBC  3.34 L 


 


Hgb  8.6 L 


 


Hct  27.7 L 


 


MCV  82.8 


 


MCH  25.7 


 


MCHC  31.0 L 


 


RDW  20.4 H 


 


Plt Count  150  D 


 


MPV  10.3 


 


Neutrophils %  94.6 H 


 


Lymphocytes %  2.3 L D 


 


Monocytes %  3.1 L 


 


Eosinophils %  0.0 


 


Basophils %  0.0 


 


Sodium   145


 


Potassium   4.9


 


Chloride   110 H


 


Carbon Dioxide   27


 


Anion Gap   8


 


BUN   85 H


 


Creatinine   3.3 H


 


Creat Clearance w eGFR   13.56


 


Random Glucose   151 H


 


Calcium   6.3 L*


 


Total Bilirubin   0.6


 


AST   175 H


 


ALT   701 H


 


Alkaline Phosphatase   96


 


Total Protein   4.6 L


 


Albumin   1.9 L








Active Medications











Generic Name Dose Route Start Last Admin





  Trade Name Freq  PRN Reason Stop Dose Admin


 


Acetaminophen  325 mg  01/25/18 12:22  





  Tylenol Suppository -  GA   





  Q4H PRN   





  FEVER   


 


Acetaminophen  1,000 mg  01/27/18 02:10  





  Ofirmev Injection -  IVPB   





  Q6H PRN   





  FEVER   


 


Albuterol Sulfate  1 amp  01/25/18 12:22  01/26/18 08:07





  Ventolin 0.083% Nebulizer Soln -  NEB   1 amp





  Q4H PRN   Administration





  SHORT OF BREATH/WHEEZING   


 


Atorvastatin Calcium  10 mg  01/25/18 22:00  01/28/18 21:40





  Lipitor -  PO   10 mg





  HS ELI   Administration


 


Diltiazem HCl  120 mg  01/25/18 22:00  01/28/18 21:44





  Cardizem Cd -  PO   Not Given





  BID ELI   


 


Enoxaparin Sodium  40 mg  01/26/18 10:00  01/28/18 10:51





  Lovenox -  SQ   40 mg





  DAILY ELI   Administration


 


Ferrous Sulfate  325 mg  01/25/18 22:00  01/29/18 06:30





  Feosol -  PO   325 mg





  TID ELI   Administration


 


Propofol  1,000,000 mcg in 100 mls @ 1.666 mls/hr  01/26/18 11:30  01/28/18 21:

40





  Diprivan -  IVPB   15 mcg/kg/min





  TITR ELI   4.997 mls/hr





  Protocol   Administration





  5 MCG/KG/MIN   


 


Fentanyl 500 mcg/ Sodium  100 mls @ 10 mls/hr  01/26/18 12:30  01/29/18 02:30





  Chloride  IVPB   10 mls/hr





  TITR ELI   Administration





  Protocol   





  50 MCG/HR   


 


Sodium Chloride  1,000 mls @ 100 mls/hr  01/26/18 13:00  01/28/18 21:45





  Normal Saline -  IV   100 mls/hr





  ASDIR ELI   Administration


 


Vasopressin 50 units/ Sodium  100 mls @ 24 mls/hr  01/26/18 17:15  01/28/18 19:

00





  Chloride  IVPB   Not Given





  TITR ELI   





  Protocol   





  0.2 UNITS/MIN   


 


Norepinephrine Bitartrate 8,  500 mls @ 18.75 mls/hr  01/26/18 18:00  01/28/18 

19:00





  000 mcg/ Sodium Chloride  IV   5 mcg/min





  TITR ELI   18.75 mls/hr





  Protocol   Administration





  5 MCG/MIN   


 


Piperacillin Sod/Tazobactam  100 mls @ 100 mls/hr  01/27/18 13:33  01/29/18 02:

31





  Sod 2.25 gm/ Dextrose  IVPB   100 mls/hr





  Q6H-IV ELI   Administration


 


Latanoprost  1 drop  01/25/18 22:00  01/28/18 21:46





  Xalatan 0.005% Eye Drops -  OU   1 drop





  HS ELI   Administration


 


Loratadine  10 mg  01/26/18 10:00  01/28/18 10:52





  Claritin -  PO   10 mg





  DAILY ELI   Administration


 


Methylprednisolone Sodium Succinate  40 mg  01/25/18 15:00  01/29/18 02:31





  Solu-Medrol -  IVPUSH   40 mg





  Q6H-IV ELI   Administration


 


Montelukast Sodium  10 mg  01/25/18 22:00  01/28/18 21:41





  Singulair -  PO   10 mg





  HS ELI   Administration


 


Multivitamins/Minerals/Vitamin C  1 tab  01/26/18 10:00  01/28/18 10:52





  Tab-A-Vit -  PO   1 tab





  DAILY ELI   Administration


 


Pantoprazole Sodium  40 mg  01/26/18 10:00  01/28/18 10:51





  Protonix Iv  IVPUSH   40 mg





  DAILY ELI   Administration


 


Tiotropium Bromide  1 puff  01/26/18 10:00  01/28/18 10:52





  Spiriva -  IH   Not Given





  DAILY ELI   











ASSESSMENT/PLAN:


The patient is a 77 year old female with a history of COPD, CHF, recent hip Fx 

and replacement 12/17 c/b COPD exacerbation who presented with acute 

respiratory failure readmitted to the ICU following a cardiac arrest event.





NEURO


Patient sedated due to intubation.  Patient remains minimally responsive off 

sedation.


-Continue propofol and fentanyl drips.


-Daily sedation vacations to evaluate mental status.


-Will continue to monitor.





CV


#Hypotension


The patient had a 2 cardiac arrests with RSOC now hypotensive in the ICU 

requiring pressors.


-Continue levophed drip and titrate as needed to maintain MAP >65.


-Will continue to monitor.





#Elevated troponin (Improved)


-Now downtrending.





RESP


#Acute respiratory failure


Patient now re-intubated following a cardiac arrest earlier today.  Chest plain 

film demonstrates right sided rib fractures likely due to chest compressions.  

It is likely the patient aspirated leading to a respiratory arrest that led to 

the cardiac arrest.


-Will cover the patient with zosyn.


-Continue solu-medrol.


-Will maintain the patient on the ventilator.


-Will continue to monitor.





GI


-NG tube with feeds.





Renal


No issues currently.


-Will continue to monitor bun/creatinine.





ID


#Pneumonia vs. Influenza vs. RSV


-Continue zosyn.


-Re-culture if patient spikes a temp.


-Follow recs from ID





MSK


No issues currently





FEN/GI


-Replete electrolytes PRN, will monitor


-IV fluids





PPX


-Lovenox 40 sq daily


-Protonix 40 IV daily for GI ppx





DISPO: Continue ICU level of care.








Visit type





- Emergency Visit


Emergency Visit: No





- New Patient


This patient is new to me today: No





- Critical Care


Critical Care patient: Yes


Total Critical Care Time (in minutes): 35


Critical Care Statement: The care of this patient involved high complexity 

decision making to prevent further life threatening deterioration of the patient

's condition and/or to evaluate & treat vital organ system(s) failure or risk 

of failure.

## 2018-01-30 LAB
ALBUMIN SERPL-MCNC: 1.9 G/DL (ref 3.4–5)
ALP SERPL-CCNC: 85 U/L (ref 45–117)
ALT SERPL-CCNC: 444 U/L (ref 12–78)
ANION GAP SERPL CALC-SCNC: 11 MMOL/L (ref 8–16)
ANISOCYTOSIS BLD QL: (no result)
AST SERPL-CCNC: 107 U/L (ref 15–37)
BASOPHILS # BLD: 0.1 % (ref 0–2)
BILIRUB SERPL-MCNC: 0.4 MG/DL (ref 0.2–1)
BUN SERPL-MCNC: 103 MG/DL (ref 7–18)
CALCIUM SERPL-MCNC: 5.7 MG/DL (ref 8.5–10.1)
CHLORIDE SERPL-SCNC: 113 MMOL/L (ref 98–107)
CO2 SERPL-SCNC: 23 MMOL/L (ref 21–32)
CREAT SERPL-MCNC: 3.9 MG/DL (ref 0.55–1.02)
DEPRECATED RDW RBC AUTO: 20.6 % (ref 11.6–15.6)
EOSINOPHIL # BLD: 0 % (ref 0–4.5)
GLUCOSE SERPL-MCNC: 100 MG/DL (ref 74–106)
HCT VFR BLD CALC: 26.2 % (ref 32.4–45.2)
HGB BLD-MCNC: 8.3 GM/DL (ref 10.7–15.3)
LYMPHOCYTES # BLD: 2.2 % (ref 8–40)
MAGNESIUM SERPL-MCNC: 2.8 MG/DL (ref 1.8–2.4)
MCH RBC QN AUTO: 25.9 PG (ref 25.7–33.7)
MCHC RBC AUTO-ENTMCNC: 31.6 G/DL (ref 32–36)
MCV RBC: 81.9 FL (ref 80–96)
MONOCYTES # BLD AUTO: 7.3 % (ref 3.8–10.2)
NEUTROPHILS # BLD: 90.4 % (ref 42.8–82.8)
PHOSPHATE SERPL-MCNC: 5.9 MG/DL (ref 2.5–4.9)
PLATELET # BLD AUTO: 124 K/MM3 (ref 134–434)
PMV BLD: 10.1 FL (ref 7.5–11.1)
POTASSIUM SERPLBLD-SCNC: 5 MMOL/L (ref 3.5–5.1)
PROT SERPL-MCNC: 4.4 G/DL (ref 6.4–8.2)
RBC # BLD AUTO: 3.19 M/MM3 (ref 3.6–5.2)
RBC MORPH BLD: YES
SODIUM SERPL-SCNC: 147 MMOL/L (ref 136–145)
WBC # BLD AUTO: 19.7 K/MM3 (ref 4–10)

## 2018-01-30 PROCEDURE — 3E0G76Z INTRODUCTION OF NUTRITIONAL SUBSTANCE INTO UPPER GI, VIA NATURAL OR ARTIFICIAL OPENING: ICD-10-PCS | Performed by: INTERNAL MEDICINE

## 2018-01-30 RX ADMIN — PIPERACILLIN SODIUM AND TAZOBACTAM SODIUM SCH MLS/HR: .25; 2 INJECTION, POWDER, LYOPHILIZED, FOR SOLUTION INTRAVENOUS at 09:48

## 2018-01-30 RX ADMIN — ATORVASTATIN CALCIUM SCH MG: 10 TABLET, FILM COATED ORAL at 21:39

## 2018-01-30 RX ADMIN — HEPARIN SODIUM SCH UNIT: 5000 INJECTION, SOLUTION INTRAVENOUS; SUBCUTANEOUS at 05:55

## 2018-01-30 RX ADMIN — METHYLPREDNISOLONE SODIUM SUCCINATE SCH MG: 40 INJECTION, POWDER, FOR SOLUTION INTRAMUSCULAR; INTRAVENOUS at 09:48

## 2018-01-30 RX ADMIN — PIPERACILLIN SODIUM AND TAZOBACTAM SODIUM SCH MLS/HR: .25; 2 INJECTION, POWDER, LYOPHILIZED, FOR SOLUTION INTRAVENOUS at 16:07

## 2018-01-30 RX ADMIN — CALCIUM CARBONATE SCH MG: 1250 SUSPENSION ORAL at 11:40

## 2018-01-30 RX ADMIN — METHYLPREDNISOLONE SODIUM SUCCINATE SCH MG: 40 INJECTION, POWDER, FOR SOLUTION INTRAMUSCULAR; INTRAVENOUS at 21:39

## 2018-01-30 RX ADMIN — MONTELUKAST SODIUM SCH MG: 10 TABLET, COATED ORAL at 21:39

## 2018-01-30 RX ADMIN — LORATADINE SCH MG: 10 TABLET ORAL at 09:48

## 2018-01-30 RX ADMIN — METHYLPREDNISOLONE SODIUM SUCCINATE SCH MG: 40 INJECTION, POWDER, FOR SOLUTION INTRAMUSCULAR; INTRAVENOUS at 02:20

## 2018-01-30 RX ADMIN — PANTOPRAZOLE SODIUM SCH MG: 40 INJECTION, POWDER, FOR SOLUTION INTRAVENOUS at 09:48

## 2018-01-30 RX ADMIN — METHYLPREDNISOLONE SODIUM SUCCINATE SCH MG: 40 INJECTION, POWDER, FOR SOLUTION INTRAMUSCULAR; INTRAVENOUS at 16:07

## 2018-01-30 RX ADMIN — CALCIUM CARBONATE SCH MG: 1250 SUSPENSION ORAL at 23:45

## 2018-01-30 RX ADMIN — FERROUS SULFATE TAB EC 324 MG (65 MG FE EQUIVALENT) SCH MG: 324 (65 FE) TABLET DELAYED RESPONSE at 21:39

## 2018-01-30 RX ADMIN — TIOTROPIUM BROMIDE SCH: 18 CAPSULE ORAL; RESPIRATORY (INHALATION) at 11:41

## 2018-01-30 RX ADMIN — HEPARIN SODIUM SCH UNIT: 5000 INJECTION, SOLUTION INTRAVENOUS; SUBCUTANEOUS at 21:39

## 2018-01-30 RX ADMIN — PIPERACILLIN SODIUM AND TAZOBACTAM SODIUM SCH MLS/HR: .25; 2 INJECTION, POWDER, LYOPHILIZED, FOR SOLUTION INTRAVENOUS at 02:20

## 2018-01-30 RX ADMIN — MULTIVITAMIN TABLET SCH TAB: TABLET at 09:48

## 2018-01-30 RX ADMIN — PIPERACILLIN SODIUM AND TAZOBACTAM SODIUM SCH MLS/HR: .25; 2 INJECTION, POWDER, LYOPHILIZED, FOR SOLUTION INTRAVENOUS at 21:41

## 2018-01-30 RX ADMIN — FERROUS SULFATE TAB EC 324 MG (65 MG FE EQUIVALENT) SCH MG: 324 (65 FE) TABLET DELAYED RESPONSE at 05:55

## 2018-01-30 RX ADMIN — HEPARIN SODIUM SCH UNIT: 5000 INJECTION, SOLUTION INTRAVENOUS; SUBCUTANEOUS at 16:06

## 2018-01-30 RX ADMIN — LATANOPROST SCH DROP: 50 SOLUTION OPHTHALMIC at 21:39

## 2018-01-30 RX ADMIN — FERROUS SULFATE TAB EC 324 MG (65 MG FE EQUIVALENT) SCH MG: 324 (65 FE) TABLET DELAYED RESPONSE at 16:06

## 2018-01-30 NOTE — PN
Progress Note, Physician


Chief Complaint: 


Unable to obtain, patient obtunded





- Current Medication List


Current Medications: 


Active Medications





Acetaminophen (Tylenol Suppository -)  325 mg OK Q4H PRN


   PRN Reason: FEVER


Acetaminophen (Ofirmev Injection -)  1,000 mg IVPB Q6H PRN


   PRN Reason: FEVER


Albuterol Sulfate (Ventolin 0.083% Nebulizer Soln -)  1 amp NEB Q4H PRN


   PRN Reason: SHORT OF BREATH/WHEEZING


   Last Admin: 01/26/18 08:07 Dose:  1 amp


Atorvastatin Calcium (Lipitor -)  10 mg PO HS ELI


   Last Admin: 01/29/18 21:02 Dose:  10 mg


Calcium Carbonate (Calcium Carb Oral Suspension -)  500 mg NGT BID ELI


Ferrous Sulfate (Feosol -)  325 mg PO TID ELI


   Last Admin: 01/30/18 05:55 Dose:  325 mg


Heparin Sodium (Porcine) (Heparin -)  5,000 unit SQ TID ELI


   Last Admin: 01/30/18 05:55 Dose:  5,000 unit


Propofol (Diprivan -)  1,000,000 mcg in 100 mls @ 1.666 mls/hr IVPB TITR ELI; 5 

MCG/KG/MIN


   PRN Reason: Protocol


   Last Admin: 01/28/18 21:40 Dose:  15 mcg/kg/min, 4.997 mls/hr


Sodium Chloride (Normal Saline -)  1,000 mls @ 100 mls/hr IV ASDIR ELI


   Last Admin: 01/29/18 15:07 Dose:  100 mls/hr


Vasopressin 50 units/ Sodium (Chloride)  100 mls @ 24 mls/hr IVPB TITR ELI; 0.2 

UNITS/MIN


   PRN Reason: Protocol


   Last Admin: 01/29/18 21:02 Dose:  Not Given


Norepinephrine Bitartrate 8, (000 mcg/ Sodium Chloride)  500 mls @ 18.75 mls/hr 

IV TITR ELI; 5 MCG/MIN


   PRN Reason: Protocol


   Last Admin: 01/29/18 21:03 Dose:  Not Given


Piperacillin Sod/Tazobactam (Sod 2.25 gm/ Dextrose)  100 mls @ 100 mls/hr IVPB 

Q6H-IV [facility]


   Last Admin: 01/30/18 09:48 Dose:  100 mls/hr


Latanoprost (Xalatan 0.005% Eye Drops -)  1 drop OU HS ELI


   Last Admin: 01/29/18 21:04 Dose:  1 drop


Loratadine (Claritin -)  10 mg [facility]ILY [facility]


   Last Admin: 01/30/18 09:48 Dose:  10 mg


Methylprednisolone Sodium Succinate (Solu-Medrol -)  40 mg IVPUSH [facility]6H-IV [facility]


   Last Admin: 01/30/18 09:48 Dose:  40 mg


Montelukast Sodium (Singulair -)  10 mg PO HS [facility]


   Last Admin: 01/29/18 21:02 Dose:  10 mg


Multivitamins/Minerals/Vitamin C (Tab-A-Vit -)  1 tab PO DAILY [facility]


   Last Admin: 01/30/18 09:48 Dose:  1 tab


Pantoprazole Sodium (Protonix Iv)  40 mg IVPUSH DAILY [facility]


   Last Admin: 01/30/18 09:48 Dose:  40 mg


Tiotropium Bromide (Spiriva -)  1 puff IH DAILY [facility]


   Last Admin: 01/29/18 09:34 Dose:  Not Given











- Objective


Vital Signs: 


 Vital Signs











Temperature  37.1 C   01/30/18 06:00


 


Pulse Rate  90   01/30/18 08:49


 


Respiratory Rate  14   01/30/18 08:49


 


Blood Pressure  132/80   01/30/18 08:00


 


O2 Sat by Pulse Oximetry (%)  99   01/30/18 08:49











Constitutional: Yes: No Distress, Calm


Cardiovascular: Yes: Regular Rate and Rhythm.  No: Gallop, Murmur, Rub


Respiratory: Yes: Regular, CTA Bilaterally, Intubated, Mechanically Ventilated.

  No: Rales, Rhonchi, Wheezes


Gastrointestinal: Yes: Normal Bowel Sounds, Soft.  No: Distention, Tenderness


Extremities: Yes: WNL


Edema: Yes


Edema: LUE: 1+, RUE: 1+, LLE: 1+, RLE: 1+


Labs: 


 CBC, BMP





 01/30/18 05:50 





 01/30/18 05:50 





 INR, PTT











INR  0.95  (0.82-1.09)   01/26/18  09:50    














Problem List





- Problems


(1) Asystole


Code(s): I46.9 - CARDIAC ARREST, CAUSE UNSPECIFIED   





(2) Lactic acid acidosis


Code(s): E87.2 - ACIDOSIS   





(3) Hemodynamic instability


Code(s): R09.89 - OTH SYMPTOMS AND SIGNS INVOLVING THE CIRC AND RESP SYSTEMS   





(4) Shock liver


Code(s): K72.00 - ACUTE AND SUBACUTE HEPATIC FAILURE WITHOUT COMA   





(5) RSV (respiratory syncytial virus pneumonia)


Code(s): J12.1 - RESPIRATORY SYNCYTIAL VIRUS PNEUMONIA   





(6) Acute and chronic respiratory failure with hypercapnia


Code(s): J96.22 - ACUTE AND CHRONIC RESPIRATORY FAILURE WITH HYPERCAPNIA   





(7) COPD exacerbation


Code(s): J44.1 - CHRONIC OBSTRUCTIVE PULMONARY DISEASE W (ACUTE) EXACERBATION   





(8) CHF (congestive heart failure)


Code(s): I50.9 - HEART FAILURE, UNSPECIFIED   


Qualifiers: 


   Congestive heart failure type: diastolic   Congestive heart failure 

chronicity: chronic   Qualified Code(s): I50.32 - Chronic diastolic (congestive

) heart failure   





(9) Fracture, hip


Code(s): S72.009A - FRACTURE OF UNSP PART OF NECK OF UNSP FEMUR, INIT   


Qualifiers: 


   Encounter type: initial encounter   Fracture type: closed   Laterality: left

   Qualified Code(s): S72.002A - Fracture of unspecified part of neck of left 

femur, initial encounter for closed fracture   





(10) Hypertension


Code(s): I10 - ESSENTIAL (PRIMARY) HYPERTENSION   





Assessment/Plan





(1) Asystolic cardiac arrest


-successfully resuscitated


-currently DNR


-patient off of propofol for over 24 hours prior to seeing with minimal 

neurologic improvement (+corneal response but minimal to pain, pupil response 

inaccurate secondary to cataracts)


-case d/w pulmonary, holding propofol to see if recovers


-son made aware


-patient still obtunded today after being off propofol for over 24 hours


-very poor prognosis, mental status secondary to hypoxic event over renal insult





(2) Acute and chronic hypoxic respiratory failure


Assessment/Plan: 


-recurrent and cause of asystole


-intubated and mechanically ventilated


-pulmonary following and case discussed


Code(s): J96.22 - ACUTE AND CHRONIC RESPIRATORY FAILURE WITH HYPERCAPNIA   





(3) COPD exacerbation


Assessment/Plan: 


-continue steroids


Code(s): J44.1 - CHRONIC OBSTRUCTIVE PULMONARY DISEASE W (ACUTE) EXACERBATION   





(3) CHF (congestive heart failure)


Assessment/Plan: 


-currently hypotensive requiring pressors


-hold torsemide


-weaning pressors


Code(s): I50.9 - HEART FAILURE, UNSPECIFIED   


Qualifiers: 


   Congestive heart failure type: diastolic   Congestive heart failure 

chronicity: chronic   Qualified Code(s): I50.32 - Chronic diastolic (congestive

) heart failure   





(4) Fracture, hip


Assessment/Plan: 


-reason for being at SNF


-continue lovenox for DVT PPx


Code(s): S72.009A - FRACTURE OF UNSP PART OF NECK OF UNSP FEMUR, INIT   


Qualifiers: 


   Encounter type: initial encounter   Fracture type: closed   Laterality: left

   Qualified Code(s): S72.002A - Fracture of unspecified part of neck of left 

femur, initial encounter for closed fracture   





(5) Hypertension


Assessment/Plan: 


-hold diltiazem and torsemide


Code(s): I10 - ESSENTIAL (PRIMARY) HYPERTENSION   





(6) Hypocalcemia


-replaced as needed





(7) Shock Liver


-improving





(8) Hemodynamic instability 


-currently on pressors





(9) Lactic acidosis


-resolved





(10) MO


-suspect secondary to code/ATN


-case d/w Dr Savage


-receiving lasix today for fluid overload


-uremia not cause of AMS





(11) RSV pneumonia


-case d/w ID


-RSV found in the outpatient setting


-chest x-ray reviewed and unchanged


-continue zosyn as may have a bacterial infection on RSV





32 minutes spent in critical care time

## 2018-01-30 NOTE — PN
Progress Note (short form)





- Note


Progress Note: 





Renal follow up for MO





Pt seen and examined in the ICU


on Vent


not awake or alert off of sedation


making urine, ~600cc in the last 24 hours


on IVF


off pressers


CVP is ~5





 Vital Signs











Temperature  98.8 F   01/30/18 10:00


 


Pulse Rate  92 H  01/30/18 10:00


 


Respiratory Rate  14   01/30/18 10:00


 


Blood Pressure  140/83   01/30/18 10:00


 


O2 Sat by Pulse Oximetry (%)  99   01/30/18 08:49








 Intake & Output











 01/27/18 01/28/18 01/29/18 01/30/18





 23:59 23:59 23:59 23:59


 


Intake Total 4258.8 2162.0 3010 850


 


Output Total 200 450 600 200


 


Balance 4058.8 1712.0 2410 650


 


Weight 56.8 kg 60.3 kg 60.2 kg 61.7 kg








NAD


on Vent via ET tube


Dec BS b/l lung fields


RRR


soft NT/ND


trace to 1+ LE edema


2+ UE edema 


Rivera in place





 CBC, BMP





 01/30/18 05:50 





 01/30/18 05:50 


 Laboratory Tests











  01/30/18





  05:50


 


Calcium  5.7 L*


 


Phosphorus  5.9 H


 


Magnesium  2.8 H


 


Albumin  1.9 L











 Current Medications





Acetaminophen (Tylenol Suppository -)  325 mg CO Q4H PRN


   PRN Reason: FEVER


Acetaminophen (Ofirmev Injection -)  1,000 mg IVPB Q6H PRN


   PRN Reason: FEVER


Albuterol Sulfate (Ventolin 0.083% Nebulizer Soln -)  1 amp NEB Q4H PRN


   PRN Reason: SHORT OF BREATH/WHEEZING


   Last Admin: 01/26/18 08:07 Dose:  1 amp


Atorvastatin Calcium (Lipitor -)  10 mg PO HS [facility]


   Last Admin: 01/29/18 21:02 Dose:  10 mg


Calcium Carbonate (Calcium Carb Oral Suspension -)  500 mg NGT BID ELI


Ferrous Sulfate (Feosol -)  325 mg PO TID [facility]


   Last Admin: 01/30/18 05:55 Dose:  325 mg


Heparin Sodium (Porcine) (Heparin -)  5,000 unit SQ TID [facility]


   Last Admin: 01/30/18 05:55 Dose:  5,000 unit


Propofol (Diprivan -)  1,000,000 mcg in 100 mls @ 1.666 mls/hr IVPB TITR ELI; 5 

MCG/KG/MIN


   PRN Reason: Protocol


   Last Admin: 01/28/18 21:40 Dose:  15 mcg/kg/min, 4.997 mls/hr


Vasopressin 50 units/ Sodium (Chloride)  100 mls @ 24 mls/hr IVPB TITR ELI; 0.2 

UNITS/MIN


   PRN Reason: Protocol


   Last Admin: 01/29/18 21:02 Dose:  Not Given


Norepinephrine Bitartrate 8, (000 mcg/ Sodium Chloride)  500 mls @ 18.75 mls/hr 

IV TITR ELI; 5 MCG/MIN


   PRN Reason: Protocol


   Last Admin: 01/29/18 21:03 Dose:  Not Given


Piperacillin Sod/Tazobactam (Sod 2.25 gm/ Dextrose)  100 mls @ 100 mls/hr IVPB 

Q6H-IV ELI


   Last Admin: 01/30/18 09:48 Dose:  100 mls/hr


Latanoprost (Xalatan 0.005% Eye Drops -)  1 drop OU HS [facility]


   Last Admin: 01/29/18 21:04 Dose:  1 drop


Loratadine (Claritin -)  10 mg PO DAILY [facility]


   Last Admin: 01/30/18 09:48 Dose:  10 mg


Methylprednisolone Sodium Succinate (Solu-Medrol -)  40 mg IVPUSH Q6H-IV [facility]


   Last Admin: 01/30/18 09:48 Dose:  40 mg


Montelukast Sodium (Singulair -)  10 mg PO HS [facility]


   Last Admin: 01/29/18 21:02 Dose:  10 mg


Multivitamins/Minerals/Vitamin C (Tab-A-Vit -)  1 tab PO DAILY [facility]


   Last Admin: 01/30/18 09:48 Dose:  1 tab


Pantoprazole Sodium (Protonix Iv)  40 mg IVPUSH DAILY [facility]


   Last Admin: 01/30/18 09:48 Dose:  40 mg


Tiotropium Bromide (Spiriva -)  1 puff IH DAILY [facility]


   Last Admin: 01/29/18 09:34 Dose:  Not Given





77 year old woman with PMhx of COPD who presented with hypercarbic respiratory 

failure requring intubation complicated by cardiac arrest now with MO with Cr 

of 3.3. Pt with 2 arrests on 1/26.





#Acute Renal Failure s/p cardiac arrest


Urine studies and clinical course are consistent with ATN from renal 

hypoprofusion 


currently pt is non-oliguric but just slightly


has peen in positive balance for the last several days and has signs of volume 

overload (edema, pleural effusions)


CVP is only around 5 but pt is off pressers and maintaining a good BP


will hold IVF at this time


would monitor urine output for the next 8 hours, if output remains marginal can 

attempt IV diuresis as long as MAP is above goal


no acute indication for RRT at the present time, and unlikely that RRT would 

significantly improve morbidity given lack of improvement in MS


avoid nephrotoxins and IV contrast


dose all meds for CrCl less then 10





#Cardiac Arrest/COPD/Resp Failure


continue ICU care





Prognosis is guarded





Thank you 


Will follow





Andre Savage DO

## 2018-01-30 NOTE — PN
Teaching Attending Note


Name of Resident: Isma Garcia





ATTENDING PHYSICIAN STATEMENT





I saw and evaluated the patient.


I reviewed the resident's note and discussed the case with the resident.


I agree with the resident's findings and plan as documented.








SUBJECTIVE:





Pt seen and examined in the ICU. Remains intubated but poorly responsive off 

sedation. Off pressors.





OBJECTIVE:





 Last Vital Signs











Temp Pulse Resp BP Pulse Ox


 


 98.8 F   91 H  15   131/72   98 


 


 01/30/18 10:00  01/30/18 12:01  01/30/18 12:01  01/30/18 11:48  01/30/18 12:01








 Intake & Output











 01/27/18 01/28/18 01/29/18 01/30/18





 23:59 23:59 23:59 23:59


 


Intake Total 4258.8 2162.0 3010 850


 


Output Total 200 450 600 200


 


Balance 4058.8 1712.0 2410 650


 


Weight 56.8 kg 60.3 kg 60.2 kg 61.7 kg








Gen:  intubated, poorly responsive, tachypneic


Heart: RRR


Lung: bilateral rhonchi


Abd: soft, nontender


Ext: + edema





 CBC, BMP





 01/30/18 05:50 





 01/30/18 05:50 





Active Medications





Acetaminophen (Tylenol Suppository -)  325 mg OR Q4H PRN


   PRN Reason: FEVER


Acetaminophen (Ofirmev Injection -)  1,000 mg IVPB Q6H PRN


   PRN Reason: FEVER


Albuterol Sulfate (Ventolin 0.083% Nebulizer Soln -)  1 amp NEB Q4H PRN


   PRN Reason: SHORT OF BREATH/WHEEZING


   Last Admin: 01/26/18 08:07 Dose:  1 amp


Atorvastatin Calcium (Lipitor -)  10 mg PO HS [facility]


   Last Admin: 01/29/18 21:02 Dose:  10 mg


Calcium Carbonate (Calcium Carb Oral Suspension -)  500 mg NGT BID [facility]


   Last Admin: 01/30/18 11:40 Dose:  500 mg


Ferrous Sulfate (Feosol -)  325 mg PO TID [facility]


   Last Admin: 01/30/18 05:55 Dose:  325 mg


Heparin Sodium (Porcine) (Heparin -)  5,000 unit SQ TID [facility]


   Last Admin: 01/30/18 05:55 Dose:  5,000 unit


Piperacillin Sod/Tazobactam (Sod 2.25 gm/ Dextrose)  100 mls @ 100 mls/hr IVPB 

Q6H-IV [facility]


   Last Admin: 01/30/18 09:48 Dose:  100 mls/hr


Latanoprost (Xalatan 0.005% Eye Drops -)  1 drop OU HS [facility]


   Last Admin: 01/29/18 21:04 Dose:  1 drop


Loratadine (Claritin -)  10 mg PO DAILY [facility]


   Last Admin: 01/30/18 09:48 Dose:  10 mg


Methylprednisolone Sodium Succinate (Solu-Medrol -)  40 mg IVPUSH Q6H-IV [facility]


   Last Admin: 01/30/18 09:48 Dose:  40 mg


Montelukast Sodium (Singulair -)  10 mg PO HS [facility]


   Last Admin: 01/29/18 21:02 Dose:  10 mg


Multivitamins/Minerals/Vitamin C (Tab-A-Vit -)  1 tab PO DAILY [facility]


   Last Admin: 01/30/18 09:48 Dose:  1 tab


Pantoprazole Sodium (Protonix Iv)  40 mg IVPUSH DAILY [facility]


   Last Admin: 01/30/18 09:48 Dose:  40 mg


Tiotropium Bromide (Spiriva -)  1 puff IH DAILY [facility]


   Last Admin: 01/30/18 11:41 Dose:  Not Given








ASSESSMENT AND PLAN:


Acute Hypoxic and Hypercapneic Respiratory Failure


r/o Anoxic Encephalopathy


Pneumonia


UTI


Septic Shock


Acute Kidney Injury


Elevated LFTs likely Ischemic Injury


+Troponins likely Demand Ischemia


Acute COPD Exacerbation


LV Diastolic Dysfunction


Pulmonary HTN





-  continue antibiotics


-  f/u cultures


-  continue medrol at current dose


-  inhaled bronchodilators standing and PRN


-  IVF to keep CVP 8-12


-  monitor urine output, creatinine


-  off pressors, maintain MAP >65


-  hold sedation to assess mental status


-  spontaneous breathing trials as tolerated when mental status improved


-  enteral feeds 


-  DVT/GI prophylaxis


-  continue ICU monitoring


-  poor prognosis








critical care time spent in reviewing chart, evaluating patient and formulating 

plan 35 min

## 2018-01-30 NOTE — PN
Physical Exam: 


SUBJECTIVE: Patient seen and examined


The patient is a 77 year old female with a history of COPD, CHF, recent hip Fx 

and replacement 12/17 c/b COPD exacerbation who presented with acute 

respiratory failure readmitted to the ICU following a cardiac arrest event.  

Patient is minimally responsive off sedation for 24 hours.  Otherwise no acute 

events overnight.





OBJECTIVE:





 Vital Signs











 Period  Temp  Pulse  Resp  BP Sys/Gonzalez  Pulse Ox


 


 Last 24 Hr  98 F-98.7 F  74-95  14-22  118-148/47-82  97-99











GENERAL: The patient is intubated, in no acute distress.


HEAD: Normal with no signs of trauma.


EYES: sclera anicteric, conjunctiva clear. No ptosis. 


ENT: oropharynx clear without exudates, moist mucous membranes.


NECK: Trachea midline, full range of motion, supple. 


LUNGS: Breath sounds equal, Course crackles auscultated on the right, no wheezes

, no accessory muscle use. 


HEART: Regular rate and rhythm, S1, S2 without murmur, rub or gallop.


ABDOMEN: Soft, nontender, nondistended, normoactive bowel sounds, no guarding, 

no rebound, no hepatosplenomegaly, no masses.


EXTREMITIES: 2+ pulses, warm, well-perfused,


NEUROLOGICAL: Unresponsive. gait not observed.


PSYCH: Normal mood, normal affect.


SKIN: Warm, dry, normal turgor, no rashes or lesions noted














 Laboratory Results - last 24 hr











  01/29/18 01/29/18 01/29/18





  05:30 10:00 10:00


 


WBC   


 


RBC   


 


Hgb   


 


Hct   


 


MCV   


 


MCH   


 


MCHC   


 


RDW   


 


Plt Count   


 


MPV   


 


Neutrophils %   


 


Lymphocytes %   


 


Monocytes %   


 


Eosinophils %   


 


Basophils %   


 


Anisocytosis   


 


Sodium  145  


 


Potassium  4.9  


 


Chloride  110 H  


 


Carbon Dioxide  27  


 


Anion Gap  8  


 


BUN  85 H  


 


Creatinine  3.3 H  


 


Creat Clearance w eGFR  13.56  


 


Random Glucose  151 H  


 


Calcium  6.3 L*  


 


Phosphorus  5.1 H  


 


Magnesium  3.0 H  


 


Total Bilirubin  0.6  


 


AST  175 H  


 


ALT  701 H  


 


Alkaline Phosphatase  96  


 


Total Protein  4.6 L  


 


Albumin  1.9 L  


 


Urine Color   Ltyellow 


 


Urine Appearance   Clear 


 


Urine pH   5.0 


 


Ur Specific Gravity   1.012 


 


Urine Protein   Negative 


 


Urine Glucose (UA)   1+ H 


 


Urine Ketones   Negative 


 


Urine Blood   2+ H 


 


Urine Nitrite   Negative 


 


Urine Bilirubin   Negative 


 


Urine Urobilinogen   Negative 


 


Ur Leukocyte Esterase   Negative 


 


Urine WBC (Auto)   1 


 


Urine RBC (Auto)   4 


 


Ur Epithelial Cells   Rare 


 


U Random Total Protein   


 


Ur Random Sodium    56


 


Ur Random Urea Nitrogn   


 


Urine Creatinine   














  01/29/18 01/29/18 01/30/18





  10:00 10:00 05:50


 


WBC    19.7 H


 


RBC    3.19 L


 


Hgb    8.3 L


 


Hct    26.2 L


 


MCV    81.9


 


MCH    25.9


 


MCHC    31.6 L


 


RDW    20.6 H


 


Plt Count    124 L


 


MPV    10.1


 


Neutrophils %    90.4 H


 


Lymphocytes %    2.2 L


 


Monocytes %    7.3  D


 


Eosinophils %    0.0


 


Basophils %    0.1  D


 


Anisocytosis    2+


 


Sodium   


 


Potassium   


 


Chloride   


 


Carbon Dioxide   


 


Anion Gap   


 


BUN   


 


Creatinine   


 


Creat Clearance w eGFR   


 


Random Glucose   


 


Calcium   


 


Phosphorus   


 


Magnesium   


 


Total Bilirubin   


 


AST   


 


ALT   


 


Alkaline Phosphatase   


 


Total Protein   


 


Albumin   


 


Urine Color   


 


Urine Appearance   


 


Urine pH   


 


Ur Specific Gravity   


 


Urine Protein   


 


Urine Glucose (UA)   


 


Urine Ketones   


 


Urine Blood   


 


Urine Nitrite   


 


Urine Bilirubin   


 


Urine Urobilinogen   


 


Ur Leukocyte Esterase   


 


Urine WBC (Auto)   


 


Urine RBC (Auto)   


 


Ur Epithelial Cells   


 


U Random Total Protein  48 H  


 


Ur Random Sodium   


 


Ur Random Urea Nitrogn   476 


 


Urine Creatinine  25.8  














  01/30/18





  05:50


 


WBC 


 


RBC 


 


Hgb 


 


Hct 


 


MCV 


 


MCH 


 


MCHC 


 


RDW 


 


Plt Count 


 


MPV 


 


Neutrophils % 


 


Lymphocytes % 


 


Monocytes % 


 


Eosinophils % 


 


Basophils % 


 


Anisocytosis 


 


Sodium  147 H


 


Potassium  5.0


 


Chloride  113 H


 


Carbon Dioxide  23


 


Anion Gap  11


 


BUN  103 H


 


Creatinine  3.9 H


 


Creat Clearance w eGFR  11.18


 


Random Glucose  100


 


Calcium  5.7 L*


 


Phosphorus  5.9 H


 


Magnesium  2.8 H


 


Total Bilirubin  0.4  D


 


AST  107 H


 


ALT  444 H


 


Alkaline Phosphatase  85


 


Total Protein  4.4 L


 


Albumin  1.9 L


 


Urine Color 


 


Urine Appearance 


 


Urine pH 


 


Ur Specific Gravity 


 


Urine Protein 


 


Urine Glucose (UA) 


 


Urine Ketones 


 


Urine Blood 


 


Urine Nitrite 


 


Urine Bilirubin 


 


Urine Urobilinogen 


 


Ur Leukocyte Esterase 


 


Urine WBC (Auto) 


 


Urine RBC (Auto) 


 


Ur Epithelial Cells 


 


U Random Total Protein 


 


Ur Random Sodium 


 


Ur Random Urea Nitrogn 


 


Urine Creatinine 








Active Medications











Generic Name Dose Route Start Last Admin





  Trade Name Freq  PRN Reason Stop Dose Admin


 


Acetaminophen  325 mg  01/25/18 12:22  





  Tylenol Suppository -  [place]   





  Q4H PRN   





  FEVER   


 


Acetaminophen  1,000 mg  01/27/18 02:10  





  Ofirmev Injection -  IVPB   





  Q6H PRN   





  FEVER   


 


Albuterol Sulfate  1 amp  01/25/18 12:22  01/26/18 08:07





  Ventolin 0.083% Nebulizer Soln -  NEB   1 amp





  Q4H PRN   Administration





  SHORT OF BREATH/WHEEZING   


 


Atorvastatin Calcium  10 mg  01/25/18 22:00  01/29/18 21:02





  Lipitor -  PO   10 mg





  HS ELI   Administration


 


Ferrous Sulfate  325 mg  01/25/18 22:00  01/30/18 05:55





  Feosol -  PO   325 mg





  TID ELI   Administration


 


Heparin Sodium (Porcine)  5,000 unit  01/29/18 14:00  01/30/18 05:55





  Heparin -  SQ   5,000 unit





  TID ELI   Administration


 


Propofol  1,000,000 mcg in 100 mls @ 1.666 mls/hr  01/26/18 11:30  01/28/18 21:

40





  Diprivan -  IVPB   15 mcg/kg/min





  TITR ELI   4.997 mls/hr





  Protocol   Administration





  5 MCG/KG/MIN   


 


Sodium Chloride  1,000 mls @ 100 mls/hr  01/26/18 13:00  01/29/18 15:07





  Normal Saline -  IV   100 mls/hr





  ASDIR ELI   Administration


 


Vasopressin 50 units/ Sodium  100 mls @ 24 mls/hr  01/26/18 17:15  01/29/18 21:

02





  Chloride  IVPB   Not Given





  TITR ELI   





  Protocol   





  0.2 UNITS/MIN   


 


Norepinephrine Bitartrate 8,  500 mls @ 18.75 mls/hr  01/26/18 18:00  01/29/18 

21:03





  000 mcg/ Sodium Chloride  IV   Not Given





  TITR ELI   





  Protocol   





  5 MCG/MIN   


 


Piperacillin Sod/Tazobactam  100 mls @ 100 mls/hr  01/27/18 13:33  01/30/18 02:

20





  Sod 2.25 gm/ Dextrose  IVPB   100 mls/hr





  Q6H-IV ELI   Administration


 


Latanoprost  1 drop  01/25/18 22:00  01/29/18 21:04





  Xalatan 0.005% Eye Drops -  OU   1 drop





  HS ELI   Administration


 


Loratadine  10 mg  01/26/18 10:00  01/29/18 09:34





  Claritin -  PO   10 mg





  DAILY ELI   Administration


 


Methylprednisolone Sodium Succinate  40 mg  01/25/18 15:00  01/30/18 02:20





  Solu-Medrol -  IVPUSH   40 mg





  Q6H-IV ELI   Administration


 


Montelukast Sodium  10 mg  01/25/18 22:00  01/29/18 21:02





  Singulair -  PO   10 mg





  HS ELI   Administration


 


Multivitamins/Minerals/Vitamin C  1 tab  01/26/18 10:00  01/29/18 09:34





  Tab-A-Vit -  PO   1 tab





  DAILY ELI   Administration


 


Pantoprazole Sodium  40 mg  01/26/18 10:00  01/29/18 09:34





  Protonix Iv  IVPUSH   40 mg





  DAILY ELI   Administration


 


Tiotropium Bromide  1 puff  01/26/18 10:00  01/29/18 09:34





  Spiriva -  IH   Not Given





  DAILY ELI   











ASSESSMENT/PLAN:


The patient is a 77 year old female with a history of COPD, CHF, recent hip Fx 

and replacement 12/17 c/b COPD exacerbation who presented with acute 

respiratory failure readmitted to the ICU following a cardiac arrest event.





NEURO


 Patient remains minimally responsive off sedation.


-Patient has continued to remain un-responsive off sedation for 24 hours.


-Will continue to monitor.





CV


#Hypotension (Improved)


-Patient now stable off pressors.


-Will continue to monitor.





#Elevated troponin (Improved)


-Now downtrending.





RESP


#Acute respiratory failure


Patient now re-intubated following a cardiac arrest earlier today.  Chest plain 

film demonstrates right sided rib fractures likely due to chest compressions.  

It is likely the patient aspirated leading to a respiratory arrest that led to 

the cardiac arrest.


-Will cover the patient with zosyn.


-Continue solu-medrol.


-Will maintain the patient on the ventilator.


-Will continue to monitor.





GI


-NG tube with feeds.





Renal


#MO


-Worsening bun/creatinine with minimal urine output.


-If the patient continues to only output 400-500ccs of urine, trial dose of 

40mg of lasix in the pm.


-Will continue to monitor bun/creatinine.





ID


#Pneumonia vs. Influenza vs. RSV


-Continue zosyn.


-Re-culture if patient spikes a temp.


-Follow recs from ID





MSK


No issues currently





FEN/GI


-Replete electrolytes PRN, will monitor


-Discontinue IV fluids.


-Tube feeds.





PPX


-Lovenox 40 sq daily


-Protonix 40 IV daily for GI ppx





DISPO: Continue ICU level of care.





Visit type





- Emergency Visit


Emergency Visit: No





- New Patient


This patient is new to me today: No





- Critical Care


Critical Care patient: Yes


Total Critical Care Time (in minutes): 35


Critical Care Statement: The care of this patient involved high complexity 

decision making to prevent further life threatening deterioration of the patient

's condition and/or to evaluate & treat vital organ system(s) failure or risk 

of failure.

## 2018-01-30 NOTE — PN
Progress Note, Physician


History of Present Illness: 





poorly responsive when sedation turned off


continues to be intubated





- Current Medication List


Current Medications: 


Active Medications





Acetaminophen (Tylenol Suppository -)  325 mg [place] Q4H PRN


   PRN Reason: FEVER


Acetaminophen (Ofirmev Injection -)  1,000 mg IVPB Q6H PRN


   PRN Reason: FEVER


Albuterol Sulfate (Ventolin 0.083% Nebulizer Soln -)  1 amp NEB Q4H PRN


   PRN Reason: SHORT OF BREATH/WHEEZING


   Last Admin: 01/26/18 08:07 Dose:  1 amp


Atorvastatin Calcium (Lipitor -)  10 mg PO HS [facility]


   Last Admin: 01/29/18 21:02 Dose:  10 mg


Calcium Carbonate (Calcium Carb Oral Suspension -)  500 mg NGT BID [facility]


   Last Admin: 01/30/18 11:40 Dose:  500 mg


Ferrous Sulfate (Feosol -)  325 mg PO TID [facility]


   Last Admin: 01/30/18 05:55 Dose:  325 mg


Heparin Sodium (Porcine) (Heparin -)  5,000 unit SQ TID [facility]


   Last Admin: 01/30/18 05:55 Dose:  5,000 unit


Piperacillin Sod/Tazobactam (Sod 2.25 gm/ Dextrose)  100 mls @ 100 mls/hr IVPB 

Q6H-IV [facility]


   Last Admin: 01/30/18 09:48 Dose:  100 mls/hr


Latanoprost (Xalatan 0.005% Eye Drops -)  1 drop OU [facility]


   Last Admin: 01/29/18 21:04 Dose:  1 drop


Loratadine (Claritin -)  10 mg PO DAILY [facility]


   Last Admin: 01/30/18 09:48 Dose:  10 mg


Methylprednisolone Sodium Succinate (Solu-Medrol -)  40 mg IVPUSH Q6H-IV [facility]


   Last Admin: 01/30/18 09:48 Dose:  40 mg


Montelukast Sodium (Singulair -)  10 mg PO [facility]


   Last Admin: 01/29/18 21:02 Dose:  10 mg


Multivitamins/Minerals/Vitamin C (Tab-A-Vit -)  1 tab PO DAILY [facility]


   Last Admin: 01/30/18 09:48 Dose:  1 tab


Pantoprazole Sodium (Protonix Iv)  40 mg IVPUSH DAILY [facility]


   Last Admin: 01/30/18 09:48 Dose:  40 mg


Tiotropium Bromide (Spiriva -)  1 puff IH DAILY [facility]


   Last Admin: 01/30/18 11:41 Dose:  Not Given











- Objective


Vital Signs: 


 Vital Signs











Temperature  98.8 F   01/30/18 10:00


 


Pulse Rate  88   01/30/18 13:00


 


Respiratory Rate  14   01/30/18 13:00


 


Blood Pressure  136/78   01/30/18 13:00


 


O2 Sat by Pulse Oximetry (%)  98   01/30/18 12:01











Constitutional: Yes: Other


Cardiovascular: Yes: Regular Rate and Rhythm


Respiratory: Yes: Intubated, Mechanically Ventilated


Gastrointestinal: Yes: Normal Bowel Sounds, Soft


Genitourinary: Yes: Rivera Present


Musculoskeletal: Yes: WNL


Extremities: Yes: WNL


Neurological: Yes: Other


Labs: 


 CBC, BMP





 01/30/18 05:50 





 01/30/18 05:50 





 INR, PTT











INR  0.95  (0.82-1.09)   01/26/18  09:50    














- ....Imaging


Chest X-ray: Report Reviewed, Image Reviewed





Assessment/Plan








Problem List





- Problems


(1) Acute and chronic respiratory failure with hypercapnia


Code(s): J96.22 - ACUTE AND CHRONIC RESPIRATORY FAILURE WITH HYPERCAPNIA   





(2) COPD exacerbation


Code(s): J44.1 - CHRONIC OBSTRUCTIVE PULMONARY DISEASE W (ACUTE) EXACERBATION   





(3) CHF (congestive heart failure)


Code(s): I50.9 - HEART FAILURE, UNSPECIFIED   


Qualifiers: 


   Congestive heart failure type: diastolic   Congestive heart failure 

chronicity: chronic   Qualified Code(s): I50.32 - Chronic diastolic (congestive

) heart failure   





(4) Fracture, hip


Code(s): S72.009A - FRACTURE OF UNSP PART OF NECK OF UNSP FEMUR, INIT   


Qualifiers: 


   Encounter type: initial encounter   Fracture type: closed   Laterality: left

   Qualified Code(s): S72.002A - Fracture of unspecified part of neck of left 

femur, initial encounter for closed fracture   





(5) Hypertension


Code(s): I10 - ESSENTIAL (PRIMARY) HYPERTENSION   





6 pneumonia





7 leukocytosis





8 uti


plan








lcx of the urine noted





plan


continue abx


supportive measures


nutrition


rest as per icu


patient continues to be critical








cc time 40 min

## 2018-01-31 LAB
ALBUMIN SERPL-MCNC: 2 G/DL (ref 3.4–5)
ALP SERPL-CCNC: 96 U/L (ref 45–117)
ALT SERPL-CCNC: 344 U/L (ref 12–78)
ANION GAP SERPL CALC-SCNC: 13 MMOL/L (ref 8–16)
ANION GAP SERPL CALC-SCNC: 15 MMOL/L (ref 8–16)
AST SERPL-CCNC: 93 U/L (ref 15–37)
BASOPHILS # BLD: 0.1 % (ref 0–2)
BILIRUB SERPL-MCNC: 0.6 MG/DL (ref 0.2–1)
BUN SERPL-MCNC: 112 MG/DL (ref 7–18)
BUN SERPL-MCNC: 116 MG/DL (ref 7–18)
CALCIUM SERPL-MCNC: 6 MG/DL (ref 8.5–10.1)
CALCIUM SERPL-MCNC: 6.1 MG/DL (ref 8.5–10.1)
CHLORIDE SERPL-SCNC: 110 MMOL/L (ref 98–107)
CHLORIDE SERPL-SCNC: 111 MMOL/L (ref 98–107)
CO2 SERPL-SCNC: 20 MMOL/L (ref 21–32)
CO2 SERPL-SCNC: 21 MMOL/L (ref 21–32)
CREAT SERPL-MCNC: 4.4 MG/DL (ref 0.55–1.02)
CREAT SERPL-MCNC: 4.7 MG/DL (ref 0.55–1.02)
DEPRECATED RDW RBC AUTO: 21.4 % (ref 11.6–15.6)
EOSINOPHIL # BLD: 0 % (ref 0–4.5)
GLUCOSE SERPL-MCNC: 169 MG/DL (ref 74–106)
GLUCOSE SERPL-MCNC: 204 MG/DL (ref 74–106)
HCT VFR BLD CALC: 28.8 % (ref 32.4–45.2)
HGB BLD-MCNC: 9.1 GM/DL (ref 10.7–15.3)
LYMPHOCYTES # BLD: 1.6 % (ref 8–40)
MAGNESIUM SERPL-MCNC: 3 MG/DL (ref 1.8–2.4)
MCH RBC QN AUTO: 26.1 PG (ref 25.7–33.7)
MCHC RBC AUTO-ENTMCNC: 31.6 G/DL (ref 32–36)
MCV RBC: 82.4 FL (ref 80–96)
MONOCYTES # BLD AUTO: 5.1 % (ref 3.8–10.2)
NEUTROPHILS # BLD: 93.2 % (ref 42.8–82.8)
PHOSPHATE SERPL-MCNC: 7.2 MG/DL (ref 2.5–4.9)
PLATELET # BLD AUTO: 159 K/MM3 (ref 134–434)
PMV BLD: 10.7 FL (ref 7.5–11.1)
POTASSIUM SERPLBLD-SCNC: 5 MMOL/L (ref 3.5–5.1)
POTASSIUM SERPLBLD-SCNC: 5.3 MMOL/L (ref 3.5–5.1)
PROT SERPL-MCNC: 4.9 G/DL (ref 6.4–8.2)
RBC # BLD AUTO: 3.49 M/MM3 (ref 3.6–5.2)
SODIUM SERPL-SCNC: 145 MMOL/L (ref 136–145)
SODIUM SERPL-SCNC: 145 MMOL/L (ref 136–145)
WBC # BLD AUTO: 18.7 K/MM3 (ref 4–10)

## 2018-01-31 RX ADMIN — PIPERACILLIN SODIUM AND TAZOBACTAM SODIUM SCH MLS/HR: .25; 2 INJECTION, POWDER, LYOPHILIZED, FOR SOLUTION INTRAVENOUS at 02:37

## 2018-01-31 RX ADMIN — LATANOPROST SCH DROP: 50 SOLUTION OPHTHALMIC at 21:32

## 2018-01-31 RX ADMIN — CALCIUM CARBONATE SCH MG: 1250 SUSPENSION ORAL at 09:33

## 2018-01-31 RX ADMIN — HEPARIN SODIUM SCH UNIT: 5000 INJECTION, SOLUTION INTRAVENOUS; SUBCUTANEOUS at 05:37

## 2018-01-31 RX ADMIN — METHYLPREDNISOLONE SODIUM SUCCINATE SCH MG: 40 INJECTION, POWDER, FOR SOLUTION INTRAMUSCULAR; INTRAVENOUS at 02:37

## 2018-01-31 RX ADMIN — METHYLPREDNISOLONE SODIUM SUCCINATE SCH MG: 40 INJECTION, POWDER, FOR SOLUTION INTRAMUSCULAR; INTRAVENOUS at 15:00

## 2018-01-31 RX ADMIN — PIPERACILLIN SODIUM AND TAZOBACTAM SODIUM SCH MLS/HR: .25; 2 INJECTION, POWDER, LYOPHILIZED, FOR SOLUTION INTRAVENOUS at 09:32

## 2018-01-31 RX ADMIN — FERROUS SULFATE TAB EC 324 MG (65 MG FE EQUIVALENT) SCH MG: 324 (65 FE) TABLET DELAYED RESPONSE at 05:37

## 2018-01-31 RX ADMIN — HEPARIN SODIUM SCH UNIT: 5000 INJECTION, SOLUTION INTRAVENOUS; SUBCUTANEOUS at 21:32

## 2018-01-31 RX ADMIN — ATORVASTATIN CALCIUM SCH MG: 10 TABLET, FILM COATED ORAL at 21:32

## 2018-01-31 RX ADMIN — HEPARIN SODIUM SCH UNIT: 5000 INJECTION, SOLUTION INTRAVENOUS; SUBCUTANEOUS at 16:46

## 2018-01-31 RX ADMIN — CALCIUM CARBONATE SCH MG: 1250 SUSPENSION ORAL at 21:33

## 2018-01-31 RX ADMIN — LORATADINE SCH MG: 10 TABLET ORAL at 09:33

## 2018-01-31 RX ADMIN — METHYLPREDNISOLONE SODIUM SUCCINATE SCH MG: 40 INJECTION, POWDER, FOR SOLUTION INTRAMUSCULAR; INTRAVENOUS at 09:32

## 2018-01-31 RX ADMIN — FERROUS SULFATE TAB EC 324 MG (65 MG FE EQUIVALENT) SCH MG: 324 (65 FE) TABLET DELAYED RESPONSE at 16:46

## 2018-01-31 RX ADMIN — PANTOPRAZOLE SODIUM SCH MG: 40 INJECTION, POWDER, FOR SOLUTION INTRAVENOUS at 09:32

## 2018-01-31 RX ADMIN — PIPERACILLIN SODIUM AND TAZOBACTAM SODIUM SCH MLS/HR: .25; 2 INJECTION, POWDER, LYOPHILIZED, FOR SOLUTION INTRAVENOUS at 16:46

## 2018-01-31 RX ADMIN — MONTELUKAST SODIUM SCH MG: 10 TABLET, COATED ORAL at 21:32

## 2018-01-31 RX ADMIN — TIOTROPIUM BROMIDE SCH: 18 CAPSULE ORAL; RESPIRATORY (INHALATION) at 09:45

## 2018-01-31 RX ADMIN — MULTIVITAMIN TABLET SCH TAB: TABLET at 09:33

## 2018-01-31 RX ADMIN — SEVELAMER CARBONATE SCH GM: 800 POWDER, FOR SUSPENSION ORAL at 21:32

## 2018-01-31 RX ADMIN — FERROUS SULFATE TAB EC 324 MG (65 MG FE EQUIVALENT) SCH MG: 324 (65 FE) TABLET DELAYED RESPONSE at 21:32

## 2018-01-31 RX ADMIN — PIPERACILLIN SODIUM AND TAZOBACTAM SODIUM SCH MLS/HR: .25; 2 INJECTION, POWDER, LYOPHILIZED, FOR SOLUTION INTRAVENOUS at 20:12

## 2018-01-31 RX ADMIN — SEVELAMER CARBONATE SCH: 800 POWDER, FOR SUSPENSION ORAL at 16:51

## 2018-01-31 RX ADMIN — METHYLPREDNISOLONE SODIUM SUCCINATE SCH MG: 40 INJECTION, POWDER, FOR SOLUTION INTRAMUSCULAR; INTRAVENOUS at 20:12

## 2018-01-31 NOTE — CONSULT
Consult - text type





- Consultation


Consultation Note: 





 Neurology





History of Present Illness: 


77 year old female who presented initially from the SNF with acute SOB. Upon 

seeing patient has been intubated secondary to hypercapneic respiratory failure 

per notes. Patient was recently discharged to SNF after hip replacement that 

was complicated by COPD exacerbation. Reportedly, was woken up by difficulty 

breathing and was brought here. In the ED she was noted to be tachypneic and 

had respiratory acidosis. BiPAP was attempted but patient was not improving and 

required intubation. Therafter, reportedly respiratory status improved to the 

point where she was able to have extubation. Thereafter had cardiac arrest and 

required resuscitation and revived. Respiratory failure required reintubation 

and patient in ICU in critical condition. Discussed with nurse and examined, 

patient does have brainstem reflexes. She does have pupil constriction, does 

have corneal reflex and gag reflex. Therefore, does not meet criteria for brain 

death. However, higher order cognition remains limited. She is not arousable or 

conversive. She does not follow commands. She remains vent dependent, slightly 

overbreathing was at rate of 14 and she was at 18 during my visit. 





- Past Medical History


Cardiovascular: Yes: HTN


Pulmonary: Yes: COPD, Other (oxygen at home)


Gastrointestinal: Yes: Other (History of  ulcer disease diagnosed 3 years ago 

when patient presented with anemia)


Renal/: Yes: UTI


Heme/Onc: Yes: Anemia


Musculoskeletal: Yes: Osteoarthritis





- Past Surgical History


Past Surgical History: Yes: None





- Smoking History


Smoking history: Current every day smoker


Have you smoked in the past 12 months: No


Aproximately how many cigarettes per day: 0


If you are a former smoker, when did you quit?: Over 15 years ago





- Alcohol/Substance Use


Hx Alcohol Use: No


History of Substance Use: reports: None





- Social History


Usual Living Arrangement: Yes: Alone, Nursing Home


ADL: Independent


Occupation: Retired dental , , 2 children


History of Recent Travel: No





Home Medications





- Allergies


Allergies/Adverse Reactions: 


 Allergies











Allergy/AdvReac Type Severity Reaction Status Date / Time


 


No Known Allergies Allergy   Verified 01/23/18 05:33














- Home Medications


Home Medications: 


Ambulatory Orders





Fluticasone/Salmeterol [Advair 250-50 Diskus] 1 each IH BID 06/14/12 


Tiotropium Bromide [Spiriva] 18 mcg IH DAILY 06/14/12 


Montelukast Na [Singulair -] 10 mg PO HS #0 tablet 06/19/12 


Multivitamins [Multivit ([facility] Formul[facility])] 1 udtab PO DAILY #0 tab 06/19/12 


Ferrous Sulfate [Feosol] 325 mg PO TID 09/17/12 


Calcium Carbonate/Vitamin D3 [Calcium 600-Vit D3 200 Tablet] 1 each PO DAILY 07/ 06/13 


Docosahexanoic Acid/Epa [Fish Oil Softgel] 1 each PO DAILY 07/06/13 


Potassium Chloride [K-Dur] 20 meq PO DAILY #0 tab.er.prt 07/06/13 


Alendronate Sodium [Fosamax] 35 mg PO COSTA 05/01/16 


Atorvastatin Ca [Lipitor] 10 mg PO HS 05/01/16 


Latanoprost 0.005% Eye Drops [Xalatan 0.005% Eye Drops -] 1 drop OU HS 05/01/16 


Diltiazem Cd [Cardizem Cd -] 120 mg PO BID  cap.cd.24h 01/05/18 


Enoxaparin [Lovenox -] 40 mg SQ DAILY  disp.syrin 01/05/18 


Loratadine [Claritin -] 10 mg PO DAILY  tablet 01/05/18 


Mineral Oil/Petrolat,Wht/Water [Eucerin (Small Jar) -] 1 applic TP DAILY  jar 01 /05/18 


Polyethylene Glycol 3350 [Miralax 119 gm Btl -] 17 gm PO BID  bottle 01/05/18 


Prednisone [Deltasone -] 10 mg PO DAILY  tablet 01/05/18 


Torsemide [Demadex -] 60 mg PO DAILY  tablet 01/05/18 


Acetaminophen [Tylenol .Regular Strength -] 650 mg PO Q6H PRN 01/23/18 


Albuterol 2.5/Ipratropium 0.5 [Duoneb -] 1 amp IH QSHIFT 01/23/18 


Ascorbic Acid [Vitamin C -] 500 mg PO TID 01/23/18 


Chlorpheniramine/Dextromethorp [Robitussin Long-Acting Liq] 15 ml PO Q6H 01/23/ 18 


Docusate Sodium [Colace -] 300 mg PO HS 01/23/18 


Menthol/Zinc Oxide [Calmoseptine Ointment] 71 gm TP QSHIFT 01/23/18 


Nystatin Oral Suspension - [Nystatin Oral Susp 777504 Units/5 ML -] 500,000 

units PO QID 01/23/18 


Omeprazole Magnesium [Prilosec Otc] 20 mg PO DAILY 01/23/18 


Sodium Chloride Nasal Spray [Ocean Spray Nasal Spray] 2 spray NS QSHIFT 01/23/ 18 











Family Disease History





- Family Disease History


Family Disease History: Heart Disease: Father, Other: Mother (Alzheimers 

Dementia)





Review of Systems


Unable to obtain ROS, reason: intubated





Physical Examination


Vital Signs: 


 Vital Signs











Temperature  37.9 C H  01/23/18 09:26


 


Pulse Rate  114 H  01/23/18 12:52


 


Respiratory Rate  16   01/23/18 12:52


 


Blood Pressure  113/69   01/23/18 12:52


 


O2 Sat by Pulse Oximetry (%)  100   01/23/18 12:52











Constitutional: Yes: Other (sedated)


Eyes: Yes: Conjunctiva Clear, EOM Intact, PERRL


HENT: Yes: Atraumatic, Normocephalic


Cardiovascular: Yes: Tachycardia.  No: Pulse Irregular, Gallop, Murmur, Rub


Respiratory: Yes: Regular, CTA Bilaterally, Intubated, Mechanically Ventilated.

  No: Rales, Rhonchi, Wheezes


Gastrointestinal: Yes: Normal Bowel Sounds, Soft.  No: Distention, Tenderness


Extremities: Yes: WNL


Edema: No


Labs: 


 


 CBCD











WBC  18.7 K/mm3 (4.0-10.0)  H  01/31/18  05:15    


 


RBC  3.49 M/mm3 (3.60-5.2)  L  01/31/18  05:15    


 


Hgb  9.1 GM/dL (10.7-15.3)  L  01/31/18  05:15    


 


Hct  28.8 % (32.4-45.2)  L  01/31/18  05:15    


 


MCV  82.4 fl (80-96)   01/31/18  05:15    


 


MCHC  31.6 g/dl (32.0-36.0)  L  01/31/18  05:15    


 


RDW  21.4 % (11.6-15.6)  H  01/31/18  05:15    


 


Plt Count  159 K/MM3 (134-434)  D 01/31/18  05:15    


 


MPV  10.7 fl (7.5-11.1)   01/31/18  05:15    








 CMP











Sodium  145 mmol/L (136-145)   01/31/18  05:15    


 


Potassium  5.3 mmol/L (3.5-5.1)  H  01/31/18  05:15    


 


Chloride  111 mmol/L ()  H  01/31/18  05:15    


 


Carbon Dioxide  21 mmol/L (21-32)   01/31/18  05:15    


 


Anion Gap  13  (8-16)   01/31/18  05:15    


 


BUN  112 mg/dL (7-18)  H*  01/31/18  05:15    


 


Creatinine  4.4 mg/dL (0.55-1.02)  H  01/31/18  05:15    


 


Creat Clearance w eGFR  9.73  (>60)   01/31/18  05:15    


 


Calcium  6.0 mg/dL (8.5-10.1)  L*  01/31/18  05:15    


 


Total Bilirubin  0.6 mg/dL (0.2-1.0)  D 01/31/18  05:15    


 


AST  93 U/L (15-37)  H  01/31/18  05:15    


 


ALT  344 U/L (12-78)  H  01/31/18  05:15    


 


Alkaline Phosphatase  96 U/L ()   01/31/18  05:15    


 


Total Protein  4.9 g/dl (6.4-8.2)  L  01/31/18  05:15    


 


Albumin  2.0 g/dl (3.4-5.0)  L  01/31/18  05:15    




















Plan


77 year old female who presented initially from the SNF with acute SOB. Upon 

seeing patient has been intubated secondary to hypercapneic respiratory failure 

per notes. Patient was recently discharged to SNF after hip replacement that 

was complicated by COPD exacerbation. Reportedly, was woken up by difficulty 

breathing and was brought here. In the ED she was noted to be tachypneic and 

had respiratory acidosis. BiPAP was attempted but patient was not improving and 

required intubation. Therafter, reportedly respiratory status improved to the 

point where she was able to have extubation. Thereafter had cardiac arrest and 

required resuscitation and revived. 


Respiratory failure required reintubation and patient in ICU in critical 

condition. 


Discussed with nurse and examined, patient does have brainstem reflexes. 


She does have pupil constriction, does have corneal reflex and gag reflex. 


Does not meet criteria for brain death. However, higher order cognition remains 

limited. 


Not on sedation but still does not follow commands and nonverbal. 


Remains intubated and on mechanical ventilation


At this point, further management would depend on goals of care


Likely poor prognosis given her clinical condition and comorbidities


Not likely to make meaningful recovery and be at function status she was before


Likely to need trach, peg, and vent at least for immediate future if continued 

care desired


Palliative care should be considered


If further information needed, CT head can be completed, though clinically 

assessement as above


Critical care time 50 mins

## 2018-01-31 NOTE — PN
Teaching Attending Note


Name of Resident: Gloria Bay





ATTENDING PHYSICIAN STATEMENT





I saw and evaluated the patient.


I reviewed the resident's note and discussed the case with the resident.


I agree with the resident's findings and plan as documented.








SUBJECTIVE:





Pt seen and examined in the ICU. Remains intubated, poorly responsive off 

sedation. Off pressors, not tolerating CPAP/PS trials. Given trial of lasix 

overnight with some urine output. CVP 8 this AM.





OBJECTIVE:





 Last Vital Signs











Temp Pulse Resp BP Pulse Ox


 


 99.4 F   100 H  17   110/62   99 


 


 01/31/18 08:55  01/31/18 10:00  01/31/18 11:36  01/31/18 10:00  01/31/18 09:02








 Intake & Output











 01/28/18 01/29/18 01/30/18 01/31/18





 23:59 23:59 23:59 23:59


 


Intake Total 2162.0 3010 1650 575


 


Output Total 450 600 850 300


 


Balance 1712.0 2410 800 275


 


Weight 60.3 kg 60.2 kg 61.7 kg 63.4 kg








Gen:  intubated, poorly responsive, tachypneic


Heart: RRR


Lung: bilateral rhonchi


Abd: soft, nontender


Ext: + edema





 CBC, BMP





 01/31/18 05:15 





 01/31/18 05:15 





Active Medications





Acetaminophen (Tylenol Suppository -)  325 mg [place] Q4H PRN


   PRN Reason: FEVER


Acetaminophen (Ofirmev Injection -)  1,000 mg IVPB Q6H PRN


   PRN Reason: FEVER


Albuterol Sulfate (Ventolin 0.083% Nebulizer Soln -)  1 amp NEB Q4H PRN


   PRN Reason: SHORT OF BREATH/WHEEZING


   Last Admin: 01/26/18 08:07 Dose:  1 amp


Atorvastatin Calcium (Lipitor -)  10 mg PO HS [facility]


   Last Admin: 01/30/18 21:39 Dose:  10 mg


Calcium Carbonate (Calcium Carb Oral Suspension -)  500 mg NGT BID [facility]


   Last Admin: 01/31/18 09:33 Dose:  500 mg


Ferrous Sulfate (Feosol -)  325 mg PO TID [facility]


   Last Admin: 01/31/18 05:37 Dose:  325 mg


Furosemide (Lasix Injection -)  40 mg IVPUSH ONCE ONE


   Stop: 01/31/18 11:46


Heparin Sodium (Porcine) (Heparin -)  5,000 unit SQ TID [facility]


   Last Admin: 01/31/18 05:37 Dose:  5,000 unit


Piperacillin Sod/Tazobactam (Sod 2.25 gm/ Dextrose)  100 mls @ 100 mls/hr IVPB 

Q6H-IV [facility]


   Last Admin: 01/31/18 09:32 Dose:  100 mls/hr


Latanoprost (Xalatan 0.005% Eye Drops -)  1 drop OU HS [facility]


   Last Admin: 01/30/18 21:39 Dose:  1 drop


Loratadine (Claritin -)  10 mg PO DAILY [facility]


   Last Admin: 01/31/18 09:33 Dose:  10 mg


Methylprednisolone Sodium Succinate (Solu-Medrol -)  40 mg IVPUSH Q6H-IV [facility]


   Last Admin: 01/31/18 09:32 Dose:  40 mg


Montelukast Sodium (Singulair -)  10 mg PO HS [facility]


   Last Admin: 01/30/18 21:39 Dose:  10 mg


Multivitamins/Minerals/Vitamin C (Tab-A-Vit -)  1 tab PO DAILY [facility]


   Last Admin: 01/31/18 09:33 Dose:  1 tab


Pantoprazole Sodium (Protonix Iv)  40 mg IVPUSH DAILY [facility]


   Last Admin: 01/31/18 09:32 Dose:  40 mg


Sevelamer Carbonate (Renvela Powder Packet -)  0.8 gm NGT TID [facility]


Tiotropium Bromide (Spiriva -)  1 puff IH DAILY [facility]


   Last Admin: 01/31/18 09:45 Dose:  Not Given








ASSESSMENT AND PLAN:


Acute Hypoxic and Hypercapneic Respiratory Failure


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


Pneumonia


UTI


Septic Shock


Acute Kidney Injury


Elevated LFTs likely Ischemic Injury


+Troponins likely Demand Ischemia


Acute COPD Exacerbation


LV Diastolic Dysfunction


Pulmonary HTN





-  continue antibiotics


-  f/u cultures


-  continue medrol at current dose


-  inhaled bronchodilators standing and PRN


-  keep CVP 8-12


-  lasix as needed 


-  monitor urine output, creatinine


-  off pressors, maintain MAP >65


-  hold sedation to assess mental status


-  spontaneous breathing trials as tolerated when mental status improved


-  enteral feeds 


-  DVT/GI prophylaxis


-  continue ICU monitoring


-  poor prognosis, continue discussions regarding goals of care








critical care time spent in reviewing chart, evaluating patient and formulating 

plan 35 min

## 2018-01-31 NOTE — PN
Progress Note (short form)





- Note


Progress Note: 





Renal follow up for MO





Pt seen and examined in the ICU


on vent


off pressers


improved urine output s/p Lasix yesterday


no overnight events


BP stable


Pt still remains unresponsive 


 Vital Signs











Temperature  98.8 F   01/31/18 11:54


 


Pulse Rate  97 H  01/31/18 11:54


 


Respiratory Rate  16   01/31/18 11:54


 


Blood Pressure  109/62   01/31/18 11:54


 


O2 Sat by Pulse Oximetry (%)  99   01/31/18 09:02








 Intake & Output











 01/28/18 01/29/18 01/30/18 01/31/18





 23:59 23:59 23:59 23:59


 


Intake Total 2162.0 3010 1650 575


 


Output Total 450 600 850 300


 


Balance 1712.0 2410 800 275


 


Weight 60.3 kg 60.2 kg 61.7 kg 63.4 kg











NAD


on Vent via ET tube


Dec BS b/l lung fields


RRR


soft NT/ND


trace to 1+ LE edema


2+ UE edema 


Rivera in place


 CBC, BMP





 01/31/18 05:15 





 01/31/18 05:15 





 Current Medications





Acetaminophen (Tylenol Suppository -)  325 mg [place] Q4H PRN


   PRN Reason: FEVER


Acetaminophen (Ofirmev Injection -)  1,000 mg IVPB Q6H PRN


   PRN Reason: FEVER


Albuterol Sulfate (Ventolin 0.083% Nebulizer Soln -)  1 amp NEB Q4H PRN


   PRN Reason: SHORT OF BREATH/WHEEZING


   Last Admin: 01/26/18 08:07 Dose:  1 amp


Atorvastatin Calcium (Lipitor -)  10 mg PO HS [facility]


   Last Admin: 01/30/18 21:39 Dose:  10 mg


Calcium Carbonate (Calcium Carb Oral Suspension -)  500 mg NGT BID [facility]


   Last Admin: 01/31/18 09:33 Dose:  500 mg


Ferrous Sulfate (Feosol -)  325 mg PO TID [facility]


   Last Admin: 01/31/18 05:37 Dose:  325 mg


Heparin Sodium (Porcine) (Heparin -)  5,000 unit SQ TID [facility]


   Last Admin: 01/31/18 05:37 Dose:  5,000 unit


Piperacillin Sod/Tazobactam (Sod 2.25 gm/ Dextrose)  100 mls @ 100 mls/hr IVPB 

Q6H-IV [facility]


   Last Admin: 01/31/18 09:32 Dose:  100 mls/hr


Latanoprost (Xalatan 0.005% Eye Drops -)  1 drop OU HS [facility]


   Last Admin: 01/30/18 21:39 Dose:  1 drop


Loratadine (Claritin -)  10 mg PO DAILY [facility]


   Last Admin: 01/31/18 09:33 Dose:  10 mg


Methylprednisolone Sodium Succinate (Solu-Medrol -)  40 mg IVPUSH Q6H-IV [facility]


   Last Admin: 01/31/18 09:32 Dose:  40 mg


Montelukast Sodium (Singulair -)  10 mg PO HS [facility]


   Last Admin: 01/30/18 21:39 Dose:  10 mg


Multivitamins/Minerals/Vitamin C (Tab-A-Vit -)  1 tab PO DAILY [facility]


   Last Admin: 01/31/18 09:33 Dose:  1 tab


Pantoprazole Sodium (Protonix Iv)  40 mg IVPUSH DAILY [facility]


   Last Admin: 01/31/18 09:32 Dose:  40 mg


Sevelamer Carbonate (Renvela Powder Packet -)  0.8 gm NGT TID [facility]


Tiotropium Bromide (Spiriva -)  1 puff IH DAILY [facility]


   Last Admin: 01/31/18 09:45 Dose:  Not Given











77 year old woman with PMhx of COPD who presented with hypercarbic respiratory 

failure requring intubation complicated by cardiac arrest now with MO with Cr 

of 3.3. Pt with 2 arrests on 1/26.





#Acute Renal Failure s/p cardiac arrest


Urine studies and clinical course are consistent with ATN from renal 

hypoprofusion 


hydronephrosis noted on CT, chronic finding and less likely contributing to 

renal dysfunction now


pt is responsive to IV lasix, will give 40mg IV now and reacess for additional 

diuretics later today


change tube fees to suplena/nephro to minimize potassium/phos


start renvela via NGT


Ternd BUN/Cr and electrolytes


no acute indication for RRT at this time


pt would be poor candiate for RRT given poor MS


BUN very high but also likely affected by steroids


dose all meds for CrCl less then 10





#Cardiac Arrest/COPD/Resp Failure


continue ICU care





Prognosis is guarded





Spoke to son over the phone and explained the current status





Andre Savage DO

## 2018-01-31 NOTE — PN
Progress Note, Physician


History of Present Illness: 





continues to be intubated 


sedated


no response off of sedation


wbc trending down





- Current Medication List


Current Medications: 


Active Medications





Acetaminophen (Tylenol Suppository -)  325 mg [place] Q4H PRN


   PRN Reason: FEVER


Acetaminophen (Ofirmev Injection -)  1,000 mg IVPB Q6H PRN


   PRN Reason: FEVER


Albuterol Sulfate (Ventolin 0.083% Nebulizer Soln -)  1 amp NEB Q4H PRN


   PRN Reason: SHORT OF BREATH/WHEEZING


   Last Admin: 01/26/18 08:07 Dose:  1 amp


Atorvastatin Calcium (Lipitor -)  10 mg PO HS [facility]


   Last Admin: 01/30/18 21:39 Dose:  10 mg


Calcium Carbonate (Calcium Carb Oral Suspension -)  500 mg NGT BID [facility]


   Last Admin: 01/31/18 09:33 Dose:  500 mg


Ferrous Sulfate (Feosol -)  325 mg PO TID [facility]


   Last Admin: 01/31/18 05:37 Dose:  325 mg


Heparin Sodium (Porcine) (Heparin -)  5,000 unit SQ TID [facility]


   Last Admin: 01/31/18 05:37 Dose:  5,000 unit


Piperacillin Sod/Tazobactam (Sod 2.25 gm/ Dextrose)  100 mls @ 100 mls/hr IVPB 

Q6H-IV [facility]


   Last Admin: 01/31/18 09:32 Dose:  100 mls/hr


Latanoprost (Xalatan 0.005% Eye Drops -)  1 drop OU [facility]


   Last Admin: 01/30/18 21:39 Dose:  1 drop


Loratadine (Claritin -)  10 mg PO DAILY [facility]


   Last Admin: 01/31/18 09:33 Dose:  10 mg


Methylprednisolone Sodium Succinate (Solu-Medrol -)  40 mg IVPUSH Q6H-IV [facility]


   Last Admin: 01/31/18 09:32 Dose:  40 mg


Montelukast Sodium (Singulair -)  10 mg PO [facility]


   Last Admin: 01/30/18 21:39 Dose:  10 mg


Multivitamins/Minerals/Vitamin C (Tab-A-Vit -)  1 tab PO DAILY [facility]


   Last Admin: 01/31/18 09:33 Dose:  1 tab


Pantoprazole Sodium (Protonix Iv)  40 mg IVPUSH DAILY [facility]


   Last Admin: 01/31/18 09:32 Dose:  40 mg


Sevelamer Carbonate (Renvela Powder Packet -)  0.8 gm NGT TID [facility]


Tiotropium Bromide (Spiriva -)  1 puff IH DAILY [facility]


   Last Admin: 01/31/18 09:45 Dose:  Not Given











- Objective


Vital Signs: 


 Vital Signs











Temperature  98.8 F   01/31/18 14:00


 


Pulse Rate  100 H  01/31/18 15:14


 


Respiratory Rate  17   01/31/18 16:04


 


Blood Pressure  136/70   01/31/18 15:14


 


O2 Sat by Pulse Oximetry (%)  100   01/31/18 15:00











Constitutional: Yes: Other


Cardiovascular: Yes: Regular Rate and Rhythm


Respiratory: Yes: Intubated, Mechanically Ventilated


Gastrointestinal: Yes: Normal Bowel Sounds, Soft


Neurological: Yes: Other


Psychiatric: Yes: Other


Labs: 


 CBC, BMP





 01/31/18 05:15 





 01/31/18 05:15 





 INR, PTT











INR  0.95  (0.82-1.09)   01/26/18  09:50    














Assessment/Plan








Problem List





- Problems


(1) Acute and chronic respiratory failure with hypercapnia


Code(s): J96.22 - ACUTE AND CHRONIC RESPIRATORY FAILURE WITH HYPERCAPNIA   





(2) COPD exacerbation


Code(s): J44.1 - CHRONIC OBSTRUCTIVE PULMONARY DISEASE W (ACUTE) EXACERBATION   





(3) CHF (congestive heart failure)


Code(s): I50.9 - HEART FAILURE, UNSPECIFIED   


Qualifiers: 


   Congestive heart failure type: diastolic   Congestive heart failure 

chronicity: chronic   Qualified Code(s): I50.32 - Chronic diastolic (congestive

) heart failure   





(4) Fracture, hip


Code(s): S72.009A - FRACTURE OF UNSP PART OF NECK OF UNSP FEMUR, INIT   


Qualifiers: 


   Encounter type: initial encounter   Fracture type: closed   Laterality: left

   Qualified Code(s): S72.002A - Fracture of unspecified part of neck of left 

femur, initial encounter for closed fracture   





(5) Hypertension


Code(s): I10 - ESSENTIAL (PRIMARY) HYPERTENSION   





6 pneumonia





7 leukocytosis





8 uti


plan














plan


continue abx


supportive measures


nutrition


rest as per icu


patient continues to be critical











cc time 40 min

## 2018-01-31 NOTE — PN
Progress Note, Physician


Chief Complaint: 


Unable to obtain, patient obtunded





- Current Medication List


Current Medications: 


Active Medications





Acetaminophen (Tylenol Suppository -)  325 mg [place] Q4H PRN


   PRN Reason: FEVER


Acetaminophen (Ofirmev Injection -)  1,000 mg IVPB Q6H PRN


   PRN Reason: FEVER


Albuterol Sulfate (Ventolin 0.083% Nebulizer Soln -)  1 amp NEB Q4H PRN


   PRN Reason: SHORT OF BREATH/WHEEZING


   Last Admin: 01/26/18 08:07 Dose:  1 amp


Atorvastatin Calcium (Lipitor -)  10 mg PO HS [facility]


   Last Admin: 01/30/18 21:39 Dose:  10 mg


Calcium Carbonate (Calcium Carb Oral Suspension -)  500 mg NGT BID [facility]


   Last Admin: 01/31/18 09:33 Dose:  500 mg


Ferrous Sulfate (Feosol -)  325 mg PO TID [facility]


   Last Admin: 01/31/18 05:37 Dose:  325 mg


Heparin Sodium (Porcine) (Heparin -)  5,000 unit SQ TID [facility]


   Last Admin: 01/31/18 05:37 Dose:  5,000 unit


Piperacillin Sod/Tazobactam (Sod 2.25 gm/ Dextrose)  100 mls @ 100 mls/hr IVPB 

Q6H-IV [facility]


   Last Admin: 01/31/18 09:32 Dose:  100 mls/hr


Latanoprost (Xalatan 0.005% Eye Drops -)  1 drop OU [facility]


   Last Admin: 01/30/18 21:39 Dose:  1 drop


Loratadine (Claritin -)  10 mg PO DAILY [facility]


   Last Admin: 01/31/18 09:33 Dose:  10 mg


Methylprednisolone Sodium Succinate (Solu-Medrol -)  40 mg IVPUSH Q6H-IV [facility]


   Last Admin: 01/31/18 09:32 Dose:  40 mg


Montelukast Sodium (Singulair -)  10 mg PO [facility]


   Last Admin: 01/30/18 21:39 Dose:  10 mg


Multivitamins/Minerals/Vitamin C (Tab-A-Vit -)  1 tab PO DAILY [facility]


   Last Admin: 01/31/18 09:33 Dose:  1 tab


Pantoprazole Sodium (Protonix Iv)  40 mg IVPUSH DAILY [facility]


   Last Admin: 01/31/18 09:32 Dose:  40 mg


Sevelamer Carbonate (Renvela Powder Packet -)  0.8 gm NGT Q8H [facility]


Tiotropium Bromide (Spiriva -)  1 puff IH DAILY [facility]


   Last Admin: 01/31/18 09:45 Dose:  Not Given











- Objective


Vital Signs: 


 Vital Signs











Temperature  37.4 C   01/31/18 08:55


 


Pulse Rate  103 H  01/31/18 09:02


 


Respiratory Rate  14   01/31/18 09:02


 


Blood Pressure  117/72   01/31/18 08:55


 


O2 Sat by Pulse Oximetry (%)  99   01/31/18 09:02











Constitutional: Yes: Other (obtunded)


Cardiovascular: Yes: Regular Rate and Rhythm.  No: Gallop, Murmur, Rub


Respiratory: Yes: CTA Bilaterally, Intubated, Mechanically Ventilated, 

Tachypnea.  No: Rales, Rhonchi, Wheezes


Gastrointestinal: Yes: Normal Bowel Sounds, Soft.  No: Distention, Tenderness


Extremities: Yes: WNL


Edema: Yes


Edema: LUE: 1+, RUE: 1+, LLE: 1+, RLE: 1+


Labs: 


 CBC, BMP





 01/31/18 05:15 





 01/31/18 05:15 





 INR, PTT











INR  0.95  (0.82-1.09)   01/26/18  09:50    














Problem List





- Problems


(1) Asystole


Code(s): I46.9 - CARDIAC ARREST, CAUSE UNSPECIFIED   





(2) Lactic acid acidosis


Code(s): E87.2 - ACIDOSIS   





(3) Hemodynamic instability


Code(s): R09.89 - OTH SYMPTOMS AND SIGNS INVOLVING THE CIRC AND RESP SYSTEMS   





(4) Shock liver


Code(s): K72.00 - ACUTE AND SUBACUTE HEPATIC FAILURE WITHOUT COMA   





(5) RSV (respiratory syncytial virus pneumonia)


Code(s): J12.1 - RESPIRATORY SYNCYTIAL VIRUS PNEUMONIA   





(6) Acute and chronic respiratory failure with hypercapnia


Code(s): J96.22 - ACUTE AND CHRONIC RESPIRATORY FAILURE WITH HYPERCAPNIA   





(7) COPD exacerbation


Code(s): J44.1 - CHRONIC OBSTRUCTIVE PULMONARY DISEASE W (ACUTE) EXACERBATION   





(8) CHF (congestive heart failure)


Code(s): I50.9 - HEART FAILURE, UNSPECIFIED   


Qualifiers: 


   Congestive heart failure type: diastolic   Congestive heart failure 

chronicity: chronic   Qualified Code(s): I50.32 - Chronic diastolic (congestive

) heart failure   





(9) Fracture, hip


Code(s): S72.009A - FRACTURE OF UNSP PART OF NECK OF UNSP FEMUR, INIT   


Qualifiers: 


   Encounter type: initial encounter   Fracture type: closed   Laterality: left

   Qualified Code(s): S72.002A - Fracture of unspecified part of neck of left 

femur, initial encounter for closed fracture   





(10) Hypertension


Code(s): I10 - ESSENTIAL (PRIMARY) HYPERTENSION   





Assessment/Plan





(1) Asystolic cardiac arrest


-heart now stable


-however overall with poor prognosis


-continues to be obtunded


-case d/w neurology


-minimal brain stem activity


-very poor prognosis and expect minimal neurologic recovery





(2) Acute and chronic hypoxic respiratory failure


Assessment/Plan: 


-recurrent and cause of asystole


-intubated and mechanically ventilated


-pulmonary following and case discussed


-to d/w family possible palliative extubation


Code(s): J96.22 - ACUTE AND CHRONIC RESPIRATORY FAILURE WITH HYPERCAPNIA   





(3) COPD exacerbation


Assessment/Plan: 


-continue steroids


Code(s): J44.1 - CHRONIC OBSTRUCTIVE PULMONARY DISEASE W (ACUTE) EXACERBATION   





(3) CHF (congestive heart failure)


Assessment/Plan: 


-currently hypotensive requiring pressors


-off pressors


-nephrology giving IV lasix for fluid overload


Code(s): I50.9 - HEART FAILURE, UNSPECIFIED   


Qualifiers: 


   Congestive heart failure type: diastolic   Congestive heart failure 

chronicity: chronic   Qualified Code(s): I50.32 - Chronic diastolic (congestive

) heart failure   





(4) Fracture, hip


Assessment/Plan: 


-reason for being at [facility]


Code(s): S72.009A - FRACTURE OF UNSP PART OF NECK OF UNSP FEMUR, INIT   


Qualifiers: 


   Encounter type: initial encounter   Fracture type: closed   Laterality: left

   Qualified Code(s): S72.002A - Fracture of unspecified part of neck of left 

femur, initial encounter for closed fracture   





(5) Hypertension


Assessment/Plan: 


-off pressors


Code(s): I10 - ESSENTIAL (PRIMARY) HYPERTENSION   





(6) Hypocalcemia


-replaced as needed





(7) Shock Liver


-improving





(8) Hemodynamic instability 


-improved, off pressors





(9) Lactic acidosis


-resolved





(10) MO


-continues to worsen but possibly plateauing


-continue monitoring at this time





(11) RSV pneumonia


-continue zosyn per ID





32 minutes spent in critical care time 





Dispo


-planning for family meeting at 2pm today to discuss goal of care

## 2018-01-31 NOTE — PN
Physical Exam: 


SUBJECTIVE: Patient seen and examined. She is not waking up, following 

commands. On mechanical ventilation.








OBJECTIVE:





 Vital Signs











 Period  Temp  Pulse  Resp  BP Sys/Gonzalez  Pulse Ox


 


 Last 24 Hr  98.2 F-99.4 F    14-17  108-137/62-78  96-99











GENERAL: The patient is intubated, on mechanical ventilation.


HEAD: Normal with no signs of trauma.


EYES: sclera anicteric, conjunctiva clear, EOMI not assessed.


ENT: oropharynx clear without exudates, moist mucous membranes.


NECK: Trachea midline, full range of motion, supple. 


LUNGS: Breath sounds equal, coarse breath sounds, no wheezes, cracklesm, no 

accessory muscle use. 


HEART: Regular rate and rhythm, S1, S2 without murmur, rub or gallop.


ABDOMEN: Soft, nontender, nondistended, normoactive bowel sounds, no guarding, 

no rebound, no hepatosplenomegaly, no masses.


EXTREMITIES: 2+ pulses, warm, no peripheral edema in LE, 1+ edema in upper 

extremities.


NEUROLOGICAL: Unresponsive, not following commands


PSYCH: Normal mood, normal affect.


SKIN: Warm, dry, normal turgor, no rashes.














 Laboratory Results - last 24 hr











  01/31/18 01/31/18





  05:15 05:15


 


WBC  18.7 H 


 


RBC  3.49 L 


 


Hgb  9.1 L 


 


Hct  28.8 L 


 


MCV  82.4 


 


MCH  26.1 


 


MCHC  31.6 L 


 


RDW  21.4 H 


 


Plt Count  159  D 


 


MPV  10.7 


 


Neutrophils %  93.2 H 


 


Lymphocytes %  1.6 L D 


 


Monocytes %  5.1 


 


Eosinophils %  0.0 


 


Basophils %  0.1 


 


Sodium   145


 


Potassium   5.3 H


 


Chloride   111 H


 


Carbon Dioxide   21


 


Anion Gap   13


 


BUN   112 H*


 


Creatinine   4.4 H


 


Creat Clearance w eGFR   9.73


 


Random Glucose   169 H


 


Calcium   6.0 L*


 


Phosphorus   7.2 H


 


Magnesium   3.0 H


 


Total Bilirubin   0.6  D


 


AST   93 H


 


ALT   344 H


 


Alkaline Phosphatase   96


 


Total Protein   4.9 L


 


Albumin   2.0 L








Active Medications











Generic Name Dose Route Start Last Admin





  Trade Name Freq  PRN Reason Stop Dose Admin


 


Acetaminophen  325 mg  01/25/18 12:22  





  Tylenol Suppository -  NE   





  Q4H PRN   





  FEVER   


 


Acetaminophen  1,000 mg  01/27/18 02:10  





  Ofirmev Injection -  IVPB   





  Q6H PRN   





  FEVER   


 


Albuterol Sulfate  1 amp  01/25/18 12:22  01/26/18 08:07





  Ventolin 0.083% Nebulizer Soln -  NEB   1 amp





  Q4H PRN   Administration





  SHORT OF BREATH/WHEEZING   


 


Atorvastatin Calcium  10 mg  01/25/18 22:00  01/30/18 21:39





  Lipitor -  PO   10 mg





  HS [facility]   Administration


 


Calcium Carbonate  500 mg  01/30/18 10:00  01/31/18 09:33





  Calcium Carb Oral Suspension -  NGT   500 mg





  BID [facility]   Administration


 


Ferrous Sulfate  325 mg  01/25/18 22:00  01/31/18 05:37





  Feosol -  PO   325 mg





  TID [facility]   Administration


 


Heparin Sodium (Porcine)  5,000 unit  01/29/18 14:00  01/31/18 05:37





  Heparin -  SQ   5,000 unit





  TID [facility]   Administration


 


Piperacillin Sod/Tazobactam  100 mls @ 100 mls/hr  01/27/18 13:33  01/31/18 09:

32





  Sod 2.25 gm/ Dextrose  IVPB   100 mls/hr





  Q6H-IV [facility]   Administration


 


Latanoprost  1 drop  01/25/18 22:00  01/30/18 21:39





  Xalatan 0.005% Eye Drops -  OU   1 drop





  HS [facility]   Administration


 


Loratadine  10 mg  01/26/18 10:00  01/31/18 09:33





  Claritin -  PO   10 mg





  DAILY [facility]   Administration


 


Methylprednisolone Sodium Succinate  40 mg  01/25/18 15:00  01/31/18 09:32





  Solu-Medrol -  IVPUSH   40 mg





  Q6H-IV [facility]   Administration


 


Montelukast Sodium  10 mg  01/25/18 22:00  01/30/18 21:39





  Singulair -  PO   10 mg





  HS [facility]   Administration


 


Multivitamins/Minerals/Vitamin C  1 tab  01/26/18 10:00  01/31/18 09:33





  Tab-A-Vit -  PO   1 tab





  DAILY [facility]   Administration


 


Pantoprazole Sodium  40 mg  01/26/18 10:00  01/31/18 09:32





  Protonix Iv  IVPUSH   40 mg





  DAILY [facility]   Administration


 


Sevelamer Carbonate  0.8 gm  01/31/18 14:00  





  Renvela Powder Packet -  NGT   





  TID [facility]   


 


Tiotropium Bromide  1 puff  01/26/18 10:00  01/31/18 09:45





  Spiriva -  IH   Not Given





  DAILY [facility]I   











ASSESSMENT/PLAN:


The patient is a 77 year old female with a history of COPD, CHF, recent hip fx 

and replacement 12/17 who presented with acute respiratory failure and 

readmitted to the ICU s/p cardiac arrest x2 on the medicine floor.





NEURO


the pt is not waking up when off sedation, not following commands


neurology evaluation


will continue to monitor





CV


Patient is now stable off pressors, no acute events on cardiac monitor


troponins-normalized





RESP


Acute respiratory failure following a cardiac arrest


Possible aspiration pneumonia, will continue Zosyn.


CXR not changed today, rib fractures after cardiac compressions.


Continue solu-medrol 40 mg Q6h, Singulair 10 mg HS


maintain the patient on the ventilator, Fio2 40 %, PEEP 5, rate 14





GI


elevated liver enzymes, AST 93, 


will monitor, avoid toxic meds


NG tube with feeds Osmolite 1/2





Renal


Worsening bun/creatinine 112/4.4 with decreased urine output. Given 40 mg of 

laasix yesterday with 700 ml urine output.


Will give one dose of lasix 40 mg today and Renevela 0.8 mg TID 


f/u nephrology recommendations


will continue to monitor


avoid nephrotoxic substances





ID


Continue Zosyn, WBC coming down, today 18, no fever, blood cultures negative


CXR unchanged


Follow recs from ID





FEN/GI


no fluids/low Ca, corrected 7.6/tube feeds





PPX


Heparin 500 u TID


Protonix 40 IV daily for GI ppx





DISPO: Continue ICU level of care. Family notified. Her daughter is coming from 

[place] today.





Visit type





- Emergency Visit


Emergency Visit: Yes


ED Registration Date: 01/23/18


Care time: The patient presented to the Emergency Department on the above date 

and was hospitalized for further evaluation of their emergent condition.





- New Patient


This patient is new to me today: No





- Critical Care


Critical Care patient: Yes


Total Critical Care Time (in minutes): 40


Critical Care Statement: The care of this patient involved high complexity 

decision making to prevent further life threatening deterioration of the patient

's condition and/or to evaluate & treat vital organ system(s) failure or risk 

of failure.

## 2018-02-01 LAB
ALBUMIN SERPL-MCNC: 1.9 G/DL (ref 3.4–5)
ALP SERPL-CCNC: 99 U/L (ref 45–117)
ALT SERPL-CCNC: 242 U/L (ref 12–78)
ANION GAP SERPL CALC-SCNC: 14 MMOL/L (ref 8–16)
ANISOCYTOSIS BLD QL: (no result)
AST SERPL-CCNC: 64 U/L (ref 15–37)
BILIRUB SERPL-MCNC: 0.5 MG/DL (ref 0.2–1)
BUN SERPL-MCNC: 124 MG/DL (ref 7–18)
CALCIUM SERPL-MCNC: 6 MG/DL (ref 8.5–10.1)
CHLORIDE SERPL-SCNC: 110 MMOL/L (ref 98–107)
CO2 SERPL-SCNC: 21 MMOL/L (ref 21–32)
CREAT SERPL-MCNC: 4.9 MG/DL (ref 0.55–1.02)
DACRYOCYTES BLD QL SMEAR: (no result)
DEPRECATED RDW RBC AUTO: 21.4 % (ref 11.6–15.6)
GLUCOSE SERPL-MCNC: 220 MG/DL (ref 74–106)
HCT VFR BLD CALC: 28 % (ref 32.4–45.2)
HGB BLD-MCNC: 8.8 GM/DL (ref 10.7–15.3)
MCH RBC QN AUTO: 25.9 PG (ref 25.7–33.7)
MCHC RBC AUTO-ENTMCNC: 31.4 G/DL (ref 32–36)
MCV RBC: 82.3 FL (ref 80–96)
PLATELET # BLD AUTO: 151 K/MM3 (ref 134–434)
PLATELET BLD QL SMEAR: ADEQUATE
PMV BLD: 10.7 FL (ref 7.5–11.1)
POTASSIUM SERPLBLD-SCNC: 5 MMOL/L (ref 3.5–5.1)
PROT SERPL-MCNC: 4.7 G/DL (ref 6.4–8.2)
RBC # BLD AUTO: 3.41 M/MM3 (ref 3.6–5.2)
SODIUM SERPL-SCNC: 145 MMOL/L (ref 136–145)
WBC # BLD AUTO: 19.2 K/MM3 (ref 4–10)

## 2018-02-01 RX ADMIN — METHYLPREDNISOLONE SODIUM SUCCINATE SCH MG: 40 INJECTION, POWDER, FOR SOLUTION INTRAMUSCULAR; INTRAVENOUS at 16:54

## 2018-02-01 RX ADMIN — MONTELUKAST SODIUM SCH MG: 10 TABLET, COATED ORAL at 21:25

## 2018-02-01 RX ADMIN — PIPERACILLIN SODIUM AND TAZOBACTAM SODIUM SCH MLS/HR: .25; 2 INJECTION, POWDER, LYOPHILIZED, FOR SOLUTION INTRAVENOUS at 09:56

## 2018-02-01 RX ADMIN — ATORVASTATIN CALCIUM SCH MG: 10 TABLET, FILM COATED ORAL at 21:25

## 2018-02-01 RX ADMIN — METHYLPREDNISOLONE SODIUM SUCCINATE SCH MG: 40 INJECTION, POWDER, FOR SOLUTION INTRAMUSCULAR; INTRAVENOUS at 20:20

## 2018-02-01 RX ADMIN — METHYLPREDNISOLONE SODIUM SUCCINATE SCH MG: 40 INJECTION, POWDER, FOR SOLUTION INTRAMUSCULAR; INTRAVENOUS at 02:14

## 2018-02-01 RX ADMIN — SEVELAMER CARBONATE SCH GM: 800 POWDER, FOR SUSPENSION ORAL at 06:02

## 2018-02-01 RX ADMIN — SEVELAMER CARBONATE SCH GM: 800 POWDER, FOR SUSPENSION ORAL at 16:53

## 2018-02-01 RX ADMIN — CALCIUM CARBONATE SCH MG: 1250 SUSPENSION ORAL at 21:26

## 2018-02-01 RX ADMIN — HEPARIN SODIUM SCH UNIT: 5000 INJECTION, SOLUTION INTRAVENOUS; SUBCUTANEOUS at 06:01

## 2018-02-01 RX ADMIN — PIPERACILLIN SODIUM AND TAZOBACTAM SODIUM SCH MLS/HR: .25; 2 INJECTION, POWDER, LYOPHILIZED, FOR SOLUTION INTRAVENOUS at 20:19

## 2018-02-01 RX ADMIN — FERROUS SULFATE TAB EC 324 MG (65 MG FE EQUIVALENT) SCH MG: 324 (65 FE) TABLET DELAYED RESPONSE at 16:54

## 2018-02-01 RX ADMIN — FERROUS SULFATE TAB EC 324 MG (65 MG FE EQUIVALENT) SCH MG: 324 (65 FE) TABLET DELAYED RESPONSE at 06:01

## 2018-02-01 RX ADMIN — HEPARIN SODIUM SCH UNIT: 5000 INJECTION, SOLUTION INTRAVENOUS; SUBCUTANEOUS at 21:25

## 2018-02-01 RX ADMIN — PANTOPRAZOLE SODIUM SCH MG: 40 INJECTION, POWDER, FOR SOLUTION INTRAVENOUS at 09:46

## 2018-02-01 RX ADMIN — CALCIUM CARBONATE SCH MG: 1250 SUSPENSION ORAL at 09:57

## 2018-02-01 RX ADMIN — HEPARIN SODIUM SCH UNIT: 5000 INJECTION, SOLUTION INTRAVENOUS; SUBCUTANEOUS at 16:53

## 2018-02-01 RX ADMIN — SEVELAMER CARBONATE SCH GM: 800 POWDER, FOR SUSPENSION ORAL at 21:26

## 2018-02-01 RX ADMIN — FERROUS SULFATE TAB EC 324 MG (65 MG FE EQUIVALENT) SCH MG: 324 (65 FE) TABLET DELAYED RESPONSE at 21:25

## 2018-02-01 RX ADMIN — LORATADINE SCH MG: 10 TABLET ORAL at 09:46

## 2018-02-01 RX ADMIN — METHYLPREDNISOLONE SODIUM SUCCINATE SCH MG: 40 INJECTION, POWDER, FOR SOLUTION INTRAMUSCULAR; INTRAVENOUS at 09:45

## 2018-02-01 RX ADMIN — PIPERACILLIN SODIUM AND TAZOBACTAM SODIUM SCH MLS/HR: .25; 2 INJECTION, POWDER, LYOPHILIZED, FOR SOLUTION INTRAVENOUS at 02:15

## 2018-02-01 RX ADMIN — LATANOPROST SCH DROP: 50 SOLUTION OPHTHALMIC at 21:26

## 2018-02-01 RX ADMIN — TIOTROPIUM BROMIDE SCH: 18 CAPSULE ORAL; RESPIRATORY (INHALATION) at 09:47

## 2018-02-01 RX ADMIN — MULTIVITAMIN TABLET SCH TAB: TABLET at 09:46

## 2018-02-01 RX ADMIN — PIPERACILLIN SODIUM AND TAZOBACTAM SODIUM SCH MLS/HR: .25; 2 INJECTION, POWDER, LYOPHILIZED, FOR SOLUTION INTRAVENOUS at 16:53

## 2018-02-01 NOTE — PN
Progress Note, Physician


History of Present Illness: 





continues to be intubated


off of sedation


not much response


opens eyes barely





- Current Medication List


Current Medications: 


Active Medications





Acetaminophen (Tylenol Suppository -)  325 mg NM Q4H PRN


   PRN Reason: FEVER


Acetaminophen (Ofirmev Injection -)  1,000 mg IVPB Q6H PRN


   PRN Reason: FEVER


Albuterol Sulfate (Ventolin 0.083% Nebulizer Soln -)  1 amp NEB Q4H PRN


   PRN Reason: SHORT OF BREATH/WHEEZING


   Last Admin: 01/26/18 08:07 Dose:  1 amp


Atorvastatin Calcium (Lipitor -)  10 mg PO HS [facility]


   Last Admin: 01/31/18 21:32 Dose:  10 mg


Calcium Carbonate (Calcium Carb Oral Suspension -)  500 mg NGT BID [facility]


   Last Admin: 02/01/18 09:57 Dose:  500 mg


Ferrous Sulfate (Feosol -)  325 mg PO TID [facility]


   Last Admin: 02/01/18 16:54 Dose:  325 mg


Heparin Sodium (Porcine) (Heparin -)  5,000 unit SQ TID [facility]


   Last Admin: 02/01/18 16:53 Dose:  5,000 unit


Piperacillin Sod/Tazobactam (Sod 2.25 gm/ Dextrose)  100 mls @ 100 mls/hr IVPB 

Q6H-IV [facility]


   Last Admin: 02/01/18 16:53 Dose:  100 mls/hr


Latanoprost (Xalatan 0.005% Eye Drops -)  1 drop OU [facility]


   Last Admin: 01/31/18 21:32 Dose:  1 drop


Loratadine (Claritin -)  10 mg PO DAILY [facility]


   Last Admin: 02/01/18 09:46 Dose:  10 mg


Methylprednisolone Sodium Succinate (Solu-Medrol -)  40 mg IVPUSH Q6H-IV [facility]


   Last Admin: 02/01/18 16:54 Dose:  40 mg


Montelukast Sodium (Singulair -)  10 mg PO [facility]


   Last Admin: 01/31/18 21:32 Dose:  10 mg


Multivitamins/Minerals/Vitamin C (Tab-A-Vit -)  1 tab PO DAILY [facility]


   Last Admin: 02/01/18 09:46 Dose:  1 tab


Pantoprazole Sodium (Protonix Iv)  40 mg IVPUSH DAILY [facility]


   Last Admin: 02/01/18 09:46 Dose:  40 mg


Sevelamer Carbonate (Renvela Powder Packet -)  0.8 gm NGT TID [facility]


   Last Admin: 02/01/18 16:53 Dose:  0.8 gm


Tiotropium Bromide (Spiriva -)  1 puff IH DAILY [facility]


   Last Admin: 02/01/18 09:47 Dose:  Not Given











- Objective


Vital Signs: 


 Vital Signs











Temperature  98.6 F   02/01/18 14:00


 


Pulse Rate  106 H  02/01/18 16:00


 


Respiratory Rate  16   02/01/18 16:32


 


Blood Pressure  116/77   02/01/18 16:00


 


O2 Sat by Pulse Oximetry (%)  99   02/01/18 08:15











Constitutional: Yes: Other


Neck: Yes: Supple


Cardiovascular: Yes: S1, S2


Respiratory: Yes: Intubated, Mechanically Ventilated


Gastrointestinal: Yes: Normal Bowel Sounds, Soft, Other (ng tube in place)


Musculoskeletal: Yes: WNL


Extremities: Yes: WNL


Neurological: Yes: Other


Labs: 


 CBC, BMP





 02/01/18 05:10 





 02/01/18 05:10 





 INR, PTT











INR  0.95  (0.82-1.09)   01/26/18  09:50    














Assessment/Plan








Problem List





- Problems


(1) Acute and chronic respiratory failure with hypercapnia


Code(s): J96.22 - ACUTE AND CHRONIC RESPIRATORY FAILURE WITH HYPERCAPNIA   





(2) COPD exacerbation


Code(s): J44.1 - CHRONIC OBSTRUCTIVE PULMONARY DISEASE W (ACUTE) EXACERBATION   





(3) CHF (congestive heart failure)


Code(s): I50.9 - HEART FAILURE, UNSPECIFIED   


Qualifiers: 


   Congestive heart failure type: diastolic   Congestive heart failure 

chronicity: chronic   Qualified Code(s): I50.32 - Chronic diastolic (congestive

) heart failure   





(4) Fracture, hip


Code(s): S72.009A - FRACTURE OF UNSP PART OF NECK OF UNSP FEMUR, INIT   


Qualifiers: 


   Encounter type: initial encounter   Fracture type: closed   Laterality: left

   Qualified Code(s): S72.002A - Fracture of unspecified part of neck of left 

femur, initial encounter for closed fracture   





(5) Hypertension


Code(s): I10 - ESSENTIAL (PRIMARY) HYPERTENSION   





6 pneumonia





7 leukocytosis





8 uti


plan














plan


continue abx


supportive measures


nutrition


rest as per icu


patient continues to be critical


no changes 











cc time 40 min

## 2018-02-01 NOTE — PN
Progress Note (short form)





- Note


Progress Note: 





 Neurology





History of Present Illness: 


77 year old female who presented initially from the SNF with acute SOB. Upon 

seeing patient has been intubated secondary to hypercapneic respiratory failure 

per notes. Patient was recently discharged to SNF after hip replacement that 

was complicated by COPD exacerbation. Reportedly, was woken up by difficulty 

breathing and was brought here. In the ED she was noted to be tachypneic and 

had respiratory acidosis. BiPAP was attempted but patient was not improving and 

required intubation. Therafter, reportedly respiratory status improved to the 

point where she was able to have extubation. Thereafter had cardiac arrest and 

required resuscitation and revived. Respiratory failure required reintubation 

and patient in ICU in critical condition. Discussed with nurse and resident and 

examined, patient does have brainstem reflexes. She does have pupil constriction

, does have corneal reflex and gag reflex. Therefore, does not meet criteria 

for brain death. However, higher order cognition remains limited. She is not 

arousable or conversive. She does not follow commands. She remains vent 

dependent, not overbreathing vent at rate of 14. 





Active Medications





Acetaminophen (Tylenol Suppository -)  325 mg [place] Q4H PRN


   PRN Reason: FEVER


Acetaminophen (Ofirmev Injection -)  1,000 mg IVPB Q6H PRN


   PRN Reason: FEVER


Albuterol Sulfate (Ventolin 0.083% Nebulizer Soln -)  1 amp NEB Q4H PRN


   PRN Reason: SHORT OF BREATH/WHEEZING


   Last Admin: 01/26/18 08:07 Dose:  1 amp


Atorvastatin Calcium (Lipitor -)  10 mg PO HS [facility]


   Last Admin: 01/31/18 21:32 Dose:  10 mg


Calcium Carbonate (Calcium Carb Oral Suspension -)  500 mg NGT BID [facility]


   Last Admin: 02/01/18 09:57 Dose:  500 mg


Ferrous Sulfate (Feosol -)  325 mg PO TID [facility]


   Last Admin: 02/01/18 06:01 Dose:  325 mg


Heparin Sodium (Porcine) (Heparin -)  5,000 unit SQ TID [facility]


   Last Admin: 02/01/18 06:01 Dose:  5,000 unit


Piperacillin Sod/Tazobactam (Sod 2.25 gm/ Dextrose)  100 mls @ 100 mls/hr IVPB 

Q6H-IV [facility]


   Last Admin: 02/01/18 09:56 Dose:  100 mls/hr


Latanoprost (Xalatan 0.005% Eye Drops -)  1 drop OU HS [facility]


   Last Admin: 01/31/18 21:32 Dose:  1 drop


Loratadine (Claritin -)  10 mg PO DAILY [facility]


   Last Admin: 02/01/18 09:46 Dose:  10 mg


Methylprednisolone Sodium Succinate (Solu-Medrol -)  40 mg IVPUSH Q6H-IV [facility]


   Last Admin: 02/01/18 09:45 Dose:  40 mg


Montelukast Sodium (Singulair -)  10 mg PO HS [facility]


   Last Admin: 01/31/18 21:32 Dose:  10 mg


Multivitamins/Minerals/Vitamin C (Tab-A-Vit -)  1 tab PO DAILY [facility]


   Last Admin: 02/01/18 09:46 Dose:  1 tab


Pantoprazole Sodium (Protonix Iv)  40 mg IVPUSH DAILY [facility]


   Last Admin: 02/01/18 09:46 Dose:  40 mg


Sevelamer Carbonate (Renvela Powder Packet -)  0.8 gm NGT TID [facility]


   Last Admin: 02/01/18 06:02 Dose:  0.8 gm


Tiotropium Bromide (Spiriva -)  1 puff IH DAILY [facility]


   Last Admin: 02/01/18 09:47 Dose:  Not Given








Physical Examination


 Vital Signs











Temperature  98.8 F   02/01/18 09:40


 


Pulse Rate  76   02/01/18 09:40


 


Respiratory Rate  18   02/01/18 09:40


 


Blood Pressure  122/75   02/01/18 09:40


 


O2 Sat by Pulse Oximetry (%)  99   02/01/18 08:15














Constitutional: Yes: Other (sedated)


Eyes: Yes: Conjunctiva Clear, EOM Intact, PERRL


HENT: Yes: Atraumatic, Normocephalic


Cardiovascular: Yes: Tachycardia.  No: Pulse Irregular, Gallop, Murmur, Rub


Respiratory: Yes: Regular, CTA Bilaterally, Intubated, Mechanically Ventilated.

  No: Rales, Rhonchi, Wheezes


Gastrointestinal: Yes: Normal Bowel Sounds, Soft.  No: Distention, Tenderness


Extremities: Yes: WNL


Edema: No





 CBCD











WBC  19.2 K/mm3 (4.0-10.0)  H  02/01/18  05:10    


 


RBC  3.41 M/mm3 (3.60-5.2)  L  02/01/18  05:10    


 


Hgb  8.8 GM/dL (10.7-15.3)  L  02/01/18  05:10    


 


Hct  28.0 % (32.4-45.2)  L  02/01/18  05:10    


 


MCV  82.3 fl (80-96)   02/01/18  05:10    


 


MCHC  31.4 g/dl (32.0-36.0)  L  02/01/18  05:10    


 


RDW  21.4 % (11.6-15.6)  H  02/01/18  05:10    


 


Plt Count  151 K/MM3 (134-434)   02/01/18  05:10    


 


MPV  10.7 fl (7.5-11.1)   02/01/18  05:10    








 CMP











Sodium  145 mmol/L (136-145)   02/01/18  05:10    


 


Potassium  5.0 mmol/L (3.5-5.1)   02/01/18  05:10    


 


Chloride  110 mmol/L ()  H  02/01/18  05:10    


 


Carbon Dioxide  21 mmol/L (21-32)   02/01/18  05:10    


 


Anion Gap  14  (8-16)   02/01/18  05:10    


 


BUN  124 mg/dL (7-18)  H*  02/01/18  05:10    


 


Creatinine  4.9 mg/dL (0.55-1.02)  H  02/01/18  05:10    


 


Creat Clearance w eGFR  8.59  (>60)   02/01/18  05:10    


 


Calcium  6.0 mg/dL (8.5-10.1)  L*  02/01/18  05:10    


 


Total Bilirubin  0.5 mg/dL (0.2-1.0)   02/01/18  05:10    


 


AST  64 U/L (15-37)  H  02/01/18  05:10    


 


ALT  242 U/L (12-78)  H  02/01/18  05:10    


 


Alkaline Phosphatase  99 U/L ()   02/01/18  05:10    


 


Total Protein  4.7 g/dl (6.4-8.2)  L  02/01/18  05:10    


 


Albumin  1.9 g/dl (3.4-5.0)  L  02/01/18  05:10    

















Plan


77 year old female who presented initially from the SNF with acute SOB. Upon 

seeing patient has been intubated secondary to hypercapneic respiratory failure 

per notes. Patient was recently discharged to SNF after hip replacement that 

was complicated by COPD exacerbation. Reportedly, was woken up by difficulty 

breathing and was brought here. In the ED she was noted to be tachypneic and 

had respiratory acidosis. BiPAP was attempted but patient was not improving and 

required intubation. Therafter, reportedly respiratory status improved to the 

point where she was able to have extubation. Thereafter had cardiac arrest and 

required resuscitation and revived. 


Respiratory failure required reintubation and patient in ICU in critical 

condition. 


Patient does have brainstem reflexes. 


She does have pupil constriction, does have corneal reflex and gag reflex. 


Does not meet criteria for brain death. However, higher order cognition remains 

limited. 


Not on sedation for 72 hrs now but still does not follow commands and 

nonverbal. 


Remains intubated and on mechanical ventilation, not overbreathing vent this AM


At this point, further management would depend on goals of care


Likely poor prognosis given her clinical condition and comorbidities


Not likely to make meaningful recovery and be at function status she was before


Likely to need trach, peg, and vent at least for immediate future if continued 

care desired


Palliative care should be considered


If further information needed, CT head can be completed, though clinically 

assessement as above


Critical care time 40 mins

## 2018-02-01 NOTE — PN
Teaching Attending Note


Name of Resident: Isma Garcia





ATTENDING PHYSICIAN STATEMENT





I saw and evaluated the patient.


I reviewed the resident's note and discussed the case with the resident.


I agree with the resident's findings and plan as documented.








SUBJECTIVE:





Pt seen and examined in the ICU. Remains intubated, unresponsive off sedation, 

tachypneic. Off pressors.





OBJECTIVE:





 Last Vital Signs











Temp Pulse Resp BP Pulse Ox


 


 98.8 F   78   18   129/73   99 


 


 02/01/18 10:00  02/01/18 11:56  02/01/18 11:56  02/01/18 11:56  02/01/18 08:15








 Intake & Output











 01/29/18 01/30/18 01/31/18 02/01/18





 23:59 23:59 23:59 23:59


 


Intake Total 3010 1650 1565 1976


 


Output Total  600


 


Balance 2410 


 


Weight 60.2 kg 61.7 kg 63.4 kg 63.004 kg








Gen:  intubated, poorly responsive, tachypneic


Heart: RRR


Lung: bilateral rhonchi


Abd: soft, nontender


Ext: + edema





 CBC, BMP





 02/01/18 05:10 





 02/01/18 05:10 





Active Medications





Acetaminophen (Tylenol Suppository -)  325 mg [place] Q4H PRN


   PRN Reason: FEVER


Acetaminophen (Ofirmev Injection -)  1,000 mg IVPB Q6H PRN


   PRN Reason: FEVER


Albuterol Sulfate (Ventolin 0.083% Nebulizer Soln -)  1 amp NEB Q4H PRN


   PRN Reason: SHORT OF BREATH/WHEEZING


   Last Admin: 01/26/18 08:07 Dose:  1 amp


Atorvastatin Calcium (Lipitor -)  10 mg PO HS [facility]


   Last Admin: 01/31/18 21:32 Dose:  10 mg


Calcium Carbonate (Calcium Carb Oral Suspension -)  500 mg NGT BID [facility]


   Last Admin: 02/01/18 09:57 Dose:  500 mg


Ferrous Sulfate (Feosol -)  325 mg PO TID [facility]


   Last Admin: 02/01/18 06:01 Dose:  325 mg


Heparin Sodium (Porcine) (Heparin -)  5,000 unit SQ TID [facility]


   Last Admin: 02/01/18 06:01 Dose:  5,000 unit


Piperacillin Sod/Tazobactam (Sod 2.25 gm/ Dextrose)  100 mls @ 100 mls/hr IVPB 

Q6H-IV [facility]


   Last Admin: 02/01/18 09:56 Dose:  100 mls/hr


Latanoprost (Xalatan 0.005% Eye Drops -)  1 drop OU HS [facility]


   Last Admin: 01/31/18 21:32 Dose:  1 drop


Loratadine (Claritin -)  10 mg PO DAILY [facility]


   Last Admin: 02/01/18 09:46 Dose:  10 mg


Methylprednisolone Sodium Succinate (Solu-Medrol -)  40 mg IVPUSH Q6H-IV [facility]


   Last Admin: 02/01/18 09:45 Dose:  40 mg


Montelukast Sodium (Singulair -)  10 mg PO HS [facility]


   Last Admin: 01/31/18 21:32 Dose:  10 mg


Multivitamins/Minerals/Vitamin C (Tab-A-Vit -)  1 tab PO DAILY [facility]


   Last Admin: 02/01/18 09:46 Dose:  1 tab


Pantoprazole Sodium (Protonix Iv)  40 mg IVPUSH DAILY [facility]


   Last Admin: 02/01/18 09:46 Dose:  40 mg


Sevelamer Carbonate (Renvela Powder Packet -)  0.8 gm NGT TID [facility]


   Last Admin: 02/01/18 06:02 Dose:  0.8 gm


Tiotropium Bromide (Spiriva -)  1 puff IH DAILY [facility]


   Last Admin: 02/01/18 09:47 Dose:  Not Given








ASSESSMENT AND PLAN:


Acute Hypoxic and Hypercapneic Respiratory Failure


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


Pneumonia


UTI


Septic Shock


Acute Kidney Injury


Elevated LFTs likely Ischemic Injury


+Troponins likely Demand Ischemia


Acute COPD Exacerbation


LV Diastolic Dysfunction


Pulmonary HTN





-  continue antibiotics


-  f/u cultures


-  continue medrol at current dose


-  inhaled bronchodilators standing and PRN


-  keep CVP 8-12


-  lasix as needed 


-  monitor urine output, creatinine


-  off pressors, maintain MAP >65


-  hold sedation to assess mental status


-  spontaneous breathing trials as tolerated when mental status improved


-  enteral feeds 


-  DVT/GI prophylaxis


-  continue ICU monitoring


-  poor prognosis for meaningful recovery, continue discussions regarding goals 

of care, recommend comfort measures








critical care time spent in reviewing chart, evaluating patient and formulating 

plan 35 min

## 2018-02-01 NOTE — PN
Progress Note, Physician


Chief Complaint: 


Unable to obtain, patient obtunded





- Current Medication List


Current Medications: 


Active Medications





Acetaminophen (Tylenol Suppository -)  325 mg [place] Q4H PRN


   PRN Reason: FEVER


Acetaminophen (Ofirmev Injection -)  1,000 mg IVPB Q6H PRN


   PRN Reason: FEVER


Albuterol Sulfate (Ventolin 0.083% Nebulizer Soln -)  1 amp NEB Q4H PRN


   PRN Reason: SHORT OF BREATH/WHEEZING


   Last Admin: 01/26/18 08:07 Dose:  1 amp


Atorvastatin Calcium (Lipitor -)  10 mg PO HS [facility]


   Last Admin: 01/31/18 21:32 Dose:  10 mg


Calcium Carbonate (Calcium Carb Oral Suspension -)  500 mg NGT BID [facility]


   Last Admin: 02/01/18 09:57 Dose:  500 mg


Ferrous Sulfate (Feosol -)  325 mg PO TID [facility]


   Last Admin: 02/01/18 06:01 Dose:  325 mg


Furosemide (Lasix Injection -)  80 mg IVPB ONCE ONE


   Stop: 02/01/18 10:59


Heparin Sodium (Porcine) (Heparin -)  5,000 unit SQ TID [facility]


   Last Admin: 02/01/18 06:01 Dose:  5,000 unit


Piperacillin Sod/Tazobactam (Sod 2.25 gm/ Dextrose)  100 mls @ 100 mls/hr IVPB 

Q6H-IV [facility]


   Last Admin: 02/01/18 09:56 Dose:  100 mls/hr


Latanoprost (Xalatan 0.005% Eye Drops -)  1 drop OU [facility]


   Last Admin: 01/31/18 21:32 Dose:  1 drop


Loratadine (Claritin -)  10 mg PO DAILY [facility]


   Last Admin: 02/01/18 09:46 Dose:  10 mg


Methylprednisolone Sodium Succinate (Solu-Medrol -)  40 mg IVPUSH Q6H-IV [facility]


   Last Admin: 02/01/18 09:45 Dose:  40 mg


Montelukast Sodium (Singulair -)  10 mg PO [facility]


   Last Admin: 01/31/18 21:32 Dose:  10 mg


Multivitamins/Minerals/Vitamin C (Tab-A-Vit -)  1 tab PO DAILY [facility]


   Last Admin: 02/01/18 09:46 Dose:  1 tab


Pantoprazole Sodium (Protonix Iv)  40 mg IVPUSH DAILY [facility]


   Last Admin: 02/01/18 09:46 Dose:  40 mg


Sevelamer Carbonate (Renvela Powder Packet -)  0.8 gm NGT TID [facility]


   Last Admin: 02/01/18 06:02 Dose:  0.8 gm


Tiotropium Bromide (Spiriva -)  1 puff IH DAILY [facility]


   Last Admin: 02/01/18 09:47 Dose:  Not Given











- Objective


Vital Signs: 


 Vital Signs











Temperature  37.1 C   02/01/18 09:40


 


Pulse Rate  76   02/01/18 09:40


 


Respiratory Rate  14   02/01/18 10:56


 


Blood Pressure  122/75   02/01/18 09:40


 


O2 Sat by Pulse Oximetry (%)  99   02/01/18 08:15











Constitutional: Yes: Other (obtunded)


Cardiovascular: Yes: Regular Rate and Rhythm.  No: Gallop, Murmur, Rub


Respiratory: Yes: CTA Bilaterally, Intubated, Mechanically Ventilated, 

Tachypnea.  No: Regular, Rales, Rhonchi, Wheezes


Gastrointestinal: Yes: Normal Bowel Sounds, Soft.  No: Distention, Tenderness


Extremities: Yes: WNL


Edema: Yes


Edema: LUE: 1+, RUE: 1+, LLE: 1+, RLE: 1+


Labs: 


 CBC, BMP





 02/01/18 05:10 





 02/01/18 05:10 





 INR, PTT











INR  0.95  (0.82-1.09)   01/26/18  09:50    














Problem List





- Problems


(1) Asystole


Code(s): I46.9 - CARDIAC ARREST, CAUSE UNSPECIFIED   





(2) Lactic acid acidosis


Code(s): E87.2 - ACIDOSIS   





(3) Hemodynamic instability


Code(s): R09.89 - OTH SYMPTOMS AND SIGNS INVOLVING THE CIRC AND RESP SYSTEMS   





(4) Shock liver


Code(s): K72.00 - ACUTE AND SUBACUTE HEPATIC FAILURE WITHOUT COMA   





(5) RSV (respiratory syncytial virus pneumonia)


Code(s): J12.1 - RESPIRATORY SYNCYTIAL VIRUS PNEUMONIA   





(6) Acute and chronic respiratory failure with hypercapnia


Code(s): J96.22 - ACUTE AND CHRONIC RESPIRATORY FAILURE WITH HYPERCAPNIA   





(7) COPD exacerbation


Code(s): J44.1 - CHRONIC OBSTRUCTIVE PULMONARY DISEASE W (ACUTE) EXACERBATION   





(8) CHF (congestive heart failure)


Code(s): I50.9 - HEART FAILURE, UNSPECIFIED   


Qualifiers: 


   Congestive heart failure type: diastolic   Congestive heart failure 

chronicity: chronic   Qualified Code(s): I50.32 - Chronic diastolic (congestive

) heart failure   





(9) Fracture, hip


Code(s): S72.009A - FRACTURE OF UNSP PART OF NECK OF UNSP FEMUR, INIT   


Qualifiers: 


   Encounter type: initial encounter   Fracture type: closed   Laterality: left

   Qualified Code(s): S72.002A - Fracture of unspecified part of neck of left 

femur, initial encounter for closed fracture   





(10) Hypertension


Code(s): I10 - ESSENTIAL (PRIMARY) HYPERTENSION   





Assessment/Plan





(1) Asystolic cardiac arrest


-heart now stable


-however overall with poor prognosis


-continues to be obtunded


-minimal brain stem activity


-very poor prognosis and expect minimal neurologic recovery





(2) Acute and chronic hypoxic respiratory failure


Assessment/Plan: 


-recurrent and cause of asystole


-intubated and mechanically ventilated


-pulmonary following and case discussed


-d/w family goals of care, currently leaning towards palliative care in 


Code(s): J96.22 - ACUTE AND CHRONIC RESPIRATORY FAILURE WITH HYPERCAPNIA   





(3) COPD exacerbation


Assessment/Plan: 


-continue steroids


Code(s): J44.1 - CHRONIC OBSTRUCTIVE PULMONARY DISEASE W (ACUTE) EXACERBATION   





(3) CHF (congestive heart failure)


Assessment/Plan: 


-currently hypotensive requiring pressors


-off pressors


-case d/w nephrology, continue diuresis currently


Code(s): I50.9 - HEART FAILURE, UNSPECIFIED   


Qualifiers: 


   Congestive heart failure type: diastolic   Congestive heart failure 

chronicity: chronic   Qualified Code(s): I50.32 - Chronic diastolic (congestive

) heart failure   





(4) Fracture, hip


Assessment/Plan: 


-reason for being at [facility]


Code(s): S72.009A - FRACTURE OF UNSP PART OF NECK OF UNSP FEMUR, INIT   


Qualifiers: 


   Encounter type: initial encounter   Fracture type: closed   Laterality: left

   Qualified Code(s): S72.002A - Fracture of unspecified part of neck of left 

femur, initial encounter for closed fracture   





(5) Hypertension


Assessment/Plan: 


-off pressors


Code(s): I10 - ESSENTIAL (PRIMARY) HYPERTENSION   





(6) Hypocalcemia


-replaced as needed





(7) Shock Liver


-improving





(8) Hemodynamic instability 


-improved, off pressors





(9) Lactic acidosis


-resolved





(10) MO


-continues to worsen but possibly plateauing


-continue monitoring at this time





(11) RSV pneumonia


-continue zosyn per ID





40 minutes spent in critical care time including family discussion





Family discussion: explained clinical course and poor prognosis with family. 

Stated understanding and will discuss amongst themselves but appears to be 

leaning towards palliative extubation and comfort measures.

## 2018-02-01 NOTE — PN
Progress Note, Physician


History of Present Illness: 


patient seen and examined at bedside


intubated and sedated


Good urine output with lasix but remains net positive  





- Current Medication List


Current Medications: 


Active Medications





Acetaminophen (Tylenol Suppository -)  325 mg DE Q4H PRN


   PRN Reason: FEVER


Acetaminophen (Ofirmev Injection -)  1,000 mg IVPB Q6H PRN


   PRN Reason: FEVER


Albuterol Sulfate (Ventolin 0.083% Nebulizer Soln -)  1 amp NEB Q4H PRN


   PRN Reason: SHORT OF BREATH/WHEEZING


   Last Admin: 01/26/18 08:07 Dose:  1 amp


Atorvastatin Calcium (Lipitor -)  10 mg PO [facility]


   Last Admin: 01/31/18 21:32 Dose:  10 mg


Calcium Carbonate (Calcium Carb Oral Suspension -)  500 mg NGT BID [facility]


   Last Admin: 02/01/18 09:57 Dose:  500 mg


Ferrous Sulfate (Feosol -)  325 mg PO TID [facility]


   Last Admin: 02/01/18 06:01 Dose:  325 mg


Furosemide (Lasix Injection -)  80 mg IVPB ONCE ONE


   Stop: 02/01/18 10:59


Heparin Sodium (Porcine) (Heparin -)  5,000 unit SQ TID [facility]


   Last Admin: 02/01/18 06:01 Dose:  5,000 unit


Piperacillin Sod/Tazobactam (Sod 2.25 gm/ Dextrose)  100 mls @ 100 mls/hr IVPB 

Q6H-IV [facility]


   Last Admin: 02/01/18 09:56 Dose:  100 mls/hr


Latanoprost (Xalatan 0.005% Eye Drops -)  1 drop OU [facility]


   Last Admin: 01/31/18 21:32 Dose:  1 drop


Loratadine (Claritin -)  10 mg PO DAILY [facility]


   Last Admin: 02/01/18 09:46 Dose:  10 mg


Methylprednisolone Sodium Succinate (Solu-Medrol -)  40 mg IVPUSH Q6H-IV [facility]


   Last Admin: 02/01/18 09:45 Dose:  40 mg


Montelukast Sodium (Singulair -)  10 mg PO [facility]


   Last Admin: 01/31/18 21:32 Dose:  10 mg


Multivitamins/Minerals/Vitamin C (Tab-A-Vit -)  1 tab PO DAILY [facility]


   Last Admin: 02/01/18 09:46 Dose:  1 tab


Pantoprazole Sodium (Protonix Iv)  40 mg IVPUSH DAILY [facility]


   Last Admin: 02/01/18 09:46 Dose:  40 mg


Sevelamer Carbonate (Renvela Powder Packet -)  0.8 gm NGT TID [facility]


   Last Admin: 02/01/18 06:02 Dose:  0.8 gm


Tiotropium Bromide (Spiriva -)  1 puff IH DAILY [facility]


   Last Admin: 02/01/18 09:47 Dose:  Not Given











- Objective


Vital Signs: 


 Vital Signs











Temperature  98.8 F   02/01/18 09:40


 


Pulse Rate  76   02/01/18 09:40


 


Respiratory Rate  14   02/01/18 10:56


 


Blood Pressure  122/75   02/01/18 09:40


 


O2 Sat by Pulse Oximetry (%)  99   02/01/18 08:15








Constitutional: Yes: No Distress


HENT: Yes: Atraumatic


Neck: Yes: Supple, Other (Right IJ central line in place)


Cardiovascular: Yes: Regular Rate and Rhythm


Respiratory: Yes: Rales (bilaterally at bases)


Gastrointestinal: Yes: Soft


Renal/: Yes: Rivera Present (with clear yellow urine output)


Edema: Yes


Edema: LUE: 2+, RUE: 2+, LLE: 2+, RLE: 2+ Sacral edema present as well 


Neurological: Yes: Other (intubated and sedated)


Labs: 


 CBC, BMP





 02/01/18 05:10 





 02/01/18 05:10 





 INR, PTT











INR  0.95  (0.82-1.09)   01/26/18  09:50    














Assessment/Plan





77F PMH opf COPD s/p cardiac arrest likely secondary to acute hypoxic 

hypercarbic respiratory failure s/p cardiopulmonary resuscitation now with MO 

with Cr of 3.3.





MO-Likely a secondary to decreased perfusion during cardiac arrest x2 causing 

ATN. Patient also exposed to IV contrast on 1/23/2018


patient with better urine output with lasix-currently nonoligouric


will give an increased dose of lasix today at 80mg to try to make patient net 

negative 


monitor BP to avoid intravascular hypotension 


continue to trend BUN/Cr to see peak-increase in BUN/Cr is increasing but 

starting to level off


trend BMP Mg Phos 


avoid nephrotoxic drugs


renally dose all medications for CrCl <15 


no dialysis indicated at this time 


trend CXR 





COPD Exacerbation


continue vent support


wean as tolerated


Per critical care team 





Shock liver


likely secondary to hypoperfusion ing cardiac arrestx2


continue to improve perfusion to liver 


keep MAP >65


trend LFTs-improved 





s/p Cardiac Arrest


Per ICU team





Anemia


continue to trend cbc


transfuse PRN 





CHF:


Lasix 80mg IV push 





RSV PNA


Hip fracture


Lactic acidosis


leukocytosis 





Thank you for this consultative opportunity 


Will continue to follow





Case discussed with attending Dr. Savage 





Family meeting today for goals of care

## 2018-02-01 NOTE — PN
Physical Exam: 


SUBJECTIVE: Patient seen and examined


The patient is a 77 year old female with a history of COPD, CHF, recent hip Fx 

and replacement 12/17 c/b COPD exacerbation who presented with acute 

respiratory failure readmitted to the ICU following a cardiac arrest event.  

Patient continues to be unresponsive on exam.  Plan to have family meeting 

today to discuss goals of care.  Otherwise no acute events overnight.





OBJECTIVE:





 Vital Signs











 Period  Temp  Pulse  Resp  BP Sys/Gonzalez  Pulse Ox


 


 Last 24 Hr  98.2 F-99.4 F    14-26  109-148/62-80  











GENERAL: The patient is intubated, in no acute distress.


HEAD: Normal with no signs of trauma.


EYES: sclera anicteric, conjunctiva clear. No ptosis. 


ENT: oropharynx clear without exudates, moist mucous membranes.


NECK: Trachea midline, full range of motion, supple. 


LUNGS: Breath sounds equal, Course crackles auscultated on the right, no wheezes

, no accessory muscle use. 


HEART: Regular rate and rhythm, S1, S2 without murmur, rub or gallop.


ABDOMEN: Soft, nontender, nondistended, normoactive bowel sounds, no guarding, 

no rebound, no hepatosplenomegaly, no masses.


EXTREMITIES: 2+ pulses, warm, well-perfused, 2+ edema noted.


NEUROLOGICAL: Unresponsive. gait not observed.


PSYCH: Normal mood, normal affect.


SKIN: Warm, dry, normal turgor, no rashes or lesions noted











 Laboratory Results - last 24 hr











  01/31/18 02/01/18 02/01/18





  18:00 05:10 05:10


 


WBC   19.2 H 


 


RBC   3.41 L 


 


Hgb   8.8 L 


 


Hct   28.0 L 


 


MCV   82.3 


 


MCH   25.9 


 


MCHC   31.4 L 


 


RDW   21.4 H 


 


Plt Count   151 


 


MPV   10.7 


 


Neutrophils %   No Result Required. 


 


Lymphocytes %   No Result Required. 


 


Sodium  145   145


 


Potassium  5.0   5.0


 


Chloride  110 H   110 H


 


Carbon Dioxide  20 L   21


 


Anion Gap  15   14


 


BUN  116 H*   124 H*


 


Creatinine  4.7 H   4.9 H


 


Creat Clearance w eGFR    8.59


 


Random Glucose  204 H   220 H


 


Calcium  6.1 L*   6.0 L*


 


Total Bilirubin    0.5


 


AST    64 H


 


ALT    242 H


 


Alkaline Phosphatase    99


 


Total Protein    4.7 L


 


Albumin    1.9 L








Active Medications











Generic Name Dose Route Start Last Admin





  Trade Name Freq  PRN Reason Stop Dose Admin


 


Acetaminophen  325 mg  01/25/18 12:22  





  Tylenol Suppository -  [place]   





  Q4H PRN   





  FEVER   


 


Acetaminophen  1,000 mg  01/27/18 02:10  





  Ofirmev Injection -  IVPB   





  Q6H PRN   





  FEVER   


 


Albuterol Sulfate  1 amp  01/25/18 12:22  01/26/18 08:07





  Ventolin 0.083% Nebulizer Soln -  NEB   1 amp





  Q4H PRN   Administration





  SHORT OF BREATH/WHEEZING   


 


Atorvastatin Calcium  10 mg  01/25/18 22:00  01/31/18 21:32





  Lipitor -  PO   10 mg





  HS ELI   Administration


 


Calcium Carbonate  500 mg  01/30/18 10:00  01/31/18 21:33





  Calcium Carb Oral Suspension -  NGT   500 mg





  BID ELI   Administration


 


Ferrous Sulfate  325 mg  01/25/18 22:00  02/01/18 06:01





  Feosol -  PO   325 mg





  TID [facility]   Administration


 


Heparin Sodium (Porcine)  5,000 unit  01/29/18 14:00  02/01/18 06:01





  Heparin -  SQ   5,000 unit





  TID [facility]   Administration


 


Piperacillin Sod/Tazobactam  100 mls @ 100 mls/hr  01/27/18 13:33  02/01/18 02:

15





  Sod 2.25 gm/ Dextrose  IVPB   100 mls/hr





  Q6H-IV [facility]   Administration


 


Latanoprost  1 drop  01/25/18 22:00  01/31/18 21:32





  Xalatan 0.005% Eye Drops -  OU   1 drop





  HS ELI   Administration


 


Loratadine  10 mg  01/26/18 10:00  01/31/18 09:33





  Claritin -  PO   10 mg





  DAILY ELI   Administration


 


Methylprednisolone Sodium Succinate  40 mg  01/25/18 15:00  02/01/18 02:14





  Solu-Medrol -  IVPUSH   40 mg





  Q6H-IV [facility]   Administration


 


Montelukast Sodium  10 mg  01/25/18 22:00  01/31/18 21:32





  Singulair -  PO   10 mg





  HS ELI   Administration


 


Multivitamins/Minerals/Vitamin C  1 tab  01/26/18 10:00  01/31/18 09:33





  Tab-A-Vit -  PO   1 tab





  DAILY ELI   Administration


 


Pantoprazole Sodium  40 mg  01/26/18 10:00  01/31/18 09:32





  Protonix Iv  IVPUSH   40 mg





  DAILY ELI   Administration


 


Sevelamer Carbonate  0.8 gm  01/31/18 14:00  02/01/18 06:02





  Renvela Powder Packet -  NGT   0.8 gm





  TID [facility]   Administration


 


Tiotropium Bromide  1 puff  01/26/18 10:00  01/31/18 09:45





  Spiriva -  IH   Not Given





  DAILY ELI   











ASSESSMENT/PLAN:


The patient is a 77 year old female with a history of COPD, CHF, recent hip Fx 

and replacement 12/17 c/b COPD exacerbation who presented with acute 

respiratory failure readmitted to the ICU following a cardiac arrest event.





NEURO


 Patient remains minimally responsive off sedation.


-Patient has continued to remain un-responsive off sedation for 72 hours.


-Will continue to monitor.





CV


#Hypotension (Improved)


-Patient now stable off pressors.


-Will continue to monitor.





#Elevated troponin (Improved)


-Now downtrending.





RESP


#Acute respiratory failure


Patient now re-intubated following a cardiac arrest earlier today.  Chest plain 

film demonstrates right sided rib fractures likely due to chest compressions.  

It is likely the patient aspirated leading to a respiratory arrest that led to 

the cardiac arrest.


-Will cover the patient with zosyn.


-Continue solu-medrol.


-Will maintain the patient on the ventilator.


-Will continue to monitor.





GI


-NG tube with feeds.





Renal


#MO


-Worsening bun/creatinine with some urine output after lasix.


-Will continue to monitor bun/creatinine.





ID


#Pneumonia vs. Influenza vs. RSV


-Continue zosyn.


-Re-culture if patient spikes a temp.


-Follow recs from ID





MSK


No issues currently





FEN/GI


-Replete electrolytes PRN, will monitor


-Discontinue IV fluids.


-Tube feeds.





PPX


-Lovenox 40 sq daily


-Protonix 40 IV daily for GI ppx





DISPO: Continue ICU level of care.





Visit type





- Emergency Visit


Emergency Visit: No





- New Patient


This patient is new to me today: No





- Critical Care


Critical Care patient: Yes


Total Critical Care Time (in minutes): 35


Critical Care Statement: The care of this patient involved high complexity 

decision making to prevent further life threatening deterioration of the patient

's condition and/or to evaluate & treat vital organ system(s) failure or risk 

of failure.

## 2018-02-01 NOTE — PN
Progress Note (short form)





- Note


Progress Note: 





Renal follow up for MO





Pt seen and examined in the ICU


on vent 


unresponsive


no overnight events


good urine output


 


 Vital Signs











Temperature  98.8 F   02/01/18 10:00


 


Pulse Rate  78   02/01/18 12:00


 


Respiratory Rate  16   02/01/18 13:50


 


Blood Pressure  129/73   02/01/18 12:00


 


O2 Sat by Pulse Oximetry (%)  99   02/01/18 08:15








 Intake & Output











 01/29/18 01/30/18 01/31/18 02/01/18





 23:59 23:59 23:59 23:59


 


Intake Total 3010 1650 1565 1976


 


Output Total  600


 


Balance 2410 


 


Weight 60.2 kg 61.7 kg 63.4 kg 63.004 kg











NAD


on vent


Dec BS lung bases


RRR


soft NT/ND


antunez in place


trace LE edema, + sacral edema 


 


 CBC, BMP





 02/01/18 05:10 





 02/01/18 05:10 





 Current Medications





Acetaminophen (Tylenol Suppository -)  325 mg [place] Q4H PRN


   PRN Reason: FEVER


Acetaminophen (Ofirmev Injection -)  1,000 mg IVPB Q6H PRN


   PRN Reason: FEVER


Albuterol Sulfate (Ventolin 0.083% Nebulizer Soln -)  1 amp NEB Q4H PRN


   PRN Reason: SHORT OF BREATH/WHEEZING


   Last Admin: 01/26/18 08:07 Dose:  1 amp


Atorvastatin Calcium (Lipitor -)  10 mg PO HS [facility]


   Last Admin: 01/31/18 21:32 Dose:  10 mg


Calcium Carbonate (Calcium Carb Oral Suspension -)  500 mg NGT BID [facility]


   Last Admin: 02/01/18 09:57 Dose:  500 mg


Ferrous Sulfate (Feosol -)  325 mg PO TID [facility]


   Last Admin: 02/01/18 06:01 Dose:  325 mg


Heparin Sodium (Porcine) (Heparin -)  5,000 unit SQ TID [facility]


   Last Admin: 02/01/18 06:01 Dose:  5,000 unit


Piperacillin Sod/Tazobactam (Sod 2.25 gm/ Dextrose)  100 mls @ 100 mls/hr IVPB 

Q6H-IV [facility]


   Last Admin: 02/01/18 09:56 Dose:  100 mls/hr


Latanoprost (Xalatan 0.005% Eye Drops -)  1 drop OU [facility]


   Last Admin: 01/31/18 21:32 Dose:  1 drop


Loratadine (Claritin -)  10 mg PO DAILY [facility]


   Last Admin: 02/01/18 09:46 Dose:  10 mg


Methylprednisolone Sodium Succinate (Solu-Medrol -)  40 mg IVPUSH Q6H-IV [facility]


   Last Admin: 02/01/18 09:45 Dose:  40 mg


Montelukast Sodium (Singulair -)  10 mg PO HS [facility]


   Last Admin: 01/31/18 21:32 Dose:  10 mg


Multivitamins/Minerals/Vitamin C (Tab-A-Vit -)  1 tab PO DAILY [facility]


   Last Admin: 02/01/18 09:46 Dose:  1 tab


Pantoprazole Sodium (Protonix Iv)  40 mg IVPUSH DAILY [facility]


   Last Admin: 02/01/18 09:46 Dose:  40 mg


Sevelamer Carbonate (Renvela Powder Packet -)  0.8 gm NGT TID [facility]


   Last Admin: 02/01/18 06:02 Dose:  0.8 gm


Tiotropium Bromide (Spiriva -)  1 puff IH DAILY [facility]


   Last Admin: 02/01/18 09:47 Dose:  Not Given














77 year old woman with PMhx of COPD who presented with hypercarbic respiratory 

failure requiring intubation complicated by cardiac arrest now with MO with Cr 

of 3.3. Pt with 2 arrests on 1/26.





#Acute Renal Failure s/p cardiac arrest


Urine studies and clinical course are consistent with ATN from renal 

hypoprofusion 


will continue with IV Lasix to maintain good urine output and attempt to 

achieve negative balance with pt with total body volume overload


no acute indication for RRT


tube feeds to be changed to low K


keep MAP > 65


trend bun/cr and electrolytes 


continue renvela Q8h





#Cardiac Arrest/COPD/Resp Failure


continue ICU care





Prognosis is guarded








Andre Savage DO

## 2018-02-02 VITALS — TEMPERATURE: 98.6 F | DIASTOLIC BLOOD PRESSURE: 72 MMHG | SYSTOLIC BLOOD PRESSURE: 130 MMHG

## 2018-02-02 LAB
ALBUMIN SERPL-MCNC: 1.9 G/DL (ref 3.4–5)
ALP SERPL-CCNC: 90 U/L (ref 45–117)
ALT SERPL-CCNC: 187 U/L (ref 12–78)
ANION GAP SERPL CALC-SCNC: 14 MMOL/L (ref 8–16)
AST SERPL-CCNC: 60 U/L (ref 15–37)
BILIRUB SERPL-MCNC: 0.4 MG/DL (ref 0.2–1)
BUN SERPL-MCNC: 130 MG/DL (ref 7–18)
CALCIUM SERPL-MCNC: 6.2 MG/DL (ref 8.5–10.1)
CHLORIDE SERPL-SCNC: 107 MMOL/L (ref 98–107)
CO2 SERPL-SCNC: 22 MMOL/L (ref 21–32)
CREAT SERPL-MCNC: 5.2 MG/DL (ref 0.55–1.02)
DEPRECATED RDW RBC AUTO: 21.1 % (ref 11.6–15.6)
GLUCOSE SERPL-MCNC: 205 MG/DL (ref 74–106)
HCT VFR BLD CALC: 28.9 % (ref 32.4–45.2)
HGB BLD-MCNC: 8.9 GM/DL (ref 10.7–15.3)
MAGNESIUM SERPL-MCNC: 2.8 MG/DL (ref 1.8–2.4)
MCH RBC QN AUTO: 25.5 PG (ref 25.7–33.7)
MCHC RBC AUTO-ENTMCNC: 30.9 G/DL (ref 32–36)
MCV RBC: 82.4 FL (ref 80–96)
PHOSPHATE SERPL-MCNC: 7.3 MG/DL (ref 2.5–4.9)
PLATELET # BLD AUTO: 188 K/MM3 (ref 134–434)
PLATELET BLD QL SMEAR: ADEQUATE
PMV BLD: 10.7 FL (ref 7.5–11.1)
POTASSIUM SERPLBLD-SCNC: 5.1 MMOL/L (ref 3.5–5.1)
PROT SERPL-MCNC: 4.8 G/DL (ref 6.4–8.2)
RBC # BLD AUTO: 3.51 M/MM3 (ref 3.6–5.2)
SODIUM SERPL-SCNC: 143 MMOL/L (ref 136–145)
WBC # BLD AUTO: 25.7 K/MM3 (ref 4–10)

## 2018-02-02 RX ADMIN — METHYLPREDNISOLONE SODIUM SUCCINATE SCH: 40 INJECTION, POWDER, FOR SOLUTION INTRAMUSCULAR; INTRAVENOUS at 21:20

## 2018-02-02 RX ADMIN — SEVELAMER CARBONATE SCH GM: 800 POWDER, FOR SUSPENSION ORAL at 13:24

## 2018-02-02 RX ADMIN — SEVELAMER CARBONATE SCH GM: 800 POWDER, FOR SUSPENSION ORAL at 05:08

## 2018-02-02 RX ADMIN — HEPARIN SODIUM SCH UNIT: 5000 INJECTION, SOLUTION INTRAVENOUS; SUBCUTANEOUS at 05:08

## 2018-02-02 RX ADMIN — PIPERACILLIN SODIUM AND TAZOBACTAM SODIUM SCH MLS/HR: .25; 2 INJECTION, POWDER, LYOPHILIZED, FOR SOLUTION INTRAVENOUS at 15:30

## 2018-02-02 RX ADMIN — MULTIVITAMIN TABLET SCH TAB: TABLET at 09:35

## 2018-02-02 RX ADMIN — FERROUS SULFATE TAB EC 324 MG (65 MG FE EQUIVALENT) SCH MG: 324 (65 FE) TABLET DELAYED RESPONSE at 13:23

## 2018-02-02 RX ADMIN — METHYLPREDNISOLONE SODIUM SUCCINATE SCH MG: 40 INJECTION, POWDER, FOR SOLUTION INTRAMUSCULAR; INTRAVENOUS at 09:33

## 2018-02-02 RX ADMIN — PANTOPRAZOLE SODIUM SCH MG: 40 INJECTION, POWDER, FOR SOLUTION INTRAVENOUS at 09:36

## 2018-02-02 RX ADMIN — FERROUS SULFATE TAB EC 324 MG (65 MG FE EQUIVALENT) SCH MG: 324 (65 FE) TABLET DELAYED RESPONSE at 05:08

## 2018-02-02 RX ADMIN — LORATADINE SCH MG: 10 TABLET ORAL at 09:34

## 2018-02-02 RX ADMIN — CALCIUM CARBONATE SCH MG: 1250 SUSPENSION ORAL at 09:33

## 2018-02-02 RX ADMIN — METHYLPREDNISOLONE SODIUM SUCCINATE SCH MG: 40 INJECTION, POWDER, FOR SOLUTION INTRAMUSCULAR; INTRAVENOUS at 02:10

## 2018-02-02 RX ADMIN — TIOTROPIUM BROMIDE SCH: 18 CAPSULE ORAL; RESPIRATORY (INHALATION) at 09:37

## 2018-02-02 RX ADMIN — PIPERACILLIN SODIUM AND TAZOBACTAM SODIUM SCH MLS/HR: .25; 2 INJECTION, POWDER, LYOPHILIZED, FOR SOLUTION INTRAVENOUS at 02:10

## 2018-02-02 RX ADMIN — METHYLPREDNISOLONE SODIUM SUCCINATE SCH MG: 40 INJECTION, POWDER, FOR SOLUTION INTRAMUSCULAR; INTRAVENOUS at 14:11

## 2018-02-02 RX ADMIN — PIPERACILLIN SODIUM AND TAZOBACTAM SODIUM SCH MLS/HR: .25; 2 INJECTION, POWDER, LYOPHILIZED, FOR SOLUTION INTRAVENOUS at 09:47

## 2018-02-02 RX ADMIN — HEPARIN SODIUM SCH UNIT: 5000 INJECTION, SOLUTION INTRAVENOUS; SUBCUTANEOUS at 13:24

## 2018-02-02 NOTE — PN
Physical Exam: 


SUBJECTIVE: Patient seen and examined


The patient is a 77 year old female with a history of COPD, CHF, recent hip Fx 

and replacement 12/17 c/b COPD exacerbation who presented with acute 

respiratory failure readmitted to the ICU following a cardiac arrest event.  

Patient remains unresponsive on exam.  Family meeting held yesterday with Dr. Tucker.  Decision has not been made yet on the goals of care for the patient.  

Another family meeting is scheduled today to continue discussion of goals of 

care.  Otherwise, no acute events overnight.





OBJECTIVE:





 Vital Signs











 Period  Temp  Pulse  Resp  BP Sys/Gonzalez  Pulse Ox


 


 Last 24 Hr  98.4 F-98.9 F    14-26  115-140/68-95  











GENERAL: The patient is intubated, in no acute distress.


HEAD: Normal with no signs of trauma.


EYES: sclera anicteric, conjunctiva clear. No ptosis. 


ENT: oropharynx clear without exudates, moist mucous membranes.


NECK: Trachea midline, full range of motion, supple. 


LUNGS: Breath sounds equal, Course crackles auscultated on the right, no wheezes

, no accessory muscle use. 


HEART: Regular rate and rhythm, S1, S2 without murmur, rub or gallop.


ABDOMEN: Soft, nontender, nondistended, normoactive bowel sounds, no guarding, 

no rebound, no hepatosplenomegaly, no masses.


EXTREMITIES: 2+ pulses, warm, well-perfused, 2+ edema noted.


NEUROLOGICAL: Unresponsive. gait not observed.


PSYCH: Normal mood, normal affect.


SKIN: Warm, dry, normal turgor, no rashes or lesions noted














 Laboratory Results - last 24 hr











  02/01/18 02/02/18 02/02/18





  05:10 06:30 06:30


 


WBC   25.7 H D 


 


RBC   3.51 L 


 


Hgb   8.9 L 


 


Hct   28.9 L 


 


MCV   82.4 


 


MCH   25.5 L 


 


MCHC   30.9 L 


 


RDW   21.1 H 


 


Plt Count   188  D 


 


MPV   10.7 


 


Total Counted  100  


 


Neutrophils %   No Result Required. 


 


Neutrophils % (Manual)  97.0 H*  


 


Lymphocytes %   No Result Required. 


 


Lymphocytes % (Manual)  1.0 L D  


 


Monocytes % (Manual)  2 L D  


 


Platelet Estimate  Adequate  


 


Anisocytosis  1+  


 


Tear Drop Cells  2+  


 


Lena Cells  1+  


 


Schistocytes  1+  


 


Sodium    143


 


Potassium    5.1


 


Chloride    107


 


Carbon Dioxide    22


 


Anion Gap    14


 


BUN    130 H*


 


Creatinine    5.2 H


 


Creat Clearance w eGFR    8.02


 


Random Glucose    205 H


 


Calcium    6.2 L*


 


Phosphorus    7.3 H


 


Magnesium    2.8 H


 


Total Bilirubin    0.4


 


AST    60 H


 


ALT    187 H


 


Alkaline Phosphatase    90


 


Total Protein    4.8 L


 


Albumin    1.9 L








Active Medications











Generic Name Dose Route Start Last Admin





  Trade Name Freq  PRN Reason Stop Dose Admin


 


Acetaminophen  325 mg  01/25/18 12:22  





  Tylenol Suppository -  [place]   





  Q4H PRN   





  FEVER   


 


Acetaminophen  1,000 mg  01/27/18 02:10  





  Ofirmev Injection -  IVPB   





  Q6H PRN   





  FEVER   


 


Albuterol Sulfate  1 amp  01/25/18 12:22  01/26/18 08:07





  Ventolin 0.083% Nebulizer Soln -  NEB   1 amp





  Q4H PRN   Administration





  SHORT OF BREATH/WHEEZING   


 


Atorvastatin Calcium  10 mg  01/25/18 22:00  02/01/18 21:25





  Lipitor -  PO   10 mg





  HS ELI   Administration


 


Calcium Carbonate  500 mg  01/30/18 10:00  02/01/18 21:26





  Calcium Carb Oral Suspension -  NGT   500 mg





  BID ELI   Administration


 


Ferrous Sulfate  325 mg  01/25/18 22:00  02/02/18 05:08





  Feosol -  PO   325 mg





  TID [facility]I   Administration


 


Heparin Sodium (Porcine)  5,000 unit  01/29/18 14:00  02/02/18 05:08





  Heparin -  SQ   5,000 unit





  TID [facility]I   Administration


 


Piperacillin Sod/Tazobactam  100 mls @ 100 mls/hr  01/27/18 13:33  02/02/18 02:

10





  Sod 2.25 gm/ Dextrose  IVPB   100 mls/hr





  Q6H-IV [facility]   Administration


 


Latanoprost  1 drop  01/25/18 22:00  02/01/18 21:26





  Xalatan 0.005% Eye Drops -  OU   1 drop





  HS ELI   Administration


 


Loratadine  10 mg  01/26/18 10:00  02/01/18 09:46





  Claritin -  PO   10 mg





  DAILY ELI   Administration


 


Methylprednisolone Sodium Succinate  40 mg  01/25/18 15:00  02/02/18 02:10





  Solu-Medrol -  IVPUSH   40 mg





  Q6H-IV [facility]   Administration


 


Montelukast Sodium  10 mg  01/25/18 22:00  02/01/18 21:25





  Singulair -  PO   10 mg





  HS ELI   Administration


 


Multivitamins/Minerals/Vitamin C  1 tab  01/26/18 10:00  02/01/18 09:46





  Tab-A-Vit -  PO   1 tab





  DAILY ELI   Administration


 


Pantoprazole Sodium  40 mg  01/26/18 10:00  02/01/18 09:46





  Protonix Iv  IVPUSH   40 mg





  DAILY ELI   Administration


 


Sevelamer Carbonate  0.8 gm  01/31/18 14:00  02/02/18 05:08





  Renvela Powder Packet -  NGT   0.8 gm





  TID ELI   Administration


 


Tiotropium Bromide  1 puff  01/26/18 10:00  02/01/18 09:47





  Spiriva -  IH   Not Given





  DAILY ELI   











ASSESSMENT/PLAN:


The patient is a 77 year old female with a history of COPD, CHF, recent hip Fx 

and replacement 12/17 c/b COPD exacerbation who presented with acute 

respiratory failure readmitted to the ICU following a cardiac arrest event.





NEURO


 Patient remains minimally responsive off sedation.


-Patient has continued to remain un-responsive off sedation for 72 hours.


-Will continue to monitor.





CV


#Hypotension (Improved)


-Patient now stable off pressors.


-Will continue to monitor.





#Elevated troponin (Improved)


-Now downtrending.





RESP


#Acute respiratory failure


Patient now re-intubated following a cardiac arrest earlier today.  Chest plain 

film demonstrates right sided rib fractures likely due to chest compressions.  

It is likely the patient aspirated leading to a respiratory arrest that led to 

the cardiac arrest.


-Will cover the patient with zosyn.


-Continue solu-medrol.


-Will maintain the patient on the ventilator.


-Will continue to monitor.





GI


-NG tube with feeds.





Renal


#MO


-Worsening bun/creatinine with some urine output after lasix.


-Patient received 80mg of lasix yesterday.


-Follow Renal recommendations.


-Will continue to monitor bun/creatinine.





ID


#Pneumonia vs. Influenza vs. RSV


-Continue zosyn.


-Re-culture if patient spikes a temp.


-Follow recs from ID





MSK


No issues currently





FEN/GI


-Replete electrolytes PRN, will monitor


-Discontinue IV fluids.


-Tube feeds.





PPX


-Lovenox 40 sq daily


-Protonix 40 IV daily for GI ppx





DISPO: Continue ICU level of care.








Visit type





- Emergency Visit


Emergency Visit: No





- New Patient


This patient is new to me today: No





- Critical Care


Critical Care patient: Yes


Total Critical Care Time (in minutes): 35


Critical Care Statement: The care of this patient involved high complexity 

decision making to prevent further life threatening deterioration of the patient

's condition and/or to evaluate & treat vital organ system(s) failure or risk 

of failure.

## 2018-02-02 NOTE — PN
Progress Note, Physician


Chief Complaint: 


Unable to obtain, patient obtunded





- Current Medication List


Current Medications: 


Active Medications





Acetaminophen (Tylenol Suppository -)  325 mg [place] Q4H PRN


   PRN Reason: FEVER


Acetaminophen (Ofirmev Injection -)  1,000 mg IVPB Q6H PRN


   PRN Reason: FEVER


Albuterol Sulfate (Ventolin 0.083% Nebulizer Soln -)  1 amp NEB Q4H PRN


   PRN Reason: SHORT OF BREATH/WHEEZING


   Last Admin: 01/26/18 08:07 Dose:  1 amp


Atorvastatin Calcium (Lipitor -)  10 mg PO HS [facility]


   Last Admin: 02/01/18 21:25 Dose:  10 mg


Calcium Carbonate (Calcium Carb Oral Suspension -)  500 mg NGT BID [facility]


   Last Admin: 02/02/18 09:33 Dose:  500 mg


Ferrous Sulfate (Feosol -)  325 mg PO TID [facility]


   Last Admin: 02/02/18 05:08 Dose:  325 mg


Heparin Sodium (Porcine) (Heparin -)  5,000 unit SQ TID [facility]


   Last Admin: 02/02/18 05:08 Dose:  5,000 unit


Piperacillin Sod/Tazobactam (Sod 2.25 gm/ Dextrose)  100 mls @ 100 mls/hr IVPB 

Q6H-IV [facility]


   Last Admin: 02/02/18 09:47 Dose:  100 mls/hr


Latanoprost (Xalatan 0.005% Eye Drops -)  1 drop OU [facility]


   Last Admin: 02/01/18 21:26 Dose:  1 drop


Loratadine (Claritin -)  10 mg PO DAILY [facility]


   Last Admin: 02/02/18 09:34 Dose:  10 mg


Methylprednisolone Sodium Succinate (Solu-Medrol -)  40 mg IVPUSH Q6H-IV [facility]


   Last Admin: 02/02/18 09:33 Dose:  40 mg


Montelukast Sodium (Singulair -)  10 mg PO [facility]


   Last Admin: 02/01/18 21:25 Dose:  10 mg


Multivitamins/Minerals/Vitamin C (Tab-A-Vit -)  1 tab PO DAILY [facility]


   Last Admin: 02/02/18 09:35 Dose:  1 tab


Pantoprazole Sodium (Protonix Iv)  40 mg IVPUSH DAILY [facility]


   Last Admin: 02/02/18 09:36 Dose:  40 mg


Sevelamer Carbonate (Renvela Powder Packet -)  0.8 gm NGT TID [facility]


   Last Admin: 02/02/18 05:08 Dose:  0.8 gm


Tiotropium Bromide (Spiriva -)  1 puff IH DAILY [facility]


   Last Admin: 02/02/18 09:37 Dose:  Not Given











- Objective


Vital Signs: 


 Vital Signs











Temperature  36.9 C   02/02/18 06:00


 


Pulse Rate  103 H  02/02/18 06:00


 


Respiratory Rate  18   02/02/18 09:31


 


Blood Pressure  121/68   02/02/18 06:00


 


O2 Sat by Pulse Oximetry (%)  100   02/02/18 07:44











Constitutional: Yes: Other (obtunded, minimally responsive)


Cardiovascular: Yes: Regular Rate and Rhythm.  No: Gallop, Murmur, Rub


Respiratory: Yes: Regular, CTA Bilaterally, Intubated, Mechanically Ventilated.

  No: Rales, Rhonchi, Wheezes


Gastrointestinal: Yes: Normal Bowel Sounds, Soft.  No: Distention, Tenderness


Extremities: Yes: WNL


Edema: Yes


Edema: LUE: 1+, RUE: 1+, LLE: 1+, RLE: 1+


Labs: 


 CBC, BMP





 02/02/18 06:30 





 02/02/18 06:30 





 INR, PTT











INR  0.95  (0.82-1.09)   01/26/18  09:50    














Problem List





- Problems


(1) Asystole


Code(s): I46.9 - CARDIAC ARREST, CAUSE UNSPECIFIED   





(2) Lactic acid acidosis


Code(s): E87.2 - ACIDOSIS   





(3) Hemodynamic instability


Code(s): R09.89 - OTH SYMPTOMS AND SIGNS INVOLVING THE CIRC AND RESP SYSTEMS   





(4) Shock liver


Code(s): K72.00 - ACUTE AND SUBACUTE HEPATIC FAILURE WITHOUT COMA   





(5) RSV (respiratory syncytial virus pneumonia)


Code(s): J12.1 - RESPIRATORY SYNCYTIAL VIRUS PNEUMONIA   





(6) Acute and chronic respiratory failure with hypercapnia


Code(s): J96.22 - ACUTE AND CHRONIC RESPIRATORY FAILURE WITH HYPERCAPNIA   





(7) COPD exacerbation


Code(s): J44.1 - CHRONIC OBSTRUCTIVE PULMONARY DISEASE W (ACUTE) EXACERBATION   





(8) CHF (congestive heart failure)


Code(s): I50.9 - HEART FAILURE, UNSPECIFIED   


Qualifiers: 


   Congestive heart failure type: diastolic   Congestive heart failure 

chronicity: chronic   Qualified Code(s): I50.32 - Chronic diastolic (congestive

) heart failure   





(9) Fracture, hip


Code(s): S72.009A - FRACTURE OF UNSP PART OF NECK OF UNSP FEMUR, INIT   


Qualifiers: 


   Encounter type: initial encounter   Fracture type: closed   Laterality: left

   Qualified Code(s): S72.002A - Fracture of unspecified part of neck of left 

femur, initial encounter for closed fracture   





(10) Hypertension


Code(s): I10 - ESSENTIAL (PRIMARY) HYPERTENSION   





Assessment/Plan





(1) Asystolic cardiac arrest


-heart now stable


-however overall with poor prognosis


-continues to be obtunded


-minimal brain stem activity


-very poor prognosis and expect minimal neurologic recovery





(2) Acute and chronic hypoxic respiratory failure


Assessment/Plan: 


-recurrent and cause of asystole


-case d/w pulmonary


-awaiting decision concerning palliative wean


Code(s): J96.22 - ACUTE AND CHRONIC RESPIRATORY FAILURE WITH HYPERCAPNIA   





(3) COPD exacerbation


Assessment/Plan: 


-continue steroids


Code(s): J44.1 - CHRONIC OBSTRUCTIVE PULMONARY DISEASE W (ACUTE) EXACERBATION   





(3) CHF (congestive heart failure)


Assessment/Plan: 


-currently hypotensive requiring pressors


-off pressors


-case d/w nephrology, continue diuresis currently with IV lasix


Code(s): I50.9 - HEART FAILURE, UNSPECIFIED   


Qualifiers: 


   Congestive heart failure type: diastolic   Congestive heart failure 

chronicity: chronic   Qualified Code(s): I50.32 - Chronic diastolic (congestive

) heart failure   





(4) Fracture, hip


Assessment/Plan: 


-reason for being at SNF


Code(s): S72.009A - FRACTURE OF UNSP PART OF NECK OF UNSP FEMUR, INIT   


Qualifiers: 


   Encounter type: initial encounter   Fracture type: closed   Laterality: left

   Qualified Code(s): S72.002A - Fracture of unspecified part of neck of left 

femur, initial encounter for closed fracture   





(5) Hypertension


Assessment/Plan: 


-off pressors


Code(s): I10 - ESSENTIAL (PRIMARY) HYPERTENSION   





(6) Hypocalcemia


-replaced as needed





(7) Shock Liver


-improving





(8) Hemodynamic instability 


-improved, off pressors





(9) Lactic acidosis


-resolved





(10) MO


-continues to worsen





(11) RSV pneumonia


-continue zosyn per ID


-may stop if put on comfort measures only

## 2018-02-02 NOTE — PN
Progress Note, Physician


History of Present Illness: 





continues to intubated


sedated 





- Current Medication List


Current Medications: 


Active Medications





Acetaminophen (Tylenol Suppository -)  325 mg [place] Q4H PRN


   PRN Reason: FEVER


Acetaminophen (Ofirmev Injection -)  1,000 mg IVPB Q6H PRN


   PRN Reason: FEVER


Albuterol Sulfate (Ventolin 0.083% Nebulizer Soln -)  1 amp NEB Q4H PRN


   PRN Reason: SHORT OF BREATH/WHEEZING


   Last Admin: 01/26/18 08:07 Dose:  1 amp


Atorvastatin Calcium (Lipitor -)  10 mg PO HS [facility]


   Last Admin: 02/01/18 21:25 Dose:  10 mg


Calcium Carbonate (Calcium Carb Oral Suspension -)  500 mg NGT BID [facility]


   Last Admin: 02/02/18 09:33 Dose:  500 mg


Ferrous Sulfate (Feosol -)  325 mg PO TID [facility]


   Last Admin: 02/02/18 13:23 Dose:  325 mg


Heparin Sodium (Porcine) (Heparin -)  5,000 unit SQ TID [facility]


   Last Admin: 02/02/18 13:24 Dose:  5,000 unit


Piperacillin Sod/Tazobactam (Sod 2.25 gm/ Dextrose)  100 mls @ 100 mls/hr IVPB 

Q6H-IV [facility]


   Last Admin: 02/02/18 09:47 Dose:  100 mls/hr


Latanoprost (Xalatan 0.005% Eye Drops -)  1 drop OU [facility]


   Last Admin: 02/01/18 21:26 Dose:  1 drop


Loratadine (Claritin -)  10 mg PO DAILY [facility]


   Last Admin: 02/02/18 09:34 Dose:  10 mg


Methylprednisolone Sodium Succinate (Solu-Medrol -)  40 mg IVPUSH Q6H-IV [facility]


   Last Admin: 02/02/18 14:11 Dose:  40 mg


Montelukast Sodium (Singulair -)  10 mg PO [facility]


   Last Admin: 02/01/18 21:25 Dose:  10 mg


Multivitamins/Minerals/Vitamin C (Tab-A-Vit -)  1 tab PO DAILY [facility]


   Last Admin: 02/02/18 09:35 Dose:  1 tab


Pantoprazole Sodium (Protonix Iv)  40 mg IVPUSH DAILY [facility]


   Last Admin: 02/02/18 09:36 Dose:  40 mg


Sevelamer Carbonate (Renvela Powder Packet -)  0.8 gm NGT TID [facility]


   Last Admin: 02/02/18 13:24 Dose:  0.8 gm


Tiotropium Bromide (Spiriva -)  1 puff IH DAILY [facility]


   Last Admin: 02/02/18 09:37 Dose:  Not Given











- Objective


Vital Signs: 


 Vital Signs











Temperature  98.5 F   02/02/18 06:00


 


Pulse Rate  109 H  02/02/18 10:00


 


Respiratory Rate  16   02/02/18 13:50


 


Blood Pressure  132/72   02/02/18 10:00


 


O2 Sat by Pulse Oximetry (%)  100   02/02/18 07:44











Constitutional: Yes: No Distress, Calm


Cardiovascular: Yes: Regular Rate and Rhythm


Respiratory: Yes: Intubated, Mechanically Ventilated


Gastrointestinal: Yes: Normal Bowel Sounds, Soft


Musculoskeletal: Yes: WNL


Extremities: Yes: WNL


Neurological: Yes: Other


Labs: 


 CBC, BMP





 02/02/18 06:30 





 02/02/18 06:30 





 INR, PTT











INR  0.95  (0.82-1.09)   01/26/18  09:50    














Assessment/Plan








Problem List





- Problems


(1) Acute and chronic respiratory failure with hypercapnia


Code(s): J96.22 - ACUTE AND CHRONIC RESPIRATORY FAILURE WITH HYPERCAPNIA   





(2) COPD exacerbation


Code(s): J44.1 - CHRONIC OBSTRUCTIVE PULMONARY DISEASE W (ACUTE) EXACERBATION   





(3) CHF (congestive heart failure)


Code(s): I50.9 - HEART FAILURE, UNSPECIFIED   


Qualifiers: 


   Congestive heart failure type: diastolic   Congestive heart failure 

chronicity: chronic   Qualified Code(s): I50.32 - Chronic diastolic (congestive

) heart failure   





(4) Fracture, hip


Code(s): S72.009A - FRACTURE OF UNSP PART OF NECK OF UNSP FEMUR, INIT   


Qualifiers: 


   Encounter type: initial encounter   Fracture type: closed   Laterality: left

   Qualified Code(s): S72.002A - Fracture of unspecified part of neck of left 

femur, initial encounter for closed fracture   





(5) Hypertension


Code(s): I10 - ESSENTIAL (PRIMARY) HYPERTENSION   





6 pneumonia





7 leukocytosis





8 uti


plan














plan


continue abx


supportive measures


nutrition


rest as per icu


patient continues to be critical


no changes 


family deciding about further plan











cc time 40 min

## 2018-02-02 NOTE — PN
Progress Note (short form)





- Note


Progress Note: 





 Neurology





History of Present Illness: 


77 year old female who presented initially from the SNF with acute SOB. Upon 

seeing patient has been intubated secondary to hypercapneic respiratory failure 

per notes. Patient was recently discharged to SNF after hip replacement that 

was complicated by COPD exacerbation. Reportedly, was woken up by difficulty 

breathing and was brought here. In the ED she was noted to be tachypneic and 

had respiratory acidosis. BiPAP was attempted but patient was not improving and 

required intubation. Therafter, reportedly respiratory status improved to the 

point where she was able to have extubation. Thereafter had cardiac arrest and 

required resuscitation and revived. Respiratory failure required reintubation 

and patient in ICU in critical condition. Discussed with nurse and resident and 

examined, patient does have brainstem reflexes. She does have pupil constriction

, does have corneal reflex and gag reflex. Therefore, does not meet criteria 

for brain death. However, higher order cognition remains limited and has not 

had meaningful recovery. She is not arousable or conversive. She does not 

follow commands. She remains vent dependent and likely with poor prognosis. 

Spoke to nurse and daughter from [place] arrived, met with team and family 

discussing goals of care and palliative care. 





Active Medications





Acetaminophen (Tylenol Suppository -)  325 mg [place] Q4H PRN


   PRN Reason: FEVER


Acetaminophen (Ofirmev Injection -)  1,000 mg IVPB Q6H PRN


   PRN Reason: FEVER


Albuterol Sulfate (Ventolin 0.083% Nebulizer Soln -)  1 amp NEB Q4H PRN


   PRN Reason: SHORT OF BREATH/WHEEZING


   Last Admin: 01/26/18 08:07 Dose:  1 amp


Atorvastatin Calcium (Lipitor -)  10 mg PO HS [facility]


   Last Admin: 02/01/18 21:25 Dose:  10 mg


Calcium Carbonate (Calcium Carb Oral Suspension -)  500 mg NGT BID [facility]


   Last Admin: 02/02/18 09:33 Dose:  500 mg


Ferrous Sulfate (Feosol -)  325 mg PO TID [facility]


   Last Admin: 02/02/18 05:08 Dose:  325 mg


Heparin Sodium (Porcine) (Heparin -)  5,000 unit SQ TID [facility]


   Last Admin: 02/02/18 05:08 Dose:  5,000 unit


Piperacillin Sod/Tazobactam (Sod 2.25 gm/ Dextrose)  100 mls @ 100 mls/hr IVPB 

Q6H-IV [facility]


   Last Admin: 02/02/18 09:47 Dose:  100 mls/hr


Latanoprost (Xalatan 0.005% Eye Drops -)  1 drop OU HS [facility]


   Last Admin: 02/01/18 21:26 Dose:  1 drop


Loratadine (Claritin -)  10 mg PO DAILY [facility]


   Last Admin: 02/02/18 09:34 Dose:  10 mg


Methylprednisolone Sodium Succinate (Solu-Medrol -)  40 mg IVPUSH Q6H-IV [facility]


   Last Admin: 02/02/18 09:33 Dose:  40 mg


Montelukast Sodium (Singulair -)  10 mg PO HS [facility]


   Last Admin: 02/01/18 21:25 Dose:  10 mg


Multivitamins/Minerals/Vitamin C (Tab-A-Vit -)  1 tab PO DAILY [facility]


   Last Admin: 02/02/18 09:35 Dose:  1 tab


Pantoprazole Sodium (Protonix Iv)  40 mg IVPUSH DAILY [facility]


   Last Admin: 02/02/18 09:36 Dose:  40 mg


Sevelamer Carbonate (Renvela Powder Packet -)  0.8 gm NGT TID [facility]


   Last Admin: 02/02/18 05:08 Dose:  0.8 gm


Tiotropium Bromide (Spiriva -)  1 puff IH DAILY [facility]


   Last Admin: 02/02/18 09:37 Dose:  Not Given











Physical Examination


 


 Vital Signs











 Period  Temp  Pulse  Resp  BP Sys/Gonzaelz  Pulse Ox


 


 Last 24 Hr  98.4 F-98.9 F    14-26  115-140/68-95  

















Constitutional: Yes: Other (sedated)


Eyes: Yes: Conjunctiva Clear, EOM Intact, PERRL


HENT: Yes: Atraumatic, Normocephalic


Cardiovascular: Yes: Tachycardia.  No: Pulse Irregular, Gallop, Murmur, Rub


Respiratory: Yes: Regular, CTA Bilaterally, Intubated, Mechanically Ventilated.

  No: Rales, Rhonchi, Wheezes


Gastrointestinal: Yes: Normal Bowel Sounds, Soft.  No: Distention, Tenderness


Extremities: Yes: WNL


Neuro:  Nonresponsive, not opening eyes spontaneously, not following commands, 

pupil response 3+ to 2+, corneal intact, gag intact, no responding to pain


Edema: No





 CBCD











WBC  19.2 K/mm3 (4.0-10.0)  H  02/01/18  05:10    


 


RBC  3.41 M/mm3 (3.60-5.2)  L  02/01/18  05:10    


 


Hgb  8.8 GM/dL (10.7-15.3)  L  02/01/18  05:10    


 


Hct  28.0 % (32.4-45.2)  L  02/01/18  05:10    


 


MCV  82.3 fl (80-96)   02/01/18  05:10    


 


MCHC  31.4 g/dl (32.0-36.0)  L  02/01/18  05:10    


 


RDW  21.4 % (11.6-15.6)  H  02/01/18  05:10    


 


Plt Count  151 K/MM3 (134-434)   02/01/18  05:10    


 


MPV  10.7 fl (7.5-11.1)   02/01/18  05:10    








 CMP











Sodium  145 mmol/L (136-145)   02/01/18  05:10    


 


Potassium  5.0 mmol/L (3.5-5.1)   02/01/18  05:10    


 


Chloride  110 mmol/L ()  H  02/01/18  05:10    


 


Carbon Dioxide  21 mmol/L (21-32)   02/01/18  05:10    


 


Anion Gap  14  (8-16)   02/01/18  05:10    


 


BUN  124 mg/dL (7-18)  H*  02/01/18  05:10    


 


Creatinine  4.9 mg/dL (0.55-1.02)  H  02/01/18  05:10    


 


Creat Clearance w eGFR  8.59  (>60)   02/01/18  05:10    


 


Calcium  6.0 mg/dL (8.5-10.1)  L*  02/01/18  05:10    


 


Total Bilirubin  0.5 mg/dL (0.2-1.0)   02/01/18  05:10    


 


AST  64 U/L (15-37)  H  02/01/18  05:10    


 


ALT  242 U/L (12-78)  H  02/01/18  05:10    


 


Alkaline Phosphatase  99 U/L ()   02/01/18  05:10    


 


Total Protein  4.7 g/dl (6.4-8.2)  L  02/01/18  05:10    


 


Albumin  1.9 g/dl (3.4-5.0)  L  02/01/18  05:10    

















Plan


77 year old female who presented initially from the SNF with acute SOB. Upon 

seeing patient has been intubated secondary to hypercapneic respiratory failure 

per notes. Patient was recently discharged to SNF after hip replacement that 

was complicated by COPD exacerbation. Reportedly, was woken up by difficulty 

breathing and was brought here. In the ED she was noted to be tachypneic and 

had respiratory acidosis. BiPAP was attempted but patient was not improving and 

required intubation. Therafter, reportedly respiratory status improved to the 

point where she was able to have extubation. Thereafter had cardiac arrest and 

required resuscitation and revived. 


Respiratory failure required reintubation and patient in ICU in critical 

condition. 


Patient does have brainstem reflexes: pupil constriction, does have corneal 

reflex and gag reflex. 


Does not meet criteria for brain death. However, higher order cognition remains 

limited. 


Not on sedation for 4 days now but still does not follow commands and 

nonverbal. 


Remains intubated and on mechanical ventilation


At this point, further management would depend on goals of care


Likely poor prognosis given her clinical condition and comorbidities


Not likely to make meaningful recovery and be at function status she was before


Likely to need trach, peg, and vent at least for immediate future if continued 

care desired


Palliative care being considered by family


Discussed with nurse and resident


Critical care time 35 mins

## 2018-02-02 NOTE — HOSP
Subjective





- Review of Symptoms


Events since last encounter: 





Hospitalist service covering for Dr. Tucker. 





Advised by Ginny Little, Palliative Care RN, family has decided on 

compassionate weaning this afternoon. ICU team managing.  





Physical Examination


Vital Signs: 


 Vital Signs











Temperature  98.5 F   02/02/18 06:00


 


Pulse Rate  109 H  02/02/18 10:00


 


Respiratory Rate  16   02/02/18 13:50


 


Blood Pressure  132/72   02/02/18 10:00


 


O2 Sat by Pulse Oximetry (%)  100   02/02/18 07:44











Labs: 


 CBC, BMP





 02/02/18 06:30 





 02/02/18 06:30

## 2018-02-02 NOTE — PN
Progress Note (short form)





- Note


Progress Note: 





Renal follow up for MO





Pt seen and examined in the ICU


no overnight events


making urine via antunez


on vent via ET Tube


FiO is 40%


 


 


 Vital Signs











Temperature  98.5 F   02/02/18 06:00


 


Pulse Rate  109 H  02/02/18 10:00


 


Respiratory Rate  15   02/02/18 11:47


 


Blood Pressure  132/72   02/02/18 10:00


 


O2 Sat by Pulse Oximetry (%)  100   02/02/18 07:44








 Intake & Output











 01/30/18 01/31/18 02/01/18 02/02/18





 23:59 23:59 23:59 23:59


 


Intake Total 1650 1565 2126 850


 


Output Total 850 1200 1000 400


 


Balance  450


 


Weight 61.7 kg 63.4 kg 63.004 kg 63.276 kg











NAD


on vent


Dec BS lung bases


RRR


soft NT/ND


antunez in place


trace LE edema, + sacral edema 


 


 


 CBC, BMP





 02/02/18 06:30 





 02/02/18 06:30 





 Current Medications





Acetaminophen (Tylenol Suppository -)  325 mg [place] Q4H PRN


   PRN Reason: FEVER


Acetaminophen (Ofirmev Injection -)  1,000 mg IVPB Q6H PRN


   PRN Reason: FEVER


Albuterol Sulfate (Ventolin 0.083% Nebulizer Soln -)  1 amp NEB Q4H PRN


   PRN Reason: SHORT OF BREATH/WHEEZING


   Last Admin: 01/26/18 08:07 Dose:  1 amp


Atorvastatin Calcium (Lipitor -)  10 mg PO HS [facility]


   Last Admin: 02/01/18 21:25 Dose:  10 mg


Calcium Carbonate (Calcium Carb Oral Suspension -)  500 mg NGT BID [facility]


   Last Admin: 02/02/18 09:33 Dose:  500 mg


Ferrous Sulfate (Feosol -)  325 mg PO TID [facility]


   Last Admin: 02/02/18 05:08 Dose:  325 mg


Heparin Sodium (Porcine) (Heparin -)  5,000 unit SQ TID [facility]


   Last Admin: 02/02/18 05:08 Dose:  5,000 unit


Piperacillin Sod/Tazobactam (Sod 2.25 gm/ Dextrose)  100 mls @ 100 mls/hr IVPB 

Q6H-IV [facility]


   Last Admin: 02/02/18 09:47 Dose:  100 mls/hr


Latanoprost (Xalatan 0.005% Eye Drops -)  1 drop OU HS [facility]


   Last Admin: 02/01/18 21:26 Dose:  1 drop


Loratadine (Claritin -)  10 mg PO DAILY [facility]


   Last Admin: 02/02/18 09:34 Dose:  10 mg


Methylprednisolone Sodium Succinate (Solu-Medrol -)  40 mg IVPUSH Q6H-IV [facility]


   Last Admin: 02/02/18 09:33 Dose:  40 mg


Montelukast Sodium (Singulair -)  10 mg PO HS [facility]


   Last Admin: 02/01/18 21:25 Dose:  10 mg


Multivitamins/Minerals/Vitamin C (Tab-A-Vit -)  1 tab PO DAILY [facility]


   Last Admin: 02/02/18 09:35 Dose:  1 tab


Pantoprazole Sodium (Protonix Iv)  40 mg IVPUSH DAILY [facility]


   Last Admin: 02/02/18 09:36 Dose:  40 mg


Sevelamer Carbonate (Renvela Powder Packet -)  0.8 gm NGT TID [facility]


   Last Admin: 02/02/18 05:08 Dose:  0.8 gm


Tiotropium Bromide (Spiriva -)  1 puff IH DAILY [facility]


   Last Admin: 02/02/18 09:37 Dose:  Not Given














77 year old woman with PMhx of COPD who presented with hypercarbic respiratory 

failure requiring intubation complicated by cardiac arrest now with MO with Cr 

of 3.3. Pt with 2 arrests on 1/26.





#Acute Renal Failure s/p cardiac arrest secondary to ATN


BUN/Cr continues to slowly up trend


pt is non-oliguric and responsive to lasix


will continue Lasix daily to maintain urine output and manage volume status 


no acute indication for RRT and unlikely that dialysis will change overall 

course as pt appears to have anoxic brain injury


Dose all meds for CrCl less then 10


continue renvela and nephro





#Cardiac Arrest/COPD/Resp Failure


continue ICU care





Prognosis is poor








Andre Savage DO

## 2018-02-02 NOTE — PN
Teaching Attending Note


Name of Resident: Alyssa Carrizales





ATTENDING PHYSICIAN STATEMENT





I saw and evaluated the patient.


I reviewed the resident's note and discussed the case with the resident.


I agree with the resident's findings and plan as documented.








SUBJECTIVE:


Patient seen and examined in the ICU.  


Remains intubated, Poorly responsive off sedation


Off pressors.





OBJECTIVE:


 Intake & Output











 01/30/18 01/31/18 02/01/18 02/02/18





 23:59 23:59 23:59 23:59


 


Intake Total 1650 1565 2126 850


 


Output Total 850 1200 1000 400


 


Balance  450


 


Weight 136 lb 0.403 oz 139 lb 12.369 oz 138 lb 14.4 oz 139 lb 8 oz








 Last Vital Signs











Temp Pulse Resp BP Pulse Ox


 


 98.5 F   109 H  15   132/72   100 


 


 02/02/18 06:00  02/02/18 10:00  02/02/18 11:47  02/02/18 10:00  02/02/18 07:44








Active Medications





Acetaminophen (Tylenol Suppository -)  325 mg MO Q4H PRN


   PRN Reason: FEVER


Acetaminophen (Ofirmev Injection -)  1,000 mg IVPB Q6H PRN


   PRN Reason: FEVER


Albuterol Sulfate (Ventolin 0.083% Nebulizer Soln -)  1 amp NEB Q4H PRN


   PRN Reason: SHORT OF BREATH/WHEEZING


   Last Admin: 01/26/18 08:07 Dose:  1 amp


Atorvastatin Calcium (Lipitor -)  10 mg PO HS [facility]


   Last Admin: 02/01/18 21:25 Dose:  10 mg


Calcium Carbonate (Calcium Carb Oral Suspension -)  500 mg NGT BID [facility]


   Last Admin: 02/02/18 09:33 Dose:  500 mg


Ferrous Sulfate (Feosol -)  325 mg PO TID [facility]


   Last Admin: 02/02/18 05:08 Dose:  325 mg


Heparin Sodium (Porcine) (Heparin -)  5,000 unit SQ TID [facility]


   Last Admin: 02/02/18 05:08 Dose:  5,000 unit


Piperacillin Sod/Tazobactam (Sod 2.25 gm/ Dextrose)  100 mls @ 100 mls/hr IVPB 

Q6H-IV [facility]


   Last Admin: 02/02/18 09:47 Dose:  100 mls/hr


Latanoprost (Xalatan 0.005% Eye Drops -)  1 drop OU [facility]


   Last Admin: 02/01/18 21:26 Dose:  1 drop


Loratadine (Claritin -)  10 mg PO DAILY [facility]


   Last Admin: 02/02/18 09:34 Dose:  10 mg


Methylprednisolone Sodium Succinate (Solu-Medrol -)  40 mg IVPUSH Q6H-IV [facility]


   Last Admin: 02/02/18 09:33 Dose:  40 mg


Montelukast Sodium (Singulair -)  10 mg PO HS [facility]


   Last Admin: 02/01/18 21:25 Dose:  10 mg


Multivitamins/Minerals/Vitamin C (Tab-A-Vit -)  1 tab PO DAILY [facility]


   Last Admin: 02/02/18 09:35 Dose:  1 tab


Pantoprazole Sodium (Protonix Iv)  40 mg IVPUSH DAILY [facility]


   Last Admin: 02/02/18 09:36 Dose:  40 mg


Sevelamer Carbonate (Renvela Powder Packet -)  0.8 gm NGT TID [facility]


   Last Admin: 02/02/18 05:08 Dose:  0.8 gm


Tiotropium Bromide (Spiriva -)  1 puff IH DAILY [facility]


   Last Admin: 02/02/18 09:37 Dose:  Not Given





Gen:  intubated, poorly responsive, tachypneic


Heart: RRR


Lung: bilateral rhonchi


Abd: soft, nontender


Ext: + edema


 Laboratory Results - last 24 hr











  02/02/18 02/02/18





  06:30 06:30


 


WBC  25.7 H D 


 


RBC  3.51 L 


 


Hgb  8.9 L 


 


Hct  28.9 L 


 


MCV  82.4 


 


MCH  25.5 L 


 


MCHC  30.9 L 


 


RDW  21.1 H 


 


Plt Count  188  D 


 


MPV  10.7 


 


Total Counted  100 


 


Neutrophils %  No Result Required. 


 


Neutrophils % (Manual)  97.0 H* 


 


Lymphocytes %  No Result Required. 


 


Monocytes % (Manual)  3 L 


 


Platelet Estimate  Adequate 


 


Sodium   143


 


Potassium   5.1


 


Chloride   107


 


Carbon Dioxide   22


 


Anion Gap   14


 


BUN   130 H*


 


Creatinine   5.2 H


 


Creat Clearance w eGFR   8.02


 


Random Glucose   205 H


 


Calcium   6.2 L*


 


Phosphorus   7.3 H


 


Magnesium   2.8 H


 


Total Bilirubin   0.4


 


AST   60 H


 


ALT   187 H


 


Alkaline Phosphatase   90


 


Total Protein   4.8 L


 


Albumin   1.9 L











ASSESSMENT AND PLAN:


Acute Hypoxic and Hypercapneic Respiratory Failure


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


Pneumonia


UTI


Septic Shock


Acute Kidney Injury


Elevated LFTs likely Ischemic Injury


+Troponins likely Demand Ischemia


Acute COPD Exacerbation


LV Diastolic Dysfunction


Pulmonary HTN





-  continue antibiotics


-  inhaled bronchodilators standing and PRN


-  keep CVP 8-12


-  lasix as needed 


-  monitor urine output, creatinine


-  off pressors, maintain MAP >65


-  hold sedation to assess mental status


-  poor prognosis for meaningful recovery, continue discussions regarding goals 

of care, recommend comfort measures





Dr Delgado 


Critical care time spent in reviewing chart, evaluating patient and formulating 

plan 36 min

## 2018-02-03 VITALS — HEART RATE: 29 BPM

## 2018-02-03 NOTE — DS
Physical Examination


Vital Signs: 


 Vital Signs











Temperature  98.6 F   02/02/18 12:00


 


Pulse Rate  29 L  02/03/18 06:00


 


Respiratory Rate  10 L  02/03/18 06:00


 


Blood Pressure  130/72   02/02/18 16:00


 


O2 Sat by Pulse Oximetry (%)  92 L  02/02/18 21:00











Labs: 


 CBC, BMP





 02/02/18 06:30 





 02/02/18 06:30 











Discharge Summary


Reason For Visit: RESP FAIL,SEPSIS,ACUTE EXACER CHRONIC OBST PULM DI


Current Active Problems





Asystole (Acute)


COPD exacerbation (Acute)


Hemodynamic instability (Acute)


Lactic acid acidosis (Acute)


RSV (respiratory syncytial virus pneumonia) (Acute)


Respiratory failure (Acute)


Sepsis (Acute)


Shock liver (Acute)








Condition: Guarded





- Instructions


Referrals: 


Alonso Gonzalez MD [Primary Care Provider] - 





- Home Medications


Comprehensive Discharge Medication List: 


Ambulatory Orders





Fluticasone/Salmeterol [Advair 250-50 Diskus] 1 each IH BID 06/14/12 


Tiotropium Bromide [Spiriva] 18 mcg IH DAILY 06/14/12 


Montelukast Na [Singulair -] 10 mg PO HS #0 tablet 06/19/12 


Multivitamins [Multivit ([facility] Formulary)] 1 udtab PO DAILY #0 tab 06/19/12 


Ferrous Sulfate [Feosol] 325 mg PO TID 09/17/12 


Calcium Carbonate/Vitamin D3 [Calcium 600-Vit D3 200 Tablet] 1 each PO DAILY 07/ 06/13 


Docosahexanoic Acid/Epa [Fish Oil Softgel] 1 each PO DAILY 07/06/13 


Potassium Chloride [K-Dur] 20 meq PO DAILY #0 tab.er.prt 07/06/13 


Alendronate Sodium [Fosamax] 35 mg PO COSTA 05/01/16 


Atorvastatin Ca [Lipitor] 10 mg PO HS 05/01/16 


Latanoprost 0.005% Eye Drops [Xalatan 0.005% Eye Drops -] 1 drop OU HS 05/01/16 


Diltiazem Cd [Cardizem Cd -] 120 mg PO BID  cap.cd.24h 01/05/18 


Enoxaparin [Lovenox -] 40 mg SQ DAILY  disp.syrin 01/05/18 


Loratadine [Claritin -] 10 mg PO DAILY  tablet 01/05/18 


Mineral Oil/Petrolat,Wht/Water [Eucerin (Small Jar) -] 1 applic TP DAILY  jar 01 /05/18 


Polyethylene Glycol 3350 [Miralax 119 gm Btl -] 17 gm PO BID  bottle 01/05/18 


Prednisone [Deltasone -] 10 mg PO DAILY  tablet 01/05/18 


Torsemide [Demadex -] 60 mg PO DAILY  tablet 01/05/18 


Acetaminophen [Tylenol .Regular Strength -] 650 mg PO Q6H PRN 01/23/18 


Albuterol 2.5/Ipratropium 0.5 [Duoneb -] 1 amp IH QSHIFT 01/23/18 


Ascorbic Acid [Vitamin C -] 500 mg PO TID 01/23/18 


Chlorpheniramine/Dextromethorp [Robitussin Long-Acting Liq] 15 ml PO Q6H 01/23/ 18 


Docusate Sodium [Colace -] 300 mg PO HS 01/23/18 


Menthol/Zinc Oxide [Calmoseptine Ointment] 71 gm TP QSHIFT 01/23/18 


Nystatin Oral Suspension - [Nystatin Oral Susp 057509 Units/5 ML -] 500,000 

units PO QID 01/23/18 


Omeprazole Magnesium [Prilosec Otc] 20 mg PO DAILY 01/23/18 


Sodium Chloride Nasal Spray [Ocean Spray Nasal Spray] 2 spray NS QSHIFT 01/23/ 18

## 2018-02-03 NOTE — HOSP
Physical Examination


Vital Signs: 


 Vital Signs











Temperature  98.6 F   02/02/18 12:00


 


Pulse Rate  29 L  02/03/18 06:00


 


Respiratory Rate  10 L  02/03/18 06:00


 


Blood Pressure  130/72   02/02/18 16:00


 


O2 Sat by Pulse Oximetry (%)  92 L  02/02/18 21:00











Findings/Remarks: 





Called by RN that there was asystole on telemetry and the patient was 

unresponsive. On exam, the patient was unresponsive, no spontaneous movement 

was observed, the patient did not respond to verbal or noxious stimuli. Absent 

heart and breath sounds for more than 1 minute. Pupils are fixed and dilated. 

Corneal reflex was absent





The patient was pronounced dead at 07:03am. Son notified by RN


Labs: 


 CBC, BMP





 02/02/18 06:30 





 02/02/18 06:30

## 2019-08-22 NOTE — CONSULT
Consult


Consult Specialty:: Pulm/CCM


Reason for Consultation:: Respiratory Failure





- History of Present Illness


History of Present Illness: 





77 chace with PMHx of COPD, CHF, recent hip Fx and replacement 12/17 c/b COPD 

exacerbation  who [facility] from the SN[facility] with acute SOB x 2days. In the ED pt 

was tachypneic and wheezing  and found to be in hypercarbic respiratory failure 

with PCO2 70's. She failed BiPAP trial and was intubated. 


In Ed VSS T 100.4, , /80, O2 sat 98%. Labs notable for WBC 10.3 

with 97% neuts, lactate 1.9, Trop <0.02. CXR with no acute infiltrate or 

pleural effusions. CTA done re C/f PE d/t recent hip surgery was negative for 

for PE. She was transferred to ICU for further management.   


 














- History Source


History Provided By: Medical Record





- Past Medical History


Cardio/Vascular: Yes: HTN


Pulmonary: Yes: COPD, Other (oxygen at home)


Gastrointestinal: Yes: Other (History of  ulcer disease diagnosed 3 years ago 

when patient presented with anemia)


Renal/: Yes: UTI


Musculoskeletal: Yes: Osteoarthritis





- Past Surgical History


Past Surgical History: Yes: None





- Alcohol/Substance Use


Hx Alcohol Use: No


History of Substance Use: reports: None





- Smoking History


Smoking history: Current every day smoker


Have you smoked in the past 12 months: No


Aproximately how many cigarettes per day: 0


If you are a former smoker, when did you quit?: Over 15 years ago





- Social History


ADL: Independent


Occupation: Retired dental , , 2 children


History of Recent Travel: No





Home Medications





- Allergies


Allergies/Adverse Reactions: 


 Allergies











Allergy/AdvReac Type Severity Reaction Status Date / Time


 


No Known Allergies Allergy   Verified 01/23/18 05:33














- Home Medications


Home Medications: 


Ambulatory Orders





Fluticasone/Salmeterol [Advair 250-50 Diskus] 1 each IH BID 06/14/12 


Tiotropium Bromide [Spiriva] 18 mcg IH DAILY 06/14/12 


Montelukast Na [Singulair -] 10 mg PO HS #0 tablet 06/19/12 


Multivitamins [Multivit ([facility] Formulary)] 1 udtab PO DAILY #0 tab 06/19/12 


Ferrous Sulfate [Feosol] 325 mg PO TID 09/17/12 


Calcium Carbonate/Vitamin D3 [Calcium 600-Vit D3 200 Tablet] 1 each PO DAILY 07/ 06/13 


Docosahexanoic Acid/Epa [Fish Oil Softgel] 1 each PO DAILY 07/06/13 


Potassium Chloride [K-Dur] 20 meq PO DAILY #0 tab.er.prt 07/06/13 


Alendronate Sodium [Fosamax] 35 mg PO COSTA 05/01/16 


Atorvastatin Ca [Lipitor] 10 mg PO HS 05/01/16 


Latanoprost 0.005% Eye Drops [Xalatan 0.005% Eye Drops -] 1 drop OU HS 05/01/16 


Diltiazem Cd [Cardizem Cd -] 120 mg PO BID  cap.cd.24h 01/05/18 


Enoxaparin [Lovenox -] 40 mg SQ DAILY  disp.syrin 01/05/18 


Loratadine [Claritin -] 10 mg PO DAILY  tablet 01/05/18 


Mineral Oil/Petrolat,Wht/Water [Eucerin (Small Jar) -] 1 applic TP DAILY  jar 01 /05/18 


Polyethylene Glycol 3350 [Miralax 119 gm Btl -] 17 gm PO BID  bottle 01/05/18 


Prednisone [Deltasone -] 10 mg PO DAILY  tablet 01/05/18 


Torsemide [Demadex -] 60 mg PO DAILY  tablet 01/05/18 


Acetaminophen [Tylenol .Regular Strength -] 650 mg PO Q6H PRN 01/23/18 


Albuterol 2.5/Ipratropium 0.5 [Duoneb -] 1 amp IH QSHIFT 01/23/18 


Ascorbic Acid [Vitamin C -] 500 mg PO TID 01/23/18 


Chlorpheniramine/Dextromethorp [Robitussin Long-Acting Liq] 15 ml PO Q6H 01/23/ 18 


Docusate Sodium [Colace -] 300 mg PO HS 01/23/18 


Menthol/Zinc Oxide [Calmoseptine Ointment] 71 gm TP QSHIFT 01/23/18 


Nystatin Oral Suspension - [Nystatin Oral Susp 217354 Units/5 ML -] 500,000 

units PO QID 01/23/18 


Omeprazole Magnesium [Prilosec Otc] 20 mg PO DAILY 01/23/18 


Sodium Chloride Nasal Spray [Ocean Spray Nasal Spray] 2 spray NS QSHIFT 01/23/ 18 











Family Disease History





- Family Disease History


Family Disease History: Heart Disease: Father, Other: Mother (Alzheimers 

Dementia)





Review of Systems


Unable to obtain ROS, reason: Pt intubated





Physical Exam


Vital Signs: 


 Vital Signs











Temperature  105 F H  01/23/18 19:30


 


Pulse Rate  124 H  01/23/18 21:28


 


Respiratory Rate  16   01/23/18 22:29


 


Blood Pressure  89/60   01/23/18 21:28


 


O2 Sat by Pulse Oximetry (%)  99   01/23/18 21:28











Constitutional: Yes: Well Nourished, No Distress


Eyes: Yes: PERRL


HENT: Yes: Atraumatic, Normocephalic


Neck: Yes: Supple, Trachea Midline


Cardiovascular: Yes: Regular Rate and Rhythm, S1, S2


Respiratory: Yes: CTA Bilaterally, Intubated


Gastrointestinal: Yes: Normal Bowel Sounds, Soft


Renal/: Yes: Rivera Present


Extremities: Yes: WNL


Edema: No


Peripheral Pulses WNL: Yes


Neurological: Yes: Other (Sedated RASS-4)


Labs: 


 CBC, BMP





 01/23/18 05:55 





 01/23/18 05:55 





CBC,CMP











WBC  10.3 K/mm3 (4.0-10.0)  H  01/23/18  05:55    


 


RBC  4.26 M/mm3 (3.60-5.2)   01/23/18  05:55    


 


Hgb  11.3 GM/dL (10.7-15.3)  D 01/23/18  05:55    


 


Hct  35.7 % (32.4-45.2)  D 01/23/18  05:55    


 


MCV  83.8 fl (80-96)   01/23/18  05:55    


 


MCH  26.6 pg (25.7-33.7)   01/23/18  05:55    


 


MCHC  31.7 g/dl (32.0-36.0)  L  01/23/18  05:55    


 


RDW  20.3 % (11.6-15.6)  H D 01/23/18  05:55    


 


Plt Count  209 K/MM3 (134-434)   01/23/18  05:55    


 


MPV  8.6 fl (7.5-11.1)   01/23/18  05:55    


 


Neutrophils %  97.7 % (42.8-82.8)  H  01/23/18  05:55    


 


Lymphocytes %  0.9 % (8-40)  L  01/23/18  05:55    


 


Monocytes %  0.7 % (3.8-10.2)  L D 01/23/18  05:55    


 


Eosinophils %  0.5 % (0-4.5)   01/23/18  05:55    


 


Basophils %  0.2 % (0-2.0)   01/23/18  05:55    


 


Sodium  138 mmol/L (136-145)   01/23/18  05:55    


 


Potassium  3.6 mmol/L (3.5-5.1)   01/23/18  05:55    


 


Chloride  94 mmol/L ()  L  01/23/18  05:55    


 


Carbon Dioxide  37 mmol/L (21-32)  H  01/23/18  05:55    


 


Anion Gap  7  (8-16)  L  01/23/18  05:55    


 


BUN  13 mg/dL (7-18)   01/23/18  05:55    


 


Creatinine  0.8 mg/dL (0.55-1.02)   01/23/18  05:55    


 


Creat Clearance w eGFR  > 60  (>60)   01/23/18  05:55    


 


Random Glucose  144 mg/dL ()  H  01/23/18  05:55    


 


Lactic Acid  1.9 mmol/L (0.0-2.0)   01/23/18  05:55    


 


Calcium  8.1 mg/dL (8.5-10.1)  L  01/23/18  05:55    


 


Phosphorus  3.5 mg/dL (2.5-4.9)   01/23/18  05:55    


 


Magnesium  1.8 mg/dL (1.8-2.4)   01/23/18  05:55    


 


Total Bilirubin  0.4 mg/dL (0.2-1.0)  D 01/23/18  05:55    


 


AST  24 U/L (15-37)   01/23/18  05:55    


 


ALT  52 U/L (12-78)   01/23/18  05:55    


 


Alkaline Phosphatase  195 U/L ()  H  01/23/18  05:55    


 


Creatine Kinase  24 IU/L ()  L  01/23/18  05:55    


 


Troponin I  < 0.02 ng/ml (0.00-0.05)   01/23/18  05:55    


 


B-Natriuretic Peptide  Cancelled   01/23/18  06:09    


 


Total Protein  6.5 g/dl (6.4-8.2)   01/23/18  05:55    


 


Albumin  3.1 g/dl (3.4-5.0)  L  01/23/18  05:55    








 Current Medications





Acetaminophen (Tylenol Suppository -)  325 mg TN Q4H PRN


   PRN Reason: FEVER


Chlorhexidine Gluconate (Hibiclens For Decolonization -)  1 applic TP HS [facility]


   Last Admin: 01/23/18 22:46 Dose:  1 applic


Enoxaparin Sodium (Lovenox -)  40 mg SQ DAILY [facility]


Midazolam HCl 100 mg/ Sodium (Chloride)  100 mls @ 2 mls/hr IVPB TITR [facility]; 2 MG/

HR


   PRN Reason: Protocol


   Last Titration: 01/23/18 19:30 Dose:  4 mg/hr, 4 mls/hr


Sodium Chloride (Normal Saline -)  1,000 mls @ 42 mls/hr IV ASDIR [facility]


   Last Admin: 01/23/18 14:28 Dose:  42 mls/hr


Latanoprost (Xalatan 0.005% Eye Drops -)  1 drop OU HS [facility]


Methylprednisolone Sodium Succinate (Solu-Medrol -)  40 mg IVPUSH Q6H-IV [facility]


   Last Admin: 01/23/18 20:56 Dose:  40 mg


Mupirocin (Bactroban Ointment (For Decolonization) -)  1 applic NS BID [facility]


   Stop: 01/28/18 21:59


   Last Admin: 01/23/18 22:46 Dose:  1 applic


Pantoprazole Sodium (Protonix Iv)  40 mg IVPUSH DAILY [facility]





 Vital Signs











 Period  Temp  Pulse  Resp  BP Sys/Ognzalez  Pulse Ox


 


 Last 24 Hr  100.3 F-105 F    16-30  /  














Imaging





- Results


Chest X-ray: Report Reviewed


Cat Scan: Report Reviewed (CTA)





Problem List





- Problems


(1) COPD exacerbation


Code(s): J44.1 - CHRONIC OBSTRUCTIVE PULMONARY DISEASE W (ACUTE) EXACERBATION   





(2) Respiratory failure


Code(s): J96.90 - RESPIRATORY FAILURE, UNSP, UNSP W HYPOXIA OR HYPERCAPNIA   


Qualifiers: 


   Chronicity: acute   Respiratory failure complication: hypoxia and 

hypercapnia   Qualified Code(s): J96.01 - Acute respiratory failure with hypoxia

; J96.02 - Acute respiratory failure with hypercapnia; J96.02 - Acute 

respiratory failure with hypercapnia; J96.02 - Acute respiratory failure with 

hypercapnia   





(3) Sepsis


Code(s): A41.9 - SEPSIS, UNSPECIFIED ORGANISM   


Qualifiers: 


   Sepsis type: sepsis due to unspecified organism   Qualified Code(s): A41.9 - 

Sepsis, unspecified organism   





(4) Acute and chronic respiratory failure with hypercapnia


Code(s): J96.22 - ACUTE AND CHRONIC RESPIRATORY FAILURE WITH HYPERCAPNIA   





(5) Acute on chronic respiratory failure with hypoxemia


Code(s): J96.21 - ACUTE AND CHRONIC RESPIRATORY FAILURE WITH HYPOXIA   





(6) CHF (congestive heart failure)


Code(s): I50.9 - HEART FAILURE, UNSPECIFIED   


Qualifiers: 


   Congestive heart failure type: diastolic   Congestive heart failure 

chronicity: chronic   Qualified Code(s): I50.32 - Chronic diastolic (congestive

) heart failure   





(7) Hypertension


Code(s): I10 - ESSENTIAL (PRIMARY) HYPERTENSION   





(8) SOB (shortness of breath)


Code(s): R06.02 - SHORTNESS OF BREATH   





Assessment/Plan





77 chace with PMHx of COPD, CHF, recent hip Fx and replacement 12/17 c/b COPD 

exacerbation  who brought in from the SNF with acute SOB x 2days. In the ED pt 

was tachypneic and wheezing. She was intubated for acute on chronic 

hypercapneic respiratory failure in the setting of COPD/CHF exacerbation in 

setting of possible HCAP. 





Plan:


Resp/ID: Hypercapeic respiratory failure requiring intubation


-Lung protective vent settings


-Wean FiO2 for O2 sat >92%


-Sedation for vent synchrony


-Daily SBT


-Diuresis for net neg 


-Cont nebs 


-Cont solumedrol 40mg qd


-ABG and CXR


-Cont Azithro, Zosyn and vanc for empiric HCAP coverage


-F/u pan cultures


-Pulm toilet


-Aspiration precautions





CV: CHF exacerbation


-Cont antiHTN meds


-Trend BNP 


-Diuresis


-TTE





Renal:


-Monitor UOP and BMP


-Replete electrolytes





Proph: GI and DVT prophylaxis





Marilou Rosado, ACNP


CC time 35mins /3 children, mother  breast cancer 70y/o, father dm, CABG 3, 2 sisters 1 sister with breast cancer,

## 2020-09-23 NOTE — PN
Progress Note, Physician


History of Present Illness: 





intubated


sedated








- Current Medication List


Current Medications: 


Active Medications





Acetaminophen (Tylenol Suppository -)  325 mg IA Q4H PRN


   PRN Reason: FEVER


Acetaminophen (Ofirmev Injection -)  1,000 mg IVPB Q6H PRN


   PRN Reason: FEVER


Albuterol Sulfate (Ventolin 0.083% Nebulizer Soln -)  1 amp NEB Q4H PRN


   PRN Reason: SHORT OF BREATH/WHEEZING


   Last Admin: 01/26/18 08:07 Dose:  1 amp


Atorvastatin Calcium (Lipitor -)  10 mg PO HS ELI


   Last Admin: 01/27/18 21:40 Dose:  10 mg


Diltiazem HCl (Cardizem Cd -)  120 mg PO BID ELI


   Last Admin: 01/28/18 10:52 Dose:  Not Given


Enoxaparin Sodium (Lovenox -)  40 mg SQ DAILY ELI


   Last Admin: 01/28/18 10:51 Dose:  40 mg


Ferrous Sulfate (Feosol -)  325 mg PO TID ELI


   Last Admin: 01/28/18 05:08 Dose:  325 mg


Propofol (Diprivan -)  1,000,000 mcg in 100 mls @ 1.666 mls/hr IVPB TITR ELI; 5 

MCG/KG/MIN


   PRN Reason: Protocol


   Last Admin: 01/27/18 15:57 Dose:  15 mcg/kg/min, 4.997 mls/hr


Fentanyl 500 mcg/ Sodium (Chloride)  100 mls @ 10 mls/hr IVPB TITR ELI; 50 MCG/

HR


   PRN Reason: Protocol


   Last Admin: 01/27/18 15:58 Dose:  10 mls/hr


Sodium Chloride (Normal Saline -)  1,000 mls @ 100 mls/hr IV ASDIR ELI


   Last Admin: 01/28/18 05:09 Dose:  100 mls/hr


Vasopressin 50 units/ Sodium (Chloride)  100 mls @ 24 mls/hr IVPB TITR ELI; 0.2 

UNITS/MIN


   PRN Reason: Protocol


   Last Titration: 01/28/18 02:25 Dose:  0.03 units/min, 4 mls/hr


Norepinephrine Bitartrate 8, (000 mcg/ Sodium Chloride)  500 mls @ 18.75 mls/hr 

IV TITR ELI; 5 MCG/MIN


   PRN Reason: Protocol


   Last Titration: 01/28/18 05:09 Dose:  1 mcg/min, 3.75 mls/hr


Piperacillin Sod/Tazobactam (Sod 2.25 gm/ Dextrose)  100 mls @ 100 mls/hr IVPB 

Q6H-IV [facility]


   Last Admin: 01/28/18 10:58 Dose:  100 mls/hr


Latanoprost (Xalatan 0.005% Eye Drops -)  1 drop OU HS [facility]


   Last Admin: 01/27/18 21:42 Dose:  1 drop


Loratadine (Claritin -)  10 mg PO DAILY [facility]


   Last Admin: 01/28/18 10:52 Dose:  10 mg


Methylprednisolone Sodium Succinate (Solu-Medrol -)  40 mg IVPUSH Q6H-IV [facility]


   Last Admin: 01/28/18 10:51 Dose:  40 mg


Montelukast Sodium (Singulair -)  10 mg PO HS [facility]


   Last Admin: 01/27/18 21:40 Dose:  10 mg


Multivitamins/Minerals/Vitamin C (Tab-A-Vit -)  1 tab PO DAILY [facility]


   Last Admin: 01/28/18 10:52 Dose:  1 tab


Pantoprazole Sodium (Protonix Iv)  40 mg IVPUSH DAILY [facility]


   Last Admin: 01/28/18 10:51 Dose:  40 mg


Tiotropium Bromide (Spiriva -)  1 puff IH DAILY [facility]


   Last Admin: 01/28/18 10:52 Dose:  Not Given











- Objective


Vital Signs: 


 Vital Signs











Temperature  99.9 F H  01/28/18 14:00


 


Pulse Rate  66   01/28/18 14:00


 


Respiratory Rate  14   01/28/18 15:20


 


Blood Pressure  127/65   01/28/18 14:00


 


O2 Sat by Pulse Oximetry (%)  100   01/28/18 09:14











Constitutional: Yes: Other


Cardiovascular: Yes: Regular Rate and Rhythm


Respiratory: Yes: Intubated, Mechanically Ventilated


Gastrointestinal: Yes: Normal Bowel Sounds, Soft


Musculoskeletal: Yes: WNL


Extremities: Yes: WNL


Neurological: Yes: Other


Labs: 


 CBC, BMP





 01/28/18 06:15 





 01/28/18 06:15 





 INR, PTT











INR  0.95  (0.82-1.09)   01/26/18  09:50    














- ....Imaging


Chest X-ray: Report Reviewed, Image Reviewed





Assessment/Plan








Problem List





- Problems


(1) Acute and chronic respiratory failure with hypercapnia


Code(s): J96.22 - ACUTE AND CHRONIC RESPIRATORY FAILURE WITH HYPERCAPNIA   





(2) COPD exacerbation


Code(s): J44.1 - CHRONIC OBSTRUCTIVE PULMONARY DISEASE W (ACUTE) EXACERBATION   





(3) CHF (congestive heart failure)


Code(s): I50.9 - HEART FAILURE, UNSPECIFIED   


Qualifiers: 


   Congestive heart failure type: diastolic   Congestive heart failure 

chronicity: chronic   Qualified Code(s): I50.32 - Chronic diastolic (congestive

) heart failure   





(4) Fracture, hip


Code(s): S72.009A - FRACTURE OF UNSP PART OF NECK OF UNSP FEMUR, INIT   


Qualifiers: 


   Encounter type: initial encounter   Fracture type: closed   Laterality: left

   Qualified Code(s): S72.002A - Fracture of unspecified part of neck of left 

femur, initial encounter for closed fracture   





(5) Hypertension


Code(s): I10 - ESSENTIAL (PRIMARY) HYPERTENSION   





6 pneumonia





7 leukocytosis





8 uti


plan








lcx of the urine noted





plan


continue zosyn


supportive measures


nutrition


monitor for any changes and mental respose


rest as per icu


patient continues to be critical





cc time 40 min